# Patient Record
Sex: MALE | Race: WHITE | NOT HISPANIC OR LATINO | Employment: FULL TIME | URBAN - METROPOLITAN AREA
[De-identification: names, ages, dates, MRNs, and addresses within clinical notes are randomized per-mention and may not be internally consistent; named-entity substitution may affect disease eponyms.]

---

## 2017-01-06 ENCOUNTER — OFFICE VISIT (OUTPATIENT)
Dept: DERMATOLOGY | Facility: CLINIC | Age: 57
End: 2017-01-06

## 2017-01-06 DIAGNOSIS — L60.2 ONYCHAUXIS: Primary | ICD-10-CM

## 2017-01-06 DIAGNOSIS — L82.1 SK (SEBORRHEIC KERATOSIS): ICD-10-CM

## 2017-01-06 RX ORDER — LIDOCAINE HYDROCHLORIDE AND EPINEPHRINE 10; 10 MG/ML; UG/ML
3 INJECTION, SOLUTION INFILTRATION; PERINEURAL ONCE
Qty: 3 ML | Refills: 0 | OUTPATIENT
Start: 2017-01-06 | End: 2017-01-06

## 2017-01-06 RX ORDER — UREA 40 %
CREAM (GRAM) TOPICAL 2 TIMES DAILY
Qty: 198 G | Refills: 5 | Status: SHIPPED | OUTPATIENT
Start: 2017-01-06

## 2017-01-06 ASSESSMENT — PAIN SCALES - GENERAL: PAINLEVEL: NO PAIN (0)

## 2017-01-06 NOTE — Clinical Note
"1/6/2017       RE: Luigi Moore  [address]  [place] [address]     Dear Colleague,    Thank you for referring your patient, Luigi Moore, to the [facility] DERMATOLOGY at [facility]. Please see a copy of my visit note below.    [facility] Dermatology Note    Dermatology Problem List:  1. 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 Onychauxis s/p nail clipping  - urea 40% cream    CC:   Chief Complaint   Patient presents with     Derm Problem     uLigi comes to clinic stating he has toe fungus and an area of concern on his left temple.     Date of Service: Jan 6, 2017    History of Present Illness:  Mr. Luigi Moore is a 56 year old male who presents as a referral from Dr. Yuan for an evaluation of his feet and left temple. Today the patient reports that he has always had the \"stuff\" on his feet. He states that the right toe nail has fallen off a few times which he believes is due to his work. He also notes that his toenails are very thick and they look a \"little weird\". He states that he has a high mental distaste for his feet. He denies any pain or discomfort when he walks. He also reports that he did not notice pits on his fingernails before clinic today. He states that when he is hot in temperature he develops a pink rash on his upper chest, but this has never been of particular concern to him. The patient also reports there is a spot of concern on his temple and thigh that come and go as pink little spots. The patient reports no other lesions of concern at this time.    Otherwise, the patient reports no painful, bleeding, nonhealing, or pruritic lesions, and denies new or changing moles.    Past Medical History:   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)     Acquired hypothyroidism     Dyslipidemia     Essential hypertension     Hyperlipidemia     " Proliferative diabetic retinopathy (H)     Past Medical History   Diagnosis Date     Hypertension      Sleep apnea      Numbness and tingling      right hand     Diabetes (H)      TELLY (obstructive sleep apnea)      dx at least 10 years ago, got a new cpap machine 1 year ago. follows in [facility]     Depressive disorder      Hidradenoma dx: Feb 2015     right hand/2 surgeries     Hypothyroidism      Cancer (H)      Uncomplicated asthma      Acquired hypothyroidism 12/7/2016     Past Surgical History   Procedure Laterality Date     Soft tissue surgery       lipoma removal, below rib cage on right side     Orthopedic surgery Left      achilles tendon      Orthopedic surgery Right      hand surgery     Excise lesion hand Right 2/26/2015     Procedure: EXCISE LESION HAND;  Surgeon: Jeovany Jefferson MD;  Location: [facility] OR     C hand/finger surgery unlisted       Social History:  The patient works with [employer]. He bikes a lot during his free time.     Family History:  There is no family history of psoriasis.    Medications:  Current Outpatient Prescriptions   Medication Sig Dispense Refill     lisinopril (PRINIVIL/ZESTRIL) 40 MG tablet        insulin lispro (HUMALOG) 100 UNIT/ML injection Use as directed in pump (approximately 70-80 Units every day) 80 mL 3     buPROPion (WELLBUTRIN XL) 300 MG 24 hr tablet Take 1 tablet (300 mg) by mouth every morning 30 tablet 11     ARIPiprazole (ABILIFY) 15 MG tablet Take 1 tablet (15 mg) by mouth daily 30 tablet 11     rizatriptan (MAXALT) 10 MG tablet Take 1 tablet (10 mg) by mouth at onset of headache for migraine 12 tablet 11     atorvastatin (LIPITOR) 40 MG tablet Take 1 tablet (40 mg) by mouth daily 90 tablet 3     levothyroxine (SYNTHROID/LEVOTHROID) 137 MCG tablet Take one tablet daily. 90 tablet 3     glucagon (GLUCAGON EMERGENCY) 1 MG kit Inject 1 mg into the muscle once for 1 dose 1 mg 3     gabapentin (NEURONTIN) 400 MG capsule Take 1 capsule (400 mg) by mouth 3 times daily  270 capsule 3     blood glucose monitoring (SHEILA CONTOUR NEXT) test strip Use to test blood sugar 4 times daily 400 strip 3     ONE TOUCH TEST STRIPS test strip Use to test blood sugar 4 times daily or as directed. 400 strip 3     losartan-hydrochlorothiazide (HYZAAR) 100-25 MG per tablet Take 1 tablet by mouth daily 90 tablet 3     zolpidem (AMBIEN) 10 MG tablet Take 1 tablet at bedtime as needed for insomnia 90 tablet 3     tadalafil (CIALIS) 20 MG tablet Take 1 hr prior to sexual activity 10 tablet 11     acetaminophen-codeine (TYLENOL #3) 300-30 MG per tablet Take 1-2 tablets by mouth every 6 hours as needed for moderate pain 60 tablet 1     albuterol (PROAIR HFA, PROVENTIL HFA, VENTOLIN HFA) 108 (90 BASE) MCG/ACT inhaler Inhale 2 puffs into the lungs every 4 hours as needed for shortness of breath / dyspnea or wheezing 1 Inhaler 3     aspirin 81 MG tablet Take 1 tablet (81 mg) by mouth daily 100 tablet 3     ONE TOUCH ULTRA test strip Test  four times daily ultra blue (please schedule clinic appt) 400 strip 3     Allergies:  Allergies   Allergen Reactions     Contrast Dye Hives     Review of Systems:  - Skin: As above in HPI. No additional skin concerns.    Physical exam:  Vitals: There were no vitals taken for this visit.  GEN: This is a well developed, well-nourished male in no acute distress, in a pleasant mood.      SKIN: Focused examination of the hands, feet, upper chest was performed.  - All toenails but left 5th toenail demonstrate white to yellowish thickening of the nail plate with subungual hyperkeratosis on many toenails   - On the the left 2nd, 3rd toenail and right 2nd, 3rd hyponychia areas there is trauma hyperkeratosis and scaling  - No evidence of tinea pedis  - Finger nails notable for irregularly spaces and superficial pits on the right 2nd finger nail, solitary pit on the right 3rd fingernail, left 4th finger nail, multiple on left 2nd finger nail  - Pale pink erythematous patch on the left  upper chest  - Pink 3 mm stuck on papule  - No other lesions of concern on areas examined.     Impression/Plan:  1. Onychauxis   - Start urea 40% cream - apply topically to bid to surface of toenails  - Nail clipping done at clinic.  - Discussion that trauma can cause thick skin on the feet due to rubbing against shoes.  - Discussion of treatment options including oral and topical medications and the side effects.    2. Seborrheic keratosis, non inflamed  - Benign nature was discussed. No further intervention required at this time.           Follow-up pending nail clipping results, earlier for new or changing lesions.    Staff Involved:  Staff Only    Scribe Disclosure:   I, Ayla Dawn, am serving as a scribe to document services personally performed by Dr. Pablito Barahona, based on data collection and the provider's statements to me.       Again, thank you for allowing me to participate in the care of your patient.      Sincerely,    Pablito Barahona MD

## 2017-01-06 NOTE — NURSING NOTE
Dermatology Rooming Note    Luigi Moore's goals for this visit include:   Chief Complaint   Patient presents with     Derm Problem     Luigi comes to clinic stating he has toe fungus and an area of concern on his left temple.     Afsaneh Terry LPN

## 2017-01-06 NOTE — PROGRESS NOTES
"[facility] Dermatology Note    Dermatology Problem List:  1. 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 Onychauxis s/p nail clipping  - urea 40% cream    CC:   Chief Complaint   Patient presents with     Derm Problem     Luigi comes to clinic stating he has toe fungus and an area of concern on his left temple.     Date of Service: Jan 6, 2017    History of Present Illness:  Mr. Luigi Moore is a 56 year old male who presents as a referral from Dr. Yuan for an evaluation of his feet and left temple. Today the patient reports that he has always had the \"stuff\" on his feet. He states that the right toe nail has fallen off a few times which he believes is due to his work. He also notes that his toenails are very thick and they look a \"little weird\". He states that he has a high mental distaste for his feet. He denies any pain or discomfort when he walks. He also reports that he did not notice pits on his fingernails before clinic today. He states that when he is hot in temperature he develops a pink rash on his upper chest, but this has never been of particular concern to him. The patient also reports there is a spot of concern on his temple and thigh that come and go as pink little spots. The patient reports no other lesions of concern at this time.    Otherwise, the patient reports no painful, bleeding, nonhealing, or pruritic lesions, and denies new or changing moles.    Past Medical History:   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)     Acquired hypothyroidism     Dyslipidemia     Essential hypertension     Hyperlipidemia     Proliferative diabetic retinopathy (H)     Past Medical History   Diagnosis Date     Hypertension      Sleep apnea      Numbness and tingling      right hand     Diabetes (H)      TELLY (obstructive sleep apnea)      dx at least 10 years ago, got a new cpap machine 1 year ago. follows in " [facility] [place] [place]     Depressive disorder      Hidradenoma dx: Feb 2015     right hand/2 surgeries     Hypothyroidism      Cancer (H)      Uncomplicated asthma      Acquired hypothyroidism 12/7/2016     Past Surgical History   Procedure Laterality Date     Soft tissue surgery       lipoma removal, below rib cage on right side     Orthopedic surgery Left      achilles tendon      Orthopedic surgery Right      hand surgery     Excise lesion hand Right 2/26/2015     Procedure: EXCISE LESION HAND;  Surgeon: Jeovany Jefferson MD;  Location: [facility] OR     C hand/finger surgery unlisted       Social History:  The patient works with [employer]. He bikes a lot during his free time.     Family History:  There is no family history of psoriasis.    Medications:  Current Outpatient Prescriptions   Medication Sig Dispense Refill     lisinopril (PRINIVIL/ZESTRIL) 40 MG tablet        insulin lispro (HUMALOG) 100 UNIT/ML injection Use as directed in pump (approximately 70-80 Units every day) 80 mL 3     buPROPion (WELLBUTRIN XL) 300 MG 24 hr tablet Take 1 tablet (300 mg) by mouth every morning 30 tablet 11     ARIPiprazole (ABILIFY) 15 MG tablet Take 1 tablet (15 mg) by mouth daily 30 tablet 11     rizatriptan (MAXALT) 10 MG tablet Take 1 tablet (10 mg) by mouth at onset of headache for migraine 12 tablet 11     atorvastatin (LIPITOR) 40 MG tablet Take 1 tablet (40 mg) by mouth daily 90 tablet 3     levothyroxine (SYNTHROID/LEVOTHROID) 137 MCG tablet Take one tablet daily. 90 tablet 3     glucagon (GLUCAGON EMERGENCY) 1 MG kit Inject 1 mg into the muscle once for 1 dose 1 mg 3     gabapentin (NEURONTIN) 400 MG capsule Take 1 capsule (400 mg) by mouth 3 times daily 270 capsule 3     blood glucose monitoring (SHEILA CONTOUR NEXT) test strip Use to test blood sugar 4 times daily 400 strip 3     ONE TOUCH TEST STRIPS test strip Use to test blood sugar 4 times daily or as directed. 400 strip 3     losartan-hydrochlorothiazide (HYZAAR) 100-25 MG per  tablet Take 1 tablet by mouth daily 90 tablet 3     zolpidem (AMBIEN) 10 MG tablet Take 1 tablet at bedtime as needed for insomnia 90 tablet 3     tadalafil (CIALIS) 20 MG tablet Take 1 hr prior to sexual activity 10 tablet 11     acetaminophen-codeine (TYLENOL #3) 300-30 MG per tablet Take 1-2 tablets by mouth every 6 hours as needed for moderate pain 60 tablet 1     albuterol (PROAIR HFA, PROVENTIL HFA, VENTOLIN HFA) 108 (90 BASE) MCG/ACT inhaler Inhale 2 puffs into the lungs every 4 hours as needed for shortness of breath / dyspnea or wheezing 1 Inhaler 3     aspirin 81 MG tablet Take 1 tablet (81 mg) by mouth daily 100 tablet 3     ONE TOUCH ULTRA test strip Test  four times daily ultra blue (please schedule clinic appt) 400 strip 3     Allergies:  Allergies   Allergen Reactions     Contrast Dye Hives     Review of Systems:  - Skin: As above in HPI. No additional skin concerns.    Physical exam:  Vitals: There were no vitals taken for this visit.  GEN: This is a well developed, well-nourished male in no acute distress, in a pleasant mood.      SKIN: Focused examination of the hands, feet, upper chest was performed.  - All toenails but left 5th toenail demonstrate white to yellowish thickening of the nail plate with subungual hyperkeratosis on many toenails   - On the the left 2nd, 3rd toenail and right 2nd, 3rd hyponychia areas there is trauma hyperkeratosis and scaling  - No evidence of tinea pedis  - Finger nails notable for irregularly spaces and superficial pits on the right 2nd finger nail, solitary pit on the right 3rd fingernail, left 4th finger nail, multiple on left 2nd finger nail  - Pale pink erythematous patch on the left upper chest  - Pink 3 mm stuck on papule  - No other lesions of concern on areas examined.     Impression/Plan:  1. Onychauxis   - Start urea 40% cream - apply topically to bid to surface of toenails  - Nail clipping done today for PAS.   - ADDENDUM: negative for fungal elements  -  Discussion that trauma can cause thick skin on the feet due to rubbing against shoes.  - Discussion of treatment options including oral and topical medications and the side effects.    2. Seborrheic keratosis, non inflamed  - Benign nature was discussed. No further intervention required at this time.     Follow-up pending nail clipping results, earlier for new or changing lesions.    Staff Involved:  Staff Only    Scribe Disclosure:   I, Ayla Dawn, am serving as a scribe to document services personally performed by Dr. Pablito Barahona, based on data collection and the provider's statements to me.     Staff attestation:  The documentation recorded by the scribe accurately reflects the services I personally performed and the decisions I personally made.    Pablito Barahona MD  Staff Dermatologist    Department of Dermatology

## 2017-01-09 LAB — COPATH REPORT: NORMAL

## 2017-01-23 ENCOUNTER — OFFICE VISIT (OUTPATIENT)
Dept: UROLOGY | Facility: CLINIC | Age: 57
End: 2017-01-23

## 2017-01-23 VITALS
HEIGHT: 76 IN | SYSTOLIC BLOOD PRESSURE: 126 MMHG | WEIGHT: 235 LBS | BODY MASS INDEX: 28.62 KG/M2 | DIASTOLIC BLOOD PRESSURE: 77 MMHG | HEART RATE: 97 BPM

## 2017-01-23 DIAGNOSIS — N52.1 ERECTILE DYSFUNCTION DUE TO DISEASES CLASSIFIED ELSEWHERE: Primary | ICD-10-CM

## 2017-01-23 ASSESSMENT — PAIN SCALES - GENERAL: PAINLEVEL: NO PAIN (0)

## 2017-01-23 NOTE — MR AVS SNAPSHOT
After Visit Summary   1/23/2017    Luigi Moore    MRN: [id number]           Patient Information     Date Of Birth          1960        Visit Information        Provider Department      1/23/2017 9:00 AM Fidel Carlisle MD [facility] and [facility]        Today's Diagnoses     Erectile dysfunction due to diseases classified elsewhere    -  1       Care Instructions    Can try the Edex medicine as we discussed - would start with the 10 mcg dose - this will be half the full dose in the syringe we ordered.  Can increase to 15 mcg (3/4 of the full dose) or 20 mcg (the full dose) if necessary.    Goal is to have an erection that lasts 30 min to an hour - if you have a very rigid, firm erection that is not going away after 3 hrs you should contact us or to to your local ER.  You can apply ice packs to the inner things if you have an erection that is not resolving.            Follow-ups after your visit        Follow-up notes from your care team     Return in about 3 months (around 4/23/2017).      Your next 10 appointments already scheduled     Mar 17, 2017 12:00 PM   (Arrive by 11:45 AM)   RETURN FOOT/ANKLE with Tony Johnson DPM   [facility] ([facility])    [address] [place] [id number] [phone]            May 01, 2017  8:20 AM   (Arrive by 8:05 AM)   Return Visit with Fidel Carlisle MD   [facility] and [facility] ([facility])    [address] [place] [id number] [phone]              Who to contact     Please call your clinic at [phone] to:    Ask questions about your health    Make or cancel appointments    Discuss your medicines    Learn about your test results    Speak to your doctor   If you have compliments or concerns about an experience at your clinic, or if you wish  "to file a complaint, please contact [facility] Patient Relations at [phone] or email us at [email or web address]         Additional Information About Your Visit        [employer] [facility] Information     [employer] Experience gives you secure access to your electronic health record. If you see a primary care provider, you can also send messages to your care team and make appointments. If you have questions, please call your primary care clinic.  If you do not have a primary care provider, please call [phone] and they will assist you.      [employer] is an electronic gateway that provides easy, online access to your medical records. With [employer], you can request a clinic appointment, read your test results, renew a prescription or communicate with your care team.     To access your existing account, please contact your [facility] or call [phone] for assistance.        Care EveryWhere ID     This is your Care EveryWhere ID. This could be used by other organizations to access your [place] medical records  [id number]        Your Vitals Were     Pulse Height BMI (Body Mass Index)             97 1.93 m (6' 4\") 28.62 kg/m2          Blood Pressure from Last 3 Encounters:   01/23/17 126/77   12/19/16 129/76   12/07/16 157/78    Weight from Last 3 Encounters:   01/23/17 106.595 kg (235 lb)   12/19/16 106.278 kg (234 lb 4.8 oz)   12/16/16 106.369 kg (234 lb 8 oz)              Today, you had the following     No orders found for display         Today's Medication Changes          These changes are accurate as of: 1/23/17  9:32 AM.  If you have any questions, ask your nurse or doctor.               Start taking these medicines.        Dose/Directions    alprostadil 20 MCG kit   Commonly known as:  EDEX   Used for:  Erectile dysfunction due to diseases classified elsewhere   Started by:  Fidel Carlisle MD        Inject 10 to 20 ug as needed.   Quantity:  120 mcg "   Refills:  11         Stop taking these medicines if you haven't already. Please contact your care team if you have questions.     lisinopril 40 MG tablet   Commonly known as:  PRINIVIL/ZESTRIL   Stopped by:  Fidel Carlisle MD                Where to get your medicines      Some of these will need a paper prescription and others can be bought over the counter.  Ask your nurse if you have questions.     Bring a paper prescription for each of these medications    - alprostadil 20 MCG kit             Primary Care Provider Office Phone # Fax #    Donn Panfilo Yuan [phone]        [facility] [address]  [facility] [address]        Thank you!     Thank you for choosing [facility]OLOG[facility] AND [facility] PROST[facility] UROLOGIC [facility]CERS  for your care. Our goal is always to provide you with excellent care. Hearing back from our patients is one way we can continue to improve our services. Please take a few minutes to complete the written survey that you may receive in the mail after your visit with us. Thank you!             Your Updated Medication List - Protect others around you: Learn how to safely use, store and throw away your medicines at www.disposemymeds.org.          This list is accurate as of: 1/23/17  9:32 AM.  Always use your most recent med list.                   Brand Name Dispense Instructions for use    acetaminophen-codeine 300-30 MG per tablet    TYLENOL #3    60 tablet    Take 1-2 tablets by mouth every 6 hours as needed for moderate pain       albuterol 108 (90 BASE) MCG/ACT Inhaler    PROAIR HFA/PROVENTIL HFA/VENTOLIN HFA    1 Inhaler    Inhale 2 puffs into the lungs every 4 hours as needed for shortness of breath / dyspnea or wheezing       alprostadil 20 MCG kit    EDEX    120 mcg    Inject 10 to 20 ug as needed.       ARIPiprazole 15 MG tablet    ABILIFY    30 tablet    Take 1 tablet (15 mg) by mouth daily       aspirin 81 MG tablet     100 tablet    Take 1 tablet  (81 mg) by mouth daily       atorvastatin 40 MG tablet    LIPITOR    90 tablet    Take 1 tablet (40 mg) by mouth daily       buPROPion 300 MG 24 hr tablet    WELLBUTRIN XL    30 tablet    Take 1 tablet (300 mg) by mouth every morning       gabapentin 400 MG capsule    NEURONTIN    270 capsule    Take 1 capsule (400 mg) by mouth 3 times daily       glucagon 1 MG kit    GLUCAGON EMERGENCY    1 mg    Inject 1 mg into the muscle once for 1 dose       insulin lispro 100 UNIT/ML injection    humaLOG    80 mL    Use as directed in pump (approximately 70-80 Units every day)       levothyroxine 137 MCG tablet    SYNTHROID/LEVOTHROID    90 tablet    Take one tablet daily.       losartan-hydrochlorothiazide 100-25 MG per tablet    HYZAAR    90 tablet    Take 1 tablet by mouth daily       * ONE TOUCH ULTRA test strip   Generic drug:  blood glucose monitoring     400 strip    Test  four times daily ultra blue (please schedule clinic appt)       * blood glucose monitoring test strip    SHEILA CONTOUR NEXT    400 strip    Use to test blood sugar 4 times daily       * ONE TOUCH TEST STRIPS test strip   Generic drug:  blood glucose monitoring     400 strip    Use to test blood sugar 4 times daily or as directed.       rizatriptan 10 MG tablet    MAXALT    12 tablet    Take 1 tablet (10 mg) by mouth at onset of headache for migraine       tadalafil 20 MG tablet    CIALIS    10 tablet    Take 1 hr prior to sexual activity       Urea 40 % Crea     198 g    Externally apply topically 2 times daily To surface of toenails       zolpidem 10 MG tablet    AMBIEN    90 tablet    Take 1 tablet at bedtime as needed for insomnia       * Notice:  This list has 3 medication(s) that are the same as other medications prescribed for you. Read the directions carefully, and ask your doctor or other care provider to review them with you.

## 2017-01-23 NOTE — Clinical Note
1/23/2017       RE: Luigi Moore  [address]  [place] [address]     Dear Colleague,    Thank you for referring your patient, Luigi Moore, to the [facility] UROLOGY AND INST FOR PROSTATE AND UROLOGIC CANCERS at [facility]. Please see a copy of my visit note below.    DIAGNOSIS:  A 56-year-old man with erectile dysfunction, here for a trial and teaching of penile injection therapy.      HISTORY OF PRESENT ILLNESS:  We had seen Mr. Moore in mid December for erectile dysfunction that did not respond to phosphodiesterase inhibitors.  We discussed treatment options with him and he was interested in a trial of injection therapy.      He returns today for instruction in penile injection therapy and for a trial injection in clinic.      He is feeling well.      We reviewed the risks of injection therapy including the risks of bleeding, infection, prolonged erection and fibrosis.  After discussing the indications and risks, he would like to proceed.      PHYSICAL EXAMINATION:  On exam today he appears well.  His weight is 235 pounds, pulse 97, blood pressure 126/77.        PROCEDURE:  Under my supervision and direction, we instructed him in how to prepare an injection of Edex brand of alprostadil.  We had him inject 10 mcg into the right lateral shaft of the penis.  He had no difficulty with the injection.       After about 10 minutes he was getting partial tumescence.  He would rate it about 5 on a scale of    0-10.  He says this is more than he would get at home just with sexual stimulation.  It is about the same that he was getting with the phosphodiesterase inhibitors.  He was not experiencing any significant pain following the injection.       ASSESSMENT:  The patient is a 56-year-old man with erectile dysfunction unresponsive to phosphodiesterase inhibitors.  The patient has multiple risk factors including age, type 1 diabetes and hypertension as well as  depression.      I suggested to Mr. Moore that we give him the Edex 20 mcg kit.  We will start with the 10 mcg dose at home initially, which is the dose he used here today.  He may get a better response at home under different circumstances.  He can titrate the dose up to 15 and to 20 mcg as necessary.  We discussed the goal was to get an erection firm enough for intercourse that lasts about a half an hour to an hour.  He understands if he gets a very rigid, hard erection which is not resolving after 3 hours he needs to go to the emergency room or contact us.  We did discuss that he could use ice packs to the inner thighs if necessary if he has an erection that is not resolving.       PLAN:   1.  Trial of Edex 20 mcg kit, titrating up from 10 up to 20 mcg as necessary.   2.  The patient will contact us if he has questions or problems.    3.  Otherwise, we will plan to see him back in 3 months to follow up.     Again, thank you for allowing me to participate in the care of your patient.      Sincerely,    Fidel Carlisle MD

## 2017-01-23 NOTE — NURSING NOTE
"Chief Complaint   Patient presents with     RECHECK     Follow up- erectile dysfunction with injection teaching       Initial Ht 1.93 m (6' 4\")  Wt 106.595 kg (235 lb)  BMI 28.62 kg/m2 Estimated body mass index is 28.62 kg/(m^2) as calculated from the following:    Height as of this encounter: 1.93 m (6' 4\").    Weight as of this encounter: 106.595 kg (235 lb).  BP completed using cuff size: FALLON Lepe    "

## 2017-01-23 NOTE — PATIENT INSTRUCTIONS
Can try the Edex medicine as we discussed - would start with the 10 mcg dose - this will be half the full dose in the syringe we ordered.  Can increase to 15 mcg (3/4 of the full dose) or 20 mcg (the full dose) if necessary.    Goal is to have an erection that lasts 30 min to an hour - if you have a very rigid, firm erection that is not going away after 3 hrs you should contact us or to to your local ER.  You can apply ice packs to the inner things if you have an erection that is not resolving.

## 2017-01-23 NOTE — PROGRESS NOTES
DIAGNOSIS:  A 56-year-old man with erectile dysfunction, here for a trial and teaching of penile injection therapy.      HISTORY OF PRESENT ILLNESS:  We had seen Mr. Moore in mid December for erectile dysfunction that did not respond to phosphodiesterase inhibitors.  We discussed treatment options with him and he was interested in a trial of injection therapy.      He returns today for instruction in penile injection therapy and for a trial injection in clinic.      He is feeling well.      We reviewed the risks of injection therapy including the risks of bleeding, infection, prolonged erection and fibrosis.  After discussing the indications and risks, he would like to proceed.      PHYSICAL EXAMINATION:  On exam today he appears well.  His weight is 235 pounds, pulse 97, blood pressure 126/77.        PROCEDURE:  Under my supervision and direction, we instructed him in how to prepare an injection of Edex brand of alprostadil.  We had him inject 10 mcg into the right lateral shaft of the penis.  He had no difficulty with the injection.       After about 10 minutes he was getting partial tumescence.  He would rate it about 5 on a scale of    0-10.  He says this is more than he would get at home just with sexual stimulation.  It is about the same that he was getting with the phosphodiesterase inhibitors.  He was not experiencing any significant pain following the injection.       ASSESSMENT:  The patient is a 56-year-old man with erectile dysfunction unresponsive to phosphodiesterase inhibitors.  The patient has multiple risk factors including age, type 1 diabetes and hypertension as well as depression.      I suggested to Mr. Moore that we give him the Edex 20 mcg kit.  We will start with the 10 mcg dose at home initially, which is the dose he used here today.  He may get a better response at home under different circumstances.  He can titrate the dose up to 15 and to 20 mcg as necessary.  We discussed the goal was to  get an erection firm enough for intercourse that lasts about a half an hour to an hour.  He understands if he gets a very rigid, hard erection which is not resolving after 3 hours he needs to go to the emergency room or contact us.  We did discuss that he could use ice packs to the inner thighs if necessary if he has an erection that is not resolving.       PLAN:   1.  Trial of Edex 20 mcg kit, titrating up from 10 up to 20 mcg as necessary.   2.  The patient will contact us if he has questions or problems.    3.  Otherwise, we will plan to see him back in 3 months to follow up.

## 2017-02-28 ENCOUNTER — TELEPHONE (OUTPATIENT)
Dept: ENDOCRINOLOGY | Facility: CLINIC | Age: 57
End: 2017-02-28

## 2017-02-28 NOTE — TELEPHONE ENCOUNTER
Received refill request for Tylenol #3 that  Luigi  has been getting from his Endocrinologist that has retired.  I left a message for Luigi that he needs to establish care with  A PCP  for  Management of  Pain  Or contact  another provider  for refills. If a PCP is needed we can help him get  established. .

## 2017-03-13 DIAGNOSIS — E10.8 TYPE 1 DIABETES MELLITUS WITH COMPLICATIONS (H): ICD-10-CM

## 2017-03-13 NOTE — TELEPHONE ENCOUNTER
acetaminophen-codeine (TYLENOL #3) 300-30 MG per tablet      Last Written Prescription Date:  6/7/2016  Last Fill Quantity: 60,   # refills: 1  Last Office Visit : 12/7/2016  Future Office visit:  Not scheduled    Routing refill request to on-call provider for review/approval because:  Drug controlled substance.  Previous patient of Dr Yuan's and not established with another provider yet.

## 2017-03-16 ASSESSMENT — ACTIVITIES OF DAILY LIVING (ADL)
ARE_THERE_SMOKE_DETECTORS_IN_YOUR_HOME?: Y
DO_MEMBERS_OF_YOUR_HOUSEHOLD_WEAR_SEAT_BELTS?: Y
DO_MEMBERS_OF_YOUR_HOUSEHOLD_USE_SUNSCREEN?: Y
ARE_THERE_CARBON_MONOXIDE_DETECTORS_IN_YOUR_HOME?: Y
DO_MEMBERS_OF_YOUR_HOUSEHOLD_USE_SAFETY_HELMETS?: Y
ARE_THERE_FIREARMS_IN_YOUR_HOME?: N

## 2017-03-22 ENCOUNTER — OFFICE VISIT (OUTPATIENT)
Dept: ENDOCRINOLOGY | Facility: CLINIC | Age: 57
End: 2017-03-22

## 2017-03-22 ENCOUNTER — OFFICE VISIT (OUTPATIENT)
Dept: INTERNAL MEDICINE | Facility: CLINIC | Age: 57
End: 2017-03-22

## 2017-03-22 VITALS
DIASTOLIC BLOOD PRESSURE: 76 MMHG | HEART RATE: 94 BPM | SYSTOLIC BLOOD PRESSURE: 142 MMHG | WEIGHT: 237 LBS | BODY MASS INDEX: 28.86 KG/M2 | HEIGHT: 76 IN

## 2017-03-22 VITALS
DIASTOLIC BLOOD PRESSURE: 81 MMHG | SYSTOLIC BLOOD PRESSURE: 128 MMHG | HEART RATE: 91 BPM | OXYGEN SATURATION: 96 % | RESPIRATION RATE: 16 BRPM | TEMPERATURE: 98 F | HEIGHT: 76 IN | WEIGHT: 237 LBS | BODY MASS INDEX: 28.86 KG/M2

## 2017-03-22 DIAGNOSIS — E10.8 TYPE 1 DIABETES MELLITUS WITH COMPLICATION (H): Primary | ICD-10-CM

## 2017-03-22 DIAGNOSIS — E10.8 TYPE 1 DIABETES MELLITUS WITH COMPLICATIONS (H): ICD-10-CM

## 2017-03-22 DIAGNOSIS — Z23 NEED FOR DIPHTHERIA-TETANUS-PERTUSSIS (TDAP) VACCINE, ADULT/ADOLESCENT: ICD-10-CM

## 2017-03-22 DIAGNOSIS — G47.33 OSA (OBSTRUCTIVE SLEEP APNEA): ICD-10-CM

## 2017-03-22 DIAGNOSIS — Z23 NEED FOR PNEUMOCOCCAL VACCINATION: ICD-10-CM

## 2017-03-22 DIAGNOSIS — J45.990 EXERCISE INDUCED BRONCHOSPASM: ICD-10-CM

## 2017-03-22 DIAGNOSIS — Z12.11 SCREENING FOR COLON CANCER: ICD-10-CM

## 2017-03-22 DIAGNOSIS — Z76.89 ENCOUNTER TO ESTABLISH CARE: Primary | ICD-10-CM

## 2017-03-22 LAB — HBA1C MFR BLD: 8 % (ref 4.3–6)

## 2017-03-22 RX ORDER — ALBUTEROL SULFATE 90 UG/1
2 AEROSOL, METERED RESPIRATORY (INHALATION) EVERY 6 HOURS
Qty: 1 INHALER | Refills: 3 | Status: SHIPPED | OUTPATIENT
Start: 2017-03-22 | End: 2018-04-30

## 2017-03-22 ASSESSMENT — PAIN SCALES - GENERAL: PAINLEVEL: NO PAIN (0)

## 2017-03-22 NOTE — PATIENT INSTRUCTIONS
Temp basal to 50% during biking.     Start taking bolus insulin BEFORE you eat.     No other changes with the pump.     Labs before your next visit.

## 2017-03-22 NOTE — LETTER
3/22/2017       RE: Luigi Moore  [address]  [place] [address]     Dear Colleague,    Thank you for referring your patient, Luigi Moore, to the [facility] ENDOCRINOLOGY at [facility]. Please see a copy of my visit note below.    Endocrinology Clinic Visit      March 22, 2017      Chief Complaint: RECHECK (HYPOTHYROID AND DIABETES TYPE 1 F/U )       Subjective:         HPI: Luigi Moore is a 56 year old year old Male with history of  has a past medical history of Acquired hypothyroidism (12/7/2016); Cancer (H); Depressive disorder; Diabetes (H); Hidradenoma (dx: Feb 2015); Hypertension; Hypothyroidism; Numbness and tingling; TELLY (obstructive sleep apnea); Sleep apnea; and Uncomplicated asthma. who is seen in consultation at  No primary care provider on file.'s request for Type-1 Diabetes Mellitus.   Maurice works at [employer] from about 10:30 p.m. to 9:00 a.m. He would then sleep from about 11:00 a.m. to 6:00 p.m. He reported checking blood glucose 4 times daily.   Maurice has a [employer] continuous glucose monitoring system which he wore some of the time. He had the system set for 70 mg/dL threshold suspend, 80 mg/dL low alert and 240 mg/dL high alert.   Barriers to achieve glycemic goals:   Review of data shows following:   Hypoglycemia:  1. Hyperglycemia preceding hypoglycemia. Main reason is patient does not bolus until BG is high. This leads to hypoglycemia in a few hours.   2. Overcorrection  Hyperglycemia:   3. Delayed bolusing  4. Delayed site change  5. Infrequent rewind.     History of Diabetes  Type 1      Complications  1. Retinopathy: Background retinopathy    2.  Nephropathy: + on ACEI  Lab Results   Component Value Date    MICROL 6 12/07/2016     3. Neuropathy   Last monofilament testing: mild diminished sensation.     4. Hypoglycemia 2/week.     5. Macrovascular: No    Treatment  Diabetes Medication(s)     Diabetic Other Sig    glucagon (GLUCAGON  EMERGENCY) 1 MG kit Inject 1 mg into the muscle once for 1 dose    Insulin Sig    insulin lispro (HUMALOG) 100 UNIT/ML injection Use as directed in pump (approximately 70-80 Units every day)            Prevention  Lipid  LDL Cholesterol Calculated   Date Value Ref Range Status   08/05/2014 78 0 - 129 mg/dL Final     Comment:     LDL Cholesterol is the primary guide to therapy: LDL-cholesterol goal in high   risk patients is <100 mg/dL and in very high risk patients is <70 mg/dL.     07/17/2012 117 0 - 129 mg/dL Final     Comment:     LDL Cholesterol is the primary guide to therapy: LDL-cholesterol goal in high   risk patients is <100 mg/dL and in very high risk patients is <70 mg/dL.     LDL Cholesterol Direct   Date Value Ref Range Status   12/07/2016 140 (H) <100 mg/dL Final     Comment:     Above desirable:  100-129 mg/dl   Borderline High:  130-159 mg/dL   High:             160-189 mg/dL   Very high:       >189 mg/dl     02/17/2015 103 0 - 129 mg/dL Final     Comment:     Optimal:         <100 mg/dL   Near Optimal:     100-129 mg/dL   Borderline High:  130-159 mg/dL   High:             160-189 mg/dL   Very high:  greater than or equal to 190 mg/dL   Cannot estimate LDL when triglyceride exceeds 400 mg/dL         Medications     HMG CoA Reductase Inhibitors    atorvastatin (LIPITOR) 40 MG tablet    Salicylates    aspirin 81 MG tablet          Renal  Medication (Note: This includes the hypertensive combination subclass to make sure to show all ACEI/ARB's.)     Antihypertensive Combinations Sig    losartan-hydrochlorothiazide (HYZAAR) 100-25 MG per tablet Take 1 tablet by mouth daily            Weight  Wt Readings from Last 4 Encounters:   03/22/17 107.5 kg (237 lb)   01/23/17 106.6 kg (235 lb)   12/19/16 106.3 kg (234 lb 4.8 oz)   12/16/16 106.4 kg (234 lb 8 oz)       Pump Data  Humalog insulin in a [employer] 532 insulin pump at a basal rate of 1.2 units per hour. When at work he often used a temporary basal rate  that was 60% of usual and 50% when he bikes.  In addition, he took premeal bolus insulin using 1 unit for each 7 grams of carbohydrate with 1 additional unit for each 20 mg/dL blood glucose was above 120 mg/dL.     Meter Download Summary: No meter    Diet: 2 meals and a snack    Exercise Bike    Weight trend  Wt Readings from Last 4 Encounters:   03/22/17 107.5 kg (237 lb)   01/23/17 106.6 kg (235 lb)   12/19/16 106.3 kg (234 lb 4.8 oz)   12/16/16 106.4 kg (234 lb 8 oz)       A1c today is 8  Lab Results   Component Value Date    A1C 8.7 12/07/2016    A1C 8.1 02/17/2015    A1C 10.0 01/17/2013        Allergies   Allergen Reactions     Contrast Dye Hives       Current Outpatient Prescriptions   Medication Sig Dispense Refill     acetaminophen-codeine (TYLENOL #3) 300-30 MG per tablet Take 1-2 tablets by mouth every 6 hours as needed for moderate pain 60 tablet 1     alprostadil (EDEX) 20 MCG kit Inject 10 to 20 ug as needed. 120 mcg 11     Urea 40 % CREA Externally apply topically 2 times daily To surface of toenails 198 g 5     insulin lispro (HUMALOG) 100 UNIT/ML injection Use as directed in pump (approximately 70-80 Units every day) 80 mL 3     buPROPion (WELLBUTRIN XL) 300 MG 24 hr tablet Take 1 tablet (300 mg) by mouth every morning 30 tablet 11     ARIPiprazole (ABILIFY) 15 MG tablet Take 1 tablet (15 mg) by mouth daily 30 tablet 11     rizatriptan (MAXALT) 10 MG tablet Take 1 tablet (10 mg) by mouth at onset of headache for migraine 12 tablet 11     atorvastatin (LIPITOR) 40 MG tablet Take 1 tablet (40 mg) by mouth daily 90 tablet 3     levothyroxine (SYNTHROID/LEVOTHROID) 137 MCG tablet Take one tablet daily. 90 tablet 3     gabapentin (NEURONTIN) 400 MG capsule Take 1 capsule (400 mg) by mouth 3 times daily 270 capsule 3     blood glucose monitoring (SHEILA CONTOUR NEXT) test strip Use to test blood sugar 4 times daily 400 strip 3     ONE TOUCH TEST STRIPS test strip Use to test blood sugar 4 times daily or as  directed. 400 strip 3     losartan-hydrochlorothiazide (HYZAAR) 100-25 MG per tablet Take 1 tablet by mouth daily 90 tablet 3     zolpidem (AMBIEN) 10 MG tablet Take 1 tablet at bedtime as needed for insomnia 90 tablet 3     tadalafil (CIALIS) 20 MG tablet Take 1 hr prior to sexual activity 10 tablet 11     albuterol (PROAIR HFA, PROVENTIL HFA, VENTOLIN HFA) 108 (90 BASE) MCG/ACT inhaler Inhale 2 puffs into the lungs every 4 hours as needed for shortness of breath / dyspnea or wheezing 1 Inhaler 3     aspirin 81 MG tablet Take 1 tablet (81 mg) by mouth daily 100 tablet 3     ONE TOUCH ULTRA test strip Test  four times daily ultra blue (please schedule clinic appt) 400 strip 3     glucagon (GLUCAGON EMERGENCY) 1 MG kit Inject 1 mg into the muscle once for 1 dose 1 mg 3       Review of Systems     Constitutional: Negative for fever and unexpected weight change. Appetite normal   Head: no headache   Eye: no vision change/ loss of peripheral vision.   ENT: No throat congestion.   Respiratory: no cough   Cardiovascular: no chest pain or tachycardia  Gastrointestinal: Negative for vomiting, abdominal pain and diarrhea.  Genitourinary: No bladder problems.  Musculoskeletal: Negative for myalgias. No weakness.  Neurological: Negative for seizures and headaches.  Psychiatric/Behavioral: Negative for behavioral problem and dysphoric mood.    Past Medical History:   Diagnosis Date     Acquired hypothyroidism 12/7/2016     Cancer (H)      Depressive disorder      Diabetes (H)      Hidradenoma dx: Feb 2015    right hand/2 surgeries     Hypertension      Hypothyroidism      Numbness and tingling     right hand     TELLY (obstructive sleep apnea)     dx at least 10 years ago, got a new cpap machine 1 year ago. [place] [facility]     Sleep apnea      Uncomplicated asthma        Past Surgical History:   Procedure Laterality Date     C HAND/FINGER SURGERY UNLISTED       EXCISE LESION HAND Right 2/26/2015    Procedure: EXCISE LESION HAND;   "Surgeon: Jeovany Jefferson MD;  Location: [facility] OR     ORTHOPEDIC SURGERY Left     achilles tendon      ORTHOPEDIC SURGERY Right     hand surgery     SOFT TISSUE SURGERY      lipoma removal, below rib cage on right side       Family History   Problem Relation Age of Onset     Other - See Comments Mother      pancreatitis     Substance Abuse Mother      MENTAL ILLNESS Mother      Depression Mother      Hypertension Mother      Obesity Mother      Asthma Father      CANCER Father       of leukemia, also had a h/o asthma     DIABETES Father      Other Cancer Father      Soft Tissue Cancer Father      Melanoma Father        Social History     Social History     Marital status:      Spouse name: N/A     Number of children: N/A     Years of education: N/A     Social History Main Topics     Smoking status: Never Smoker     Smokeless tobacco: Never Used     Alcohol use No      Comment: History of alcohol abuse/sober for 11months     Drug use: No     Sexual activity: Yes     Partners: Female     Birth control/ protection: Condom, Pill     Other Topics Concern     Not on file     Social History Narrative    Works at [employer], inside job    No unusual exposures    Has 1 dog 3 cats    [place] [place] [facility], in a house    Hardwood floors           Objective:   /76  Pulse 94  Ht 1.93 m (6' 4\")  Wt 107.5 kg (237 lb)  BMI 28.85 kg/m2  Constitutional: Appears well-developed and well-nourished. Active.   EYES: anicteric, normal extra-ocular movements, no lid lag or retraction   HEENT: Mouth/Throat: Mucous membrane is moist. Oropharynx is clear. No adenopathy. Normal thyroid   Cardiovascular: RRR, S1, S2 normal. No m/g/r   Pulmonary/Chest: CTAB. No wheezing or rales   Abdominal: +BS. Nontender to palpation. No organomegaly present.  Neurological: Alert. Normal affect. CNII-XII intact. Muscle strength 5/5. Sensory is intact.  Extremities: No clubbing, cyanosis or inflammation   Skin: normal texture, color  Feet: Callus+ " no toe infection (per podiatry) intact monofilament  Lymphatic: no cervical lymphadenopathy.  Psychological: appropriate mood     In House Labs:   Lab Results   Component Value Date    A1C 8.7 12/07/2016    A1C 8.1 02/17/2015    A1C 10.0 01/17/2013       TSH   Date Value Ref Range Status   12/07/2016 2.42 0.40 - 4.00 mU/L Final   07/29/2015 1.11 0.40 - 4.00 mU/L Final   02/17/2015 2.34 0.40 - 4.00 mU/L Final     Comment:     Effective 7/30/2014, the reference range for this assay has changed to reflect   new instrumentation/methodology.     08/05/2014 2.34 0.40 - 4.00 mU/L Final     Comment:     Effective 7/30/2014, the reference range for this assay has changed to reflect   new instrumentation/methodology.     05/01/2013 2.89 0.4 - 5.0 mU/L Final     T4 Free   Date Value Ref Range Status   12/07/2016 1.33 0.76 - 1.46 ng/dL Final   02/17/2015 1.04 0.76 - 1.46 ng/dL Final     Comment:     Effective 7/30/2014, the reference range for this assay has changed to reflect   new instrumentation/methodology.     02/25/2011 1.31 0.70 - 1.85 ng/dL Final       Creatinine   Date Value Ref Range Status   12/07/2016 0.74 0.66 - 1.25 mg/dL Final   ]    Recent Labs   Lab Test  12/07/16   1341  02/17/15   1611   07/17/12   0715   CHOL   --    --    --   194   HDL   --    --    --   56   LDL  140*  103   < >  117   TRIG   --    --    --   104   CHOLHDLRATIO   --    --    --   3.5    < > = values in this interval not displayed.       No results found for: [id number], [id number], [id number]      Assessment/Treatment Plan:      Luigi Moore is a 56 year old year old male with    1. Type 1 Diabetes with triopathy:    A1c is 8.0 today.      Barriers to achieving glycemic goals: Major issue is lack of bolus at the start of meal leading to hyperglycemia, overcorrection after that leading to hypoglycemia.     Hypoglycemia risks  1. Hyperglycemia preceding hypoglycemia. Main reason is patient does not bolus until BG is high. This leads to  hypoglycemia in a few hours.   2. Overcorrection  Hyperglycemia risks  3. Delayed bolusing  4. Delayed site change  5. Infrequent rewind.     Patient Instructions   Temp basal to 50% during biking.     Start taking bolus insulin BEFORE you eat.     No other changes with the pump.     Labs before your next visit.   .     Hypothyroidism  Continue LT4    FU labs prior to next visit.     I will contact the patient with the test results.  Return to clinic in 4 months.    Loraine Szymanski MD  [id number]  Endocrinology Service    Test and/or medications prescribed today:  No orders of the defined types were placed in this encounter.    Again, thank you for allowing me to participate in the care of your patient.      Sincerely,    Loraine Szymanski MD

## 2017-03-22 NOTE — PATIENT INSTRUCTIONS
[facility] Medication Refill Request Information:  * Please contact your pharmacy regarding ANY request for medication refills.  ** [facility] Prescription Fax = [phone]  * Please allow 3 business days for routine medication refills.  * Please allow 5 business days for controlled substance medication refills.     [facility] Test Result notification information:  *You will be notified with in 7-10 days of your appointment day regarding the results of your test.  If you are on MyChart you will be notified as soon as the provider has reviewed the results and signed off on them.    [facility] [phone]     Endoscopy Screening Tool    Your Provider is requesting that you have a Colonoscopy procedure.    If you answer yes to any of the following 9 questions, your procedure will be done at the [facility] - [phone].    No 1) PATIENT IS UNDER 16 YEARS OF AGE?  No 2) PATIENT HAS A BLEEDING DISORDER?  No 3) PATIENT IS TAKING MEDICATIONS THAT THIN THE BLOOD TO IMPAIR CLOTTING?  No 4) PATIENT HAS HAD A HEART OR LUNG TRANSPLANT?  No 5) PATIENT HAS A PACE MAKER WITH A DEFIBRILLATOR?  No 6) PATIENT HAS CHEST PAIN OR FREQUENT USE OF NITROGLYCERIN?  No 7) PATIENT HAS CARDIOVASCULAR PROBLEMS OR CONGESTIVE HEART FAILURE?  No 8) PATIENT HAS ESOPHAGEAL VARICES OR COPD?  No 9) PATIENT USES HOME OXYGEN CONTINUOUSLY?    If none of those 9 questions apply to you, your procedure will be done at the [facility] ([facility])  [address] [place] [address], [address]  [place] [address]  [phone].    Please answer the following questions to provide schedulers information to note for the providers performing the procedure:  No 10) PATIENT HAS KIDNEY DISEASE OR KIDNEY FAILURE?  Yes11) IS THE PATIENT DIABETIC?  NA 12) FOR FEMALE PATIENTS, IS THE PATIENT PREGNANT?    Additional Information that is needed:  Pharmacy    [id number] - [place], [place] - [address] AT [facility] [address] &  [facility] [address] [place], [place] - [address] AT [facility] [address] [facility] (of Pharmacy): Noe  Phone Number (of Pharmacy):     What is best time of day; 9-11am,  and best phone number: [phone] to reach patient.

## 2017-03-22 NOTE — NURSING NOTE
"Performed A1C test - patient tolerated well.    Ludmila Tovar CMA       Chief Complaint   Patient presents with     RECHECK     HYPOTHYROID AND DIABETES TYPE 1 F/U        Initial /76  Pulse 94  Ht 1.93 m (6' 4\")  Wt 107.5 kg (237 lb)  BMI 28.85 kg/m2 Estimated body mass index is 28.85 kg/(m^2) as calculated from the following:    Height as of this encounter: 1.93 m (6' 4\").    Weight as of this encounter: 107.5 kg (237 lb).  Medication Reconciliation: complete       Ludmila Tovar CMA     "

## 2017-03-22 NOTE — NURSING NOTE
Immunization(s) was given, tolerated well. See immunizations for more details. Yin Faust LPN at 1:17 PM on 3/22/2017.

## 2017-03-22 NOTE — NURSING NOTE
Chief Complaint   Patient presents with     Establish Care     Here to establish care     Jasper Martinez CMA at 10:51 AM on 3/22/2017

## 2017-03-22 NOTE — PROGRESS NOTES
Luigi Moore is a 56 year old male who comes in for    CC: establish care  HPI:  Mr. Moore presents to establish care with primary care and to catch up on health maintenance; requesting Hepatitis C screening, pneumonia vaccination, Tdap. Is also due for colonoscopy (has never had this done, does not have any symptoms), and he believes he is also due for a diabetic eye exam.   He had a foot exam this morning during his endocrinology appointment. He has a hx of T1DM (diagnosed at age 22) with proliferative diabetic retinopathy, and uses an insulin pump and [employer] continuous glucose monitor. Today's HgbA1C 8.0. Routine labs ordered today, will return to clinic fasting for these prior to his next endo visit in 4 months.    Uses Cpap, since about 2004 for sleep apnea. Requesting an order for a new machine today.  Exercise--bikes 3-4 x per week. Wears a helmet 100% of the time.    Other issues discussed today:     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)     Acquired hypothyroidism     Dyslipidemia     Essential hypertension     Hyperlipidemia     Proliferative diabetic retinopathy (H)       Current Outpatient Prescriptions   Medication Sig Dispense Refill     acetaminophen-codeine (TYLENOL #3) 300-30 MG per tablet Take 1-2 tablets by mouth every 6 hours as needed for moderate pain 60 tablet 1     alprostadil (EDEX) 20 MCG kit Inject 10 to 20 ug as needed. 120 mcg 11     Urea 40 % CREA Externally apply topically 2 times daily To surface of toenails 198 g 5     insulin lispro (HUMALOG) 100 UNIT/ML injection Use as directed in pump (approximately 70-80 Units every day) 80 mL 3     buPROPion (WELLBUTRIN XL) 300 MG 24 hr tablet Take 1 tablet (300 mg) by mouth every morning 30 tablet 11     ARIPiprazole (ABILIFY) 15 MG tablet Take 1 tablet (15 mg) by mouth daily 30 tablet 11     rizatriptan (MAXALT) 10 MG tablet Take 1 tablet (10 mg) by mouth at onset of headache for  migraine 12 tablet 11     atorvastatin (LIPITOR) 40 MG tablet Take 1 tablet (40 mg) by mouth daily 90 tablet 3     levothyroxine (SYNTHROID/LEVOTHROID) 137 MCG tablet Take one tablet daily. 90 tablet 3     gabapentin (NEURONTIN) 400 MG capsule Take 1 capsule (400 mg) by mouth 3 times daily 270 capsule 3     blood glucose monitoring (SHEILA CONTOUR NEXT) test strip Use to test blood sugar 4 times daily 400 strip 3     ONE TOUCH TEST STRIPS test strip Use to test blood sugar 4 times daily or as directed. 400 strip 3     losartan-hydrochlorothiazide (HYZAAR) 100-25 MG per tablet Take 1 tablet by mouth daily 90 tablet 3     zolpidem (AMBIEN) 10 MG tablet Take 1 tablet at bedtime as needed for insomnia 90 tablet 3     tadalafil (CIALIS) 20 MG tablet Take 1 hr prior to sexual activity 10 tablet 11     albuterol (PROAIR HFA, PROVENTIL HFA, VENTOLIN HFA) 108 (90 BASE) MCG/ACT inhaler Inhale 2 puffs into the lungs every 4 hours as needed for shortness of breath / dyspnea or wheezing 1 Inhaler 3     aspirin 81 MG tablet Take 1 tablet (81 mg) by mouth daily 100 tablet 3     ONE TOUCH ULTRA test strip Test  four times daily ultra blue (please schedule clinic appt) 400 strip 3     glucagon (GLUCAGON EMERGENCY) 1 MG kit Inject 1 mg into the muscle once for 1 dose 1 mg 3         ALLERGIES: Contrast dye    PAST MEDICAL HX:   Past Medical History:   Diagnosis Date     Acquired hypothyroidism 12/7/2016     Cancer (H)      Depressive disorder      Diabetes (H)      Hidradenoma dx: Feb 2015    right hand/2 surgeries     Hypertension      Hypothyroidism      Numbness and tingling     right hand     TELLY (obstructive sleep apnea)     dx at least 10 years ago, got a new cpap machine 1 year ago. follows in [facility]     Sleep apnea      Uncomplicated asthma        PAST SURGICAL HX:   Past Surgical History:   Procedure Laterality Date     C HAND/FINGER SURGERY UNLISTED       EXCISE LESION HAND Right 2/26/2015    Procedure: EXCISE LESION HAND;   Surgeon: Jeovany Jefferson MD;  Location: [facility] OR     ORTHOPEDIC SURGERY Left     achilles tendon      ORTHOPEDIC SURGERY Right     hand surgery     SOFT TISSUE SURGERY      lipoma removal, below rib cage on right side       IMMUNIZATION HX:   Immunization History   Administered Date(s) Administered     Influenza (IIV3) 2013     Family History   Problem Relation Age of Onset     Other - See Comments Mother      pancreatitis     Substance Abuse Mother      alcohol     MENTAL ILLNESS Mother      Depression Mother      Hypertension Mother      Obesity Mother      Asthma Father      CANCER Father       of leukemia, also had a h/o asthma     DIABETES Father      Other Cancer Father      Melanoma Father      Liver Cancer Brother 53     CANCER Brother      Intestinal cancer, spread to liver     CANCER Maternal Grandmother      CANCER Maternal Grandfather      Bone Cancer Paternal Grandmother      Bone Cancer Paternal Grandfather        SOCIAL HX:   Social History     Social History Narrative    Works at [employer], inside job    No unusual exposures    Has 1 dog 3 cats    Lives in [facility] [place] [facility], in a house    Hardwood floors           ROS:   Answers for HPI/ROS submitted by the patient on 3/15/2017   Annual Exam:  General Symptoms: No  Skin Symptoms: No  HENT Symptoms: No  EYE SYMPTOMS: No  HEART SYMPTOMS: No  LUNG SYMPTOMS: No  INTESTINAL SYMPTOMS: No  URINARY SYMPTOMS: No  REPRODUCTIVE SYMPTOMS: Yes  SKELETAL SYMPTOMS: No  BLOOD SYMPTOMS: No  NERVOUS SYSTEM SYMPTOMS: No  MENTAL HEALTH SYMPTOMS: No--depression is stable on Bupropion 300 mg and Abilify 15 mg daily.  Scrotal pain or swelling: No  Erectile dysfunction: Yes  Penile discharge: No  Genital ulcers: No  Reduced libido: No  Frequency of exercise:: 2-3 days/week  Duration of exercise:: Greater than 60 minutes    OBJECTIVE:  /81 (BP Location: Right arm, Patient Position: Chair, Cuff Size: Adult Regular)  Pulse 91  Temp 98  F (36.7  C) (Oral)  Resp 16   Wt 107.5 kg (237 lb)  SpO2 96%  BMI 28.85 kg/m2   Wt Readings from Last 1 Encounters:   03/22/17 107.5 kg (237 lb)     Constitutional: no distress, comfortable, pleasant, well-groomed  Eyes: anicteric, conjunctiva pink, normal extra-ocular movements   Ears, Nose and Throat: tympanic membranes pearly gray with positive light reflex, EACs clear bilaterally, nose clear and free of lesions, throat clear, mucosa pink and moist.   Neck: supple with full range of motion, no thyromegaly, no lymphadenopathy  Cardiovascular: regular rate and rhythm, normal S1 and S2, no murmurs, rubs or gallops, peripheral pulses full and symmetric, cap refill <2 sec  Respiratory: clear to auscultation with good air movement bilaterally, no wheezes or crackles, non-labored  Gastrointestinal: positive bowel sounds, nontender, no hepatosplenomegaly, no masses, glucometer and insulin pump intact  Psychological: appropriate mood, demonstrates intact judgment and logical thought process      ASSESSMENT/PLAN:    1. Encounter to establish care    2. Screening for colon cancer    3. 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)    4. TELLY (obstructive sleep apnea)    5. Need for pneumococcal vaccination    6. Exercise induced bronchospasm    7. Need for diphtheria-tetanus-pertussis (Tdap) vaccine, adult/adolescent      Health maintenance: Referral for colonoscopy and ophthalmology. PCV23 and Tdap given in clinic today. Order for new CPAP machine provided. Refill for albuterol for exercise-induced asthma.   Continue with healthy lifestyle habits of stress management and regular exercise.  FOLLOW UP: in 6 month(s) for physical, or sooner as need for any changes or concerns    GALILEA Escalante CNP

## 2017-03-22 NOTE — MR AVS SNAPSHOT
After Visit Summary   3/22/2017    Luigi Moore    MRN: [id number]           Patient Information     Date Of Birth          1960        Visit Information        Provider Department      3/22/2017 9:45 AM Loraine Szymanski MD [facility]        Care Instructions    Temp basal to 50% during biking.     Start taking bolus insulin BEFORE you eat.     No other changes with the pump.     Labs before your next visit.         Follow-ups after your visit        Follow-up notes from your care team     Return in about 4 months (around 7/22/2017).      Your next 10 appointments already scheduled     May 01, 2017  8:20 AM CDT   (Arrive by 8:05 AM)   Return Visit with Fidel Carlisle MD   [facility] ([facility])    [address] [place] [id number] [phone]            Jul 26, 2017 10:15 AM CDT   (Arrive by 10:00 AM)   RETURN ENDOCRINE with Loraine Szymanski MD   [facility] ([facility])    [address] [place] [id number] [phone]              Who to contact     Please call your clinic at [phone] to:    Ask questions about your health    Make or cancel appointments    Discuss your medicines    Learn about your test results    Speak to your doctor   If you have compliments or concerns about an experience at your clinic, or if you wish to file a complaint, please contact [facility] Patient Relations at [phone] or email us at [email or web address]         Additional Information About Your Visit        MyChart Information     [employer] gives you secure access to your electronic health record. If you see a primary care provider, you can also send messages to your care team and make appointments. If you have questions, please call your primary care clinic.  If  "you do not have a primary care provider, please call [phone] and they will assist you.      Beam. is an electronic gateway that provides easy, online access to your medical records. With Be[employer]., you can request a clinic appointment, read your test results, renew a prescription or communicate with your care team.     To access your existing account, please contact your [facility] or call [phone] for assistance.        Care EveryWhere ID     This is your Care EveryWhere ID. This could be used by other organizations to access your [facility] medical records  [id number]        Your Vitals Were     Pulse Height BMI (Body Mass Index)             94 1.93 m (6' 4\") 28.85 kg/m2          Blood Pressure from Last 3 Encounters:   03/22/17 142/76   01/23/17 126/77   12/19/16 129/76    Weight from Last 3 Encounters:   03/22/17 107.5 kg (237 lb)   01/23/17 106.6 kg (235 lb)   12/19/16 106.3 kg (234 lb 4.8 oz)              Today, you had the following     No orders found for display       Primary Care Provider    None Specified       No primary provider on file.        Thank you!     Thank you for choosing [facility]  for your care. Our goal is always to provide you with excellent care. Hearing back from our patients is one way we can continue to improve our services. Please take a few minutes to complete the written survey that you may receive in the mail after your visit with us. Thank you!             Your Updated Medication List - Protect others around you: Learn how to safely use, store and throw away your medicines at www.disposemymeds.org.          This list is accurate as of: 3/22/17 10:34 AM.  Always use your most recent med list.                   Brand Name Dispense Instructions for use    acetaminophen-codeine 300-30 MG per tablet    TYLENOL #3    60 tablet    Take 1-2 tablets by mouth every 6 hours as needed for moderate pain       albuterol 108 (90 BASE) MCG/ACT " Inhaler    PROAIR HFA/PROVENTIL HFA/VENTOLIN HFA    1 Inhaler    Inhale 2 puffs into the lungs every 4 hours as needed for shortness of breath / dyspnea or wheezing       alprostadil 20 MCG kit    EDEX    120 mcg    Inject 10 to 20 ug as needed.       ARIPiprazole 15 MG tablet    ABILIFY    30 tablet    Take 1 tablet (15 mg) by mouth daily       aspirin 81 MG tablet     100 tablet    Take 1 tablet (81 mg) by mouth daily       atorvastatin 40 MG tablet    LIPITOR    90 tablet    Take 1 tablet (40 mg) by mouth daily       buPROPion 300 MG 24 hr tablet    WELLBUTRIN XL    30 tablet    Take 1 tablet (300 mg) by mouth every morning       gabapentin 400 MG capsule    NEURONTIN    270 capsule    Take 1 capsule (400 mg) by mouth 3 times daily       glucagon 1 MG kit    GLUCAGON EMERGENCY    1 mg    Inject 1 mg into the muscle once for 1 dose       insulin lispro 100 UNIT/ML injection    humaLOG    80 mL    Use as directed in pump (approximately 70-80 Units every day)       levothyroxine 137 MCG tablet    SYNTHROID/LEVOTHROID    90 tablet    Take one tablet daily.       losartan-hydrochlorothiazide 100-25 MG per tablet    HYZAAR    90 tablet    Take 1 tablet by mouth daily       * ONE TOUCH ULTRA test strip   Generic drug:  blood glucose monitoring     400 strip    Test  four times daily ultra blue (please schedule clinic appt)       * blood glucose monitoring test strip    SHEILA CONTOUR NEXT    400 strip    Use to test blood sugar 4 times daily       * ONE TOUCH TEST STRIPS test strip   Generic drug:  blood glucose monitoring     400 strip    Use to test blood sugar 4 times daily or as directed.       rizatriptan 10 MG tablet    MAXALT    12 tablet    Take 1 tablet (10 mg) by mouth at onset of headache for migraine       tadalafil 20 MG tablet    CIALIS    10 tablet    Take 1 hr prior to sexual activity       Urea 40 % Crea     198 g    Externally apply topically 2 times daily To surface of toenails       zolpidem 10 MG  tablet    AMBIEN    90 tablet    Take 1 tablet at bedtime as needed for insomnia       * Notice:  This list has 3 medication(s) that are the same as other medications prescribed for you. Read the directions carefully, and ask your doctor or other care provider to review them with you.

## 2017-03-22 NOTE — PROGRESS NOTES
Endocrinology Clinic Visit      March 22, 2017      Chief Complaint: RECHECK (HYPOTHYROID AND DIABETES TYPE 1 F/U )       Subjective:         HPI: Luigi Moore is a 56 year old year old Male with history of  has a past medical history of Acquired hypothyroidism (12/7/2016); Cancer (H); Depressive disorder; Diabetes (H); Hidradenoma (dx: Feb 2015); Hypertension; Hypothyroidism; Numbness and tingling; TELLY (obstructive sleep apnea); Sleep apnea; and Uncomplicated asthma. who is seen in consultation at  No primary care provider on file.'s request for Type-1 Diabetes Mellitus.   Maurice works at [employer] from about 10:30 p.m. to 9:00 a.m. He would then sleep from about 11:00 a.m. to 6:00 p.m. He reported checking blood glucose 4 times daily.   Maurice has a [employer] continuous glucose monitoring system which he wore some of the time. He had the system set for 70 mg/dL threshold suspend, 80 mg/dL low alert and 240 mg/dL high alert.   Barriers to achieve glycemic goals:   Review of data shows following:   Hypoglycemia:  1. Hyperglycemia preceding hypoglycemia. Main reason is patient does not bolus until BG is high. This leads to hypoglycemia in a few hours.   2. Overcorrection  Hyperglycemia:   3. Delayed bolusing  4. Delayed site change  5. Infrequent rewind.     History of Diabetes  Type 1      Complications  1. Retinopathy: Background retinopathy    2.  Nephropathy: + on ACEI  Lab Results   Component Value Date    MICROL 6 12/07/2016     3. Neuropathy   Last monofilament testing: mild diminished sensation.     4. Hypoglycemia 2/week.     5. Macrovascular: No    Treatment  Diabetes Medication(s)     Diabetic Other Sig    glucagon (GLUCAGON EMERGENCY) 1 MG kit Inject 1 mg into the muscle once for 1 dose    Insulin Sig    insulin lispro (HUMALOG) 100 UNIT/ML injection Use as directed in pump (approximately 70-80 Units every day)            Prevention  Lipid  LDL Cholesterol Calculated   Date Value Ref Range Status   08/05/2014 78  0 - 129 mg/dL Final     Comment:     LDL Cholesterol is the primary guide to therapy: LDL-cholesterol goal in high   risk patients is <100 mg/dL and in very high risk patients is <70 mg/dL.     07/17/2012 117 0 - 129 mg/dL Final     Comment:     LDL Cholesterol is the primary guide to therapy: LDL-cholesterol goal in high   risk patients is <100 mg/dL and in very high risk patients is <70 mg/dL.     LDL Cholesterol Direct   Date Value Ref Range Status   12/07/2016 140 (H) <100 mg/dL Final     Comment:     Above desirable:  100-129 mg/dl   Borderline High:  130-159 mg/dL   High:             160-189 mg/dL   Very high:       >189 mg/dl     02/17/2015 103 0 - 129 mg/dL Final     Comment:     Optimal:         <100 mg/dL   Near Optimal:     100-129 mg/dL   Borderline High:  130-159 mg/dL   High:             160-189 mg/dL   Very high:  greater than or equal to 190 mg/dL   Cannot estimate LDL when triglyceride exceeds 400 mg/dL         Medications     HMG CoA Reductase Inhibitors    atorvastatin (LIPITOR) 40 MG tablet    Salicylates    aspirin 81 MG tablet          Renal  Medication (Note: This includes the hypertensive combination subclass to make sure to show all ACEI/ARB's.)     Antihypertensive Combinations Sig    losartan-hydrochlorothiazide (HYZAAR) 100-25 MG per tablet Take 1 tablet by mouth daily            Weight  Wt Readings from Last 4 Encounters:   03/22/17 107.5 kg (237 lb)   01/23/17 106.6 kg (235 lb)   12/19/16 106.3 kg (234 lb 4.8 oz)   12/16/16 106.4 kg (234 lb 8 oz)       Pump Data  Humalog insulin in a Medtronic 532 insulin pump at a basal rate of 1.2 units per hour. When at work he often used a temporary basal rate that was 60% of usual and 50% when he bikes.  In addition, he took premeal bolus insulin using 1 unit for each 7 grams of carbohydrate with 1 additional unit for each 20 mg/dL blood glucose was above 120 mg/dL.     Meter Download Summary: No meter    Diet: 2 meals and a snack    Exercise  Bike    Weight trend  Wt Readings from Last 4 Encounters:   03/22/17 107.5 kg (237 lb)   01/23/17 106.6 kg (235 lb)   12/19/16 106.3 kg (234 lb 4.8 oz)   12/16/16 106.4 kg (234 lb 8 oz)       A1c today is 8  Lab Results   Component Value Date    A1C 8.7 12/07/2016    A1C 8.1 02/17/2015    A1C 10.0 01/17/2013        Allergies   Allergen Reactions     Contrast Dye Hives       Current Outpatient Prescriptions   Medication Sig Dispense Refill     acetaminophen-codeine (TYLENOL #3) 300-30 MG per tablet Take 1-2 tablets by mouth every 6 hours as needed for moderate pain 60 tablet 1     alprostadil (EDEX) 20 MCG kit Inject 10 to 20 ug as needed. 120 mcg 11     Urea 40 % CREA Externally apply topically 2 times daily To surface of toenails 198 g 5     insulin lispro (HUMALOG) 100 UNIT/ML injection Use as directed in pump (approximately 70-80 Units every day) 80 mL 3     buPROPion (WELLBUTRIN XL) 300 MG 24 hr tablet Take 1 tablet (300 mg) by mouth every morning 30 tablet 11     ARIPiprazole (ABILIFY) 15 MG tablet Take 1 tablet (15 mg) by mouth daily 30 tablet 11     rizatriptan (MAXALT) 10 MG tablet Take 1 tablet (10 mg) by mouth at onset of headache for migraine 12 tablet 11     atorvastatin (LIPITOR) 40 MG tablet Take 1 tablet (40 mg) by mouth daily 90 tablet 3     levothyroxine (SYNTHROID/LEVOTHROID) 137 MCG tablet Take one tablet daily. 90 tablet 3     gabapentin (NEURONTIN) 400 MG capsule Take 1 capsule (400 mg) by mouth 3 times daily 270 capsule 3     blood glucose monitoring (SHEILA CONTOUR NEXT) test strip Use to test blood sugar 4 times daily 400 strip 3     ONE TOUCH TEST STRIPS test strip Use to test blood sugar 4 times daily or as directed. 400 strip 3     losartan-hydrochlorothiazide (HYZAAR) 100-25 MG per tablet Take 1 tablet by mouth daily 90 tablet 3     zolpidem (AMBIEN) 10 MG tablet Take 1 tablet at bedtime as needed for insomnia 90 tablet 3     tadalafil (CIALIS) 20 MG tablet Take 1 hr prior to sexual  activity 10 tablet 11     albuterol (PROAIR HFA, PROVENTIL HFA, VENTOLIN HFA) 108 (90 BASE) MCG/ACT inhaler Inhale 2 puffs into the lungs every 4 hours as needed for shortness of breath / dyspnea or wheezing 1 Inhaler 3     aspirin 81 MG tablet Take 1 tablet (81 mg) by mouth daily 100 tablet 3     ONE TOUCH ULTRA test strip Test  four times daily ultra blue (please schedule clinic appt) 400 strip 3     glucagon (GLUCAGON EMERGENCY) 1 MG kit Inject 1 mg into the muscle once for 1 dose 1 mg 3       Review of Systems     Constitutional: Negative for fever and unexpected weight change. Appetite normal   Head: no headache   Eye: no vision change/ loss of peripheral vision.   ENT: No throat congestion.   Respiratory: no cough   Cardiovascular: no chest pain or tachycardia  Gastrointestinal: Negative for vomiting, abdominal pain and diarrhea.  Genitourinary: No bladder problems.  Musculoskeletal: Negative for myalgias. No weakness.  Neurological: Negative for seizures and headaches.  Psychiatric/Behavioral: Negative for behavioral problem and dysphoric mood.    Past Medical History:   Diagnosis Date     Acquired hypothyroidism 12/7/2016     Cancer (H)      Depressive disorder      Diabetes (H)      Hidradenoma dx: Feb 2015    right hand/2 surgeries     Hypertension      Hypothyroidism      Numbness and tingling     right hand     TELLY (obstructive sleep apnea)     dx at least 10 years ago, got a new cpap machine 1 year ago. follows in [facility] [place]     Sleep apnea      Uncomplicated asthma        Past Surgical History:   Procedure Laterality Date     C HAND/FINGER SURGERY UNLISTED       EXCISE LESION HAND Right 2/26/2015    Procedure: EXCISE LESION HAND;  Surgeon: Jeovany Jefferson MD;  Location: [facility] OR     ORTHOPEDIC SURGERY Left     achilles tendon      ORTHOPEDIC SURGERY Right     hand surgery     SOFT TISSUE SURGERY      lipoma removal, below rib cage on right side       Family History   Problem Relation Age of Onset      "Other - See Comments Mother      pancreatitis     Substance Abuse Mother      MENTAL ILLNESS Mother      Depression Mother      Hypertension Mother      Obesity Mother      Asthma Father      CANCER Father       of leukemia, also had a h/o asthma     DIABETES Father      Other Cancer Father      Soft Tissue Cancer Father      Melanoma Father        Social History     Social History     Marital status:      Spouse name: N/A     Number of children: N/A     Years of education: N/A     Social History Main Topics     Smoking status: Never Smoker     Smokeless tobacco: Never Used     Alcohol use No      Comment: History of alcohol abuse/sober for 11months     Drug use: No     Sexual activity: Yes     Partners: Female     Birth control/ protection: Condom, Pill     Other Topics Concern     Not on file     Social History Narrative    Works at [employer], inside job    No unusual exposures    Has 1 dog 3 cats    Lives in [facility], in a house    Hardwood floors           Objective:   /76  Pulse 94  Ht 1.93 m (6' 4\")  Wt 107.5 kg (237 lb)  BMI 28.85 kg/m2  Constitutional: Appears well-developed and well-nourished. Active.   EYES: anicteric, normal extra-ocular movements, no lid lag or retraction   HEENT: Mouth/Throat: Mucous membrane is moist. Oropharynx is clear. No adenopathy. Normal thyroid   Cardiovascular: RRR, S1, S2 normal. No m/g/r   Pulmonary/Chest: CTAB. No wheezing or rales   Abdominal: +BS. Nontender to palpation. No organomegaly present.  Neurological: Alert. Normal affect. CNII-XII intact. Muscle strength 5/5. Sensory is intact.  Extremities: No clubbing, cyanosis or inflammation   Skin: normal texture, color  Feet: Callus+ no toe infection (per podiatry) intact monofilament  Lymphatic: no cervical lymphadenopathy.  Psychological: appropriate mood     In House Labs:   Lab Results   Component Value Date    A1C 8.7 2016    A1C 8.1 2015    A1C 10.0 2013       TSH   Date Value Ref Range " Status   12/07/2016 2.42 0.40 - 4.00 mU/L Final   07/29/2015 1.11 0.40 - 4.00 mU/L Final   02/17/2015 2.34 0.40 - 4.00 mU/L Final     Comment:     Effective 7/30/2014, the reference range for this assay has changed to reflect   new instrumentation/methodology.     08/05/2014 2.34 0.40 - 4.00 mU/L Final     Comment:     Effective 7/30/2014, the reference range for this assay has changed to reflect   new instrumentation/methodology.     05/01/2013 2.89 0.4 - 5.0 mU/L Final     T4 Free   Date Value Ref Range Status   12/07/2016 1.33 0.76 - 1.46 ng/dL Final   02/17/2015 1.04 0.76 - 1.46 ng/dL Final     Comment:     Effective 7/30/2014, the reference range for this assay has changed to reflect   new instrumentation/methodology.     02/25/2011 1.31 0.70 - 1.85 ng/dL Final       Creatinine   Date Value Ref Range Status   12/07/2016 0.74 0.66 - 1.25 mg/dL Final   ]    Recent Labs   Lab Test  12/07/16   1341  02/17/15   1611   07/17/12   0715   CHOL   --    --    --   194   HDL   --    --    --   56   LDL  140*  103   < >  117   TRIG   --    --    --   104   CHOLHDLRATIO   --    --    --   3.5    < > = values in this interval not displayed.       No results found for: [id number], [id number], [id number]      Assessment/Treatment Plan:      Luigi Moore is a 56 year old year old male with    1. Type 1 Diabetes with triopathy:    A1c is 8.0 today.      Barriers to achieving glycemic goals: Major issue is lack of bolus at the start of meal leading to hyperglycemia, overcorrection after that leading to hypoglycemia.     Hypoglycemia risks  1. Hyperglycemia preceding hypoglycemia. Main reason is patient does not bolus until BG is high. This leads to hypoglycemia in a few hours.   2. Overcorrection  Hyperglycemia risks  3. Delayed bolusing  4. Delayed site change  5. Infrequent rewind.     Patient Instructions   Temp basal to 50% during biking.     Start taking bolus insulin BEFORE you eat.     No other changes with the pump.      Labs before your next visit.   .     Hypothyroidism  Continue LT4    FU labs prior to next visit.     I will contact the patient with the test results.  Return to clinic in 4 months.    Loraine Szymanski MD  [id number]  Endocrinology [facility]    Test and/or medications prescribed today:  No orders of the defined types were placed in this encounter.              Answers for HPI/ROS submitted by the patient on 3/15/2017   General Symptoms: No  Skin Symptoms: No  HENT Symptoms: No  EYE SYMPTOMS: No  HEART SYMPTOMS: No  LUNG SYMPTOMS: No  INTESTINAL SYMPTOMS: No  URINARY SYMPTOMS: No  REPRODUCTIVE SYMPTOMS: Yes  SKELETAL SYMPTOMS: No  BLOOD SYMPTOMS: No  NERVOUS SYSTEM SYMPTOMS: No  MENTAL HEALTH SYMPTOMS: No  Scrotal pain or swelling: No  Erectile dysfunction: Yes  Penile discharge: No  Genital ulcers: No  Reduced libido: No

## 2017-03-22 NOTE — MR AVS SNAPSHOT
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  ** [facility] Prescription Fax = [phone]  * Please allow 3 business days for routine medication refills.  * Please allow 5 business days for controlled substance medication refills.     Primary Care Center Test Result notification information:  *You will be notified with in 7-10 days of your appointment day regarding the results of your test.  If you are on MyChart you will be notified as soon as the provider has reviewed the results and signed off on them.    [facility] [phone]     Endoscopy Screening Tool    Your Provider is requesting that you have a Colonoscopy procedure.    If you answer yes to any of the following 9 questions, your procedure will be done at the [facility] - [phone].    No 1) PATIENT IS UNDER 16 YEARS OF AGE?  No 2) PATIENT HAS A BLEEDING DISORDER?  No 3) PATIENT IS TAKING MEDICATIONS THAT THIN THE BLOOD TO IMPAIR CLOTTING?  No 4) PATIENT HAS HAD A HEART OR LUNG TRANSPLANT?  No 5) PATIENT HAS A PACE MAKER WITH A DEFIBRILLATOR?  No 6) PATIENT HAS CHEST PAIN OR FREQUENT USE OF NITROGLYCERIN?  No 7) PATIENT HAS CARDIOVASCULAR PROBLEMS OR CONGESTIVE HEART FAILURE?  No 8) PATIENT HAS  ESOPHAGEAL VARICES OR COPD?  No 9) PATIENT USES HOME OXYGEN CONTINUOUSLY?    If none of those 9 questions apply to you, your procedure will be done at the [facility] ([facility])  [address], [address]  [place] [address]  [phone].    Please answer the following questions to provide schedulers information to note for the providers performing the procedure:  No 10) PATIENT HAS KIDNEY DISEASE OR KIDNEY FAILURE?  Yes11) IS THE PATIENT DIABETIC?  NA 12) FOR FEMALE PATIENTS, IS THE PATIENT PREGNANT?    Additional Information that is needed:  Pharmacy    [id number] - [place], [place] - [address] AT [facility] OF [place] [id number] - [place], [place] - [address] AT [facility] OF [facility] (of Pharmacy): [place]  Phone Number (of Pharmacy):     What is best time of day; 9-11am,  and best phone number: [phone] to reach patient.            Follow-ups after your visit        Additional Services     GI Procedure Referral       Last Lab Result: Creatinine (mg/dL)       Date                     Value                 12/07/2016               0.74             ----------  Body mass index is 28.85 kg/(m^2).     Needed:  No  Language:  English    Patient will be contacted to schedule procedure.     Please be aware that coverage of these services is subject to the terms and limitations of your health insurance plan.  Call member services at your health plan with any benefit or coverage questions.  Any procedures must be performed at a [facility] facility OR coordinated by your clinic's referral office.    Please bring the following with you to your appointment:    (1) Any X-Rays, CTs or MRIs which have been performed.  Contact the facility where they were done to arrange for  prior to your scheduled appointment.    (2) List of current medications   (3) This referral request   (4) Any documents/labs given to you for this  referral            OPHTHALMOLOGY ADULT REFERRAL       Your provider has referred you to: [facility]: Eye Clinic - [place] [phone]   [email or web address]    Please be aware that coverage of these services is subject to the terms and limitations of your health insurance plan.  Call member services at your health plan with any benefit or coverage questions.      Please bring the following with you to your appointment:    (1) Any X-Rays, CTs or MRIs which have been performed.  Contact the facility where they were done to arrange for  prior to your scheduled appointment.    (2) List of current medications  (3) This referral request   (4) Any documents/labs given to you for this referral                  Your next 10 appointments already scheduled     Apr 06, 2017  3:00 PM CDT   RETURN RETINA with Karine Hawkins MD   Eye Clinic ([facility])    [address]  [facility] [address]   [phone]            May 01, 2017  8:20 AM CDT   (Arrive by 8:05 AM)   Return Visit with Fidel Carlisle MD   [facility]rology and Inst for Prostate and Urologic Cancers ([facility])    [address]  [address] [facility] [address] [phone]            Jul 26, 2017 10:15 AM CDT   (Arrive by 10:00 AM)   RETURN ENDOCRINE with Loraine Szymanski MD   [facility] Endocrinology ([facility])    [address]  [address] [facility] [address] [phone]              Future tests that were ordered for you today     Open Future Orders        Priority Expected Expires Ordered    Albumin Random Urine Quantitative Routine  3/22/2018 3/22/2017    Hemoglobin A1c Routine  3/22/2018 3/22/2017    Creatinine Routine  3/22/2018 3/22/2017    Urea nitrogen Routine  3/22/2018 3/22/2017    Electrolyte panel Routine  3/22/2018 3/22/2017    Alkaline phosphatase Routine   "3/22/2018 3/22/2017    T4 free Routine  3/22/2018 3/22/2017    TSH with free T4 reflex Routine  3/22/2018 3/22/2017    ALT Routine  3/22/2018 3/22/2017    Hemoglobin Routine  3/22/2018 3/22/2017    LDL cholesterol direct Routine  3/22/2018 3/22/2017            Who to contact     Please call your clinic at [phone] to:    Ask questions about your health    Make or cancel appointments    Discuss your medicines    Learn about your test results    Speak to your doctor   If you have compliments or concerns about an experience at your clinic, or if you wish to file a complaint, please contact [facility] Patient Relations at [phone] or email us at [email or web address]         Additional Information About Your Visit        Argushar[facility]UL Information     Branch gives you secure access to your electronic health record. If you see a primary care provider, you can also send messages to your care team and make appointments. If you have questions, please call your primary care clinic.  If you do not have a primary care provider, please call [phone] and they will assist you.      Branch is an electronic gateway that provides easy, online access to your medical records. With Branch, you can request a clinic appointment, read your test results, renew a prescription or communicate with your care team.     To access your existing account, please contact your [facility] or call [phone] for assistance.        Care EveryWhere ID     This is your Care EveryWhere ID. This could be used by other organizations to access your [facility] medical records  [id number]        Your Vitals Were     Pulse Temperature Respirations Height Pulse Oximetry BMI (Body Mass Index)    91 98  F (36.7  C) (Oral) 16 1.93 m (6' 4\") 96% 28.85 kg/m2       Blood Pressure from Last 3 Encounters:   03/22/17 128/81   03/22/17 142/76   01/23/17 126/77    Weight from Last 3 Encounters: "   03/22/17 107.5 kg (237 lb)   03/22/17 107.5 kg (237 lb)   01/23/17 106.6 kg (235 lb)              We Performed the Following     GI Procedure Referral     OPHTHALMOLOGY ADULT REFERRAL     Pneumococcal vaccine 23 valent PPSV23  (Pneumovax) [51484]     TDAP (BOOSTRIX), AGE 10-64 YR          Today's Medication Changes          These changes are accurate as of: 3/22/17  8:05 PM.  If you have any questions, ask your nurse or doctor.               Start taking these medicines.        Dose/Directions    albuterol 108 (90 BASE) MCG/ACT Inhaler   Commonly known as:  PROAIR HFA/PROVENTIL HFA/VENTOLIN HFA   Used for:  Exercise induced bronchospasm   Started by:  Kathya Lang APRN CNP        Dose:  2 puff   Inhale 2 puffs into the lungs every 6 hours   Quantity:  1 Inhaler   Refills:  3       order for DME   Used for:  TELLY (obstructive sleep apnea)   Started by:  Kathya Lang APRN CNP        Equipment being ordered: CPAP machine   Quantity:  1 each   Refills:  0            Where to get your medicines      These medications were sent to [facility] [address] [address] AT [facility] [address]  [address] [facility] [address]    Hours:  24-hours Phone:  [phone]     albuterol 108 (90 BASE) MCG/ACT Inhaler         Some of these will need a paper prescription and others can be bought over the counter.  Ask your nurse if you have questions.     Bring a paper prescription for each of these medications     order for DME                Primary Care Provider Office Phone # Fax #    GALILEA Morataya [phone]       [address] [address]        Thank you!     Thank you for choosing [facility]  for your care. Our goal is always to provide you with excellent care. Hearing back from our patients is one way we can continue to improve our services. Please take a few minutes to complete  the written survey that you may receive in the mail after your visit with us. Thank you!             Your Updated Medication List - Protect others around you: Learn how to safely use, store and throw away your medicines at www.disposemymeds.org.          This list is accurate as of: 3/22/17  8:05 PM.  Always use your most recent med list.                   Brand Name Dispense Instructions for use    acetaminophen-codeine 300-30 MG per tablet    TYLENOL #3    60 tablet    Take 1-2 tablets by mouth every 6 hours as needed for moderate pain       albuterol 108 (90 BASE) MCG/ACT Inhaler    PROAIR HFA/PROVENTIL HFA/VENTOLIN HFA    1 Inhaler    Inhale 2 puffs into the lungs every 6 hours       alprostadil 20 MCG kit    EDEX    120 mcg    Inject 10 to 20 ug as needed.       ARIPiprazole 15 MG tablet    ABILIFY    30 tablet    Take 1 tablet (15 mg) by mouth daily       aspirin 81 MG tablet     100 tablet    Take 1 tablet (81 mg) by mouth daily       atorvastatin 40 MG tablet    LIPITOR    90 tablet    Take 1 tablet (40 mg) by mouth daily       buPROPion 300 MG 24 hr tablet    WELLBUTRIN XL    30 tablet    Take 1 tablet (300 mg) by mouth every morning       gabapentin 400 MG capsule    NEURONTIN    270 capsule    Take 1 capsule (400 mg) by mouth 3 times daily       glucagon 1 MG kit    GLUCAGON EMERGENCY    1 mg    Inject 1 mg into the muscle once for 1 dose       insulin lispro 100 UNIT/ML injection    humaLOG    80 mL    Use as directed in pump (approximately 70-80 Units every day)       levothyroxine 137 MCG tablet    SYNTHROID/LEVOTHROID    90 tablet    Take one tablet daily.       losartan-hydrochlorothiazide 100-25 MG per tablet    HYZAAR    90 tablet    Take 1 tablet by mouth daily       * ONE TOUCH ULTRA test strip   Generic drug:  blood glucose monitoring     400 strip    Test  four times daily ultra blue (please schedule clinic appt)       * blood glucose monitoring test strip    SHEILA CONTOUR NEXT    400 strip     Use to test blood sugar 4 times daily       * ONE TOUCH TEST STRIPS test strip   Generic drug:  blood glucose monitoring     400 strip    Use to test blood sugar 4 times daily or as directed.       order for DME     1 each    Equipment being ordered: CPAP machine       rizatriptan 10 MG tablet    MAXALT    12 tablet    Take 1 tablet (10 mg) by mouth at onset of headache for migraine       tadalafil 20 MG tablet    CIALIS    10 tablet    Take 1 hr prior to sexual activity       Urea 40 % Crea     198 g    Externally apply topically 2 times daily To surface of toenails       zolpidem 10 MG tablet    AMBIEN    90 tablet    Take 1 tablet at bedtime as needed for insomnia       * Notice:  This list has 3 medication(s) that are the same as other medications prescribed for you. Read the directions carefully, and ask your doctor or other care provider to review them with you.

## 2017-03-23 ENCOUNTER — TELEPHONE (OUTPATIENT)
Dept: GASTROENTEROLOGY | Facility: CLINIC | Age: 57
End: 2017-03-23

## 2017-03-24 ENCOUNTER — TELEPHONE (OUTPATIENT)
Dept: GASTROENTEROLOGY | Facility: CLINIC | Age: 57
End: 2017-03-24

## 2017-04-14 ENCOUNTER — RESULTS ONLY (OUTPATIENT)
Dept: LAB | Age: 57
End: 2017-04-14

## 2017-04-14 LAB
CK SERPL-CCNC: 135 U/L (ref 30–300)
POTASSIUM SERPL-SCNC: 3.7 MMOL/L (ref 3.4–5.3)
TSH SERPL DL<=0.005 MIU/L-ACNC: 0.83 MU/L (ref 0.4–4)

## 2017-04-24 ENCOUNTER — PRE VISIT (OUTPATIENT)
Dept: UROLOGY | Facility: CLINIC | Age: 57
End: 2017-04-24

## 2017-05-01 ENCOUNTER — OFFICE VISIT (OUTPATIENT)
Dept: UROLOGY | Facility: CLINIC | Age: 57
End: 2017-05-01

## 2017-05-01 VITALS
HEIGHT: 76 IN | DIASTOLIC BLOOD PRESSURE: 66 MMHG | WEIGHT: 235 LBS | SYSTOLIC BLOOD PRESSURE: 120 MMHG | HEART RATE: 110 BPM | BODY MASS INDEX: 28.62 KG/M2

## 2017-05-01 DIAGNOSIS — N52.1 ERECTILE DYSFUNCTION DUE TO DISEASES CLASSIFIED ELSEWHERE: Primary | ICD-10-CM

## 2017-05-01 ASSESSMENT — PAIN SCALES - GENERAL: PAINLEVEL: NO PAIN (0)

## 2017-05-01 NOTE — PROGRESS NOTES
DIAGNOSIS:  A 56-year-old man with erectile dysfunction, on penile injection therapy.       HISTORY OF PRESENT ILLNESS:  Mr. Moore is here for a followup visit.  We had seen him in December for initial evaluation and then in January for instruction in penile injection therapy.  We prescribed alprostadil as the Edex brand with the 20-mcg kit.  He returns today to follow up.       He is getting a good response with the Edex.  He has been using, however 30-35 mcg at a time.  This requires that he uses 2 of the 20-mcg kits.  With this, he gets erections that last about a half an hour to an hour that are adequate for intercourse.  He has had no problems with pain with the erections.  He has noted no curvature of the penis.  He is very satisfied with the current results.      He does the injections appropriately on the lateral shaft of the penis.  He interchanges sides.        He reports no significant change in his health or new medical issues since we last saw him.       He continues on other medications including Humalog insulin for type 1 diabetes, Hyzaar, Abilify, Wellbutrin, Lipitor and levothyroxine.        PHYSICAL EXAMINATION:   GENERAL:  He appears well.   VITAL SIGNS:  Weight is 235 pounds.  Blood pressure 120/66.   GENITALIA:  Penis is normally developed.  There is no plaque palpable.      LABORATORY TESTS:  He had some recent laboratory tests.  In April TSH was normal at 0.83.  He had a hemoglobin A1c in March of 8.       ASSESSMENT:  The patient is a 56-year-old man with erectile dysfunction.  Major risk factors included type 1 diabetes which was longstanding and hypertension.  The patient also had depression and was on antidepressants.  He did not get adequate response to phosphodiesterase inhibitors.       Mr. Moore is getting an adequate response with the alprostadil.  We could go up to a maximum dose of 40 mcg of that, but his current dose of 30-35 mcg appears adequate.       Because he is having to use  2 kits at a time now, we will try changing the prescription to the 40-mcg kits.      PLAN:   1.  Continue the alprostadil as the Edex injection kits.  We will change the prescription to the 40-mcg kits.  He will use three-quarters or so of a mL with each injection.  This would be approximately    30 mcg.   2.  We reviewed the injection technique which he is doing appropriately.   3.  He will call us if he has any problems with penile curvature or is having problems with prolonged erections.  He does understand that if he gets a very firm, rigid erection that is not resolving after 3 hours or so, he needs to contact us or go to the emergency room.       Otherwise, we can plan to see him back in a year to follow up.

## 2017-05-01 NOTE — PATIENT INSTRUCTIONS
Call us if there are questions or problems with the new Rx.  Otherwise, follow up with Dr. Carlisle in one year.    It was a pleasure meeting with you today.  Thank you for allowing me and my team the privilege of caring for you today.  YOU are the reason we are here, and I truly hope we provided you with the excellent service you deserve.  Please let us know if there is anything else we can do for you so that we can be sure you are leaving completely satisfied with your care experience.        FALLON Poole

## 2017-05-01 NOTE — LETTER
5/1/2017       RE: Luigi Moore  [address]  [place] [address]     Dear Colleague,    Thank you for referring your patient, Luigi Moore, to the [facility] UROLOGY AND INST FOR PROSTATE AND UROLOGIC CANCERS at [facility]. Please see a copy of my visit note below.    DIAGNOSIS:  A 56-year-old man with erectile dysfunction, on penile injection therapy.       HISTORY OF PRESENT ILLNESS:  Mr. Moore is here for a followup visit.  We had seen him in December for initial evaluation and then in January for instruction in penile injection therapy.  We prescribed alprostadil as the Edex brand with the 20-mcg kit.  He returns today to follow up.       He is getting a good response with the Edex.  He has been using, however 30-35 mcg at a time.  This requires that he uses 2 of the 20-mcg kits.  With this, he gets erections that last about a half an hour to an hour that are adequate for intercourse.  He has had no problems with pain with the erections.  He has noted no curvature of the penis.  He is very satisfied with the current results.      He does the injections appropriately on the lateral shaft of the penis.  He interchanges sides.        He reports no significant change in his health or new medical issues since we last saw him.       He continues on other medications including Humalog insulin for type 1 diabetes, Hyzaar, Abilify, Wellbutrin, Lipitor and levothyroxine.        PHYSICAL EXAMINATION:   GENERAL:  He appears well.   VITAL SIGNS:  Weight is 235 pounds.  Blood pressure 120/66.   GENITALIA:  Penis is normally developed.  There is no plaque palpable.      LABORATORY TESTS:  He had some recent laboratory tests.  In April TSH was normal at 0.83.  He had a hemoglobin A1c in March of 8.       ASSESSMENT:  The patient is a 56-year-old man with erectile dysfunction.  Major risk factors included type 1 diabetes which was longstanding and hypertension.  The  patient also had depression and was on antidepressants.  He did not get adequate response to phosphodiesterase inhibitors.       Mr. Moore is getting an adequate response with the alprostadil.  We could go up to a maximum dose of 40 mcg of that, but his current dose of 30-35 mcg appears adequate.       Because he is having to use 2 kits at a time now, we will try changing the prescription to the 40-mcg kits.      PLAN:   1.  Continue the alprostadil as the Edex injection kits.  We will change the prescription to the 40-mcg kits.  He will use three-quarters or so of a mL with each injection.  This would be approximately    30 mcg.   2.  We reviewed the injection technique which he is doing appropriately.   3.  He will call us if he has any problems with penile curvature or is having problems with prolonged erections.  He does understand that if he gets a very firm, rigid erection that is not resolving after 3 hours or so, he needs to contact us or go to the emergency room.       Otherwise, we can plan to see him back in a year to follow up.     Again, thank you for allowing me to participate in the care of your patient.      Sincerely,    Fidel Carlisle MD

## 2017-05-01 NOTE — MR AVS SNAPSHOT
After Visit Summary   5/1/2017    Luigi Moore    MRN: [id number]           Patient Information     Date Of Birth          1960        Visit Information        Provider Department      5/1/2017 8:20 AM Fidel Carlisle MD [facility] Urology and [facility] Urologic [facility]        Today's Diagnoses     Erectile dysfunction due to diseases classified elsewhere    -  1      Care Instructions    Call us if there are questions or problems with the new Rx.  Otherwise, follow up with Dr. Carlisle in one year.    It was a pleasure meeting with you today.  Thank you for allowing me and my team the privilege of caring for you today.  YOU are the reason we are here, and I truly hope we provided you with the excellent service you deserve.  Please let us know if there is anything else we can do for you so that we can be sure you are leaving completely satisfied with your care experience.        Tami Maya [facility]        Follow-ups after your visit        Follow-up notes from your care team     Return in about 1 year (around 5/1/2018).      Your next 10 appointments already scheduled     May 08, 2017   Procedure with Nish Meraz MD   [facility], [place], Endoscopy ([facility], [facility])    [address] [id number] [phone]           [facility] is located on the corner of [address] and [address] on the [address] [facility]. It is easily accessible from virtually any point in the [facility] area, via [address] and [address].            Jul 26, 2017 10:15 AM CDT   (Arrive by 10:00 AM)   RETURN ENDOCRINE with Loraine Szymanski MD   [facility] Endocrinology ([facility])    [address] [id number] [phone]              Who to contact     Please call your clinic at [phone] to:    Ask questions about your health    Make or cancel  "appointments    Discuss your medicines    Learn about your test results    Speak to your doctor   If you have compliments or concerns about an experience at your clinic, or if you wish to file a complaint, please contact [facility] Patient Relations at [phone] or email us at [email or web address]         Additional Information About Your Visit        DriveK[employer] Information     [employer] gives you secure access to your electronic health record. If you see a primary care provider, you can also send messages to your care team and make appointments. If you have questions, please call your primary care clinic.  If you do not have a primary care provider, please call [phone] and they will assist you.      [employer] is an electronic gateway that provides easy, online access to your medical records. With [employer], you can request a clinic appointment, read your test results, renew a prescription or communicate with your care team.     To access your existing account, please contact your [facility] or call [phone] for assistance.        Care EveryWhere ID     This is your Care EveryWhere ID. This could be used by other organizations to access your [place] medical records  [id number]        Your Vitals Were     Pulse Height BMI (Body Mass Index)             110 1.93 m (6' 4\") 28.61 kg/m2          Blood Pressure from Last 3 Encounters:   05/01/17 120/66   03/22/17 128/81   03/22/17 142/76    Weight from Last 3 Encounters:   05/01/17 106.6 kg (235 lb)   03/22/17 107.5 kg (237 lb)   03/22/17 107.5 kg (237 lb)              Today, you had the following     No orders found for display         Today's Medication Changes          These changes are accurate as of: 5/1/17  8:36 AM.  If you have any questions, ask your nurse or doctor.               These medicines have changed or have updated prescriptions.        Dose/Directions    alprostadil 40 MCG kit "   Commonly known as:  EDEX   This may have changed:    - medication strength  - additional instructions   Used for:  Erectile dysfunction due to diseases classified elsewhere   Changed by:  Fidel Carlisle MD        Inject 30 - 35 mcg (approx 3/4 ml) as directed.   Quantity:  240 mcg   Refills:  11         Stop taking these medicines if you haven't already. Please contact your care team if you have questions.     tadalafil 20 MG tablet   Commonly known as:  CIALIS   Stopped by:  Fidel Carlisle MD                Where to get your medicines      Some of these will need a paper prescription and others can be bought over the counter.  Ask your nurse if you have questions.     Bring a paper prescription for each of these medications     alprostadil 40 MCG kit                Primary Care Provider Office Phone # Fax #    Kathya GALILEA Mirza [facility] [phone]       [address] [address]        Thank you!     Thank you for choosing [facility] UROLOGY AND [facility] FOR PROSTATE AND UROLOGIC CANCERS  for your care. Our goal is always to provide you with excellent care. Hearing back from our patients is one way we can continue to improve our services. Please take a few minutes to complete the written survey that you may receive in the mail after your visit with us. Thank you!             Your Updated Medication List - Protect others around you: Learn how to safely use, store and throw away your medicines at www.disposemymeds.org.          This list is accurate as of: 5/1/17  8:36 AM.  Always use your most recent med list.                   Brand Name Dispense Instructions for use    acetaminophen-codeine 300-30 MG per tablet    TYLENOL #3    60 tablet    Take 1-2 tablets by mouth every 6 hours as needed for moderate pain       albuterol 108 (90 BASE) MCG/ACT Inhaler    PROAIR HFA/PROVENTIL HFA/VENTOLIN HFA    1 Inhaler    Inhale 2 puffs into the lungs every 6 hours        alprostadil 40 MCG kit    EDEX    240 mcg    Inject 30 - 35 mcg (approx 3/4 ml) as directed.       ARIPiprazole 15 MG tablet    ABILIFY    30 tablet    Take 1 tablet (15 mg) by mouth daily       aspirin 81 MG tablet     100 tablet    Take 1 tablet (81 mg) by mouth daily       atorvastatin 40 MG tablet    LIPITOR    90 tablet    Take 1 tablet (40 mg) by mouth daily       buPROPion 300 MG 24 hr tablet    WELLBUTRIN XL    30 tablet    Take 1 tablet (300 mg) by mouth every morning       gabapentin 400 MG capsule    NEURONTIN    270 capsule    Take 1 capsule (400 mg) by mouth 3 times daily       glucagon 1 MG kit    GLUCAGON EMERGENCY    1 mg    Inject 1 mg into the muscle once for 1 dose       insulin lispro 100 UNIT/ML injection    humaLOG    80 mL    Use as directed in pump (approximately 70-80 Units every day)       levothyroxine 137 MCG tablet    SYNTHROID/LEVOTHROID    90 tablet    Take one tablet daily.       losartan-hydrochlorothiazide 100-25 MG per tablet    HYZAAR    90 tablet    Take 1 tablet by mouth daily       * ONE TOUCH ULTRA test strip   Generic drug:  blood glucose monitoring     400 strip    Test  four times daily ultra blue (please schedule clinic appt)       * blood glucose monitoring test strip    SHEILA CONTOUR NEXT    400 strip    Use to test blood sugar 4 times daily       * ONE TOUCH TEST STRIPS test strip   Generic drug:  blood glucose monitoring     400 strip    Use to test blood sugar 4 times daily or as directed.       order for DME     1 each    Equipment being ordered: CPAP machine       rizatriptan 10 MG tablet    MAXALT    12 tablet    Take 1 tablet (10 mg) by mouth at onset of headache for migraine       Urea 40 % Crea     198 g    Externally apply topically 2 times daily To surface of toenails       VIOXX OR      1 TABLET DAILY       zolpidem 10 MG tablet    AMBIEN    90 tablet    Take 1 tablet at bedtime as needed for insomnia       * Notice:  This list has 3 medication(s) that are  the same as other medications prescribed for you. Read the directions carefully, and ask your doctor or other care provider to review them with you.

## 2017-05-01 NOTE — NURSING NOTE
"Chief Complaint   Patient presents with     RECHECK     Follow up- erectile dysfunction       Initial Ht 1.93 m (6' 4\")  Wt 106.6 kg (235 lb)  BMI 28.61 kg/m2 Estimated body mass index is 28.61 kg/(m^2) as calculated from the following:    Height as of this encounter: 1.93 m (6' 4\").    Weight as of this encounter: 106.6 kg (235 lb).  Medication Reconciliation: complete     FALLON Poole    "

## 2017-05-02 ENCOUNTER — TELEPHONE (OUTPATIENT)
Dept: GASTROENTEROLOGY | Facility: CLINIC | Age: 57
End: 2017-05-02

## 2017-05-02 DIAGNOSIS — Z12.11 ENCOUNTER FOR SCREENING COLONOSCOPY: Primary | ICD-10-CM

## 2017-05-02 NOTE — TELEPHONE ENCOUNTER
Patient scheduled for Colonoscopy    Indication for procedure. Screening for colon cancer    Referring Provider. Kathya Lang APRN CNP    ? Not Needed    Arrival time verified? Yes, 0800    Facility location verified? Yes, [address] [facility] [place]    Instructions given regarding prep and procedure    Prep Type Golytely    Are you taking any anticoagulants or blood thinners? Aspirin    Instructions given? Stopped    Electronic implanted devices? No    Pre procedure teaching completed? Yes

## 2017-05-08 ENCOUNTER — HOSPITAL ENCOUNTER (OUTPATIENT)
Facility: CLINIC | Age: 57
Discharge: HOME | End: 2017-05-08
Attending: INTERNAL MEDICINE | Admitting: INTERNAL MEDICINE
Payer: COMMERCIAL

## 2017-05-08 ENCOUNTER — SURGERY (OUTPATIENT)
Age: 57
End: 2017-05-08

## 2017-05-08 VITALS
SYSTOLIC BLOOD PRESSURE: 131 MMHG | DIASTOLIC BLOOD PRESSURE: 66 MMHG | HEART RATE: 70 BPM | OXYGEN SATURATION: 96 % | RESPIRATION RATE: 10 BRPM

## 2017-05-08 LAB
COLONOSCOPY: NORMAL
GLUCOSE BLDC GLUCOMTR-MCNC: 213 MG/DL (ref 70–99)

## 2017-05-08 PROCEDURE — 99153 MOD SED SAME PHYS/QHP EA: CPT | Performed by: INTERNAL MEDICINE

## 2017-05-08 PROCEDURE — 88305 TISSUE EXAM BY PATHOLOGIST: CPT | Performed by: INTERNAL MEDICINE

## 2017-05-08 PROCEDURE — 25000128 H RX IP 250 OP 636: Performed by: INTERNAL MEDICINE

## 2017-05-08 PROCEDURE — G0500 MOD SEDAT ENDO SERVICE >5YRS: HCPCS | Performed by: INTERNAL MEDICINE

## 2017-05-08 PROCEDURE — 25000125 ZZHC RX 250: Performed by: INTERNAL MEDICINE

## 2017-05-08 PROCEDURE — 99152 MOD SED SAME PHYS/QHP 5/>YRS: CPT | Performed by: INTERNAL MEDICINE

## 2017-05-08 PROCEDURE — 82962 GLUCOSE BLOOD TEST: CPT

## 2017-05-08 PROCEDURE — 45385 COLONOSCOPY W/LESION REMOVAL: CPT | Performed by: INTERNAL MEDICINE

## 2017-05-08 RX ORDER — ONDANSETRON 2 MG/ML
4 INJECTION INTRAMUSCULAR; INTRAVENOUS
Status: DISCONTINUED | OUTPATIENT
Start: 2017-05-08 | End: 2017-05-08 | Stop reason: HOSPADM

## 2017-05-08 RX ORDER — FENTANYL CITRATE 50 UG/ML
INJECTION, SOLUTION INTRAMUSCULAR; INTRAVENOUS PRN
Status: DISCONTINUED | OUTPATIENT
Start: 2017-05-08 | End: 2017-05-08 | Stop reason: HOSPADM

## 2017-05-08 RX ADMIN — FENTANYL CITRATE 50 MCG: 50 INJECTION, SOLUTION INTRAMUSCULAR; INTRAVENOUS at 09:29

## 2017-05-08 RX ADMIN — MIDAZOLAM HYDROCHLORIDE 2 MG: 1 INJECTION, SOLUTION INTRAMUSCULAR; INTRAVENOUS at 09:29

## 2017-05-08 RX ADMIN — MIDAZOLAM HYDROCHLORIDE 1 MG: 1 INJECTION, SOLUTION INTRAMUSCULAR; INTRAVENOUS at 09:31

## 2017-05-08 NOTE — IP AVS SNAPSHOT
[facility], [place], Endoscopy    [address] [facility] [address]    Phone:  [phone]                                       After Visit Summary   5/8/2017    Luigi Moore    MRN: [id number]           After Visit Summary Signature Page     I have received my discharge instructions, and my questions have been answered. I have discussed any challenges I see with this plan with the nurse or doctor.    ..........................................................................................................................................  Patient/Patient Representative Signature      ..........................................................................................................................................  Patient Representative Print Name and Relationship to Patient    ..................................................               ................................................  Date                                            Time    ..........................................................................................................................................  Reviewed by Signature/Title    ...................................................              ..............................................  Date                                                            Time

## 2017-05-08 NOTE — OR NURSING
Colonoscopy with polypectomy via cold snare and pt tolerated well with conscious sedation and on 2L nc oxygen.

## 2017-05-08 NOTE — IP AVS SNAPSHOT
MRN:[id number]                      After Visit Summary   5/8/2017    Luigi Moore    MRN: [id number]           Thank you!     Thank you for choosing [facility] for your care. Our goal is always to provide you with excellent care. Hearing back from our patients is one way we can continue to improve our services. Please take a few minutes to complete the written survey that you may receive in the mail after you visit with us. Thank you!        Patient Information     Date Of Birth          1960        About your hospital stay     You were admitted on:  May 8, 2017 You last received care in the:  [facility], Endoscopy    You were discharged on:  May 8, 2017       Who to Call     For medical emergencies, please call 911.  For non-urgent questions about your medical care, please call your primary care provider or clinic, [phone]  For questions related to your surgery, please call your surgery clinic        Attending Provider     Provider Specialty    Nish Lozano MD Gastroenterology       Primary Care Provider Office Phone # Fax #    Kathya GALILEA Mirza [facility] [phone]       [address] [address]        Your next 10 appointments already scheduled     Jul 26, 2017 10:15 AM CDT   (Arrive by 10:00 AM)   RETURN ENDOCRINE with Loraine Szymanski MD   [facility] Endocrinology ([facility])    [address] [place] [address] [phone]              Further instructions from your care team       Dc  Discharge Instructions after Colonoscopy  or Sigmoidoscopy    Today you had a __x__ Colonoscopy _    Activity and Diet  You were given medicine for pain. You may be dizzy or sleepy.  For 24 hours:    Do not drive or use heavy equipment.    Do not make important decisions.    Do not drink any alcohol.  You may return to your normal diet and medicines.    Discomfort    Air  was placed in your colon during the exam in order to see it. Walking helps to pass the air.    You may take Tylenol (acetaminophen) for pain unless your doctor has told you not to.  Do not take aspirin or ibuprofen (Advil, Motrin, or other anti-inflammatory  drugs) for __3___ days.    Follow-up  __x__ We took small tissue samples or polyps to study. Your doctor will call you with the results  within two weeks.    When to call:    Call right away if you have:    Unusual pain in belly or chest pain not relieved with passing air.    More than 1 to 2 Tablespoons of bleeding from your rectum.    Fever above 100.6  F (37.5  C).    If you have severe pain, bleeding, or shortness of breath, go to an emergency room.    If you have questions, call:  Monday to Friday, 7 a.m. to 4:30 p.m.  Endoscopy: [phone] (We may have to call you back)    After hours  Hospital: [phone] (Ask for the GI fellow on call)    Pending Results     No orders found from 5/6/2017 to 5/9/2017.            Admission Information     Date & Time Provider Department Dept. Phone    5/8/2017 Nish Lozano MD [facility], [place], Endoscopy [phone]      Your Vitals Were     Blood Pressure Pulse Respirations Pulse Oximetry          115/76 72 32 98%        MyChart Information     [employer] gives you secure access to your electronic health record. If you see a primary care provider, you can also send messages to your care team and make appointments. If you have questions, please call your primary care clinic.  If you do not have a primary care provider, please call [phone] and they will assist you.        Care EveryWhere ID     This is your Care EveryWhere ID. This could be used by other organizations to access your [place] medical records  [id number]           Review of your medicines      UNREVIEWED medicines. 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), Acquired hypothyroidism, Dyslipidemia, Onychomycosis        Dose:  40 mg   Take 1 tablet (40 mg) by mouth daily   Quantity:  90 tablet   Refills:  3       buPROPion 300 MG 24 hr tablet   Commonly known as:  WELLBUTRIN XL   Used for:  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), Acquired hypothyroidism, Dyslipidemia, Onychomycosis        Dose:  300 mg   Take 1 tablet (300 mg) by mouth every morning   Quantity:  30 tablet   Refills:  11       gabapentin 400 MG capsule   Commonly known as:  NEURONTIN   Used for:  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), Acquired hypothyroidism, Dyslipidemia, Onychomycosis        Dose:  400 mg   Take 1 capsule (400 mg) by mouth 3 times daily   Quantity:  270 capsule    Refills:  3       glucagon 1 MG kit   Commonly known as:  GLUCAGON EMERGENCY   Used for:  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), Acquired hypothyroidism, Dyslipidemia, Onychomycosis        Dose:  1 mg   Inject 1 mg into the muscle once for 1 dose   Quantity:  1 mg   Refills:  3       insulin lispro 100 UNIT/ML injection   Commonly known as:  humaLOG   Used for:  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), Acquired hypothyroidism, Dyslipidemia, Onychomycosis        Dose:  10 mg   Take 1 tablet (10 mg) by mouth at onset of headache for migraine   Quantity:  12 tablet   Refills:  11       Urea 40 % Crea   Used for:  Onychauxis        Externally apply topically 2 times daily To surface of toenails   Quantity:  198 g   Refills:  5       VIOXX OR        1 TABLET DAILY   Refills:  0       zolpidem 10 MG tablet   Commonly known as:  AMBIEN   Used for:  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)        Take 1 tablet at bedtime as needed for insomnia   Quantity:  90 tablet   Refills:  3         CONTINUE these medicines which have NOT CHANGED        Dose / Directions    * ONE TOUCH ULTRA test strip   Used for:  DM (diabetes mellitus) (H)   Generic drug:  blood glucose monitoring        Test  four  times daily ultra blue (please schedule clinic appt)   Quantity:  400 strip   Refills:  3       * blood glucose monitoring test strip   Commonly known as:  SHEILA CONTOUR NEXT   Used for:  Type 1 diabetes mellitus without complication (H)        Use to test blood sugar 4 times daily   Quantity:  400 strip   Refills:  3       * ONE TOUCH TEST STRIPS test strip   Used for:  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   Quantity:  400 strip   Refills:  3       order for DME   Used for:  TELLY (obstructive sleep apnea)        Equipment being ordered: CPAP machine   Quantity:  1 each   Refills:  0       * Notice:  This list has 3 medication(s) that are the same as other medications prescribed for you. Read the directions carefully, and ask your doctor or other care provider to review them with you.             Protect others around you: Learn how to safely use, store and throw away your medicines at www.disposemymeds.org.             Medication List: This is a list of all your medications and when to take them. Check marks below indicate your daily home schedule. Keep this list as a reference.      Medications           Morning Afternoon Evening Bedtime As Needed    acetaminophen-codeine 300-30 MG per tablet   Commonly known as:  TYLENOL #3   Take 1-2 tablets by mouth every 6 hours as needed for moderate pain                                albuterol 108 (90 BASE) MCG/ACT Inhaler   Commonly known as:  PROAIR HFA/PROVENTIL HFA/VENTOLIN HFA   Inhale 2 puffs into the lungs every 6 hours                                alprostadil 40 MCG kit   Commonly known as:  EDEX   Inject 30 - 35 mcg (approx 3/4 ml) as directed.                                ARIPiprazole 15 MG tablet   Commonly known as:  ABILIFY   Take 1 tablet (15 mg) by mouth daily                                aspirin 81 MG tablet   Take 1 tablet (81 mg) by mouth  daily                                atorvastatin 40 MG tablet   Commonly known as:  LIPITOR   Take 1 tablet (40 mg) by mouth daily                                buPROPion 300 MG 24 hr tablet   Commonly known as:  WELLBUTRIN XL   Take 1 tablet (300 mg) by mouth every morning                                gabapentin 400 MG capsule   Commonly known as:  NEURONTIN   Take 1 capsule (400 mg) by mouth 3 times daily                                glucagon 1 MG kit   Commonly known as:  GLUCAGON EMERGENCY   Inject 1 mg into the muscle once for 1 dose                                insulin lispro 100 UNIT/ML injection   Commonly known as:  humaLOG   Use as directed in pump (approximately 70-80 Units every day)                                levothyroxine 137 MCG tablet   Commonly known as:  SYNTHROID/LEVOTHROID   Take one tablet daily.                                losartan-hydrochlorothiazide 100-25 MG per tablet   Commonly known as:  HYZAAR   Take 1 tablet by mouth daily                                * ONE TOUCH ULTRA test strip   Test  four times daily ultra blue (please schedule clinic appt)   Generic drug:  blood glucose monitoring                                * blood glucose monitoring test strip   Commonly known as:  SHEILA CONTOUR NEXT   Use to test blood sugar 4 times daily                                * ONE TOUCH TEST STRIPS test strip   Use to test blood sugar 4 times daily or as directed.   Generic drug:  blood glucose monitoring                                order for DME   Equipment being ordered: CPAP machine                                rizatriptan 10 MG tablet   Commonly known as:  MAXALT   Take 1 tablet (10 mg) by mouth at onset of headache for migraine                                Urea 40 % Crea   Externally apply topically 2 times daily To surface of toenails                                VIOXX OR   1 TABLET DAILY                                zolpidem 10 MG tablet   Commonly known as:   AMBIEN   Take 1 tablet at bedtime as needed for insomnia                                * Notice:  This list has 3 medication(s) that are the same as other medications prescribed for you. Read the directions carefully, and ask your doctor or other care provider to review them with you.

## 2017-05-08 NOTE — DISCHARGE INSTRUCTIONS
Dc  Discharge Instructions after Colonoscopy  or Sigmoidoscopy    Today you had a __x__ Colonoscopy _    Activity and Diet  You were given medicine for pain. You may be dizzy or sleepy.  For 24 hours:    Do not drive or use heavy equipment.    Do not make important decisions.    Do not drink any alcohol.  You may return to your normal diet and medicines.    Discomfort    Air was placed in your colon during the exam in order to see it. Walking helps to pass the air.    You may take Tylenol (acetaminophen) for pain unless your doctor has told you not to.  Do not take aspirin or ibuprofen (Advil, Motrin, or other anti-inflammatory  drugs) for __3___ days.    Follow-up  __x__ We took small tissue samples or polyps to study. Your doctor will call you with the results  within two weeks.    When to call:    Call right away if you have:    Unusual pain in belly or chest pain not relieved with passing air.    More than 1 to 2 Tablespoons of bleeding from your rectum.    Fever above 100.6  F (37.5  C).    If you have severe pain, bleeding, or shortness of breath, go to an emergency room.    If you have questions, call:  Monday to Friday, 7 a.m. to 4:30 p.m.  Endoscopy: [phone] (We may have to call you back)    After hours  Hospital: [phone] (Ask for the GI fellow on call)

## 2017-05-09 DIAGNOSIS — E10.8 TYPE 1 DIABETES MELLITUS WITH COMPLICATIONS (H): ICD-10-CM

## 2017-05-09 LAB — COPATH REPORT: NORMAL

## 2017-05-09 NOTE — TELEPHONE ENCOUNTER
ambien  Last Written Prescription Date:  11/16/16  Last Fill Quantity: 90,   # refills: 3  Last Office Visit : 3/22/17  Future Office visit:  7/26/17    Routing refill request to provider for review/approval because:  Drug not on the [facility], P or [facility] refill protocol or controlled substance  Dr. Yuan ordered 1 year of refills in November but can only be filled for 6 months at a time. New order needed

## 2017-05-11 RX ORDER — ZOLPIDEM TARTRATE 10 MG/1
TABLET ORAL
Qty: 90 TABLET | Refills: 1 | Status: SHIPPED | OUTPATIENT
Start: 2017-05-11 | End: 2017-11-16

## 2017-05-12 ENCOUNTER — TELEPHONE (OUTPATIENT)
Dept: ENDOCRINOLOGY | Facility: CLINIC | Age: 57
End: 2017-05-12

## 2017-05-12 NOTE — TELEPHONE ENCOUNTER
Please let the patient know that he needs to get this done through his PCP. If he does not have one, please provide him with information regarding the PCP near him

## 2017-05-12 NOTE — TELEPHONE ENCOUNTER
Left voicemail for pt re: MD message below and PCP number [phone] to establish care.  ----- Message from Saundra Ordoñez RN sent at 5/12/2017 12:32 PM CDT -----  Regarding: ambien  Dr. Yuan okayed a more refill of his ambien but he is now seeing Dr. Szymanski and he will not be prescribing this for him. Please see note below and let pt know he needs to establish with PCP and have future ambien refills done through that provider, thanks    Please let the patient know that he needs to get this done through his PCP. If he does not have one, please provide him with information regarding the PCP near him     Loraine Szymanski MD

## 2017-11-06 DIAGNOSIS — E10.8 TYPE 1 DIABETES MELLITUS WITH COMPLICATIONS (H): ICD-10-CM

## 2017-11-06 NOTE — TELEPHONE ENCOUNTER
Zolpidem 10 mg       Last Written Prescription Date:  5/11/17  Last Fill Quantity: 90,   # refills: 1  Last Office Visit : 3/22/17  Future Office visit:  NONE  Routing refill request to provider for review/approval because:  Drug not on refill protocol or controlled substance

## 2017-11-07 RX ORDER — ZOLPIDEM TARTRATE 10 MG/1
TABLET ORAL
Qty: 90 TABLET | Refills: 1 | OUTPATIENT
Start: 2017-11-07

## 2017-11-16 DIAGNOSIS — G47.00 INSOMNIA, UNSPECIFIED TYPE: Primary | ICD-10-CM

## 2017-11-16 DIAGNOSIS — E10.8 TYPE 1 DIABETES MELLITUS WITH COMPLICATIONS (H): ICD-10-CM

## 2017-11-16 RX ORDER — ZOLPIDEM TARTRATE 10 MG/1
TABLET ORAL
Qty: 90 TABLET | Refills: 0 | Status: SHIPPED | OUTPATIENT
Start: 2017-11-16 | End: 2018-02-20

## 2017-11-16 NOTE — TELEPHONE ENCOUNTER
Rx faxed to pt's [facility] pharmacy in [place]. Left message for pt informing him that no further refills until he follows up in clinic. Number for scheduling provided.    Juanita Hoyt RN

## 2017-11-16 NOTE — TELEPHONE ENCOUNTER
Last office visit 3/22/17. No future appt scheduled. Last zolpidem refill received 8/07/17.  reviewed 11/16/17. Last prescribed by endocrinology. Endo requests PCP to review refill request.    Juanita Hoyt RN

## 2017-12-13 DIAGNOSIS — E78.5 DYSLIPIDEMIA: ICD-10-CM

## 2017-12-13 DIAGNOSIS — E10.8 TYPE 1 DIABETES MELLITUS WITH COMPLICATIONS (H): ICD-10-CM

## 2017-12-13 DIAGNOSIS — E03.9 ACQUIRED HYPOTHYROIDISM: ICD-10-CM

## 2017-12-13 DIAGNOSIS — B35.1 ONYCHOMYCOSIS: ICD-10-CM

## 2017-12-15 RX ORDER — ARIPIPRAZOLE 15 MG/1
15 TABLET ORAL DAILY
Qty: 90 TABLET | Refills: 0 | OUTPATIENT
Start: 2017-12-15

## 2017-12-15 NOTE — TELEPHONE ENCOUNTER
Abilify      Last Written Prescription Date:  12-7-16  Last Fill Quantity: 30,   # refills: 11  Last Office Visit : 3/22/2017  [facility]  Future Office visit:  none    Routing refill request to provider for review/approval because:  Drug not on protocol.    Scheduling has been notified to contact the pt for appointment.      Kathleen M Doege RN

## 2017-12-15 NOTE — TELEPHONE ENCOUNTER
Pharmacy notified to contact pt to see if followed by psy or if not then to send to PCP.    Kathleen M Doege RN

## 2018-01-02 DIAGNOSIS — C44.90 HIDRADENOCARCINOMA: Primary | ICD-10-CM

## 2018-01-20 ENCOUNTER — RADIANT APPOINTMENT (OUTPATIENT)
Dept: CT IMAGING | Facility: CLINIC | Age: 58
End: 2018-01-20
Attending: PHYSICIAN ASSISTANT
Payer: COMMERCIAL

## 2018-01-20 ENCOUNTER — RADIANT APPOINTMENT (OUTPATIENT)
Dept: MRI IMAGING | Facility: CLINIC | Age: 58
End: 2018-01-20
Attending: PHYSICIAN ASSISTANT
Payer: COMMERCIAL

## 2018-01-20 DIAGNOSIS — C44.90 HIDRADENOCARCINOMA: ICD-10-CM

## 2018-01-20 LAB — RADIOLOGIST FLAGS: NORMAL

## 2018-01-20 RX ORDER — GADOBUTROL 604.72 MG/ML
10 INJECTION INTRAVENOUS ONCE
Status: COMPLETED | OUTPATIENT
Start: 2018-01-20 | End: 2018-01-20

## 2018-01-20 RX ORDER — GADOBUTROL 604.72 MG/ML
10 INJECTION INTRAVENOUS ONCE
Status: DISCONTINUED | OUTPATIENT
Start: 2018-01-20 | End: 2018-01-20

## 2018-01-20 RX ADMIN — GADOBUTROL 10 ML: 604.72 INJECTION INTRAVENOUS at 10:39

## 2018-01-20 NOTE — DISCHARGE INSTRUCTIONS
MRI Contrast Discharge Instructions    The IV contrast you received today will pass out of your body in your  urine. This will happen in the next 24 hours. You will not feel this process.  Your urine will not change color.    Drink at least 4 extra glasses of water or juice today (unless your doctor  has restricted your fluids). This reduces the stress on your kidneys.  You may take your regular medicines.    If you are on dialysis: It is best to have dialysis today.    If you have a reaction: Most reactions happen right away. If you have  any new symptoms after leaving the hospital (such as hives or swelling),  call your hospital at the correct number below. Or call your family doctor.  If you have breathing distress or wheezing, call 911.    Special instructions: ***    I have read and understand the above information.    Signature:______________________________________ Date:___________    Staff:__________________________________________ Date:___________     Time:__________    [facility] Departments:    ___[place]: [phone]  ___[facility]: [phone]  ___[place]: [phone] ___[facility]: [phone]  ___[facility]: [phone]  ___[facility]: [phone]  ___[phone]  ___[facility]: [phone]  ___[phone]

## 2018-01-26 ENCOUNTER — OFFICE VISIT (OUTPATIENT)
Dept: ORTHOPEDICS | Facility: CLINIC | Age: 58
End: 2018-01-26
Payer: COMMERCIAL

## 2018-01-26 VITALS — WEIGHT: 232.2 LBS | HEIGHT: 76 IN | BODY MASS INDEX: 28.27 KG/M2

## 2018-01-26 DIAGNOSIS — C44.90 HIDRADENOCARCINOMA: Primary | ICD-10-CM

## 2018-01-26 NOTE — LETTER
1/26/2018       RE: Luigi Moore  [address]  [place] [address]     Dear Colleague,    Thank you for referring your patient, Luigi Moore, to the [facility] at [facility]. Please see a copy of my visit note below.    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  He has not noticed any new masses at that site.  He continues to be active and engaging in work.  He really has no symptoms related to his tumor.  I reviewed his patient surveys in the EMR.    He has some persistent numbness in the area of his previous surgery but full motion of the fingers wrist and elbow.  There is no edema erythema or adenopathy or masses in the right upper extremity including the hand.  The hand is well-perfused.  He has a normal gait.  He is alert oriented has a normal mood and affect and is in no acute distress.  Eyes are nonicteric with equal pupils.    I reviewed his MRI and CT scan.  MRI shows no sign of local recurrence.  The CT scan shows no new nodules but slight increase in size over the last 18 months in nodules that he had present.  Recommendations were given for another CT scan in 1 year.    We will plan to repeat this patient's CT scan of his chest in 1 year.  He will come back sooner if he notices any mass in his hand or other chest symptoms.  I have answered all of his questions.        Again, thank you for allowing me to participate in the care of your patient.      Sincerely,    Jeovany Jefferson MD

## 2018-01-26 NOTE — PROGRESS NOTES
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  He has not noticed any new masses at that site.  He continues to be active and engaging in work.  He really has no symptoms related to his tumor.  I reviewed his patient surveys in the EMR.    He has some persistent numbness in the area of his previous surgery but full motion of the fingers wrist and elbow.  There is no edema erythema or adenopathy or masses in the right upper extremity including the hand.  The hand is well-perfused.  He has a normal gait.  He is alert oriented has a normal mood and affect and is in no acute distress.  Eyes are nonicteric with equal pupils.    I reviewed his MRI and CT scan.  MRI shows no sign of local recurrence.  The CT scan shows no new nodules but slight increase in size over the last 18 months in nodules that he had present.  Recommendations were given for another CT scan in 1 year.    We will plan to repeat this patient's CT scan of his chest in 1 year.  He will come back sooner if he notices any mass in his hand or other chest symptoms.  I have answered all of his questions.

## 2018-01-26 NOTE — MR AVS SNAPSHOT
"              After Visit Summary   1/26/2018    Luigi Moore    MRN: [id number]           Patient Information     Date Of Birth          1960        Visit Information        Provider Department      1/26/2018 11:15 AM Jeovany Jefferson MD [facility]        Today's Diagnoses     Hidradenocarcinoma    -  1       Follow-ups after your visit        Who to contact     Please call your clinic at [phone] to:    Ask questions about your health    Make or cancel appointments    Discuss your medicines    Learn about your test results    Speak to your doctor   If you have compliments or concerns about an experience at your clinic, or if you wish to file a complaint, please contact [facility] Patient Relations at [phone] or email us at [email or web address]         Additional Information About Your Visit        MyChart Information     [employer] gives you secure access to your electronic health record. If you see a primary care provider, you can also send messages to your care team and make appointments. If you have questions, please call your primary care clinic.  If you do not have a primary care provider, please call [phone] and they will assist you.      [employer] is an electronic gateway that provides easy, online access to your medical records. With [employer], you can request a clinic appointment, read your test results, renew a prescription or communicate with your care team.     To access your existing account, please contact your [facility] or call [phone] for assistance.        Care EveryWhere ID     This is your Care EveryWhere ID. This could be used by other organizations to access your [facility] medical records  [id number]        Your Vitals Were     Height BMI (Body Mass Index)                1.93 m (6' 4\") 28.26 kg/m2           Blood Pressure from Last 3 Encounters:   05/08/17 131/66   05/01/17 " 120/66   03/22/17 128/81    Weight from Last 3 Encounters:   01/26/18 105.3 kg (232 lb 3.2 oz)   05/01/17 106.6 kg (235 lb)   03/22/17 107.5 kg (237 lb)              Today, you had the following     No orders found for display       Primary Care Provider Office Phone # Fax #    Kathya Lang, APRN [facility] [phone] [address] [address]        Equal Access to Services     OLGA [facility] : Hadii aad ku hadasho Soomaali, waaxda luqadaha, qaybta kaalmada adeegyada, waxjosh mensah haydeborah jaimes . So [facility] [phone].    ATENCIÓN: Si habla español, tiene a garcia disposición servicios gratuitos de asistencia lingüística. [facility] [phone].    We comply with applicable federal civil rights laws and [place] laws. We do not discriminate on the basis of race, color, national origin, age, disability, sex, sexual orientation, or gender identity.            Thank you!     Thank you for choosing [facility]  for your care. Our goal is always to provide you with excellent care. Hearing back from our patients is one way we can continue to improve our services. Please take a few minutes to complete the written survey that you may receive in the mail after your visit with us. Thank you!             Your Updated Medication List - Protect others around you: Learn how to safely use, store and throw away your medicines at www.disposemymeds.org.          This list is accurate as of 1/26/18 11:55 AM.  Always use your most recent med list.                   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), Acquired hypothyroidism, Dyslipidemia, Onychomycosis       aspirin 81 MG tablet     100 tablet    Take 1 tablet (81 mg) by mouth daily    Soft tissue tumor       atorvastatin 40 MG tablet    LIPITOR    90 tablet    Take 1 tablet (40 mg) by mouth daily    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), Acquired hypothyroidism, Dyslipidemia, Onychomycosis       buPROPion 300 MG 24 hr tablet    WELLBUTRIN XL    30 tablet    Take 1 tablet (300 mg) by mouth every morning    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), Acquired hypothyroidism, Dyslipidemia, Onychomycosis       gabapentin 400 MG capsule    NEURONTIN    270 capsule    Take 1 capsule (400 mg) by mouth 3 times daily    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), Acquired hypothyroidism, Dyslipidemia, Onychomycosis       glucagon 1 MG kit    GLUCAGON EMERGENCY    1 mg    Inject 1 mg into the muscle once for 1 dose    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), Acquired hypothyroidism, Dyslipidemia, Onychomycosis       insulin lispro 100 UNIT/ML injection    humaLOG    80 mL    Use as directed in pump (approximately 70-80 Units every day)    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), Acquired hypothyroidism, Dyslipidemia, Onychomycosis       levothyroxine 137 MCG tablet    SYNTHROID/LEVOTHROID    90 tablet    Take one tablet daily.    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)       losartan-hydrochlorothiazide 100-25 MG per tablet    HYZAAR    90 tablet    Take 1 tablet by mouth daily    Type 1 diabetes mellitus without complication (H)       * ONETOUCH ULTRA test strip   Generic drug:  blood glucose monitoring     400  strip    Test  four times daily ultra blue (please schedule clinic appt)    DM (diabetes mellitus) (H)       * blood glucose monitoring test strip    SHEILA CONTOUR NEXT    400 strip    Use to test blood sugar 4 times daily    Type 1 diabetes mellitus without complication (H)       * ONETOUCH TEST STRIPS test strip   Generic drug:  blood glucose monitoring     400 strip    Use to test blood sugar 4 times daily or as directed.    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 Read the directions carefully, and ask your doctor or other care provider to review them with you.

## 2018-01-26 NOTE — NURSING NOTE
"Reason For Visit:   Chief Complaint   Patient presents with     RECHECK     Follow Up, Right Hand. Same Day MRI and CT Scan. HX:  DOS: 02/26/2015, Wide Resection of Right Hand Wound Bed.                        HEIGHT: 6' 4\", WEIGHT: 232 lbs 3.2 oz, BMI: Body mass index is 28.26 kg/(m^2).      Current Outpatient Prescriptions   Medication Sig Dispense Refill     zolpidem (AMBIEN) 10 MG tablet Take 1 tablet at bedtime as needed for insomnia 90 tablet 0     Rofecoxib (VIOXX OR) 1 TABLET DAILY       alprostadil (EDEX) 40 MCG kit Inject 30 - 35 mcg (approx 3/4 ml) as directed. 240 mcg 11     order for DME Equipment being ordered: CPAP machine 1 each 0     albuterol (PROAIR HFA/PROVENTIL HFA/VENTOLIN HFA) 108 (90 BASE) MCG/ACT Inhaler Inhale 2 puffs into the lungs every 6 hours 1 Inhaler 3     acetaminophen-codeine (TYLENOL #3) 300-30 MG per tablet Take 1-2 tablets by mouth every 6 hours as needed for moderate pain 60 tablet 1     Urea 40 % CREA Externally apply topically 2 times daily To surface of toenails 198 g 5     insulin lispro (HUMALOG) 100 UNIT/ML injection Use as directed in pump (approximately 70-80 Units every day) 80 mL 3     buPROPion (WELLBUTRIN XL) 300 MG 24 hr tablet Take 1 tablet (300 mg) by mouth every morning 30 tablet 11     ARIPiprazole (ABILIFY) 15 MG tablet Take 1 tablet (15 mg) by mouth daily 30 tablet 11     rizatriptan (MAXALT) 10 MG tablet Take 1 tablet (10 mg) by mouth at onset of headache for migraine 12 tablet 11     atorvastatin (LIPITOR) 40 MG tablet Take 1 tablet (40 mg) by mouth daily 90 tablet 3     levothyroxine (SYNTHROID/LEVOTHROID) 137 MCG tablet Take one tablet daily. 90 tablet 3     gabapentin (NEURONTIN) 400 MG capsule Take 1 capsule (400 mg) by mouth 3 times daily 270 capsule 3     blood glucose monitoring (SHEILA CONTOUR NEXT) test strip Use to test blood sugar 4 times daily 400 strip 3     ONE TOUCH TEST STRIPS test strip Use to test blood sugar 4 times daily or as directed. " 400 strip 3     losartan-hydrochlorothiazide (HYZAAR) 100-25 MG per tablet Take 1 tablet by mouth daily 90 tablet 3     aspirin 81 MG tablet Take 1 tablet (81 mg) by mouth daily 100 tablet 3     ONE TOUCH ULTRA test strip Test  four times daily ultra blue (please schedule clinic appt) 400 strip 3     glucagon (GLUCAGON EMERGENCY) 1 MG kit Inject 1 mg into the muscle once for 1 dose 1 mg 3          Allergies   Allergen Reactions     Diagnostic X-Ray Materials Anaphylaxis     Contrast Dye Hives     Diatrizoate Hives     iodine

## 2018-02-09 DIAGNOSIS — B35.1 ONYCHOMYCOSIS: ICD-10-CM

## 2018-02-09 DIAGNOSIS — E10.8 TYPE 1 DIABETES MELLITUS WITH COMPLICATIONS (H): ICD-10-CM

## 2018-02-09 DIAGNOSIS — E03.9 ACQUIRED HYPOTHYROIDISM: ICD-10-CM

## 2018-02-09 DIAGNOSIS — E78.5 DYSLIPIDEMIA: ICD-10-CM

## 2018-02-09 RX ORDER — ARIPIPRAZOLE 15 MG/1
15 TABLET ORAL DAILY
Qty: 30 TABLET | Refills: 1 | OUTPATIENT
Start: 2018-02-09

## 2018-02-12 DIAGNOSIS — E78.5 DYSLIPIDEMIA: ICD-10-CM

## 2018-02-12 DIAGNOSIS — E10.8 TYPE 1 DIABETES MELLITUS WITH COMPLICATIONS (H): ICD-10-CM

## 2018-02-12 DIAGNOSIS — E03.9 ACQUIRED HYPOTHYROIDISM: ICD-10-CM

## 2018-02-12 DIAGNOSIS — B35.1 ONYCHOMYCOSIS: ICD-10-CM

## 2018-02-12 NOTE — TELEPHONE ENCOUNTER
abilify  Last Written Prescription Date:  12/6/16  Last Fill Quantity: 30,   # refills: 11  Last Office Visit : 3/22/17  Future Office visit:  No future    Routing refill request to clinic nurse for review/approval because:  Last refilled by Dr. Donn Yuan, endocrine refused further refills redirected to PCP, failed protocol  Scheduling has been notified to contact the pt for appointment.

## 2018-02-13 RX ORDER — ARIPIPRAZOLE 15 MG/1
15 TABLET ORAL DAILY
Qty: 90 TABLET | Refills: 0 | Status: SHIPPED | OUTPATIENT
Start: 2018-02-13 | End: 2018-10-01

## 2018-02-20 DIAGNOSIS — E10.8 TYPE 1 DIABETES MELLITUS WITH COMPLICATIONS (H): ICD-10-CM

## 2018-02-20 DIAGNOSIS — G47.00 INSOMNIA, UNSPECIFIED TYPE: ICD-10-CM

## 2018-02-20 RX ORDER — ZOLPIDEM TARTRATE 10 MG/1
TABLET ORAL
Qty: 90 TABLET | Refills: 0 | Status: SHIPPED | OUTPATIENT
Start: 2018-02-20 | End: 2018-05-11

## 2018-02-27 DIAGNOSIS — E10.9 TYPE 1 DIABETES MELLITUS WITHOUT COMPLICATION (H): ICD-10-CM

## 2018-02-27 RX ORDER — LOSARTAN POTASSIUM AND HYDROCHLOROTHIAZIDE 25; 100 MG/1; MG/1
1 TABLET ORAL DAILY
Qty: 90 TABLET | Refills: 3 | Status: SHIPPED | OUTPATIENT
Start: 2018-02-27 | End: 2019-04-19

## 2018-02-27 NOTE — TELEPHONE ENCOUNTER
Last Clinic Visit:  3/22/17   NEXT:NONE    RF  12/7/16  90:3  90 DAY RF     APPT. DUE    Scheduling has been notified to contact the pt for appointment.

## 2018-02-28 ENCOUNTER — TELEPHONE (OUTPATIENT)
Dept: ENDOCRINOLOGY | Facility: CLINIC | Age: 58
End: 2018-02-28

## 2018-02-28 DIAGNOSIS — B35.1 ONYCHOMYCOSIS: ICD-10-CM

## 2018-02-28 DIAGNOSIS — E11.9 DM (DIABETES MELLITUS) (H): ICD-10-CM

## 2018-02-28 DIAGNOSIS — E10.8 TYPE 1 DIABETES MELLITUS WITH COMPLICATIONS (H): ICD-10-CM

## 2018-02-28 DIAGNOSIS — E78.5 DYSLIPIDEMIA: ICD-10-CM

## 2018-02-28 DIAGNOSIS — E03.9 ACQUIRED HYPOTHYROIDISM: ICD-10-CM

## 2018-02-28 RX ORDER — ATORVASTATIN CALCIUM 40 MG/1
40 TABLET, FILM COATED ORAL DAILY
Qty: 90 TABLET | Refills: 0 | Status: SHIPPED | OUTPATIENT
Start: 2018-02-28 | End: 2019-04-09

## 2018-02-28 RX ORDER — LEVOTHYROXINE SODIUM 137 UG/1
TABLET ORAL
Qty: 90 TABLET | Refills: 0 | Status: SHIPPED | OUTPATIENT
Start: 2018-02-28 | End: 2018-07-23

## 2018-02-28 NOTE — TELEPHONE ENCOUNTER
Last Clinic Visit:  3/22/17  F/u 4/16/18      gabapentin (NEURONTIN) 400 MG capsule    Last Written Prescription Date:  12/7/16  Last Fill Quantity: 270,   # refills: 3  Last Office Visit : 3/22/17  Future Office visit:  4/16/18    Routing refill request to provider for review/approval because:  Not on protocol

## 2018-03-01 RX ORDER — GABAPENTIN 400 MG/1
400 CAPSULE ORAL 3 TIMES DAILY
Qty: 270 CAPSULE | Refills: 0 | Status: SHIPPED | OUTPATIENT
Start: 2018-03-01 | End: 2018-07-23

## 2018-04-05 DIAGNOSIS — E03.9 ACQUIRED HYPOTHYROIDISM: ICD-10-CM

## 2018-04-05 DIAGNOSIS — E10.8 TYPE 1 DIABETES MELLITUS WITH COMPLICATIONS (H): ICD-10-CM

## 2018-04-05 DIAGNOSIS — E78.5 DYSLIPIDEMIA: ICD-10-CM

## 2018-04-05 DIAGNOSIS — B35.1 ONYCHOMYCOSIS: ICD-10-CM

## 2018-04-06 RX ORDER — BUPROPION HYDROCHLORIDE 300 MG/1
300 TABLET ORAL EVERY MORNING
Qty: 30 TABLET | Refills: 11 | OUTPATIENT
Start: 2018-04-06

## 2018-04-06 RX ORDER — BUPROPION HYDROCHLORIDE 300 MG/1
300 TABLET ORAL EVERY MORNING
Qty: 30 TABLET | Refills: 11 | Status: SHIPPED | OUTPATIENT
Start: 2018-04-06 | End: 2019-04-15

## 2018-04-06 NOTE — TELEPHONE ENCOUNTER
buPROPion (WELLBUTRIN XL) 300 MG 24 hr tablet  Last Written Prescription Date:  12/7/16  Last Fill Quantity: 30,   # refills: 11  Last Office Visit :3/22/17  Future Office visit: 4/16/18      Routing refill request to provider for review/approval because:  Not on protocol.  rf or to pcp?

## 2018-04-07 ASSESSMENT — ENCOUNTER SYMPTOMS
PANIC: 1
DECREASED CONCENTRATION: 1
NERVOUS/ANXIOUS: 0
INSOMNIA: 1
DEPRESSION: 0

## 2018-04-10 ENCOUNTER — OFFICE VISIT (OUTPATIENT)
Dept: INTERNAL MEDICINE | Facility: CLINIC | Age: 58
End: 2018-04-10
Payer: COMMERCIAL

## 2018-04-10 VITALS
BODY MASS INDEX: 28.7 KG/M2 | HEIGHT: 76 IN | DIASTOLIC BLOOD PRESSURE: 74 MMHG | HEART RATE: 85 BPM | SYSTOLIC BLOOD PRESSURE: 135 MMHG | WEIGHT: 235.7 LBS

## 2018-04-10 DIAGNOSIS — G47.33 OSA (OBSTRUCTIVE SLEEP APNEA): Primary | ICD-10-CM

## 2018-04-10 DIAGNOSIS — E10.9 TYPE 1 DIABETES MELLITUS WITHOUT COMPLICATION (H): ICD-10-CM

## 2018-04-10 DIAGNOSIS — Z11.59 ENCOUNTER FOR HCV SCREENING TEST FOR LOW RISK PATIENT: ICD-10-CM

## 2018-04-10 DIAGNOSIS — E03.9 ACQUIRED HYPOTHYROIDISM: ICD-10-CM

## 2018-04-10 DIAGNOSIS — D23.9 CLEAR CELL HIDRADENOMA: ICD-10-CM

## 2018-04-10 LAB
ANION GAP SERPL CALCULATED.3IONS-SCNC: 6 MMOL/L (ref 3–14)
BUN SERPL-MCNC: 11 MG/DL (ref 7–30)
CALCIUM SERPL-MCNC: 8.8 MG/DL (ref 8.5–10.1)
CHLORIDE SERPL-SCNC: 105 MMOL/L (ref 94–109)
CHOLEST SERPL-MCNC: 173 MG/DL
CO2 SERPL-SCNC: 28 MMOL/L (ref 20–32)
CREAT SERPL-MCNC: 0.86 MG/DL (ref 0.66–1.25)
CREAT UR-MCNC: 121 MG/DL
ERYTHROCYTE [DISTWIDTH] IN BLOOD BY AUTOMATED COUNT: 12.9 % (ref 10–15)
GFR SERPL CREATININE-BSD FRML MDRD: >90 ML/MIN/1.7M2
GLUCOSE SERPL-MCNC: 78 MG/DL (ref 70–99)
HBA1C MFR BLD: 7.6 % (ref 0–6.4)
HCT VFR BLD AUTO: 43.1 % (ref 40–53)
HDLC SERPL-MCNC: 70 MG/DL
HGB BLD-MCNC: 14.7 G/DL (ref 13.3–17.7)
LDLC SERPL CALC-MCNC: 89 MG/DL
MCH RBC QN AUTO: 32.8 PG (ref 26.5–33)
MCHC RBC AUTO-ENTMCNC: 34.1 G/DL (ref 31.5–36.5)
MCV RBC AUTO: 96 FL (ref 78–100)
MICROALBUMIN UR-MCNC: <5 MG/L
MICROALBUMIN/CREAT UR: NORMAL MG/G CR (ref 0–17)
NONHDLC SERPL-MCNC: 103 MG/DL
PLATELET # BLD AUTO: 267 10E9/L (ref 150–450)
POTASSIUM SERPL-SCNC: 3.9 MMOL/L (ref 3.4–5.3)
RBC # BLD AUTO: 4.48 10E12/L (ref 4.4–5.9)
SODIUM SERPL-SCNC: 138 MMOL/L (ref 133–144)
TRIGL SERPL-MCNC: 68 MG/DL
TSH SERPL DL<=0.005 MIU/L-ACNC: 2.68 MU/L (ref 0.4–4)
WBC # BLD AUTO: 7.9 10E9/L (ref 4–11)

## 2018-04-10 ASSESSMENT — PAIN SCALES - GENERAL: PAINLEVEL: NO PAIN (0)

## 2018-04-10 NOTE — MR AVS SNAPSHOT
After Visit Summary   4/10/2018    Luigi Moore    MRN: [id number]           Patient Information     Date Of Birth          1960        Visit Information        Provider Department      4/10/2018 1:40 PM Doris Altman MD [facility]        Today's Diagnoses     TELLY (obstructive sleep apnea)    -  1    Type 1 diabetes mellitus without complication (H)        Encounter for HCV screening test for low risk patient        Acquired hypothyroidism        Clear cell hidradenoma          Care Instructions    [place]at[place] [phone] [place] [place] [address]          Follow-ups after your visit        Additional Services     DERMATOLOGY REFERRAL       Patient has new hx of clear cell hidradenoma s/p resection 2015, now with new small raised flesh like lesion on scalp.       Your provider has referred you to: [facility]: Dermatology Clinic - [place] [phone]   [email or web address]    Please be aware that coverage of these services is subject to the terms and limitations of your health insurance plan.  Call member services at your health plan with any benefit or coverage questions.      Please bring the following with you to your appointment:    (1) Any X-Rays, CTs or MRIs which have been performed.  Contact the facility where they were done to arrange for  prior to your scheduled appointment.    (2) List of current medications  (3) This referral request   (4) Any documents/labs given to you for this referral                  Your next 10 appointments already scheduled     Apr 10, 2018  3:00 PM CDT   LAB with  LAB    Health Lab ([facility])    [address] [place] [address]  1st Floor  [facility] [address] [phone]           Please do not eat 10-12 hours before your appointment if you are coming in fasting for labs on lipids, cholesterol, or glucose (sugar). This does not apply to pregnant women. Water, hot tea  and black coffee (with nothing added) are okay. Do not drink other fluids, diet soda or chew gum.            Apr 16, 2018 12:00 PM CDT   (Arrive by 11:45 AM)   RETURN ENDOCRINE with Loraine Szymanski MD   [facility] ([facility])    [address] [id number] [phone]              Future tests that were ordered for you today     Open Future Orders        Priority Expected Expires Ordered    CBC with platelets Routine 4/10/2018 4/24/2018 4/10/2018    Basic metabolic panel Routine 4/10/2018 4/24/2018 4/10/2018    Hemoglobin A1c Routine 4/10/2018 4/24/2018 4/10/2018    Lipid panel reflex to direct LDL Non-fasting Routine 4/10/2018 4/24/2018 4/10/2018    TSH with free T4 reflex Routine 4/10/2018 4/24/2018 4/10/2018    HCV Screen with Reflex Routine 4/10/2018 4/10/2019 4/10/2018            Who to contact     Please call your clinic at [phone] to:    Ask questions about your health    Make or cancel appointments    Discuss your medicines    Learn about your test results    Speak to your doctor            Additional Information About Your Visit        Adea Information     Adea gives you secure access to your electronic health record. If you see a primary care provider, you can also send messages to your care team and make appointments. If you have questions, please call your primary care clinic.  If you do not have a primary care provider, please call [phone] and they will assist you.      Adea is an electronic gateway that provides easy, online access to your medical records. With Adea, you can request a clinic appointment, read your test results, renew a prescription or communicate with your care team.     To access your existing account, please contact your [facility] or call [phone] for assistance.        Care EveryWhere ID     This is your Care EveryWhere ID. This could be used  "by other organizations to access your [facility] medical records  [id number]        Your Vitals Were     Pulse Height BMI (Body Mass Index)             85 1.93 m (6' 4\") 28.69 kg/m2          Blood Pressure from Last 3 Encounters:   04/10/18 135/74   05/08/17 131/66   05/01/17 120/66    Weight from Last 3 Encounters:   04/10/18 106.9 kg (235 lb 11.2 oz)   01/26/18 105.3 kg (232 lb 3.2 oz)   05/01/17 106.6 kg (235 lb)              We Performed the Following     Albumin Random Urine Quantitative with Creat Ratio     CPAP DEVICE     DERMATOLOGY REFERRAL          Today's Medication Changes          These changes are accurate as of 4/10/18  2:50 PM.  If you have any questions, ask your nurse or doctor.               Stop taking these medicines if you haven't already. Please contact your care team if you have questions.     [facility] OR   Stopped by:  Doris Altman MD                    Primary Care Provider Office Phone # Fax #    Kathya GALILEA Mirza [facility] [phone] [address] [facility] [address]        Equal Access to Services     [facility] AH: Hadii aad ku hadasho Soquangali, waaxda luqadaha, qaybta kaalmada adeegyada, dennis jaimes . So [facility] [phone].    ATENCIÓN: Si habla español, tiene a garcia disposición servicios gratuitos de asistencia lingüística. Llame al [phone].    We comply with applicable federal civil rights laws and [place] laws. We do not discriminate on the basis of race, color, national origin, age, disability, sex, sexual orientation, or gender identity.            Thank you!     Thank you for choosing [facility]  for your care. Our goal is always to provide you with excellent care. Hearing back from our patients is one way we can continue to improve our services. Please take a few minutes to complete the written survey that you may receive in the mail after your visit with us. Thank you!             Your Updated " Medication List - Protect others around you: Learn how to safely use, store and throw away your medicines at www.disposemymeds.org.          This list is accurate as of 4/10/18  2:50 PM.  Always use your most recent med list.                   Brand Name Dispense Instructions for use Diagnosis    acetaminophen-codeine 300-30 MG per tablet    TYLENOL #3    60 tablet    Take 1-2 tablets by mouth every 6 hours as needed for moderate pain    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)       albuterol 108 (90 Base) MCG/ACT Inhaler    PROAIR HFA/PROVENTIL HFA/VENTOLIN HFA    1 Inhaler    Inhale 2 puffs into the lungs every 6 hours    Exercise induced bronchospasm       alprostadil 40 MCG kit    EDEX    240 mcg    Inject 30 - 35 mcg (approx 3/4 ml) as directed.    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)       * ONETOUCH ULTRA test strip   Generic drug:  blood glucose monitoring     400 strip    Test  four times daily ultra blue (please schedule clinic appt)    DM (diabetes mellitus) (H)       buPROPion 300 MG 24 hr tablet    WELLBUTRIN  XL    30 tablet    Take 1 tablet (300 mg) by mouth every morning    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), Acquired hypothyroidism, Dyslipidemia, Onychomycosis       gabapentin 400 MG capsule    NEURONTIN    270 capsule    Take 1 capsule (400 mg) by mouth 3 times daily    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), Acquired hypothyroidism, Dyslipidemia, Onychomycosis       glucagon 1 MG kit    GLUCAGON EMERGENCY    1 mg    Inject 1 mg into the muscle once for 1 dose    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), Acquired hypothyroidism, Dyslipidemia, Onychomycosis       insulin lispro 100 UNIT/ML injection    humaLOG    80 mL    Use as directed in pump (approximately 70-80 Units every day)    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), Acquired hypothyroidism, Dyslipidemia, Onychomycosis       levothyroxine 137 MCG tablet    SYNTHROID/LEVOTHROID    90 tablet    Take by mouth one tablet daily.    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)       losartan-hydrochlorothiazide 100-25 MG per tablet    HYZAAR    90 tablet    Take 1 tablet by mouth daily *MUST BE SEEN FOR FURTHER  REFILLS    Type 1 diabetes mellitus without complication (H)       order for DME     1 each    Equipment being ordered: CPAP machine    TELLY (obstructive sleep apnea)       rizatriptan 10 MG tablet    MAXALT    12 tablet    Take 1 tablet (10 mg) by mouth at onset of headache for migraine    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), Acquired hypothyroidism, Dyslipidemia, Onychomycosis       Urea 40 % Crea     198 g    Externally apply topically 2 times daily To surface of toenails    Onychauxis       zolpidem 10 MG tablet    AMBIEN    90 tablet    Take 1 tablet at bedtime as needed for insomnia    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 Read the directions carefully, and  ask your doctor or other care provider to review them with you.

## 2018-04-11 LAB — HCV AB SERPL QL IA: NONREACTIVE

## 2018-04-13 NOTE — PROGRESS NOTES
Attending Addendum:  Patient seen and examined with resident in clinic today.  Pertinent portions of history and exam were independently verified by myself.  I agree with the exam and plan as outlined above with the following modifications: none.  Freda De La Garza MD  Internal Medicine      Answers for HPI/ROS submitted by the patient on 4/7/2018   General Symptoms: No  Skin Symptoms: No  HENT Symptoms: No  EYE SYMPTOMS: No  HEART SYMPTOMS: No  LUNG SYMPTOMS: No  INTESTINAL SYMPTOMS: No  URINARY SYMPTOMS: No  REPRODUCTIVE SYMPTOMS: Yes  SKELETAL SYMPTOMS: No  BLOOD SYMPTOMS: No  NERVOUS SYSTEM SYMPTOMS: No  MENTAL HEALTH SYMPTOMS: Yes  Scrotal pain or swelling: No  Erectile dysfunction: Yes  Penile discharge: No  Genital ulcers: No  Reduced libido: No  Nervous or Anxious: No  Depression: No  Trouble sleeping: Yes  Trouble thinking or concentrating: Yes  Mood changes: No  Panic attacks: Yes

## 2018-04-16 NOTE — PROGRESS NOTES
"Luigi Moore MRN# [id number]   YOB: 1960 Age: 57 year old     Date: April 10, 2018     Reason for visit: annual physical     HPI:   Mr. Moore is a 57 year old male with medical history notable for DMI (uses insulin pump), acquired hypothyroidism, HTN, HLD, TELLY, MDD who presents for annual physical, 3 month hx scalp \"bump\" in context of prior  hx of malignant clear cell hidradenoma.     The patient states that overall he has been doing quite well since his last annual visit and only has a couple of things he would like to address today.    First, he notes a small \"bump\" located on the left anterior portion ~2 inches above his right eye. He states that it has been there for about 3-4 months, is not sure if it is growing in size, but does think the size changes (waxes and wanes). Is not hard, but is more soft and fleshy feeling, feels mobile. Is not painful or pruritic, no associated skin changes, no hx trauma. States that would typically not be concerned but ~5 years ago had a similar bump on right hand that was ultimately diagnosed as a clear cell hidradenoma, which is a malignant tumor of the apocrine glands. Removed by ortho, all f/u since that time has been unremarkable. He notes that he has been told the scalp can commonly be involved so is concerned.     Secondly, the patient reports he has been feeling slightly more fatigued than usual over the past couple of months. Is worried that maybe his thyroid function is getting low again, though has been on stable dose of Synthroid for an extended period of time. Reports sleep is unchanged, is not falling asleep easily or napping during the day. Not sure if he is sleepy or more weary. Has been using the same CPAP machine and setting for many years, though does not report he is waking up more at night, spouse/girlfriend not reporting that he is snoring or having apneic episodes. Denies any other hyper or hypo thyroid symptoms. No fevers, chills, " night sweats, weight loss. Does note he has been exercising less than normal, typically bikes regularly but has been working mre hours, has not been able to exercise as much, thinks maybe this is why he is more tired.Has a dx of MDD, on Buproprion and Abilify, sees a therapist and psychiatrist, feels that his MDD is stable, no recent worsening. Denies current depressed mood or anhedonia.     Regarding chronic medical problems, reports that he checks BP at home regularly and is well controlled with -130s, DBP 70s-90s. DMI followed by Endocrinology, is due to see them next week, uses insulin pump. No other concerns regarding this.     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)     Acquired hypothyroidism     Dyslipidemia     Essential hypertension     Hyperlipidemia     Proliferative diabetic retinopathy (H)     Obstructive sleep apnea syndrome     ROS: Complete 10 point review of systems negative unless mentioned in HPI    Current Outpatient Prescriptions   Medication     buPROPion (WELLBUTRIN XL) 300 MG 24 hr tablet     gabapentin (NEURONTIN) 400 MG capsule     levothyroxine (SYNTHROID/LEVOTHROID) 137 MCG tablet     atorvastatin (LIPITOR) 40 MG tablet     ONETOUCH ULTRA test strip     losartan-hydrochlorothiazide (HYZAAR) 100-25 MG per tablet     zolpidem (AMBIEN) 10 MG tablet     ARIPiprazole (ABILIFY) 15 MG tablet     insulin lispro (HUMALOG) 100 UNIT/ML injection     alprostadil (EDEX) 40 MCG kit     order for DME     albuterol (PROAIR HFA/PROVENTIL HFA/VENTOLIN HFA) 108 (90 BASE) MCG/ACT Inhaler     acetaminophen-codeine (TYLENOL #3) 300-30 MG per tablet     Urea 40 % CREA     rizatriptan (MAXALT) 10 MG tablet     blood glucose monitoring (SHEILA CONTOUR NEXT) test strip     ONE TOUCH TEST STRIPS test strip     aspirin 81 MG tablet     glucagon (GLUCAGON EMERGENCY) 1 MG kit     No current facility-administered medications for this visit.       Exam:  BP  "135/74  Pulse 85  Ht 1.93 m (6' 4\")  Wt 106.9 kg (235 lb 11.2 oz)  BMI 28.69 kg/m2  General: NAD, pleasant and cooperative with interview and exam  HEENT: NCAT, moist oral mucosa, oropharynx clear, anicteric sclera, small 3x3cm bump on right anterior scalp, palpable to touch, non-tender, no skin changes or associated rashes or ulcerations, appears somewhat fleshy and mobile, not hard or fixed  Neck: no cervical lymphadenopathy or thyromegaly appreciated  CV: RRR, Normal S1, S2, no murmurs, rubs, thrills  Resp: non-labored respirations, CTAB, no rhonchi, crackles, wheezes appreciated  Abdomen: soft, nontender, nondistended, normoactive bs, no HSM  Extremities: WWP, no LE edema bilaterally, monofilament testing done, sensation intact throughout bilaterally   Skin: Warm and dry, no jaundice, rash, or concerning lesions  Neurological: alert and oriented, language fluent, no dysarthria, no gait abnormalities appreciated.  Psychological: appropriate mood     Labs:   Prior labs reviewed by me, none recent    Lab Results   Component Value Date    A1C 7.6 04/10/2018    A1C 8.7 12/07/2016    A1C 8.1 02/17/2015    A1C 10.0 01/17/2013     Imaging:   All prior imaging reviewed by me, none recent.     A&P:   Mr. Moore is a 57 year old male with medical history notable for DMI (uses insulin pump), acquired hypothyroidism, HTN, HLD, TELLY, MDD who presents for annual physical, 3 month hx scalp \"bump\" in context of prior  hx of malignant clear cell hidradenoma.     # HTN:   Patient with hx HTN, treated with Losartan-HCTZ 100-25mg qday; BP today in acceptable range at 135/74; reports good BP control at home. Will recheck BMP today given ARB, HCTZ use.  Otherwise no changes.   -     Basic metabolic panel; Future    # Hx Clear cell hidradenoma  # Scalp Lesion:   Patient with remote hx right hand clear cell hidradenoma, monitoring by ortho annually with CT chest and MRI right hand has been unremarkable. Now with new scalp bump, not " particularly like prior lesion, no concerning features on exam. Suspect most likely cyst vs adenoma, but given hx and no clear imaging study will refer to Derm for further eval, low suspicion this is recurrent clear cell hidradenoma.   -     DERMATOLOGY REFERRAL    # Type 1 diabetes mellitus without complication (H)  Longstanding hx DM1, managed by Endocrinology, last A1c in 8 range. Uses insulin pump, reports BGs have been better controlled, FBG ~`120. Has annual optho visit, did foot check today with negative monofilament testing, sensation intact. Will repeat basic labs today in anticipation of endocrine follow-up.   -     Basic metabolic panel; Future  -     Hemoglobin A1c; Future  -     Albumin Random Urine Quantitative with Creat Ratio   -    Reviewed need for annual optho visit as dayna  -    Foot check with monofilament testing today within normal limits    # HLD:   On atorvastatin 40, due for FLP recheck, ordered as below.   -     Lipid panel reflex to direct LDL Non-fasting; Future    # Fatigue:  Patient reporting 2 months generalized fatigue, not able to identify changes to sleep, not napping or falling asleep easily during the day, not affecting daily life. Possible contributors include deconditioning from decreased exercise in the winter months, increased work hours/stress, poorly controlled MDD though patient feels depression well controlled, thyroid abnormalities.  Could also be d/t poorly functioning CPAP/i.e. Need for updated rx, last done ~10 years ago.   -- CBC, TSH, T4 screening  -- CPAP device ordered as below  -- Encouraged patient to f/u with psychologist and psychiatrist regarding MDD cares  -- If no improvement, labs unrevealing consider referral back to sleep medicine for re-evaluation of TELLY    # Acquired hypothyroidism  Last check ~1 year ago, patient reporting fatigue symptoms, will check as above, though low suspicion thyroid abnormality given stable Synthroid dose for extended period of  time, no other s/s hypo or hyperthyroid.   -     TSH with free T4 reflex; Future    # Hx TELLY (obstructive sleep apnea)  -     CPAP DEVICE    # Encounter for HCV screening test for low risk patient  -     HCV Screen with Reflex; Future    # Healthcare Maintenance:  - BP: see above  - Lipids: see above  - Diabetes Screening: see above  - Colonoscopy: done 5/8/17, see Epic for details  - Immunizations: up to date  -HCV Screening: ordered today     Return to clinic: 1 year for annual physical or sooner if needed     Patient care plan discussed with attending physician, Dr. De La Garza, who agreed with above.    Doris Altman MD  Internal Medicine, PGY-3  [phone]

## 2018-04-21 ENCOUNTER — MYC MEDICAL ADVICE (OUTPATIENT)
Dept: INTERNAL MEDICINE | Facility: CLINIC | Age: 58
End: 2018-04-21

## 2018-04-24 DIAGNOSIS — B35.1 ONYCHOMYCOSIS: ICD-10-CM

## 2018-04-24 DIAGNOSIS — E03.9 ACQUIRED HYPOTHYROIDISM: ICD-10-CM

## 2018-04-24 DIAGNOSIS — E78.5 DYSLIPIDEMIA: ICD-10-CM

## 2018-04-24 DIAGNOSIS — E10.8 TYPE 1 DIABETES MELLITUS WITH COMPLICATIONS (H): ICD-10-CM

## 2018-04-26 RX ORDER — RIZATRIPTAN BENZOATE 10 MG/1
10 TABLET ORAL
Qty: 12 TABLET | Refills: 5 | OUTPATIENT
Start: 2018-04-26

## 2018-04-26 NOTE — TELEPHONE ENCOUNTER
rizatriptan (MAXALT) 10 MG tablet   Last Written Prescription Date:  12/7/16  Last Fill Quantity: 12,   # refills: 11  Last Office Visit : 3/22/17  Future Office visit: 7/25/18    Routing refill request to provider for review/approval because: not on protocol. Lv> 1 yr.   2 cancelled appts.   F/u  7/25/18

## 2018-04-26 NOTE — TELEPHONE ENCOUNTER
Patient should have headaches assessed by PCP and get treated as deemed necessary after the evaluation.

## 2018-04-30 DIAGNOSIS — R06.2 WHEEZING: Primary | ICD-10-CM

## 2018-04-30 NOTE — TELEPHONE ENCOUNTER
albuterol (PROAIR HFA/PROVENTIL HFA/VENTOLIN HFA) 108 (90 BASE) MCG/ACT Inhaler      Last Written Prescription Date:  3/22/17  Last Fill Quantity: 1 inhaler,   # refills: 3  Last Office Visit : 4/10/18  Future Office visit:  none    Routing refill request to provider for review/approval because:  Failed protocol- no asthma control test assessment on file.

## 2018-05-01 RX ORDER — ALBUTEROL SULFATE 90 UG/1
2 AEROSOL, METERED RESPIRATORY (INHALATION) EVERY 6 HOURS PRN
Qty: 8.5 G | Refills: 1 | Status: SHIPPED | OUTPATIENT
Start: 2018-05-01 | End: 2018-11-05

## 2018-05-11 DIAGNOSIS — E10.8 TYPE 1 DIABETES MELLITUS WITH COMPLICATIONS (H): ICD-10-CM

## 2018-05-11 DIAGNOSIS — G47.00 INSOMNIA, UNSPECIFIED TYPE: ICD-10-CM

## 2018-05-11 RX ORDER — ZOLPIDEM TARTRATE 10 MG/1
TABLET ORAL
Qty: 90 TABLET | Refills: 0
Start: 2018-05-20 | End: 2018-08-08

## 2018-06-21 ENCOUNTER — RADIANT APPOINTMENT (OUTPATIENT)
Dept: MAMMOGRAPHY | Facility: CLINIC | Age: 58
End: 2018-06-21
Payer: COMMERCIAL

## 2018-06-21 ENCOUNTER — RADIANT APPOINTMENT (OUTPATIENT)
Dept: MAMMOGRAPHY | Facility: CLINIC | Age: 58
End: 2018-06-21
Attending: NURSE PRACTITIONER
Payer: COMMERCIAL

## 2018-06-21 ENCOUNTER — OFFICE VISIT (OUTPATIENT)
Dept: INTERNAL MEDICINE | Facility: CLINIC | Age: 58
End: 2018-06-21
Payer: COMMERCIAL

## 2018-06-21 VITALS
HEART RATE: 93 BPM | SYSTOLIC BLOOD PRESSURE: 119 MMHG | DIASTOLIC BLOOD PRESSURE: 73 MMHG | BODY MASS INDEX: 26.79 KG/M2 | TEMPERATURE: 98.6 F | WEIGHT: 220.1 LBS | RESPIRATION RATE: 12 BRPM

## 2018-06-21 DIAGNOSIS — R22.31 MASS OF RIGHT AXILLA: ICD-10-CM

## 2018-06-21 DIAGNOSIS — R22.31 MASS OF RIGHT AXILLA: Primary | ICD-10-CM

## 2018-06-21 LAB
ANION GAP SERPL CALCULATED.3IONS-SCNC: 8 MMOL/L (ref 3–14)
BASOPHILS # BLD AUTO: 0.1 10E9/L (ref 0–0.2)
BASOPHILS NFR BLD AUTO: 1.2 %
BUN SERPL-MCNC: 8 MG/DL (ref 7–30)
CALCIUM SERPL-MCNC: 8.6 MG/DL (ref 8.5–10.1)
CHLORIDE SERPL-SCNC: 102 MMOL/L (ref 94–109)
CO2 SERPL-SCNC: 26 MMOL/L (ref 20–32)
CREAT SERPL-MCNC: 0.78 MG/DL (ref 0.66–1.25)
DIFFERENTIAL METHOD BLD: NORMAL
EOSINOPHIL # BLD AUTO: 0.4 10E9/L (ref 0–0.7)
EOSINOPHIL NFR BLD AUTO: 5.4 %
ERYTHROCYTE [DISTWIDTH] IN BLOOD BY AUTOMATED COUNT: 12.3 % (ref 10–15)
GFR SERPL CREATININE-BSD FRML MDRD: >90 ML/MIN/1.7M2
GLUCOSE SERPL-MCNC: 117 MG/DL (ref 70–99)
HCT VFR BLD AUTO: 44.1 % (ref 40–53)
HGB BLD-MCNC: 15.8 G/DL (ref 13.3–17.7)
IMM GRANULOCYTES # BLD: 0 10E9/L (ref 0–0.4)
IMM GRANULOCYTES NFR BLD: 0.3 %
LYMPHOCYTES # BLD AUTO: 2.5 10E9/L (ref 0.8–5.3)
LYMPHOCYTES NFR BLD AUTO: 33.3 %
MCH RBC QN AUTO: 33 PG (ref 26.5–33)
MCHC RBC AUTO-ENTMCNC: 35.8 G/DL (ref 31.5–36.5)
MCV RBC AUTO: 92 FL (ref 78–100)
MONOCYTES # BLD AUTO: 0.7 10E9/L (ref 0–1.3)
MONOCYTES NFR BLD AUTO: 8.9 %
NEUTROPHILS # BLD AUTO: 3.8 10E9/L (ref 1.6–8.3)
NEUTROPHILS NFR BLD AUTO: 50.9 %
NRBC # BLD AUTO: 0 10*3/UL
NRBC BLD AUTO-RTO: 0 /100
PLATELET # BLD AUTO: 275 10E9/L (ref 150–450)
POTASSIUM SERPL-SCNC: 3.7 MMOL/L (ref 3.4–5.3)
RBC # BLD AUTO: 4.79 10E12/L (ref 4.4–5.9)
SODIUM SERPL-SCNC: 137 MMOL/L (ref 133–144)
WBC # BLD AUTO: 7.4 10E9/L (ref 4–11)

## 2018-06-21 ASSESSMENT — PAIN SCALES - GENERAL: PAINLEVEL: NO PAIN (0)

## 2018-06-21 NOTE — PROGRESS NOTES
"[facility]   Kathya Martinodellearnest, GALILEA CNP  06/21/2018      Chief Complaint:   Consult       History of Present Illness:   Luigi Moore is a 57 year old male with a history of hidradenoma and type 1 diabetes mellitus who presents for evaluation of a lump in his armpit. He is accompanied by his cousins, Lesa and Juan. 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 He believes the lump has been present for 1 week. He reports that the lump is hard, immobile, nontender, and \"marble-sized.\" The lump is not painful, and Luigi has not attempted to treat it himself. Luigi reports feeling tired lately, which he attributes to his work schedule. He denies shortness of breath or night sweats. He has had no recent medication changes. He has not found any additional lumps.     Other concerns discussed:  Luigi reports that his blood sugars have been \"good\" lately, and that they average in the 130-range.     Review of Systems:   Pertinent items are noted in HPI, remainder of complete ROS is negative.      Active Medications:   Current Outpatient Prescriptions:      acetaminophen-codeine (TYLENOL #3) 300-30 MG per tablet, Take 1-2 tablets by mouth every 6 hours as needed for moderate pain, Disp: 60 tablet, Rfl: 1     albuterol (PROAIR HFA/PROVENTIL HFA/VENTOLIN HFA) 108 (90 Base) MCG/ACT Inhaler, Inhale 2 puffs into the lungs every 6 hours as needed for shortness of breath / dyspnea or wheezing - exercise induced wheezing, Disp: 8.5 g, Rfl: 1     alprostadil (EDEX) 40 MCG kit, Inject 30 - 35 mcg (approx 3/4 ml) as directed., Disp: 240 mcg, Rfl: 11     ARIPiprazole (ABILIFY) 15 MG tablet, Take 1 tablet (15 mg) by mouth daily, Disp: 90 tablet, Rfl: 0     aspirin 81 MG tablet, Take 1 tablet (81 mg) by mouth daily, Disp: 100 " tablet, Rfl: 3     atorvastatin (LIPITOR) 40 MG tablet, Take 1 tablet (40 mg) by mouth daily, Disp: 90 tablet, Rfl: 0     blood glucose monitoring (SHEILA CONTOUR NEXT) test strip, Use to test blood sugar 4 times daily, Disp: 400 strip, Rfl: 3     buPROPion (WELLBUTRIN XL) 300 MG 24 hr tablet, Take 1 tablet (300 mg) by mouth every morning, Disp: 30 tablet, Rfl: 11     gabapentin (NEURONTIN) 400 MG capsule, Take 1 capsule (400 mg) by mouth 3 times daily, Disp: 270 capsule, Rfl: 0     insulin lispro (HUMALOG) 100 UNIT/ML injection, Use as directed in pump (approximately 70-80 Units every day), Disp: 80 mL, Rfl: 3     levothyroxine (SYNTHROID/LEVOTHROID) 137 MCG tablet, Take by mouth one tablet daily., Disp: 90 tablet, Rfl: 0     losartan-hydrochlorothiazide (HYZAAR) 100-25 MG per tablet, Take 1 tablet by mouth daily *MUST BE SEEN FOR FURTHER  REFILLS, Disp: 90 tablet, Rfl: 3     ONE TOUCH TEST STRIPS test strip, Use to test blood sugar 4 times daily or as directed., Disp: 400 strip, Rfl: 3     ONETOUCH ULTRA test strip, Test  four times daily ultra blue (please schedule clinic appt), Disp: 400 strip, Rfl: 0     order for DME, Equipment being ordered: CPAP machine, Disp: 1 each, Rfl: 0     rizatriptan (MAXALT) 10 MG tablet, Take 1 tablet (10 mg) by mouth at onset of headache for migraine, Disp: 12 tablet, Rfl: 11     Urea 40 % CREA, Externally apply topically 2 times daily To surface of toenails, Disp: 198 g, Rfl: 5     zolpidem (AMBIEN) 10 MG tablet, Take 1 tablet at bedtime as needed for insomnia, Disp: 90 tablet, Rfl: 0     glucagon (GLUCAGON EMERGENCY) 1 MG kit, Inject 1 mg into the muscle once for 1 dose, Disp: 1 mg, Rfl: 3      Allergies:   Diagnostic x-ray materials; Contrast dye; and Diatrizoate      Past Medical History:  Acquired hypothyroidism  Depressive disorder  Hidradenoma, right hand  Numbness and tingling, right hand  Obstructed sleep apnea  Type 1 diabetes mellitus without  complications  Dyslipidemia  Hyperlipidemia  Essential hypertension  Proliferative diabetic retinopathy  Uncomplicated asthma     Past Surgical History:  Excise lesion hand, right, 2015  Other hand surgery, right  Orthopedic surgery, achilles tendon, left  Lipoma removal below rib cage, right    Family History:   Mother: Pancreatitis, alcohol abuse, depression, hypertension, obesity  Father: Asthma, leukemia (), diabetes, melanoma  Brother: Liver cancer, intestinal cancer  Maternal grandmother: Cancer  Maternal grandfather: Cancer  Paternal grandmother: Bone cancer  Paternal grandfather: Bone cancer      Social History:   Marital Status:   Presents to the clinic accompanied by his cousins, [place]  Tobacco Use: Never smoker, never used  Alcohol Use: No (history of alcohol abuse; sober since )  PCP: Kathya Lang     Physical Exam:   /73  Pulse 93  Temp 98.6  F (37  C)  Resp 12  Wt 99.8 kg (220 lb 1.6 oz)  BMI 26.79 kg/m2   Constitutional: no distress, comfortable, pleasant, well-groomed  Eyes: anicteric, conjunctiva pink, normal extra-ocular movements   Ears, Nose and Throat: tympanic membranes pearly gray with positive light reflex, EOMs clear bilaterally, nose clear and free of lesions, throat clear, mucosa pink and moist.   Neck: supple with full range of motion, no thyromegaly.   Cardiovascular: regular rate and rhythm, normal S1 and S2, no murmurs, rubs or gallops  Respiratory: clear to auscultation with good air movement bilaterally, no wheezes or crackles, non-labored   Breast: no skin retraction, no mass or concerning lesion  Lymph: no pre/post-auricular, submandibular, anterior/posterior cervical, or supra/infraclavicular, or inguinal lymphadenopathy. There is a ~1 cm firm, non-tender superficial nodule in R axilla  Gastrointestinal: positive bowel sounds, nontender, no hepatosplenomegaly, no masses    Skin: no concerning lesions or rash, no jaundice, temp normal    Psychological: appropriate mood, demonstrates intact judgment and logical thought process     Assessment and Plan:  Mass of right axilla  Ordered labs and ultrasound today to further assess the R axillary lump. Discussed need for FNA to biopsy if indicated by US.  - CBC with platelets differential  - Basic metabolic panel  - US Axillary Right  - BREAST CENTER REFERRAL     Follow-up: as needed following imaging        Scribe Disclosure:   I, Umu Harry, am serving as a scribe to document services personally performed by GALILEA Escalante CNP at this visit, based upon the provider's statements to me. All documentation has been reviewed by the aforementioned provider prior to being entered into the official medical record.     Portions of this medical record were completed by a scribe. UPON MY REVIEW AND AUTHENTICATION BY ELECTRONIC SIGNATURE, this confirms (a) I performed the applicable clinical services, and (b) the record is accurate .     GALILEA Escalante CNP

## 2018-06-21 NOTE — PATIENT INSTRUCTIONS
[facility]: [phone]     [facility] Medication Refill Request Information:  * Please contact your pharmacy regarding ANY request for medication refills.  ** [facility] Prescription Fax = [phone]  * Please allow 3 business days for routine medication refills.  * Please allow 5 business days for controlled substance medication refills.     [facility] Test Result notification information:  *You will be notified with in 7-10 days of your appointment day regarding the results of your test.  If you are on MyChart you will be notified as soon as the provider has reviewed the results and signed off on them.

## 2018-06-21 NOTE — MR AVS SNAPSHOT
After Visit Summary   6/21/2018    Luigi Moore    MRN: [id number]           Patient Information     Date Of Birth          1960        Visit Information        Provider Department      6/21/2018 7:00 AM Kathya Lang APRN [facility]        Today's Diagnoses     Mass of right axilla    -  1      Care Instructions    Primary Care Center: [phone]     Primary Care Center Medication Refill Request Information:  * Please contact your pharmacy regarding ANY request for medication refills.  ** [facility] Prescription Fax = [phone]  * Please allow 3 business days for routine medication refills.  * Please allow 5 business days for controlled substance medication refills.     Primary Care Center Test Result notification information:  *You will be notified with in 7-10 days of your appointment day regarding the results of your test.  If you are on MyChart you will be notified as soon as the provider has reviewed the results and signed off on them.            Follow-ups after your visit        Additional Services     BREAST CENTER REFERRAL       Your provider has referred you to: [facility]: Breast Center at [facility] [phone]   [email or web address]    Please be aware that coverage of these services is subject to the terms and limitations of your health insurance plan.  Call member services at your health plan with any benefit or coverage questions.      Please bring the following with you to your appointment:    (1) Any X-Rays, CTs or MRIs which have been performed.  Contact the facility where they were done to arrange for  prior to your scheduled appointment.    (2) List of current medications   (3) This referral request   (4) Any documents/labs given to you for this referral                  Your next 10 appointments already scheduled     Jun 21, 2018  7:30 AM CDT   LAB with [facility]   [facility] ([facility])    [address] [id number] [phone]           Please do not eat 10-12 hours before your appointment if you are coming in fasting for labs on lipids, cholesterol, or glucose (sugar). This does not apply to pregnant women. Water, hot tea and black coffee (with nothing added) are okay. Do not drink other fluids, diet soda or chew gum.            Jul 25, 2018  3:30 PM CDT   (Arrive by 3:15 PM)   RETURN ENDOCRINE with Loraine Szymanski MD   [facility] ([facility])    [address] [address] [phone]              Future tests that were ordered for you today     Open Future Orders        Priority Expected Expires Ordered    CBC with platelets differential Routine 6/21/2018 7/5/2018 6/21/2018    Basic metabolic panel Routine 6/21/2018 7/5/2018 6/21/2018    US Axillary Right Routine  6/21/2019 6/21/2018            Who to contact     Please call your clinic at [phone] to:    Ask questions about your health    Make or cancel appointments    Discuss your medicines    Learn about your test results    Speak to your doctor            Additional Information About Your Visit        [employer] Information     [employer] gives you secure access to your electronic health record. If you see a primary care provider, you can also send messages to your care team and make appointments. If you have questions, please call your primary care clinic.  If you do not have a primary care provider, please call [phone] and they will assist you.      [employer] is an electronic gateway that provides easy, online access to your medical records. With [employer], you can request a clinic appointment, read your test results, renew a prescription or communicate with your care team.     To access your existing account, please contact your [facility] or  call [phone] for assistance.        Care EveryWhere ID     This is your Care EveryWhere ID. This could be used by other organizations to access your [place] medical records  [id number]        Your Vitals Were     Pulse Temperature Respirations BMI (Body Mass Index)          93 98.6  F (37  C) 12 26.79 kg/m2         Blood Pressure from Last 3 Encounters:   06/21/18 119/73   04/10/18 135/74   05/08/17 131/66    Weight from Last 3 Encounters:   06/21/18 99.8 kg (220 lb 1.6 oz)   04/10/18 106.9 kg (235 lb 11.2 oz)   01/26/18 105.3 kg (232 lb 3.2 oz)              We Performed the Following     BREAST CENTER REFERRAL        Primary Care Provider Office Phone # Fax #    Kathya Hill Maulacie, APRN [facility] [phone]       [address] [facility] [address]        Equal Access to Services     GALLO SIDDIQI : Hadii aad ku hadasho Soomaali, waaxda luqadaha, qaybta kaalmada adeegyada, waxay idiin haymaryn saman jaimes . So [facility] [phone].    ATENCIÓN: Si elina espvarsha, tiene a garcia disposición servicios gratuitos de asistencia lingüística. Llame al [phone].    We comply with applicable federal civil rights laws and [place] laws. We do not discriminate on the basis of race, color, national origin, age, disability, sex, sexual orientation, or gender identity.            Thank you!     Thank you for choosing [facility]  for your care. Our goal is always to provide you with excellent care. Hearing back from our patients is one way we can continue to improve our services. Please take a few minutes to complete the written survey that you may receive in the mail after your visit with us. Thank you!             Your Updated Medication List - Protect others around you: Learn how to safely use, store and throw away your medicines at www.disposemymeds.org.          This list is accurate as of 6/21/18  7:26 AM.  Always use your most recent med list.                   Brand Name  Dispense Instructions for use Diagnosis    acetaminophen-codeine 300-30 MG per tablet    TYLENOL #3    60 tablet    Take 1-2 tablets by mouth every 6 hours as needed for moderate pain    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)       albuterol 108 (90 Base) MCG/ACT Inhaler    PROAIR HFA/PROVENTIL HFA/VENTOLIN HFA    8.5 g    Inhale 2 puffs into the lungs every 6 hours as needed for shortness of breath / dyspnea or wheezing - exercise induced wheezing    Wheezing       alprostadil 40 MCG kit    EDEX    240 mcg    Inject 30 - 35 mcg (approx 3/4 ml) as directed.    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)       * ONETOUCH ULTRA test strip   Generic drug:  blood glucose monitoring     400 strip    Test  four times daily ultra blue (please schedule clinic appt)    DM (diabetes mellitus) (H)       buPROPion 300 MG 24 hr tablet    WELLBUTRIN XL    30 tablet    Take 1 tablet (300 mg) by mouth every morning    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), Acquired hypothyroidism, Dyslipidemia, Onychomycosis       gabapentin 400 MG  capsule    NEURONTIN    270 capsule    Take 1 capsule (400 mg) by mouth 3 times daily    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), Acquired hypothyroidism, Dyslipidemia, Onychomycosis       glucagon 1 MG kit    GLUCAGON EMERGENCY    1 mg    Inject 1 mg into the muscle once for 1 dose    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), Acquired hypothyroidism, Dyslipidemia, Onychomycosis       insulin lispro 100 UNIT/ML injection    humaLOG    80 mL    Use as directed in pump (approximately 70-80 Units every day)    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), Acquired hypothyroidism, Dyslipidemia, Onychomycosis       levothyroxine 137 MCG tablet    SYNTHROID/LEVOTHROID    90 tablet    Take by mouth one tablet daily.    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)       losartan-hydrochlorothiazide 100-25 MG per tablet    HYZAAR    90 tablet    Take 1 tablet by mouth daily *MUST BE SEEN FOR FURTHER  REFILLS    Type 1 diabetes mellitus without complication (H)       order for DME     1 each    Equipment being ordered: CPAP machine    TELLY (obstructive sleep apnea)       rizatriptan 10 MG tablet    MAXALT    12 tablet    Take 1 tablet (10 mg) by mouth at onset of headache for migraine    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), Acquired hypothyroidism, Dyslipidemia, Onychomycosis       Urea 40 % Crea     198 g    Externally apply topically 2 times daily To surface of toenails    Onychauxis       zolpidem 10 MG tablet    AMBIEN    90 tablet    Take 1 tablet at bedtime as needed for insomnia    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 Read the directions carefully, and ask your doctor or other care provider to review them with you.

## 2018-06-21 NOTE — NURSING NOTE
Chief Complaint   Patient presents with     Consult     right armpit lump x this week     PEGGY FUENTES at 6:59 AM on 6/21/2018.

## 2018-06-27 ENCOUNTER — RADIANT APPOINTMENT (OUTPATIENT)
Dept: MAMMOGRAPHY | Facility: CLINIC | Age: 58
End: 2018-06-27
Attending: NURSE PRACTITIONER
Payer: COMMERCIAL

## 2018-06-27 DIAGNOSIS — R22.31 MASS OF RIGHT AXILLA: ICD-10-CM

## 2018-06-27 DIAGNOSIS — N63.10 LUMP OF RIGHT BREAST: ICD-10-CM

## 2018-06-27 RX ADMIN — LIDOCAINE HYDROCHLORIDE 10 ML: 10 INJECTION, SOLUTION EPIDURAL; INFILTRATION; INTRACAUDAL; PERINEURAL at 11:22

## 2018-06-29 LAB — COPATH REPORT: NORMAL

## 2018-07-02 ENCOUNTER — TELEPHONE (OUTPATIENT)
Dept: MAMMOGRAPHY | Facility: CLINIC | Age: 58
End: 2018-07-02

## 2018-07-02 NOTE — TELEPHONE ENCOUNTER
Left Luigi a voicemail message regarding the availability of the results from his recent right breast biopsy. Awaiting a return call.

## 2018-07-02 NOTE — TELEPHONE ENCOUNTER
Spoke to Luigi regarding the results from his recent right breast biopsy which indicated benign lymph node and fibroadipose tissue with no evidence of lymphoma or metastatic carcinoma. He stated he was healing well after his biopsy with no concerns at this time. Discussed the radiologist's recommendation for clinical follow-up of axillary lump. Luigi verbalized understanding and all questions were addressed at this time.

## 2018-07-18 ENCOUNTER — MYC MEDICAL ADVICE (OUTPATIENT)
Dept: INTERNAL MEDICINE | Facility: CLINIC | Age: 58
End: 2018-07-18

## 2018-07-23 DIAGNOSIS — E78.5 DYSLIPIDEMIA: ICD-10-CM

## 2018-07-23 DIAGNOSIS — E03.9 ACQUIRED HYPOTHYROIDISM: ICD-10-CM

## 2018-07-23 DIAGNOSIS — E10.8 TYPE 1 DIABETES MELLITUS WITH COMPLICATIONS (H): ICD-10-CM

## 2018-07-23 DIAGNOSIS — B35.1 ONYCHOMYCOSIS: ICD-10-CM

## 2018-07-24 RX ORDER — LEVOTHYROXINE SODIUM 137 UG/1
TABLET ORAL
Qty: 30 TABLET | Refills: 0 | Status: SHIPPED | OUTPATIENT
Start: 2018-07-24 | End: 2018-09-06

## 2018-07-24 RX ORDER — GABAPENTIN 400 MG/1
400 CAPSULE ORAL 3 TIMES DAILY
Qty: 270 CAPSULE | Refills: 0 | Status: SHIPPED | OUTPATIENT
Start: 2018-07-24 | End: 2018-12-18

## 2018-07-24 NOTE — TELEPHONE ENCOUNTER
gabapentin (NEURONTIN) 400 MG capsule  Last Written Prescription Date:  3/1/18  Last Fill Quantity: 270,   # refills: 0  Last Office Visit : 3/22/17  Future Office visit:  7/25/18    Routing refill request to provider for review/approval because:  Not on protocol

## 2018-07-27 ENCOUNTER — TELEPHONE (OUTPATIENT)
Dept: UROLOGY | Facility: CLINIC | Age: 58
End: 2018-07-27

## 2018-07-27 NOTE — TELEPHONE ENCOUNTER
----- Message from Fidel Carlisle MD sent at 7/26/2018 11:51 AM CDT -----  OK - can have him sched fu visit with Dr Cooper and then can give him enough refills until he can see Lui.    Thanks Mari MERRITT      ----- Message -----     From: Mari Caputo LPN     Sent: 7/25/2018   5:26 PM       To: Fidel Carlisle MD    Patient asking for refill on edex  Seen last may 2017  Are you ok with this??? mari

## 2018-07-27 NOTE — TELEPHONE ENCOUNTER
Called [facility] and refilled edex for one month made patient an appointment next month for follow up with [facility] --- one month refill. Elvira Caputo LPN Staff Nurse

## 2018-08-06 ENCOUNTER — PRE VISIT (OUTPATIENT)
Dept: UROLOGY | Facility: CLINIC | Age: 58
End: 2018-08-06

## 2018-08-08 DIAGNOSIS — G47.00 INSOMNIA, UNSPECIFIED TYPE: ICD-10-CM

## 2018-08-08 DIAGNOSIS — E10.8 TYPE 1 DIABETES MELLITUS WITH COMPLICATIONS (H): ICD-10-CM

## 2018-08-08 RX ORDER — ZOLPIDEM TARTRATE 10 MG/1
TABLET ORAL
Qty: 60 TABLET | Refills: 0 | Status: SHIPPED | OUTPATIENT
Start: 2018-08-15 | End: 2018-10-02

## 2018-08-08 NOTE — TELEPHONE ENCOUNTER
Last fill date was 5/11/18 with 90 tabs. It was 10 day early from the previous Rx.    8/09/18: rx faxed to [facility] #[id number] pharmacy today at [phone].    Juanita Hoyt RN

## 2018-08-10 ENCOUNTER — OFFICE VISIT (OUTPATIENT)
Dept: UROLOGY | Facility: CLINIC | Age: 58
End: 2018-08-10
Payer: COMMERCIAL

## 2018-08-10 VITALS
WEIGHT: 220 LBS | HEART RATE: 92 BPM | DIASTOLIC BLOOD PRESSURE: 70 MMHG | SYSTOLIC BLOOD PRESSURE: 147 MMHG | HEIGHT: 76 IN | BODY MASS INDEX: 26.79 KG/M2

## 2018-08-10 DIAGNOSIS — N52.9 VASCULOGENIC ERECTILE DYSFUNCTION, UNSPECIFIED VASCULOGENIC ERECTILE DYSFUNCTION TYPE: Primary | ICD-10-CM

## 2018-08-10 DIAGNOSIS — E10.8 TYPE 1 DIABETES MELLITUS WITH COMPLICATIONS (H): ICD-10-CM

## 2018-08-10 DIAGNOSIS — N52.1 ERECTILE DYSFUNCTION DUE TO DISEASES CLASSIFIED ELSEWHERE: ICD-10-CM

## 2018-08-10 RX ORDER — IBUPROFEN 200 MG
TABLET ORAL
Qty: 20 EACH | Status: SHIPPED | OUTPATIENT
Start: 2018-08-10 | End: 2020-11-02

## 2018-08-10 ASSESSMENT — PAIN SCALES - GENERAL: PAINLEVEL: NO PAIN (0)

## 2018-08-10 NOTE — NURSING NOTE
"Chief Complaint   Patient presents with     Follow Up For     ED       Blood pressure 147/70, pulse 92, height 1.93 m (6' 4\"), weight 99.8 kg (220 lb). Body mass index is 26.78 kg/(m^2).    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)     Acquired hypothyroidism     Dyslipidemia     Essential hypertension     Hyperlipidemia     Proliferative diabetic retinopathy (H)     Obstructive sleep apnea syndrome       Allergies   Allergen Reactions     Diagnostic X-Ray Materials Anaphylaxis     Contrast Dye Hives     Diatrizoate Hives     iodine       Current Outpatient Prescriptions   Medication Sig Dispense Refill     acetaminophen-codeine (TYLENOL #3) 300-30 MG per tablet Take 1-2 tablets by mouth every 6 hours as needed for moderate pain 60 tablet 1     albuterol (PROAIR HFA/PROVENTIL HFA/VENTOLIN HFA) 108 (90 Base) MCG/ACT Inhaler Inhale 2 puffs into the lungs every 6 hours as needed for shortness of breath / dyspnea or wheezing - exercise induced wheezing 8.5 g 1     alprostadil (EDEX) 40 MCG kit Inject 30 - 35 mcg (approx 3/4 ml) as directed. 240 mcg 11     ARIPiprazole (ABILIFY) 15 MG tablet Take 1 tablet (15 mg) by mouth daily 90 tablet 0     aspirin 81 MG tablet Take 1 tablet (81 mg) by mouth daily 100 tablet 3     atorvastatin (LIPITOR) 40 MG tablet Take 1 tablet (40 mg) by mouth daily 90 tablet 0     blood glucose monitoring (SHEILA CONTOUR NEXT) test strip Use to test blood sugar 4 times daily 400 strip 3     buPROPion (WELLBUTRIN XL) 300 MG 24 hr tablet Take 1 tablet (300 mg) by mouth every morning 30 tablet 11     gabapentin (NEURONTIN) 400 MG capsule Take 1 capsule (400 mg) by mouth 3 times daily 270 capsule 0     glucagon (GLUCAGON EMERGENCY) 1 MG kit Inject 1 mg into the muscle once for 1 dose 1 mg 3     insulin lispro (HUMALOG) 100 UNIT/ML injection Use as directed in pump (approximately 70-80 Units every day) 80 mL 3     levothyroxine (SYNTHROID/LEVOTHROID) 137 " MCG tablet Take by mouth one tablet daily. 30 tablet 0     losartan-hydrochlorothiazide (HYZAAR) 100-25 MG per tablet Take 1 tablet by mouth daily *MUST BE SEEN FOR FURTHER  REFILLS 90 tablet 3     ONE TOUCH TEST STRIPS test strip Use to test blood sugar 4 times daily or as directed. 400 strip 3     ONETOUCH ULTRA test strip Test  four times daily ultra blue (please schedule clinic appt) 400 strip 0     order for DME Equipment being ordered: CPAP machine 1 each 0     rizatriptan (MAXALT) 10 MG tablet Take 1 tablet (10 mg) by mouth at onset of headache for migraine 12 tablet 11     Urea 40 % CREA Externally apply topically 2 times daily To surface of toenails 198 g 5     [START ON 8/15/2018] zolpidem (AMBIEN) 10 MG tablet Take 1 tablet at bedtime as needed for insomnia 60 tablet 0       Social History   Substance Use Topics     Smoking status: Never Smoker     Smokeless tobacco: Never Used     Alcohol use No      Comment: History of alcohol abuse/sober since 2015; went to treatment        Love Canseco LPN  8/10/2018  3:03 PM

## 2018-08-10 NOTE — LETTER
"8/10/2018       RE: Luigi Moore  [address]  [place] [place] [facility] [address]     Dear Colleague,    Thank you for referring your patient, Luigi Moore, to the [facility] UROLOGY AND INST FOR PROSTATE AND UROLOGIC CANCERS at [facility]. Please see a copy of my visit note below.    Luigi Moore is a 57 year old male here for erectile dysfunction follow-up.  Past pt of Dr. Carlisle.  Has been using 40mcg Edex kits.  Sometimes this dose is adequate, often not.  Denies new lumps, no curvature to erections.    /70  Pulse 92  Ht 1.93 m (6' 4\")  Wt 99.8 kg (220 lb)  BMI 26.78 kg/m2   General appearance normal.  resps normal, non-labored.  Abdomen nondistended.  Phallus without plaques today.  CLINT deferred.    Component      Latest Ref Rng & Units 6/21/2018   Sodium      133 - 144 mmol/L 137   Potassium      3.4 - 5.3 mmol/L 3.7   Chloride      94 - 109 mmol/L 102   Carbon Dioxide      20 - 32 mmol/L 26   Anion Gap      3 - 14 mmol/L 8   Glucose      70 - 99 mg/dL 117 (H)   Urea Nitrogen      7 - 30 mg/dL 8   Creatinine      0.66 - 1.25 mg/dL 0.78   GFR Estimate      >60 mL/min/1.7m2 >90   GFR Estimate If Black      >60 mL/min/1.7m2 >90   Calcium      8.5 - 10.1 mg/dL 8.6      Current Outpatient Prescriptions   Medication     Insulin Syringe (BD INSULIN SYRINGE) 29G X 1/2\" 1 ML MISC     NEW MED     acetaminophen-codeine (TYLENOL #3) 300-30 MG per tablet     albuterol (PROAIR HFA/PROVENTIL HFA/VENTOLIN HFA) 108 (90 Base) MCG/ACT Inhaler     alprostadil (EDEX) 40 MCG kit     ARIPiprazole (ABILIFY) 15 MG tablet     aspirin 81 MG tablet     atorvastatin (LIPITOR) 40 MG tablet     blood glucose monitoring (SHEILA CONTOUR NEXT) test strip     buPROPion (WELLBUTRIN XL) 300 MG 24 hr tablet     gabapentin (NEURONTIN) 400 MG capsule     glucagon (GLUCAGON EMERGENCY) 1 MG kit     insulin lispro (HUMALOG) 100 UNIT/ML injection     levothyroxine (SYNTHROID/LEVOTHROID) 137 MCG " tablet     losartan-hydrochlorothiazide (HYZAAR) 100-25 MG per tablet     ONE TOUCH TEST STRIPS test strip     ONETOUCH ULTRA test strip     order for DME     rizatriptan (MAXALT) 10 MG tablet     Urea 40 % CREA     [START ON 8/15/2018] zolpidem (AMBIEN) 10 MG tablet     No current facility-administered medications for this visit.      Facility-Administered Medications Ordered in Other Visits   Medication     lidocaine (PF) (XYLOCAINE) 1 % injection 10 mL         A-  Erectile dysfunction, follow-up on intracavernosal injection prescription.    Plan    Advised moving up to Trimix.  Discussed intracavernosal injections or IPP as alternatives.  He would like to try stronger ICI.    Trimix #8 prescription, 0.3mL starting dose.  Return to clinic 1 year, sooner if needed.  15min visit, over 50% face to face in counseling/discussion of erectile dysfunction treatment options.    Again, thank you for allowing me to participate in the care of your patient.      Sincerely,    Johnathan Cooper MD

## 2018-08-10 NOTE — PROGRESS NOTES
"Luigi Moore is a 57 year old male here for erectile dysfunction follow-up.  Past pt of Dr. Carlisle.  Has been using 40mcg Edex kits.  Sometimes this dose is adequate, often not.  Denies new lumps, no curvature to erections.    /70  Pulse 92  Ht 1.93 m (6' 4\")  Wt 99.8 kg (220 lb)  BMI 26.78 kg/m2   General appearance normal.  resps normal, non-labored.  Abdomen nondistended.  Phallus without plaques today.  CLINT deferred.    Component      Latest Ref Rng & Units 6/21/2018   Sodium      133 - 144 mmol/L 137   Potassium      3.4 - 5.3 mmol/L 3.7   Chloride      94 - 109 mmol/L 102   Carbon Dioxide      20 - 32 mmol/L 26   Anion Gap      3 - 14 mmol/L 8   Glucose      70 - 99 mg/dL 117 (H)   Urea Nitrogen      7 - 30 mg/dL 8   Creatinine      0.66 - 1.25 mg/dL 0.78   GFR Estimate      >60 mL/min/1.7m2 >90   GFR Estimate If Black      >60 mL/min/1.7m2 >90   Calcium      8.5 - 10.1 mg/dL 8.6      Current Outpatient Prescriptions   Medication     Insulin Syringe (BD INSULIN SYRINGE) 29G X 1/2\" 1 ML MISC     NEW MED     acetaminophen-codeine (TYLENOL #3) 300-30 MG per tablet     albuterol (PROAIR HFA/PROVENTIL HFA/VENTOLIN HFA) 108 (90 Base) MCG/ACT Inhaler     alprostadil (EDEX) 40 MCG kit     ARIPiprazole (ABILIFY) 15 MG tablet     aspirin 81 MG tablet     atorvastatin (LIPITOR) 40 MG tablet     blood glucose monitoring (SHEILA CONTOUR NEXT) test strip     buPROPion (WELLBUTRIN XL) 300 MG 24 hr tablet     gabapentin (NEURONTIN) 400 MG capsule     glucagon (GLUCAGON EMERGENCY) 1 MG kit     insulin lispro (HUMALOG) 100 UNIT/ML injection     levothyroxine (SYNTHROID/LEVOTHROID) 137 MCG tablet     losartan-hydrochlorothiazide (HYZAAR) 100-25 MG per tablet     ONE TOUCH TEST STRIPS test strip     ONETOUCH ULTRA test strip     order for DME     rizatriptan (MAXALT) 10 MG tablet     Urea 40 % CREA     [START ON 8/15/2018] zolpidem (AMBIEN) 10 MG tablet     No current facility-administered medications for this visit. "      Facility-Administered Medications Ordered in Other Visits   Medication     lidocaine (PF) (XYLOCAINE) 1 % injection 10 mL         A-  Erectile dysfunction, follow-up on intracavernosal injection prescription.    Plan    Advised moving up to Trimix.  Discussed intracavernosal injections or IPP as alternatives.  He would like to try stronger ICI.    Trimix #8 prescription, 0.3mL starting dose.  Return to clinic 1 year, sooner if needed.  15min visit, over 50% face to face in counseling/discussion of erectile dysfunction treatment options.    Ben TABOR

## 2018-08-10 NOTE — MR AVS SNAPSHOT
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 Cooper as needed.    It was a pleasure meeting with you today.  Thank you for allowing me and my team the privilege of caring for you today.  YOU are the reason we are here, and I truly hope we provided you with the excellent service you deserve.  Please let us know if there is anything else we can do for you so that we can be sure you are leaving completely satisfied with your care experience.        Tami Maya ROBERTO          Follow-ups after your visit        Follow-up notes from your care team     Return in about 1 year (around 8/10/2019).      Your next 10 appointments already scheduled     Dec 19, 2018  3:30 PM CST   (Arrive by 3:15 PM)   RETURN ENDOCRINE with Loraine Szymanski MD   [facility] End[facility] ([facility])    [address] [place] [id number] [phone]              Who to contact     Please call your clinic at [phone] to:    Ask questions about your health    Make or cancel appointments    Discuss your medicines    Learn about your test results    Speak to your doctor            Additional Information About Your Visit        MyChart Information     [employer] gives you secure access to your electronic health record. If you see a primary care provider, you can also send messages to your care team and make appointments. If you have questions,  "please call your primary care clinic.  If you do not have a primary care provider, please call [phone] and they will assist you.      [employer] is an electronic gateway that provides easy, online access to your medical records. With [employer], you can request a clinic appointment, read your test results, renew a prescription or communicate with your care team.     To access your existing account, please contact your [facility] or call [phone] for assistance.        Care EveryWhere ID     This is your Care EveryWhere ID. This could be used by other organizations to access your [facility] medical records  [id number]        Your Vitals Were     Pulse Height BMI (Body Mass Index)             92 1.93 m (6' 4\") 26.78 kg/m2          Blood Pressure from Last 3 Encounters:   08/10/18 147/70   06/21/18 119/73   04/10/18 135/74    Weight from Last 3 Encounters:   08/10/18 99.8 kg (220 lb)   06/21/18 99.8 kg (220 lb 1.6 oz)   04/10/18 106.9 kg (235 lb 11.2 oz)              Today, you had the following     No orders found for display         Today's Medication Changes          These changes are accurate as of 8/10/18 11:59 PM.  If you have any questions, ask your nurse or doctor.               Start taking these medicines.        Dose/Directions    Insulin Syringe 29G X 1/2\" 1 ML Misc   Commonly known as:  BD insulin syringe   Used for:  Vasculogenic erectile dysfunction, unspecified vasculogenic erectile dysfunction type   Started by:  Johnathan Cooper MD        Use as directed   Quantity:  20 each   Refills:  prn       NEW MED   Used for:  Vasculogenic erectile dysfunction, unspecified vasculogenic erectile dysfunction type   Started by:  Johnathan Cooper MD        \"[place] Trimix #8\" Trimix intercavernosal injection, per mL: PGE1 20 mcg Papaverine 27mg Phentolamine 1 mg   Start at 0.3mL injection May titrate in 0.1mL increments to lowest effective dose. Max use one daily, and " "3x/week.   Quantity:  5 mL   Refills:  99            Where to get your medicines      These medications were sent to [facility] - [place], [place] - [address]   [address] , [facility] [address]    Hours:  Mon-Fri 8:30am-5:00pm Toll Free [phone] Phone:  [phone]     Insulin Syringe 29G X 1/2\" 1 ML Misc         Some of these will need a paper prescription and others can be bought over the counter.  Ask your nurse if you have questions.     Bring a paper prescription for each of these medications     [facility]                Primary Care Provider Office Phone # Fax #    Kathya TrejoGALILEA [facility] [phone]       [address] [facility] [address]        Equal Access to Services     GALLO SIDDIQI : Hadii emeterio lucero hadasho Soomaali, waaxda luqadaha, qaybta kaalmada adeegyada, dennis jaimes . So [facility] [phone].    ATENCIÓN: Si habla español, tiene a garcia disposición servicios gratuitos de asistencia lingüística. Frank al [phone].    We comply with applicable federal civil rights laws and [place] laws. We do not discriminate on the basis of race, color, national origin, age, disability, sex, sexual orientation, or gender identity.            Thank you!     Thank you for choosing [facility] UROLOGY AND [facility] FOR PROSTATE AND UROLOGIC CANCERS  for your care. Our goal is always to provide you with excellent care. Hearing back from our patients is one way we can continue to improve our services. Please take a few minutes to complete the written survey that you may receive in the mail after your visit with us. Thank you!             Your Updated Medication List - Protect others around you: Learn how to safely use, store and throw away your medicines at www.disposemymeds.org.          This list is accurate as of 8/10/18 11:59 PM.  Always use your most recent med list.                   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), Acquired hypothyroidism, Dyslipidemia, Onychomycosis       glucagon 1 MG kit    GLUCAGON EMERGENCY    1 mg    Inject 1 mg into the muscle once for 1 dose    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)       losartan-hydrochlorothiazide 100-25 MG per tablet    HYZAAR    90 tablet    Take 1 tablet by mouth daily *MUST BE SEEN FOR FURTHER  REFILLS    Type 1 diabetes mellitus without complication (H)       NEW MED     5 mL    \"[place] Trimix #8\" Trimix intercavernosal injection, per mL: PGE1 20 mcg Papaverine 27mg Phentolamine 1 mg   Start at 0.3mL injection May titrate in 0.1mL increments to lowest effective dose. Max use one daily, and 3x/week.    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 Read the directions carefully, and ask your doctor or other care provider to review them with you.

## 2018-08-10 NOTE — PATIENT INSTRUCTIONS
Follow up with Dr. Cooper as needed.    It was a pleasure meeting with you today.  Thank you for allowing me and my team the privilege of caring for you today.  YOU are the reason we are here, and I truly hope we provided you with the excellent service you deserve.  Please let us know if there is anything else we can do for you so that we can be sure you are leaving completely satisfied with your care experience.        FALLON Poole

## 2018-08-15 ENCOUNTER — OFFICE VISIT (OUTPATIENT)
Dept: ENDOCRINOLOGY | Facility: CLINIC | Age: 58
End: 2018-08-15
Payer: COMMERCIAL

## 2018-08-15 VITALS
BODY MASS INDEX: 26.9 KG/M2 | DIASTOLIC BLOOD PRESSURE: 74 MMHG | HEART RATE: 63 BPM | SYSTOLIC BLOOD PRESSURE: 153 MMHG | WEIGHT: 220.9 LBS | HEIGHT: 76 IN

## 2018-08-15 DIAGNOSIS — E10.8 TYPE 1 DIABETES MELLITUS WITH COMPLICATION (H): Primary | ICD-10-CM

## 2018-08-15 LAB — HBA1C MFR BLD: 7.8 % (ref 4.3–6)

## 2018-08-15 NOTE — LETTER
8/15/2018     RE: Luigi Moore  [address]  [place] [address]     Dear Colleague,    Thank you for referring your patient, Luigi Moore, to the [facility] END[facility]CRINOLOG[facility]. Please see a copy of my visit note below.    Endocrinology Clinic Visit        Interval history  Presents today for follow-up on type 1 diabetes mellitus.  A1c was 7.8.  Last A1c 7.6.  He has been trying to bolus immediately after food.  Infrequent hypoglycemia 1-2 occasions in 2 weeks.  He can feel low blood sugars without any problems.  No severe hypoglycemia  No change in mental status  Good appetite, stable weight  Went for biopsy of the breast and axillary lymph nodes and were benign.  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  For that to happen, his bedtime number has to be over 200.        Assessment/Treatment Plan:      Luigi Moore is a 56 year old year old male with    1. Type 1 Diabetes with triopathy:    A1c is 7.8 today.    Barriers to achieving glycemic goals: Major issue is lack of bolus at the start of meal leading to hyperglycemia, overcorrection after that leading to hypoglycemia.     Hypoglycemia risks  1. Hyperglycemia preceding hypoglycemia. Main reason is patient does not bolus until BG is high. This leads to hypoglycemia in a few hours.  He has been trying to bolus immediately after meals.  2. Overcorrection    Hyperglycemia risks  3. Delayed bolusing  4. Delayed site change  5. Infrequent rewind.      Hypothyroidism  Continue LT4    FU labs prior to next visit.     I will contact the patient with the test results.  Return to clinic in 4 months.    Loraine Szymanski MD  [id number]  Endocrinology Service            Chief Complaint: RECHECK (Hypothyroidism/Type I Diabetes)       Subjective:         HPI: Luigi Moore is a 57 year old year old Male with history of  has a past medical  history of Acquired hypothyroidism (12/7/2016); Depressive disorder; Hidradenoma (dx: Feb 2015); Hypertension; Numbness and tingling (2015); TELLY (obstructive sleep apnea) (2007); Type 1 diabetes (H) (1982); and Uncomplicated asthma. who is seen in Follow-up for Type-1 Diabetes Mellitus.   Maurice works at [employer] from about 10:30 p.m. to 9:00 a.m. He would then sleep from about 11:00 a.m. to 6:00 p.m. He reported checking blood glucose 4 times daily.   Maurice has a IPXI continuous glucose monitoring system which he wore some of the time. He had the system set for 70 mg/dL threshold suspend, 80 mg/dL low alert and 240 mg/dL high alert.   Barriers to achieve glycemic goals:   Review of data shows following:   Hypoglycemia:  1. Hyperglycemia preceding hypoglycemia. Main reason is patient does not bolus until BG is high. This leads to hypoglycemia in a few hours.   2. Overcorrection  Hyperglycemia:   3. Delayed bolusing  4. Delayed site change  5. Infrequent rewind.     History of Diabetes  Type 1      Complications  1. Retinopathy: Background retinopathy    2.  Nephropathy: + on ACEI  Lab Results   Component Value Date    MICROL 6 12/07/2016     3. Neuropathy   Last monofilament testing: mild diminished sensation.     4. Hypoglycemia 2/week.     5. Macrovascular: No    Treatment  Diabetes Medication(s)     Diabetic Other Sig    glucagon (GLUCAGON EMERGENCY) 1 MG kit Inject 1 mg into the muscle once for 1 dose    Insulin Sig    insulin lispro (HUMALOG) 100 UNIT/ML injection Use as directed in pump (approximately 70-80 Units every day)            Prevention  Lipid  LDL Cholesterol Calculated   Date Value Ref Range Status   04/10/2018 89 <100 mg/dL Final     Comment:     Desirable:       <100 mg/dl   08/05/2014 78 0 - 129 mg/dL Final     Comment:     LDL Cholesterol is the primary guide to therapy: LDL-cholesterol goal in high   risk patients is <100 mg/dL and in very high risk patients is <70 mg/dL.       LDL Cholesterol  Direct   Date Value Ref Range Status   12/07/2016 140 (H) <100 mg/dL Final     Comment:     Above desirable:  100-129 mg/dl   Borderline High:  130-159 mg/dL   High:             160-189 mg/dL   Very high:       >189 mg/dl     02/17/2015 103 0 - 129 mg/dL Final     Comment:     Optimal:         <100 mg/dL   Near Optimal:     100-129 mg/dL   Borderline High:  130-159 mg/dL   High:             160-189 mg/dL   Very high:  greater than or equal to 190 mg/dL   Cannot estimate LDL when triglyceride exceeds 400 mg/dL         Medications     HMG CoA Reductase Inhibitors    atorvastatin (LIPITOR) 40 MG tablet    Salicylates    aspirin 81 MG tablet          Renal  Medication (Note: This includes the hypertensive combination subclass to make sure to show all ACEI/ARB's.)     Antihypertensive Combinations Sig    losartan-hydrochlorothiazide (HYZAAR) 100-25 MG per tablet Take 1 tablet by mouth daily *MUST BE SEEN FOR FURTHER  REFILLS            Weight  Wt Readings from Last 4 Encounters:   08/15/18 100.2 kg (220 lb 14.4 oz)   08/10/18 99.8 kg (220 lb)   06/21/18 99.8 kg (220 lb 1.6 oz)   04/10/18 106.9 kg (235 lb 11.2 oz)       Pump Data  Humalog insulin in a [employer] 532 insulin pump at a basal rate of 1.2 units per hour. When at work he often used a temporary basal rate that was 60% of usual and 50% when he bikes.  In addition, he took premeal bolus insulin using 1 unit for each 7 grams of carbohydrate with 1 additional unit for each 20 mg/dL blood glucose was above 120 mg/dL.     Meter Download Summary: No meter    Diet: 2 meals and a snack    Exercise Bike    Weight trend  Wt Readings from Last 4 Encounters:   08/15/18 100.2 kg (220 lb 14.4 oz)   08/10/18 99.8 kg (220 lb)   06/21/18 99.8 kg (220 lb 1.6 oz)   04/10/18 106.9 kg (235 lb 11.2 oz)       A1c today is 8  Lab Results   Component Value Date    A1C 8.7 12/07/2016    A1C 8.1 02/17/2015    A1C 10.0 01/17/2013        Allergies   Allergen Reactions     Diagnostic X-Ray  "Materials Anaphylaxis     Contrast Dye Hives     Diatrizoate Hives     iodine       Current Outpatient Prescriptions   Medication Sig Dispense Refill     acetaminophen-codeine (TYLENOL #3) 300-30 MG per tablet Take 1-2 tablets by mouth every 6 hours as needed for moderate pain 60 tablet 1     albuterol (PROAIR HFA/PROVENTIL HFA/VENTOLIN HFA) 108 (90 Base) MCG/ACT Inhaler Inhale 2 puffs into the lungs every 6 hours as needed for shortness of breath / dyspnea or wheezing - exercise induced wheezing 8.5 g 1     alprostadil (EDEX) 40 MCG kit Inject 30 - 35 mcg (approx 3/4 ml) as directed. 240 mcg 11     ARIPiprazole (ABILIFY) 15 MG tablet Take 1 tablet (15 mg) by mouth daily 90 tablet 0     aspirin 81 MG tablet Take 1 tablet (81 mg) by mouth daily 100 tablet 3     atorvastatin (LIPITOR) 40 MG tablet Take 1 tablet (40 mg) by mouth daily 90 tablet 0     blood glucose monitoring (SHEILA CONTOUR NEXT) test strip Use to test blood sugar 4 times daily 400 strip 3     buPROPion (WELLBUTRIN XL) 300 MG 24 hr tablet Take 1 tablet (300 mg) by mouth every morning 30 tablet 11     gabapentin (NEURONTIN) 400 MG capsule Take 1 capsule (400 mg) by mouth 3 times daily 270 capsule 0     glucagon (GLUCAGON EMERGENCY) 1 MG kit Inject 1 mg into the muscle once for 1 dose 1 mg 3     insulin lispro (HUMALOG) 100 UNIT/ML injection Use as directed in pump (approximately 70-80 Units every day) 80 mL 3     Insulin Syringe (BD INSULIN SYRINGE) 29G X 1/2\" 1 ML MISC Use as directed 20 each prn     levothyroxine (SYNTHROID/LEVOTHROID) 137 MCG tablet Take by mouth one tablet daily. 30 tablet 0     losartan-hydrochlorothiazide (HYZAAR) 100-25 MG per tablet Take 1 tablet by mouth daily *MUST BE SEEN FOR FURTHER  REFILLS 90 tablet 3     NEW MED \"Alfred Trimix #8\"  Trimix intercavernosal injection, per mL:  PGE1 20 mcg  Papaverine 27mg  Phentolamine 1 mg     Start at 0.3mL injection  May titrate in 0.1mL increments to lowest effective dose.  Max use one " daily, and 3x/week. 5 mL 99     ONE TOUCH TEST STRIPS test strip Use to test blood sugar 4 times daily or as directed. 400 strip 3     ONETOUCH ULTRA test strip Test  four times daily ultra blue (please schedule clinic appt) 400 strip 0     order for DME Equipment being ordered: CPAP machine 1 each 0     rizatriptan (MAXALT) 10 MG tablet Take 1 tablet (10 mg) by mouth at onset of headache for migraine 12 tablet 11     Urea 40 % CREA Externally apply topically 2 times daily To surface of toenails 198 g 5     zolpidem (AMBIEN) 10 MG tablet Take 1 tablet at bedtime as needed for insomnia 60 tablet 0       Review of Systems     Constitutional: Negative for fever and unexpected weight change. Appetite normal   Head: no headache   Eye: no vision change/ loss of peripheral vision.   ENT: No throat congestion.   Respiratory: no cough   Cardiovascular: no chest pain or tachycardia  Gastrointestinal: Negative for vomiting, abdominal pain and diarrhea.  Genitourinary: No bladder problems.  Musculoskeletal: Negative for myalgias. No weakness.  Neurological: Negative for seizures and headaches.  Psychiatric/Behavioral: Negative for behavioral problem and dysphoric mood.    Past Medical History:   Diagnosis Date     Acquired hypothyroidism 12/7/2016     Depressive disorder      Hidradenoma dx: Feb 2015    right hand/2 surgeries     Hypertension      Numbness and tingling 2015    right hand, related to surgery for hidradenoma     TELLY (obstructive sleep apnea) 2007    New cpap machine 1 year ago. follows at [facility]     Type 1 diabetes (H) 1982     Uncomplicated asthma        Past Surgical History:   Procedure Laterality Date     EXCISE LESION HAND Right 2/26/2015    Procedure: EXCISE LESION HAND;  Surgeon: Jeovany Jefferson MD;  Location: [facility] OR     ORTHOPEDIC SURGERY Left     achilles tendon      ORTHOPEDIC SURGERY Right     hand surgery     SOFT TISSUE SURGERY      lipoma removal, below rib cage on right side       Family  "History   Problem Relation Age of Onset     Other - See Comments Mother      pancreatitis     Substance Abuse Mother      alcohol     Mental Illness Mother      Depression Mother      Hypertension Mother      Obesity Mother      Asthma Father      Cancer Father       of leukemia, also had a h/o asthma     Diabetes Father      Other Cancer Father      Melanoma Father      Liver Cancer Brother 53     Cancer Brother      Intestinal cancer, spread to liver     Cancer Maternal Grandmother      Cancer Maternal Grandfather      Bone Cancer Paternal Grandmother      Bone Cancer Paternal Grandfather        Social History     Social History     Marital status:      Spouse name: N/A     Number of children: N/A     Years of education: N/A     Social History Main Topics     Smoking status: Never Smoker     Smokeless tobacco: Never Used     Alcohol use No      Comment: History of alcohol abuse/sober for 11months     Drug use: No     Sexual activity: Yes     Partners: Female     Birth control/ protection: Condom, Pill     Other Topics Concern     Not on file     Social History Narrative    Works at [employer], inside job    No unusual exposures    Has 1 dog 3 cats    L[facility] in [facility], in a house    Hardwood floors           Objective:   /74 (BP Location: Right arm, Patient Position: Sitting, Cuff Size: Adult Regular)  Pulse 63  Ht 1.93 m (6' 4\")  Wt 100.2 kg (220 lb 14.4 oz)  BMI 26.89 kg/m2  Constitutional: Appears well-developed and well-nourished. Active.   EYES: anicteric, normal extra-ocular movements, no lid lag or retraction   HEENT: Mouth/Throat: Mucous membrane is moist. Oropharynx is clear. No adenopathy. Normal thyroid   Cardiovascular: RRR, S1, S2 normal. No m/g/r   Pulmonary/Chest: CTAB. No wheezing or rales   Abdominal: +BS. Nontender to palpation. No organomegaly present.  Neurological: Alert. Normal affect. CNII-XII intact. Muscle strength 5/5. Sensory is intact.  Extremities: No clubbing, cyanosis or " inflammation   Skin: normal texture, color  Feet: Callus+ no toe infection (per podiatry) intact monofilament  Lymphatic: no cervical lymphadenopathy.  Psychological: appropriate mood     In House Labs:   Lab Results   Component Value Date    A1C 8.7 12/07/2016    A1C 8.1 02/17/2015    A1C 10.0 01/17/2013       TSH   Date Value Ref Range Status   04/10/2018 2.68 0.40 - 4.00 mU/L Final   04/14/2017 0.83 0.40 - 4.00 mU/L Final   12/07/2016 2.42 0.40 - 4.00 mU/L Final   07/29/2015 1.11 0.40 - 4.00 mU/L Final   02/17/2015 2.34 0.40 - 4.00 mU/L Final     Comment:     Effective 7/30/2014, the reference range for this assay has changed to reflect   new instrumentation/methodology.       T4 Free   Date Value Ref Range Status   12/07/2016 1.33 0.76 - 1.46 ng/dL Final   02/17/2015 1.04 0.76 - 1.46 ng/dL Final     Comment:     Effective 7/30/2014, the reference range for this assay has changed to reflect   new instrumentation/methodology.     02/25/2011 1.31 0.70 - 1.85 ng/dL Final       Creatinine   Date Value Ref Range Status   06/21/2018 0.78 0.66 - 1.25 mg/dL Final   ]    Recent Labs   Lab Test  12/07/16   1341  02/17/15   1611   07/17/12   0715   CHOL   --    --    --   194   HDL   --    --    --   56   LDL  140*  103   < >  117   TRIG   --    --    --   104   CHOLHDLRATIO   --    --    --   3.5    < > = values in this interval not displayed.       No results found for: [id number], [id number], [id number]    Test and/or medications prescribed today:  Orders Placed This Encounter   Procedures     Hemoglobin A1c POCT     Again, thank you for allowing me to participate in the care of your patient.      Sincerely,    Loraine Szymanski MD

## 2018-08-15 NOTE — PROGRESS NOTES
Endocrinology Clinic Visit        Interval history  Presents today for follow-up on type 1 diabetes mellitus.  A1c was 7.8.  Last A1c 7.6.  He has been trying to bolus immediately after food.  Infrequent hypoglycemia 1-2 occasions in 2 weeks.  He can feel low blood sugars without any problems.  No severe hypoglycemia  No change in mental status  Good appetite, stable weight  Went for biopsy of the breast and axillary lymph nodes and were benign.  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  For that to happen, his bedtime number has to be over 200.        Assessment/Treatment Plan:      Luigi Moore is a 56 year old year old male with    1. Type 1 Diabetes with triopathy:    A1c is 7.8 today.    Barriers to achieving glycemic goals: Major issue is lack of bolus at the start of meal leading to hyperglycemia, overcorrection after that leading to hypoglycemia.     Hypoglycemia risks  1. Hyperglycemia preceding hypoglycemia. Main reason is patient does not bolus until BG is high. This leads to hypoglycemia in a few hours.  He has been trying to bolus immediately after meals.  2. Overcorrection    Hyperglycemia risks  3. Delayed bolusing  4. Delayed site change  5. Infrequent rewind.      Hypothyroidism  Continue LT4    FU labs prior to next visit.     I will contact the patient with the test results.  Return to clinic in 4 months.    Loraine Szymanski MD  [id number]  Endocrinology Service            Chief Complaint: RECHECK (Hypothyroidism/Type I Diabetes)       Subjective:         HPI: Luigi Moore is a 57 year old year old Male with history of  has a past medical history of Acquired hypothyroidism (12/7/2016); Depressive disorder; Hidradenoma (dx: Feb 2015); Hypertension; Numbness and tingling (2015); TELLY (obstructive sleep apnea) (2007); Type 1 diabetes (H) (1982); and Uncomplicated asthma. who is seen in Follow-up for Type-1 Diabetes Mellitus.    Maurice works at [employer] from about 10:30 p.m. to 9:00 a.m. He would then sleep from about 11:00 a.m. to 6:00 p.m. He reported checking blood glucose 4 times daily.   Maurice has a Youbet[employer] continuous glucose monitoring system which he wore some of the time. He had the system set for 70 mg/dL threshold suspend, 80 mg/dL low alert and 240 mg/dL high alert.   Barriers to achieve glycemic goals:   Review of data shows following:   Hypoglycemia:  1. Hyperglycemia preceding hypoglycemia. Main reason is patient does not bolus until BG is high. This leads to hypoglycemia in a few hours.   2. Overcorrection  Hyperglycemia:   3. Delayed bolusing  4. Delayed site change  5. Infrequent rewind.     History of Diabetes  Type 1      Complications  1. Retinopathy: Background retinopathy    2.  Nephropathy: + on ACEI  Lab Results   Component Value Date    MICROL 6 12/07/2016     3. Neuropathy   Last monofilament testing: mild diminished sensation.     4. Hypoglycemia 2/week.     5. Macrovascular: No    Treatment  Diabetes Medication(s)     Diabetic Other Sig    glucagon (GLUCAGON EMERGENCY) 1 MG kit Inject 1 mg into the muscle once for 1 dose    Insulin Sig    insulin lispro (HUMALOG) 100 UNIT/ML injection Use as directed in pump (approximately 70-80 Units every day)            Prevention  Lipid  LDL Cholesterol Calculated   Date Value Ref Range Status   04/10/2018 89 <100 mg/dL Final     Comment:     Desirable:       <100 mg/dl   08/05/2014 78 0 - 129 mg/dL Final     Comment:     LDL Cholesterol is the primary guide to therapy: LDL-cholesterol goal in high   risk patients is <100 mg/dL and in very high risk patients is <70 mg/dL.       LDL Cholesterol Direct   Date Value Ref Range Status   12/07/2016 140 (H) <100 mg/dL Final     Comment:     Above desirable:  100-129 mg/dl   Borderline High:  130-159 mg/dL   High:             160-189 mg/dL   Very high:       >189 mg/dl     02/17/2015 103 0 - 129 mg/dL Final     Comment:     Optimal:          <100 mg/dL   Near Optimal:     100-129 mg/dL   Borderline High:  130-159 mg/dL   High:             160-189 mg/dL   Very high:  greater than or equal to 190 mg/dL   Cannot estimate LDL when triglyceride exceeds 400 mg/dL         Medications     HMG CoA Reductase Inhibitors    atorvastatin (LIPITOR) 40 MG tablet    Salicylates    aspirin 81 MG tablet          Renal  Medication (Note: This includes the hypertensive combination subclass to make sure to show all ACEI/ARB's.)     Antihypertensive Combinations Sig    losartan-hydrochlorothiazide (HYZAAR) 100-25 MG per tablet Take 1 tablet by mouth daily *MUST BE SEEN FOR FURTHER  REFILLS            Weight  Wt Readings from Last 4 Encounters:   08/15/18 100.2 kg (220 lb 14.4 oz)   08/10/18 99.8 kg (220 lb)   06/21/18 99.8 kg (220 lb 1.6 oz)   04/10/18 106.9 kg (235 lb 11.2 oz)       Pump Data  Humalog insulin in a [email or web address] 532 insulin pump at a basal rate of 1.2 units per hour. When at work he often used a temporary basal rate that was 60% of usual and 50% when he bikes.  In addition, he took premeal bolus insulin using 1 unit for each 7 grams of carbohydrate with 1 additional unit for each 20 mg/dL blood glucose was above 120 mg/dL.     Meter Download Summary: No meter    Diet: 2 meals and a snack    Exercise Bike    Weight trend  Wt Readings from Last 4 Encounters:   08/15/18 100.2 kg (220 lb 14.4 oz)   08/10/18 99.8 kg (220 lb)   06/21/18 99.8 kg (220 lb 1.6 oz)   04/10/18 106.9 kg (235 lb 11.2 oz)       A1c today is 8  Lab Results   Component Value Date    A1C 8.7 12/07/2016    A1C 8.1 02/17/2015    A1C 10.0 01/17/2013        Allergies   Allergen Reactions     Diagnostic X-Ray Materials Anaphylaxis     Contrast Dye Hives     Diatrizoate Hives     iodine       Current Outpatient Prescriptions   Medication Sig Dispense Refill     acetaminophen-codeine (TYLENOL #3) 300-30 MG per tablet Take 1-2 tablets by mouth every 6 hours as needed for moderate pain 60 tablet 1      "albuterol (PROAIR HFA/PROVENTIL HFA/VENTOLIN HFA) 108 (90 Base) MCG/ACT Inhaler Inhale 2 puffs into the lungs every 6 hours as needed for shortness of breath / dyspnea or wheezing - exercise induced wheezing 8.5 g 1     alprostadil (EDEX) 40 MCG kit Inject 30 - 35 mcg (approx 3/4 ml) as directed. 240 mcg 11     ARIPiprazole (ABILIFY) 15 MG tablet Take 1 tablet (15 mg) by mouth daily 90 tablet 0     aspirin 81 MG tablet Take 1 tablet (81 mg) by mouth daily 100 tablet 3     atorvastatin (LIPITOR) 40 MG tablet Take 1 tablet (40 mg) by mouth daily 90 tablet 0     blood glucose monitoring (SHEILA CONTOUR NEXT) test strip Use to test blood sugar 4 times daily 400 strip 3     buPROPion (WELLBUTRIN XL) 300 MG 24 hr tablet Take 1 tablet (300 mg) by mouth every morning 30 tablet 11     gabapentin (NEURONTIN) 400 MG capsule Take 1 capsule (400 mg) by mouth 3 times daily 270 capsule 0     glucagon (GLUCAGON EMERGENCY) 1 MG kit Inject 1 mg into the muscle once for 1 dose 1 mg 3     insulin lispro (HUMALOG) 100 UNIT/ML injection Use as directed in pump (approximately 70-80 Units every day) 80 mL 3     Insulin Syringe (BD INSULIN SYRINGE) 29G X 1/2\" 1 ML MISC Use as directed 20 each prn     levothyroxine (SYNTHROID/LEVOTHROID) 137 MCG tablet Take by mouth one tablet daily. 30 tablet 0     losartan-hydrochlorothiazide (HYZAAR) 100-25 MG per tablet Take 1 tablet by mouth daily *MUST BE SEEN FOR FURTHER  REFILLS 90 tablet 3     NEW MED \"[place] Trimix #8\"  Trimix intercavernosal injection, per mL:  PGE1 20 mcg  Papaverine 27mg  Phentolamine 1 mg     Start at 0.3mL injection  May titrate in 0.1mL increments to lowest effective dose.  Max use one daily, and 3x/week. 5 mL 99     ONE TOUCH TEST STRIPS test strip Use to test blood sugar 4 times daily or as directed. 400 strip 3     ONETOUCH ULTRA test strip Test  four times daily ultra blue (please schedule clinic appt) 400 strip 0     order for DME Equipment being ordered: CPAP machine 1 " each 0     rizatriptan (MAXALT) 10 MG tablet Take 1 tablet (10 mg) by mouth at onset of headache for migraine 12 tablet 11     Urea 40 % CREA Externally apply topically 2 times daily To surface of toenails 198 g 5     zolpidem (AMBIEN) 10 MG tablet Take 1 tablet at bedtime as needed for insomnia 60 tablet 0       Review of Systems     Constitutional: Negative for fever and unexpected weight change. Appetite normal   Head: no headache   Eye: no vision change/ loss of peripheral vision.   ENT: No throat congestion.   Respiratory: no cough   Cardiovascular: no chest pain or tachycardia  Gastrointestinal: Negative for vomiting, abdominal pain and diarrhea.  Genitourinary: No bladder problems.  Musculoskeletal: Negative for myalgias. No weakness.  Neurological: Negative for seizures and headaches.  Psychiatric/Behavioral: Negative for behavioral problem and dysphoric mood.    Past Medical History:   Diagnosis Date     Acquired hypothyroidism 2016     Depressive disorder      Hidradenoma dx: 2015    right hand/2 surgeries     Hypertension      Numbness and tingling     right hand, related to surgery for hidradenoma     TELLY (obstructive sleep apnea)     New cpap machine 1 year ago. follows at [facility]     Type 1 diabetes (H) 1982     Uncomplicated asthma        Past Surgical History:   Procedure Laterality Date     EXCISE LESION HAND Right 2015    Procedure: EXCISE LESION HAND;  Surgeon: Jeovany Jefferson MD;  Location: [facility] OR     ORTHOPEDIC SURGERY Left     achilles tendon      ORTHOPEDIC SURGERY Right     hand surgery     SOFT TISSUE SURGERY      lipoma removal, below rib cage on right side       Family History   Problem Relation Age of Onset     Other - See Comments Mother      pancreatitis     Substance Abuse Mother      alcohol     Mental Illness Mother      Depression Mother      Hypertension Mother      Obesity Mother      Asthma Father      Cancer Father       of leukemia, also had a  "h/o asthma     Diabetes Father      Other Cancer Father      Melanoma Father      Liver Cancer Brother 53     Cancer Brother      Intestinal cancer, spread to liver     Cancer Maternal Grandmother      Cancer Maternal Grandfather      Bone Cancer Paternal Grandmother      Bone Cancer Paternal Grandfather        Social History     Social History     Marital status:      Spouse name: N/A     Number of children: N/A     Years of education: N/A     Social History Main Topics     Smoking status: Never Smoker     Smokeless tobacco: Never Used     Alcohol use No      Comment: History of alcohol abuse/sober for 11months     Drug use: No     Sexual activity: Yes     Partners: Female     Birth control/ protection: Condom, Pill     Other Topics Concern     Not on file     Social History Narrative    Works at [employer], inside job    No unusual exposures    Has 1 dog 3 cats    Lives in [facility], in a house    Hardwood floors           Objective:   /74 (BP Location: Right arm, Patient Position: Sitting, Cuff Size: Adult Regular)  Pulse 63  Ht 1.93 m (6' 4\")  Wt 100.2 kg (220 lb 14.4 oz)  BMI 26.89 kg/m2  Constitutional: Appears well-developed and well-nourished. Active.   EYES: anicteric, normal extra-ocular movements, no lid lag or retraction   HEENT: Mouth/Throat: Mucous membrane is moist. Oropharynx is clear. No adenopathy. Normal thyroid   Cardiovascular: RRR, S1, S2 normal. No m/g/r   Pulmonary/Chest: CTAB. No wheezing or rales   Abdominal: +BS. Nontender to palpation. No organomegaly present.  Neurological: Alert. Normal affect. CNII-XII intact. Muscle strength 5/5. Sensory is intact.  Extremities: No clubbing, cyanosis or inflammation   Skin: normal texture, color  Feet: Callus+ no toe infection (per podiatry) intact monofilament  Lymphatic: no cervical lymphadenopathy.  Psychological: appropriate mood     In House Labs:   Lab Results   Component Value Date    A1C 8.7 12/07/2016    A1C 8.1 02/17/2015    A1C 10.0 " 01/17/2013       TSH   Date Value Ref Range Status   04/10/2018 2.68 0.40 - 4.00 mU/L Final   04/14/2017 0.83 0.40 - 4.00 mU/L Final   12/07/2016 2.42 0.40 - 4.00 mU/L Final   07/29/2015 1.11 0.40 - 4.00 mU/L Final   02/17/2015 2.34 0.40 - 4.00 mU/L Final     Comment:     Effective 7/30/2014, the reference range for this assay has changed to reflect   new instrumentation/methodology.       T4 Free   Date Value Ref Range Status   12/07/2016 1.33 0.76 - 1.46 ng/dL Final   02/17/2015 1.04 0.76 - 1.46 ng/dL Final     Comment:     Effective 7/30/2014, the reference range for this assay has changed to reflect   new instrumentation/methodology.     02/25/2011 1.31 0.70 - 1.85 ng/dL Final       Creatinine   Date Value Ref Range Status   06/21/2018 0.78 0.66 - 1.25 mg/dL Final   ]    Recent Labs   Lab Test  12/07/16   1341  02/17/15   1611   07/17/12   0715   CHOL   --    --    --   194   HDL   --    --    --   56   LDL  140*  103   < >  117   TRIG   --    --    --   104   CHOLHDLRATIO   --    --    --   3.5    < > = values in this interval not displayed.       No results found for: [id number], [id number], [id number]    Test and/or medications prescribed today:  Orders Placed This Encounter   Procedures     Hemoglobin A1c POCT

## 2018-08-15 NOTE — MR AVS SNAPSHOT
After Visit Summary   8/15/2018    Luigi Moore    MRN: [id number]           Patient Information     Date Of Birth          1960        Visit Information        Provider Department      8/15/2018 6:30 PM Loraine Szymanski MD [facility]        Today's Diagnoses     Diabetes mellitus type 1 (H)    -  1       Follow-ups after your visit        Your next 10 appointments already scheduled     Sep 04, 2018  2:45 PM CDT   (Arrive by 2:30 PM)   Return Visit with GALILEA Morataya [facility] ([facility])    [address] [place] [address]  4th [facility] [id number] [phone]            Feb 20, 2019  2:45 PM CST   LAB with [facility] ([facility])    [address] [place] [address]  1st [facility] [id number] [phone]           Please do not eat 10-12 hours before your appointment if you are coming in fasting for labs on lipids, cholesterol, or glucose (sugar). This does not apply to pregnant women. Water, hot tea and black coffee (with nothing added) are okay. Do not drink other fluids, diet soda or chew gum.            Feb 20, 2019  3:30 PM CST   (Arrive by 3:15 PM)   RETURN DIABETES with Loraine Szymanski MD   [facility] ([facility])    [address] [place] [address]  3rd [facility] [id number] [phone]              Future tests that were ordered for you today     Open Future Orders        Priority Expected Expires Ordered    Hemoglobin A1c Routine  8/15/2019 8/15/2018    Albumin Random Urine Quantitative with Creat Ratio Routine  8/15/2019 8/15/2018    Creatinine Routine  8/15/2019 8/15/2018    Glucose Routine  8/15/2019 8/15/2018    TSH Routine  8/15/2019 8/15/2018    Lipid Profile Routine  8/15/2019 8/15/2018    Urea nitrogen Routine  8/15/2019 8/15/2018    Potassium Routine  8/15/2019 8/15/2018            Who to  "contact     Please call your clinic at [phone] to:    Ask questions about your health    Make or cancel appointments    Discuss your medicines    Learn about your test results    Speak to your doctor            Additional Information About Your Visit        [employer] Information     [employer] gives you secure access to your electronic health record. If you see a primary care provider, you can also send messages to your care team and make appointments. If you have questions, please call your primary care clinic.  If you do not have a primary care provider, please call [phone] and they will assist you.      [employer] is an electronic gateway that provides easy, online access to your medical records. With [employer], you can request a clinic appointment, read your test results, renew a prescription or communicate with your care team.     To access your existing account, please contact your [facility] or call [phone] for assistance.        Care EveryWhere ID     This is your Care EveryWhere ID. This could be used by other organizations to access your [place] medical records  [id number]        Your Vitals Were     Pulse Height BMI (Body Mass Index)             63 1.93 m (6' 4\") 26.89 kg/m2          Blood Pressure from Last 3 Encounters:   08/15/18 153/74   08/10/18 147/70   06/21/18 119/73    Weight from Last 3 Encounters:   08/15/18 100.2 kg (220 lb 14.4 oz)   08/10/18 99.8 kg (220 lb)   06/21/18 99.8 kg (220 lb 1.6 oz)              We Performed the Following     Hemoglobin A1c POCT        Primary Care Provider Office Phone # Fax #    GALILEA Morataya [facility] [phone]       [address] [facility] [address]        Equal Access to Services     [facility] : Hadii emeterio Mayen, shaila manuel, dennis redd. [facility] [phone].    ATENCIÓN: Si habla español, tiene a garcia disposición " servicios gratuitos de asistencia lingüística. Frank peña [phone].    We comply with applicable federal civil rights laws and [place] laws. We do not discriminate on the basis of race, color, national origin, age, disability, sex, sexual orientation, or gender identity.            Thank you!     Thank you for choosing [facility]  for your care. Our goal is always to provide you with excellent care. Hearing back from our patients is one way we can continue to improve our services. Please take a few minutes to complete the written survey that you may receive in the mail after your visit with us. Thank you!             Your Updated Medication List - Protect others around you: Learn how to safely use, store and throw away your medicines at www.disposemymeds.org.          This list is accurate as of 8/15/18  7:00 PM.  Always use your most recent med list.                   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), Acquired hypothyroidism, Dyslipidemia, Onychomycosis       aspirin 81 MG tablet     100 tablet    Take 1 tablet (81 mg) by mouth daily    Soft tissue tumor       atorvastatin 40 MG tablet    LIPITOR    90 tablet    Take 1 tablet (40 mg) by mouth daily     "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), Acquired hypothyroidism, Dyslipidemia, Onychomycosis       * blood glucose monitoring test strip    SHEILA CONTOUR NEXT    400 strip    Use to test blood sugar 4 times daily    Type 1 diabetes mellitus without complication (H)       * ONETOUCH TEST STRIPS test strip   Generic drug:  blood glucose monitoring     400 strip    Use to test blood sugar 4 times daily or as directed.    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)       * ONETOUCH ULTRA test strip   Generic drug:  blood glucose monitoring     400 strip    Test  four times daily ultra blue (please schedule clinic appt)    DM (diabetes mellitus) (H)       buPROPion 300 MG 24 hr tablet    WELLBUTRIN XL    30 tablet    Take 1 tablet (300 mg) by mouth every morning    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), Acquired hypothyroidism, Dyslipidemia, Onychomycosis       gabapentin 400 MG capsule    NEURONTIN    270 capsule    Take 1 capsule (400 mg) by mouth 3 times daily    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), Acquired hypothyroidism, Dyslipidemia, Onychomycosis       glucagon 1 MG kit    GLUCAGON EMERGENCY    1 mg    Inject 1 mg into the muscle once for 1 dose    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)       losartan-hydrochlorothiazide 100-25 MG per tablet    HYZAAR " "   90 tablet    Take 1 tablet by mouth daily *MUST BE SEEN FOR FURTHER  REFILLS    Type 1 diabetes mellitus without complication (H)       NEW MED     5 mL    \"[place] Trimix #8\" Trimix intercavernosal injection, per mL: PGE1 20 mcg Papaverine 27mg Phentolamine 1 mg   Start at 0.3mL injection May titrate in 0.1mL increments to lowest effective dose. Max use one daily, and 3x/week.    Vasculogenic erectile dysfunction, unspecified vasculogenic erectile dysfunction type       order for DME     1 each    Equipment being ordered: CPAP machine    TELLY (obstructive sleep apnea)       rizatriptan 10 MG tablet    MAXALT    12 tablet    Take 1 tablet (10 mg) by mouth at onset of headache for migraine    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), Acquired hypothyroidism, Dyslipidemia, Onychomycosis       Urea 40 % Crea     198 g    Externally apply topically 2 times daily To surface of toenails    Onychauxis       zolpidem 10 MG tablet    AMBIEN    60 tablet    Take 1 tablet at bedtime as needed for insomnia    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 Read the directions carefully, and ask your doctor or other care provider to review them with you.      "

## 2018-08-15 NOTE — NURSING NOTE
Chief Complaint   Patient presents with     RECHECK     Hypothyroidism/Type I Diabetes     Performed capillary puncture for A1C testing. Patient tolerated well.    Audi Swift [facility]  Endocrinology & Diabetes

## 2018-08-17 ENCOUNTER — DOCUMENTATION ONLY (OUTPATIENT)
Dept: ENDOCRINOLOGY | Facility: CLINIC | Age: 58
End: 2018-08-17

## 2018-08-17 NOTE — PROGRESS NOTES
[facility] CLINICAL DOCUMENTATION    Form Documentation Form or Letter Request    Type or form/letter needing completion: [employer] CMN  Provider: Nessa  Has provider seen patient for office visit related to reason for form request? Yes  Date form needed: ASAP  Once completed: Fax form to: [email or web address]

## 2018-09-04 ENCOUNTER — OFFICE VISIT (OUTPATIENT)
Dept: INTERNAL MEDICINE | Facility: CLINIC | Age: 58
End: 2018-09-04
Payer: COMMERCIAL

## 2018-09-04 ENCOUNTER — MYC MEDICAL ADVICE (OUTPATIENT)
Dept: ENDOCRINOLOGY | Facility: CLINIC | Age: 58
End: 2018-09-04

## 2018-09-04 VITALS
WEIGHT: 223 LBS | HEART RATE: 89 BPM | RESPIRATION RATE: 24 BRPM | SYSTOLIC BLOOD PRESSURE: 138 MMHG | DIASTOLIC BLOOD PRESSURE: 80 MMHG | OXYGEN SATURATION: 99 % | TEMPERATURE: 97.9 F | BODY MASS INDEX: 27.14 KG/M2

## 2018-09-04 DIAGNOSIS — G43.111 INTRACTABLE MIGRAINE WITH AURA WITH STATUS MIGRAINOSUS: Primary | ICD-10-CM

## 2018-09-04 RX ORDER — TOPIRAMATE 25 MG/1
25 TABLET, FILM COATED ORAL DAILY
Qty: 90 TABLET | Refills: 1 | Status: SHIPPED | OUTPATIENT
Start: 2018-09-04 | End: 2018-11-29

## 2018-09-04 ASSESSMENT — PAIN SCALES - GENERAL: PAINLEVEL: EXTREME PAIN (9)

## 2018-09-04 NOTE — MR AVS SNAPSHOT
After Visit Summary   9/4/2018    Luigi Moore    MRN: [id number]           Patient Information     Date Of Birth          1960        Visit Information        Provider Department      9/4/2018 2:45 PM Kathya Lang APRN CNP [facility]        Today's Diagnoses     Intractable migraine with aura with status migrainosus    -  1      Care Instructions      Topiramate Oral tablet  What is this medicine?  TOPIRAMATE (toe PYRE a mate) is used to treat seizures in adults or children with epilepsy. It is also used for the prevention of migraine headaches.  This medicine may be used for other purposes; ask your health care provider or pharmacist if you have questions.  What should I tell my health care provider before I take this medicine?  They need to know if you have any of these conditions:    bleeding disorders    cirrhosis of the liver or liver disease    diarrhea    glaucoma    kidney stones or kidney disease    low blood counts, like low white cell, platelet, or red cell counts    lung disease like asthma, obstructive pulmonary disease, emphysema    metabolic acidosis    on a ketogenic diet    schedule for surgery or a procedure    suicidal thoughts, plans, or attempt; a previous suicide attempt by you or a family member    an unusual or allergic reaction to topiramate, other medicines, foods, dyes, or preservatives    pregnant or trying to get pregnant    breast-feeding  How should I use this medicine?  Take this medicine by mouth with a glass of water. Follow the directions on the prescription label. Do not crush or chew. You may take this medicine with meals. Take your medicine at regular intervals. Do not take it more often than directed.  Talk to your pediatrician regarding the use of this medicine in children. Special care may be needed. While this drug may be prescribed for children as young as 2 years of age for selected conditions, precautions do  apply.  Overdosage: If you think you have taken too much of this medicine contact a poison control center or emergency room at once.  NOTE: This medicine is only for you. Do not share this medicine with others.  What if I miss a dose?  If you miss a dose, take it as soon as you can. If your next dose is to be taken in less than 6 hours, then do not take the missed dose. Take the next dose at your regular time. Do not take double or extra doses.  What may interact with this medicine?  Do not take this medicine with any of the following medications:    probenecid  This medicine may also interact with the following medications:    acetazolamide    alcohol    amitriptyline    aspirin and aspirin-like medicines    birth control pills    certain medicines for depression    certain medicines for seizures    certain medicines that treat or prevent blood clots like warfarin, enoxaparin, dalteparin, apixaban, dabigatran, and rivaroxaban    digoxin    hydrochlorothiazide    lithium    medicines for pain, sleep, or muscle relaxation    metformin    methazolamide    NSAIDS, medicines for pain and inflammation, like ibuprofen or naproxen    pioglitazone    risperidone  This list may not describe all possible interactions. Give your health care provider a list of all the medicines, herbs, non-prescription drugs, or dietary supplements you use. Also tell them if you smoke, drink alcohol, or use illegal drugs. Some items may interact with your medicine.  What should I watch for while using this medicine?  Visit your doctor or health care professional for regular checks on your progress. Do not stop taking this medicine suddenly. This increases the risk of seizures if you are using this medicine to control epilepsy. Wear a medical identification bracelet or chain to say you have epilepsy or seizures, and carry a card that lists all your medicines.  This medicine can decrease sweating and increase your body temperature. Watch for  signs of  sweating or fever, especially in children. Avoid extreme heat, hot baths, and saunas. Be careful about exercising, especially in hot weather. Contact your health care provider right away if you notice a fever or decrease in sweating.  You should drink plenty of fluids while taking this medicine. If you have had kidney stones in the past, this will help to reduce your chances of forming kidney stones.  If you have stomach pain, with nausea or vomiting and yellowing of your eyes or skin, call your doctor immediately.  You may get drowsy, dizzy, or have blurred vision. Do not drive, use machinery, or do anything that needs mental alertness until you know how this medicine affects you. To reduce dizziness, do not sit or stand up quickly, especially if you are an older patient. Alcohol can increase drowsiness and dizziness. Avoid alcoholic drinks.  If you notice blurred vision, eye pain, or other eye problems, seek medical attention at once for an eye exam.  The use of this medicine may increase the chance of suicidal thoughts or actions. Pay special attention to how you are responding while on this medicine. Any worsening of mood, or thoughts of suicide or dying should be reported to your health care professional right away.  This medicine may increase the chance of developing metabolic acidosis. If left untreated, this can cause kidney stones, bone disease, or slowed growth in children. Symptoms include breathing fast, fatigue, loss of appetite, irregular heartbeat, or loss of consciousness. Call your doctor immediately if you experience any of these side effects. Also, tell your doctor about any surgery you plan on having while taking this medicine since this may increase your risk for metabolic acidosis.  Birth control pills may not work properly while you are taking this medicine. Talk to your doctor about using an extra method of birth control.  Women who become pregnant while using this medicine  may enroll in the [place] Antiepileptic Drug Pregnancy Registry by calling 1-800.784.8981. This registry collects information about the safety of antiepileptic drug use during pregnancy.  What side effects may I notice from receiving this medicine?  Side effects that you should report to your doctor or health care professional as soon as possible:    allergic reactions like skin rash, itching or hives, swelling of the face, lips, or tongue    decreased sweating and/or rise in body temperature    depression    difficulty breathing, fast or irregular breathing patterns    difficulty speaking    difficulty walking or controlling muscle movements    hearing impairment    redness, blistering, peeling or loosening of the skin, including inside the mouth    tingling, pain or numbness in the hands or feet    unusual bleeding or bruising    unusually weak or tired    worsening of mood, thoughts or actions of suicide or dying  Side effects that usually do not require medical attention (report to your doctor or health care professional if they continue or are bothersome):    altered taste    back pain, joint or muscle aches and pains    diarrhea, or constipation    headache    loss of appetite    nausea    stomach upset, indigestion    tremors  This list may not describe all possible side effects. Call your doctor for medical advice about side effects. You may report side effects to FDA at 7-934-FDA-5510.  Where should I keep my medicine?  Keep out of the reach of children.  Store at room temperature between 15 and 30 degrees C (59 and 86 degrees F) in a tightly closed container. Protect from moisture. Throw away any unused medicine after the expiration date.  NOTE:This sheet is a summary. It may not cover all possible information. If you have questions about this medicine, talk to your doctor, pharmacist, or health care provider. Copyright  2016 [employer]                Follow-ups after your visit        Follow-up  notes from your care team     Return in about 4 weeks (around 10/2/2018).      Your next 10 appointments already scheduled     Sep 06, 2018  3:30 PM CDT   (Arrive by 3:15 PM)   Diabetes Education with Anastasia Cowart RN   [facility] ([facility])    [address] [place] [id number] [phone]            Feb 20, 2019  2:45 PM CST   LAB with  LAB   [facility] ([facility])    [address] [place] [id number] [phone]           Please do not eat 10-12 hours before your appointment if you are coming in fasting for labs on lipids, cholesterol, or glucose (sugar). This does not apply to pregnant women. Water, hot tea and black coffee (with nothing added) are okay. Do not drink other fluids, diet soda or chew gum.            Feb 20, 2019  3:30 PM CST   (Arrive by 3:15 PM)   RETURN DIABETES with Loraine Szymanski MD   [facility] ([facility])    [address] [place] [id number] [phone]              Who to contact     Please call your clinic at [phone] to:    Ask questions about your health    Make or cancel appointments    Discuss your medicines    Learn about your test results    Speak to your doctor            Additional Information About Your Visit        [employer] Information     [employer] gives you secure access to your electronic health record. If you see a primary care provider, you can also send messages to your care team and make appointments. If you have questions, please call your primary care clinic.  If you do not have a primary care provider, please call [phone] and they will assist you.      [employer] is an electronic gateway that provides easy, online access to your medical records. With [employer], you can request a clinic appointment, read your test results, renew a prescription or communicate with your care  team.     To access your existing account, please contact your [facility] or call [phone] for assistance.        Care EveryWhere ID     This is your Care EveryWhere ID. This could be used by other organizations to access your [facility] medical records  [id number]        Your Vitals Were     Pulse Temperature Respirations Pulse Oximetry BMI (Body Mass Index)       89 97.9  F (36.6  C) (Oral) 24 99% 27.14 kg/m2        Blood Pressure from Last 3 Encounters:   09/04/18 168/87   08/15/18 153/74   08/10/18 147/70    Weight from Last 3 Encounters:   09/04/18 101.2 kg (223 lb)   08/15/18 100.2 kg (220 lb 14.4 oz)   08/10/18 99.8 kg (220 lb)              Today, you had the following     No orders found for display         Today's Medication Changes          These changes are accurate as of 9/4/18  2:50 PM.  If you have any questions, ask your nurse or doctor.               Start taking these medicines.        Dose/Directions    topiramate 25 MG tablet   Commonly known as:  TOPAMAX   Used for:  Intractable migraine with aura with status migrainosus   Started by:  Kathya Lang APRN CNP        Dose:  25 mg   Take 1 tablet (25 mg) by mouth daily Increase to 50 mg per day after 1 week.   Quantity:  90 tablet   Refills:  1            Where to get your medicines      These medications were sent to [facility] [address] [place] [address] [address] [facility] [address]  [address] [facility] [address]     Phone:  [phone]     topiramate 25 MG tablet                Primary Care Provider Office Phone # Fax #    GALILEA Morataya [phone]       [address] [facility] [address]        Equal Access to Services     GALLO SIDDIQI AH: Agus wallaceo Sole[place], waaxda luqadaha, qaybta kaalmada adeegyada, waxay makenna mtz. So [facility] [phone].    ATENCIÓN: Si arianna gutierrez  disposición servicios gratuitos de asistencia lingüística. Frank peña [phone].    We comply with applicable federal civil rights laws and [place] laws. We do not discriminate on the basis of race, color, national origin, age, disability, sex, sexual orientation, or gender identity.            Thank you!     Thank you for choosing [facility]  for your care. Our goal is always to provide you with excellent care. Hearing back from our patients is one way we can continue to improve our services. Please take a few minutes to complete the written survey that you may receive in the mail after your visit with us. Thank you!             Your Updated Medication List - Protect others around you: Learn how to safely use, store and throw away your medicines at www.disposemymeds.org.          This list is accurate as of 9/4/18  2:50 PM.  Always use your most recent med list.                   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), Acquired hypothyroidism, Dyslipidemia, Onychomycosis       aspirin 81 MG tablet     100 tablet    Take 1 tablet (81 mg) by mouth daily    Soft tissue tumor       atorvastatin 40 MG tablet    LIPITOR    90 tablet    Take 1 tablet (40 mg) by  "mouth daily    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), Acquired hypothyroidism, Dyslipidemia, Onychomycosis       * blood glucose monitoring test strip    SHEILA CONTOUR NEXT    400 strip    Use to test blood sugar 4 times daily    Type 1 diabetes mellitus without complication (H)       * ONETOUCH TEST STRIPS test strip   Generic drug:  blood glucose monitoring     400 strip    Use to test blood sugar 4 times daily or as directed.    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)       * ONETOUCH ULTRA test strip   Generic drug:  blood glucose monitoring     400 strip    Test  four times daily ultra blue (please schedule clinic appt)    DM (diabetes mellitus) (H)       buPROPion 300 MG 24 hr tablet    WELLBUTRIN XL    30 tablet    Take 1 tablet (300 mg) by mouth every morning    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), Acquired hypothyroidism, Dyslipidemia, Onychomycosis       gabapentin 400 MG capsule    NEURONTIN    270 capsule    Take 1 capsule (400 mg) by mouth 3 times daily    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), Acquired hypothyroidism, Dyslipidemia, Onychomycosis       glucagon 1 MG kit    GLUCAGON EMERGENCY    1 mg    Inject 1 mg into the muscle once for 1 dose    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)       losartan-hydrochlorothiazide 100-25 MG per " "tablet    HYZAAR    90 tablet    Take 1 tablet by mouth daily *MUST BE SEEN FOR FURTHER  REFILLS    Type 1 diabetes mellitus without complication (H)       NEW MED     5 mL    \"[place] Trimix #8\" Trimix intercavernosal injection, per mL: PGE1 20 mcg Papaverine 27mg Phentolamine 1 mg   Start at 0.3mL injection May titrate in 0.1mL increments to lowest effective dose. Max use one daily, and 3x/week.    Vasculogenic erectile dysfunction, unspecified vasculogenic erectile dysfunction type       order for DME     1 each    Equipment being ordered: CPAP machine    TELLY (obstructive sleep apnea)       rizatriptan 10 MG tablet    MAXALT    12 tablet    Take 1 tablet (10 mg) by mouth at onset of headache for migraine    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), Acquired hypothyroidism, Dyslipidemia, Onychomycosis       topiramate 25 MG tablet    TOPAMAX    90 tablet    Take 1 tablet (25 mg) by mouth daily Increase to 50 mg per day after 1 week.    Intractable migraine with aura with status migrainosus       Urea 40 % Crea     198 g    Externally apply topically 2 times daily To surface of toenails    Onychauxis       zolpidem 10 MG tablet    AMBIEN    60 tablet    Take 1 tablet at bedtime as needed for insomnia    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 Read the directions carefully, and ask your doctor or other care provider to review them with you.      "

## 2018-09-04 NOTE — PATIENT INSTRUCTIONS
Topiramate Oral tablet  What is this medicine?  TOPIRAMATE (toe PYRE a mate) is used to treat seizures in adults or children with epilepsy. It is also used for the prevention of migraine headaches.  This medicine may be used for other purposes; ask your health care provider or pharmacist if you have questions.  What should I tell my health care provider before I take this medicine?  They need to know if you have any of these conditions:    bleeding disorders    cirrhosis of the liver or liver disease    diarrhea    glaucoma    kidney stones or kidney disease    low blood counts, like low white cell, platelet, or red cell counts    lung disease like asthma, obstructive pulmonary disease, emphysema    metabolic acidosis    on a ketogenic diet    schedule for surgery or a procedure    suicidal thoughts, plans, or attempt; a previous suicide attempt by you or a family member    an unusual or allergic reaction to topiramate, other medicines, foods, dyes, or preservatives    pregnant or trying to get pregnant    breast-feeding  How should I use this medicine?  Take this medicine by mouth with a glass of water. Follow the directions on the prescription label. Do not crush or chew. You may take this medicine with meals. Take your medicine at regular intervals. Do not take it more often than directed.  Talk to your pediatrician regarding the use of this medicine in children. Special care may be needed. While this drug may be prescribed for children as young as 2 years of age for selected conditions, precautions do apply.  Overdosage: If you think you have taken too much of this medicine contact a poison control center or emergency room at once.  NOTE: This medicine is only for you. Do not share this medicine with others.  What if I miss a dose?  If you miss a dose, take it as soon as you can. If your next dose is to be taken in less than 6 hours, then do not take the missed dose. Take the next dose at your regular time. Do  not take double or extra doses.  What may interact with this medicine?  Do not take this medicine with any of the following medications:    probenecid  This medicine may also interact with the following medications:    acetazolamide    alcohol    amitriptyline    aspirin and aspirin-like medicines    birth control pills    certain medicines for depression    certain medicines for seizures    certain medicines that treat or prevent blood clots like warfarin, enoxaparin, dalteparin, apixaban, dabigatran, and rivaroxaban    digoxin    hydrochlorothiazide    lithium    medicines for pain, sleep, or muscle relaxation    metformin    methazolamide    NSAIDS, medicines for pain and inflammation, like ibuprofen or naproxen    pioglitazone    risperidone  This list may not describe all possible interactions. Give your health care provider a list of all the medicines, herbs, non-prescription drugs, or dietary supplements you use. Also tell them if you smoke, drink alcohol, or use illegal drugs. Some items may interact with your medicine.  What should I watch for while using this medicine?  Visit your doctor or health care professional for regular checks on your progress. Do not stop taking this medicine suddenly. This increases the risk of seizures if you are using this medicine to control epilepsy. Wear a medical identification bracelet or chain to say you have epilepsy or seizures, and carry a card that lists all your medicines.  This medicine can decrease sweating and increase your body temperature. Watch for signs of  sweating or fever, especially in children. Avoid extreme heat, hot baths, and saunas. Be careful about exercising, especially in hot weather. Contact your health care provider right away if you notice a fever or decrease in sweating.  You should drink plenty of fluids while taking this medicine. If you have had kidney stones in the past, this will help to reduce your chances of forming kidney  stones.  If you have stomach pain, with nausea or vomiting and yellowing of your eyes or skin, call your doctor immediately.  You may get drowsy, dizzy, or have blurred vision. Do not drive, use machinery, or do anything that needs mental alertness until you know how this medicine affects you. To reduce dizziness, do not sit or stand up quickly, especially if you are an older patient. Alcohol can increase drowsiness and dizziness. Avoid alcoholic drinks.  If you notice blurred vision, eye pain, or other eye problems, seek medical attention at once for an eye exam.  The use of this medicine may increase the chance of suicidal thoughts or actions. Pay special attention to how you are responding while on this medicine. Any worsening of mood, or thoughts of suicide or dying should be reported to your health care professional right away.  This medicine may increase the chance of developing metabolic acidosis. If left untreated, this can cause kidney stones, bone disease, or slowed growth in children. Symptoms include breathing fast, fatigue, loss of appetite, irregular heartbeat, or loss of consciousness. Call your doctor immediately if you experience any of these side effects. Also, tell your doctor about any surgery you plan on having while taking this medicine since this may increase your risk for metabolic acidosis.  Birth control pills may not work properly while you are taking this medicine. Talk to your doctor about using an extra method of birth control.  Women who become pregnant while using this medicine may enroll in the [place] Antiepileptic Drug Pregnancy Registry by calling 1-975.243.3283. This registry collects information about the safety of antiepileptic drug use during pregnancy.  What side effects may I notice from receiving this medicine?  Side effects that you should report to your doctor or health care professional as soon as possible:    allergic reactions like skin rash, itching or hives,  swelling of the face, lips, or tongue    decreased sweating and/or rise in body temperature    depression    difficulty breathing, fast or irregular breathing patterns    difficulty speaking    difficulty walking or controlling muscle movements    hearing impairment    redness, blistering, peeling or loosening of the skin, including inside the mouth    tingling, pain or numbness in the hands or feet    unusual bleeding or bruising    unusually weak or tired    worsening of mood, thoughts or actions of suicide or dying  Side effects that usually do not require medical attention (report to your doctor or health care professional if they continue or are bothersome):    altered taste    back pain, joint or muscle aches and pains    diarrhea, or constipation    headache    loss of appetite    nausea    stomach upset, indigestion    tremors  This list may not describe all possible side effects. Call your doctor for medical advice about side effects. You may report side effects to FDA at 6-886-FDA-1052.  Where should I keep my medicine?  Keep out of the reach of children.  Store at room temperature between 15 and 30 degrees C (59 and 86 degrees F) in a tightly closed container. Protect from moisture. Throw away any unused medicine after the expiration date.  NOTE:This sheet is a summary. It may not cover all possible information. If you have questions about this medicine, talk to your doctor, pharmacist, or health care provider. Copyright  2016 [employer] Standard

## 2018-09-04 NOTE — NURSING NOTE
Chief Complaint   Patient presents with     Headache     Patient is here for migraines and other health concerns       Jasper Martinez CMA ([facility]) at 2:23 PM on 9/4/2018

## 2018-09-04 NOTE — PROGRESS NOTES
[facility]  Primary Care Center   Kathya PRADOAnahi Lang, GALILEA CNP  09/05/2018      Chief Complaint:   Headache       History of Present Illness:   Luigi Moore is a 57 year old male with a history of type I diabetes, hypertension, hyperlipidemia, and hypothyroidism, who presents for evaluation of migraines. The patient follows with Dr. Szymanski, endocrinologist, for his type I diabetes. On 07/18, the patient communicated via [employer] that he has been having a lot of migraines lately, stating that his Maxalt is only sometimes effective. He has filed for FMLA as a result of his debilitating migraines. He has a long history of migraines and notes that they are worsening lately, getting longer. He takes maxalt at headache onset and again an hour later if there is no improvement. Migraines occur 3-4 times over a couple weeks followed by a period of relief in between. Two of these migraines lasted 4 days. They start at the front of his skull and radiate to the posterior; he does have aura prior to headache onset with flashes of light. He has not noticed weakness or tingling. He has been taking his prescribed medications regularly. He is interested in preventative measures for future migraines. Pain is rated 9/10 when the migraine is present. Per patient his sleep is stable, he recently saw a sleep specialist and is using CPAP.   For wheezing while cycling he uses an albuterol inhaler, lately he has been playing more tennis and not biking as much.     Today he feels like he is getting over the flu, he experienced myalgia, fatigue, and fever last week. This is resolving and he feels much better today with some remaining mild respiratory symptoms.    Blood pressure was high during his visit. He works night and thinks his medication has worn off. He denies bowel changes and urinary symptoms.     Review of Systems:   Pertinent items are noted in HPI, remainder of complete ROS is negative.      Active Medications:     Current  "Outpatient Prescriptions:      acetaminophen-codeine (TYLENOL #3) 300-30 MG per tablet, Take 1-2 tablets by mouth every 6 hours as needed for moderate pain, Disp: 60 tablet, Rfl: 1     albuterol (PROAIR HFA/PROVENTIL HFA/VENTOLIN HFA) 108 (90 Base) MCG/ACT Inhaler, Inhale 2 puffs into the lungs every 6 hours as needed for shortness of breath / dyspnea or wheezing - exercise induced wheezing, Disp: 8.5 g, Rfl: 1     alprostadil (EDEX) 40 MCG kit, Inject 30 - 35 mcg (approx 3/4 ml) as directed., Disp: 240 mcg, Rfl: 11     ARIPiprazole (ABILIFY) 15 MG tablet, Take 1 tablet (15 mg) by mouth daily, Disp: 90 tablet, Rfl: 0     aspirin 81 MG tablet, Take 1 tablet (81 mg) by mouth daily, Disp: 100 tablet, Rfl: 3     atorvastatin (LIPITOR) 40 MG tablet, Take 1 tablet (40 mg) by mouth daily, Disp: 90 tablet, Rfl: 0     buPROPion (WELLBUTRIN XL) 300 MG 24 hr tablet, Take 1 tablet (300 mg) by mouth every morning, Disp: 30 tablet, Rfl: 11     gabapentin (NEURONTIN) 400 MG capsule, Take 1 capsule (400 mg) by mouth 3 times daily, Disp: 270 capsule, Rfl: 0     insulin lispro (HUMALOG) 100 UNIT/ML injection, Use as directed in pump (approximately 70-80 Units every day), Disp: 80 mL, Rfl: 3     Insulin Syringe (BD INSULIN SYRINGE) 29G X 1/2\" 1 ML MISC, Use as directed, Disp: 20 each, Rfl: prn     levothyroxine (SYNTHROID/LEVOTHROID) 137 MCG tablet, Take by mouth one tablet daily., Disp: 30 tablet, Rfl: 0     losartan-hydrochlorothiazide (HYZAAR) 100-25 MG per tablet, Take 1 tablet by mouth daily *MUST BE SEEN FOR FURTHER  REFILLS, Disp: 90 tablet, Rfl: 3     NEW MED, \"[place] Trimix #8\" Trimix intercavernosal injection, per mL: PGE1 20 mcg Papaverine 27mg Phentolamine 1 mg   Start at 0.3mL injection May titrate in 0.1mL increments to lowest effective dose. Max use one daily, and 3x/week., Disp: 5 mL, Rfl: 99     ONE TOUCH TEST STRIPS test strip, Use to test blood sugar 4 times daily or as directed., Disp: 400 strip, Rfl: 3     " ONETOUCH ULTRA test strip, Test  four times daily ultra blue (please schedule clinic appt), Disp: 400 strip, Rfl: 0     order for DME, Equipment being ordered: CPAP machine, Disp: 1 each, Rfl: 0     rizatriptan (MAXALT) 10 MG tablet, Take 1 tablet (10 mg) by mouth at onset of headache for migraine, Disp: 12 tablet, Rfl: 11     topiramate (TOPAMAX) 25 MG tablet, Take 1 tablet (25 mg) by mouth daily Increase to 50 mg per day after 1 week., Disp: 90 tablet, Rfl: 1     Urea 40 % CREA, Externally apply topically 2 times daily To surface of toenails, Disp: 198 g, Rfl: 5     zolpidem (AMBIEN) 10 MG tablet, Take 1 tablet at bedtime as needed for insomnia, Disp: 60 tablet, Rfl: 0     blood glucose monitoring (SHEILA CONTOUR NEXT) test strip, Use to test blood sugar 4 times daily with Medtronic  670 G pump, Disp: 400 strip, Rfl: 3     glucagon (GLUCAGON EMERGENCY) 1 MG kit, Inject 1 mg into the muscle once for 1 dose, Disp: 1 mg, Rfl: 3  No current facility-administered medications for this visit.     Facility-Administered Medications Ordered in Other Visits:      lidocaine (PF) (XYLOCAINE) 1 % injection 10 mL, 10 mL, Intradermal, Once, Kathya Lang APRN CNP      Allergies:   Diagnostic x-ray materials; Contrast dye; and Diatrizoate      Past Medical History:  Acquired hypothyroidism  Depressive disorder  Hidradenoma  Hypertension  Numbness and tingling  Obstructive sleep apnea  Type I diabetes mellitus with complications  Uncomplicated asthma  Dyslipidemia  Essential hypertension  Hyperlipidemia  Proliferative diabetes retinopathy    Past Surgical History:  Excise lesion hand  Orthopedic surgery: achilles tendon  Orthopedic surgery: hand surgery  Soft tissue surgery: lipoma removal     Family History:   Pancreatitis - mother  Substance abuse - mother  Mental illness - mother  Depression - mother  Hypertension - mother  obesity - mother  Asthma - father  Leukemia - father  Diabetes - father  Other cancer -  father  Melanoma - father  Liver cancer - brother  Intestinal cancer - brother  Bone cancer - paternal grandmother, paternal grandfather     Social History:   Smoking status: never smoker  Smokeless tobacco: never used  Alcohol use; no   Cycles and plays tennis for exercise    Physical Exam:   /80  Pulse 89  Temp 97.9  F (36.6  C) (Oral)  Resp 24  Wt 101.2 kg (223 lb)  SpO2 99%  BMI 27.14 kg/m2   Constitutional: Alert, oriented, pleasant, no acute distress  Head: Normocephalic, atraumatic  Eyes: Extra-ocular movements intact, pupils equally round and reactive bilaterally, no scleral icterus  Ears: tympanic membranes pearly gray with positive light reflex  ENT: Oropharynx clear, mildy erythematous, no tonsillar edema or exudates, moist mucus membranes, good dentition  Neck: Supple, no lymphadenopathy, no thyromegaly  Cardiovascular: Regular rate and rhythm, no murmurs, rubs or gallops  Respiratory: Good air movement bilaterally, lungs clear, no wheezes/rales/rhonchi  GI: Glucose monitor in place, Abdomen soft, bowel sounds present, nondistended, nontender, no organomegaly or masses, no rebound/guarding  Neurologic: Alert and oriented, cranial nerves 2-12 intact, strength 5/5 throughout, sensation to light touch intact, reflexes 2+ bilaterally  Psychiatric: normal mentation, affect and mood      Assessment and Plan:  Luigi was seen today for migraines.    Diagnoses and all orders for this visit:    Intractable migraine with aura with status migrainosus  Maxalt has not been consistently relieving his migraine symptoms and he would like to try preventative medication as migraines are debilitating and make work difficult. Given increase in frequency and duration of headache, will start Topiramate for prophylaxis, starting with 25 mg and increasing to 50 mg after 1 week if tolerating well. Advised f/u in 2-4 weeks, or sooner if symptoms worsen. Work on lifestyle management for headache prevention--sleep,  nutrition, stress reduction.  -     topiramate (TOPAMAX) 25 MG tablet; Take 1 tablet (25 mg) by mouth daily Increase to 50 mg per day after 1 week.    Benign essential hypertension  BP control borderline--improved upon recheck, but above goal of <130/80. Discussed will need to increase anti-hypertensive medication regimen if remains elevated.    Follow-up: Return in about 4 weeks (around 10/2/2018).         Scribe Disclosure:   I, Buster Walls, am serving as a scribe to document services personally performed by GALILEA Escalante CNP at this visit, based upon the provider's statements to me. All documentation has been reviewed by the aforementioned provider prior to being entered into the official medical record.     Portions of this medical record were completed by a scribe. UPON MY REVIEW AND AUTHENTICATION BY ELECTRONIC SIGNATURE, this confirms (a) I performed the applicable clinical services, and (b) the record is accurate.    GALILEA Escalante CNP

## 2018-09-05 ENCOUNTER — DOCUMENTATION ONLY (OUTPATIENT)
Dept: ENDOCRINOLOGY | Facility: CLINIC | Age: 58
End: 2018-09-05

## 2018-09-05 ENCOUNTER — MEDICAL CORRESPONDENCE (OUTPATIENT)
Dept: HEALTH INFORMATION MANAGEMENT | Facility: CLINIC | Age: 58
End: 2018-09-05

## 2018-09-05 DIAGNOSIS — E10.9 TYPE 1 DIABETES MELLITUS WITHOUT COMPLICATION (H): ICD-10-CM

## 2018-09-05 NOTE — PROGRESS NOTES
[facility] CLINICAL DOCUMENTATION    Form Documentation Form or Letter Request    Type or form/letter needing completion: Medtronic CMN  Provider: Nessa  Has provider seen patient for office visit related to reason for form request? Yes  Date form needed: ASAP  Once completed: Fax form to: Serena

## 2018-09-06 ENCOUNTER — ALLIED HEALTH/NURSE VISIT (OUTPATIENT)
Dept: EDUCATION SERVICES | Facility: CLINIC | Age: 58
End: 2018-09-06
Payer: COMMERCIAL

## 2018-09-06 DIAGNOSIS — E10.8 TYPE 1 DIABETES MELLITUS WITH COMPLICATIONS (H): Primary | ICD-10-CM

## 2018-09-06 DIAGNOSIS — E10.8 TYPE 1 DIABETES MELLITUS WITH COMPLICATIONS (H): ICD-10-CM

## 2018-09-06 RX ORDER — LEVOTHYROXINE SODIUM 137 UG/1
137 TABLET ORAL DAILY
Qty: 90 TABLET | Refills: 2 | Status: SHIPPED | OUTPATIENT
Start: 2018-09-06 | End: 2019-09-12

## 2018-09-06 NOTE — PROGRESS NOTES
"Diabetes Self-Management Education & Support    Initial Diabetes Self-Management Education & Support - Insulin Pump/CGM    SUBJECTIVE/OBJECTIVE  Diabetes education in the past 24mo: (P) No  Diabetes type: (P) Type 1  Date of diagnosis: (P) 06[birth year]  Disease course: (P) Stable  Diabetes management related comments/concerns: (P) No  Cultural Influences/Ethnic Background:  American      Diabetes Symptoms & Complications  Blurred vision: (P) No  Fatigue: (P) No  Foot paresthesias: (P) No  Foot ulcerations: (P) No  Polydipsia: (P) No  Polyphagia: (P) No  Polyuria: (P) No  Visual change: (P) No  Weakness: (P) No  Weight loss: (P) No  Slow healing wounds: (P) No  Weight trend: (P) Stable  Autonomic neuropathy: (P) No  CVA: (P) No  Heart disease: (P) No  Nephropathy: (P) No  Peripheral neuropathy: (P) No  Peripheral Vascular Disease: (P) No  Retinopathy: (P) Yes  Sexual dysfunction: (P) Yes    Patient Problem List and Family Medical History reviewed for relevant medical history, current medical status, and diabetes risk factors.    Vitals:    Estimated body mass index is 27.14 kg/(m^2) as calculated from the following:    Height as of 8/15/18: 1.93 m (6' 4\").    Weight as of 9/4/18: 101.2 kg (223 lb).   Last 3 BP:   BP Readings from Last 3 Encounters:   09/04/18 138/80   08/15/18 153/74   08/10/18 147/70       History   Smoking Status     Never Smoker   Smokeless Tobacco     Never Used       Labs:  Lab Results   Component Value Date    A1C 7.6 04/10/2018     Lab Results   Component Value Date     06/21/2018     Lab Results   Component Value Date    LDL 89 04/10/2018     HDL Cholesterol   Date Value Ref Range Status   04/10/2018 70 >39 mg/dL Final   ]  GFR Estimate   Date Value Ref Range Status   06/21/2018 >90 >60 mL/min/1.7m2 Final     Comment:     Non  GFR Calc     GFR Estimate If Black   Date Value Ref Range Status   06/21/2018 >90 >60 mL/min/1.7m2 Final     Comment:      GFR Calc "     Lab Results   Component Value Date    CR 0.78 06/21/2018     No results found for: MICROALBUMIN    Diabetes Self Management Training: Insulin Pump Start    SUBJECTIVE:  Patient presents for a Medtronic 670 G insulin pump start accompanied by self  Pump serial number: [id number]  OBJECTIVE:    Patient is checking blood sugar: four times daily, six times daily     Patient is currently wearing glucose sensor? YES Enlite      ASSESSMENT / INTERVENTION:  Basic Features were reviewed and entered including:  Button Functions, Battery type/Insertion,Belt Clip/Activity Guard,Skins, Start up Wizard, Home Screen, Pump Unlock/Sleep Mode, Status Bar,  Icons, Menu Review, Status Screens, Audio Options, Display Options, Connect Pump and Meter    Additional Features were explained:  Summary/Daily History/ Alarm History, Bolus BG Check, Missed Meal Bolus, Low Milo,Set Change Reminder, Present Temp Setup, Dual Wave/Square Wave, Bolus Increment, Easy Bolus, Auto Suspend, Bolus Speed, Airplane Mode, Block, Self Test, Event Markers      Pump Settings:    BASAL RATES and times:  12   AM (midnight): 1.2 units/hour    10AM  1.0  6    PM: 1.2 units/hour     Carb ratio:   CARB RATIO and times:  12   AM (midnight): 7 grams    Corection Factor (Sensitivity) and times:  12   AM (midnight): 30 mg/dL    Target BG Ranges:   BLOOD GLUCOSE TARGET and times:  12   AM (midnight): 90 - 140 mg/dl    Active insulin time: 2.5 hours      Dual wave/Square wave Bolus: off     BG Reminder: off      Patient is in: ( manual)    Education materials provided to patient: Tips to remember after starting an insulin pump    Sensor Settings:    High Setup:      Hi Limit: on (240)    Time before high: :Off    Alert on high: on    Rise alert: on ( 2 mg/dl per minute, )    Low Setup:    Low Limit: on (70 mg/dl)    Suspend before low: On    Alert before low:On    Suspend on low: off     Alert on low:on     Snooze:    High Snooze:Off    Low snooze: On 20  minutes  Additional Training:    High and low blood sugar protocols for pump  Back up plan in case of pump failure    FOLLOW-UP:  Telephone follow-up scheduled in 1 day.    Upload to [facility] in 3 days    Time Spent: 120  Visit Type: Individual    Scanned documents: Insulin pump initiation settings with MD signature, Insulin pump start checklist.        PLAN  See Patient Instructions for co-developed, patient-stated behavior change goals.  AVS printed and provided to patient today. See Follow-Up section for recommended follow-up.    Time Spent: 90 minutes  Encounter Type: Individual    Any diabetes medication dose changes were made via the CDE Protocol and Collaborative Practice Agreement with the patient's referring provider. A copy of this encounter was shared with the provider.

## 2018-09-06 NOTE — PATIENT INSTRUCTIONS
1.) Suspend your pump every time you disconnect.  Example: shower    2.) Check your blood sugar 4-6 times a day.  Calibrate your sensor with 3 to 4 of the readings.    3.) Calibrate when blood sugar is stable.  This means 0-1 arrows.  This is most likely to be before meals.    4.) Always calibrate at bedtime.    5.) Do not treat hypoglycemia if pump is suspending before low.    6.) Practice accurate carb counting.  Read labels, check Calorie Morgan or Google, assess portion sizes.    7.) Bolus before meals and snacks.    8.) Change sensor every 7 days.  Shoot for a certain day of the week.  Example: Saturday morning    9.) Change sensor early in the day to get 4 stable, spread-out calibrations on day one.  This will help ensure accuracy.    10.) Continue to change pump and reservoir every 2 to 3 days.    11.) Return October 5 at 10:30 am    Anastasia Cowart RN,CDE  [address] [address]  Phone: [phone]  [email or web address]

## 2018-09-06 NOTE — MR AVS SNAPSHOT
After Visit Summary   9/6/2018    Luigi Moore    MRN: [id number]           Patient Information     Date Of Birth          1960        Visit Information        Provider Department      9/6/2018 3:30 PM Anastasia Cowart RN [facility]        Care Instructions    1.) Suspend your pump every time you disconnect.  Example: shower    2.) Check your blood sugar 4-6 times a day.  Calibrate your sensor with 3 to 4 of the readings.    3.) Calibrate when blood sugar is stable.  This means 0-1 arrows.  This is most likely to be before meals.    4.) Always calibrate at bedtime.    5.) Do not treat hypoglycemia if pump is suspending before low.    6.) Practice accurate carb counting.  Read labels, check Calorie Morgan or Google, assess portion sizes.    7.) Bolus before meals and snacks.    8.) Change sensor every 7 days.  Shoot for a certain day of the week.  Example: Saturday morning    9.) Change sensor early in the day to get 4 stable, spread-out calibrations on day one.  This will help ensure accuracy.    10.) Continue to change pump and reservoir every 2 to 3 days.    11.) Return October 5 at 10:30 am    Anastasia Cowart RN,RADHA  [address] [address]  Phone: [phone]  [email or web address]                  Follow-ups after your visit        Your next 10 appointments already scheduled     Oct 03, 2018 10:45 AM CDT   (Arrive by 10:30 AM)   Return Visit with GALILEA Morataya [facility] ([facility])    [address] [place] [address]  4th [facility] [address] [phone]            Feb 20, 2019  2:45 PM CST   LAB with  LAB   [facility] ([facility])    [address] [place] [address] [phone]           Please do not eat 10-12 hours before your appointment if you are coming in fasting for labs on lipids, cholesterol, or glucose (sugar).  This does not apply to pregnant women. Water, hot tea and black coffee (with nothing added) are okay. Do not drink other fluids, diet soda or chew gum.            Feb 20, 2019  3:30 PM CST   (Arrive by 3:15 PM)   RETURN DIABETES with Loraine Szymanski MD   [facility] ([facility])    [address] [address] [phone]              Who to contact     Please call your clinic at [phone] to:    Ask questions about your health    Make or cancel appointments    Discuss your medicines    Learn about your test results    Speak to your doctor            Additional Information About Your Visit        N4MD Information     N4MD gives you secure access to your electronic health record. If you see a primary care provider, you can also send messages to your care team and make appointments. If you have questions, please call your primary care clinic.  If you do not have a primary care provider, please call [phone] and they will assist you.      N4MD is an electronic gateway that provides easy, online access to your medical records. With N4MD, you can request a clinic appointment, read your test results, renew a prescription or communicate with your care team.     To access your existing account, please contact your [facility] or call [phone] for assistance.        Care EveryWhere ID     This is your Care EveryWhere ID. This could be used by other organizations to access your [place] medical records  [id number]         Blood Pressure from Last 3 Encounters:   09/04/18 138/80   08/15/18 153/74   08/10/18 147/70    Weight from Last 3 Encounters:   09/04/18 101.2 kg (223 lb)   08/15/18 100.2 kg (220 lb 14.4 oz)   08/10/18 99.8 kg (220 lb)              Today, you had the following     No orders found for display         Today's Medication Changes          These changes are accurate as of  9/6/18  4:23 PM.  If you have any questions, ask your nurse or doctor.               These medicines have changed or have updated prescriptions.        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   Quantity:  90 tablet   Refills:  2            Where to get your medicines      These medications were sent to [facility] [id number] AT [facility] [address]  [address], [facility] [address]     Phone:  [phone]     levothyroxine 137 MCG tablet                Primary Care Provider Office Phone # Fax #    Kathya Liz Lang, APRN [facility] [phone]       [address] [facility] [address]        Equal Access to Services     [facility]: Hadii emeterio ku hadasho Soomaali, waaxda luqadaha, qaybta kaalmada adeegyacarlton, dennis jaimes . So [facility] [phone].    ATENCIÓN: Si habla español, tiene a garcia disposición servicios gratuitos de asistencia lingüística. [facility] [phone].    We comply with applicable federal civil rights laws and [place] laws. We do not discriminate on the basis of race, color, national origin, age, disability, sex, sexual orientation, or gender identity.            Thank you!     Thank you for choosing [facility]  for your care. Our goal is always to provide you with excellent care. Hearing back from our patients is one way we can continue to improve our services. Please take a few minutes to complete the written survey that you may receive in the mail after your visit with us. Thank you!             Your Updated Medication List - Protect others around you:  Learn how to safely use, store and throw away your medicines at www.disposemymeds.org.          This list is accurate as of 9/6/18  4:23 PM.  Always use your most recent med list.                   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), Acquired hypothyroidism, Dyslipidemia, Onychomycosis       aspirin 81 MG tablet     100 tablet    Take 1 tablet (81 mg) by mouth daily    Soft tissue tumor       atorvastatin 40 MG tablet    LIPITOR    90 tablet    Take 1 tablet (40 mg) by mouth daily    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), Acquired hypothyroidism, Dyslipidemia, Onychomycosis       buPROPion 300 MG 24 hr tablet    WELLBUTRIN XL    30 tablet    Take 1 tablet (300 mg) by mouth every morning    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), Acquired hypothyroidism, Dyslipidemia, Onychomycosis       gabapentin 400 MG capsule    NEURONTIN    270 capsule    Take 1 capsule (400 mg) by mouth 3 times daily    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), Acquired hypothyroidism, Dyslipidemia, Onychomycosis       glucagon 1 MG kit    GLUCAGON EMERGENCY    1 mg    Inject 1 mg into the muscle once for 1 dose    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), Acquired  "hypothyroidism, Dyslipidemia, Onychomycosis       insulin lispro 100 UNIT/ML injection    humaLOG    80 mL    Use as directed in pump (approximately 70-80 Units every day)    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), Acquired hypothyroidism, Dyslipidemia, Onychomycosis       Insulin Syringe 29G X 1/2\" 1 ML Misc    BD insulin syringe    20 each    Use as directed    Vasculogenic erectile dysfunction, unspecified vasculogenic erectile dysfunction type       levothyroxine 137 MCG tablet    SYNTHROID/LEVOTHROID    90 tablet    Take 1 tablet (137 mcg) by mouth daily Take by mouth one tablet daily.    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)       losartan-hydrochlorothiazide 100-25 MG per tablet    HYZAAR    90 tablet    Take 1 tablet by mouth daily *MUST BE SEEN FOR FURTHER  REFILLS    Type 1 diabetes mellitus without complication (H)       NEW MED     5 mL    \"[place] Trimix #8\" Trimix intercavernosal injection, per mL: PGE1 20 mcg Papaverine 27mg Phentolamine 1 mg   Start at 0.3mL injection May titrate in 0.1mL increments to lowest effective dose. Max use one daily, and 3x/week.    Vasculogenic erectile dysfunction, unspecified vasculogenic erectile dysfunction type       * ONETOUCH TEST STRIPS test strip   Generic drug:  blood glucose monitoring     400 strip    Use to test blood sugar 4 times daily or as directed.    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)       * ONETOUCH ULTRA test strip   Generic drug:  blood glucose monitoring     400 strip    Test  four times daily ultra blue (please schedule clinic appt)    DM (diabetes mellitus) (H)       * blood glucose monitoring test strip    SHEILA CONTOUR NEXT    400 strip    Use to test blood sugar 4 times daily with Medtronic  670 G pump    Type 1 diabetes mellitus without complication (H)       order for DME     1 each    Equipment being ordered: CPAP machine    TELLY (obstructive sleep apnea)       rizatriptan 10 MG tablet    MAXALT    12 tablet "    Take 1 tablet (10 mg) by mouth at onset of headache for migraine    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), Acquired hypothyroidism, Dyslipidemia, Onychomycosis       topiramate 25 MG tablet    TOPAMAX    90 tablet    Take 1 tablet (25 mg) by mouth daily Increase to 50 mg per day after 1 week.    Intractable migraine with aura with status migrainosus       Urea 40 % Crea     198 g    Externally apply topically 2 times daily To surface of toenails    Onychauxis       zolpidem 10 MG tablet    AMBIEN    60 tablet    Take 1 tablet at bedtime as needed for insomnia    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 Read the directions carefully, and ask your doctor or other care provider to review them with you.

## 2018-09-14 NOTE — TELEPHONE ENCOUNTER
Continuous glucose monitoring physician interpretation    Major patterns detected was:  Hyperglycemia preceding hypoglycemia: He boluses only after he eats and when the blood sugars are high.  Manual bolusing leading to hypoglycemia    Hyperglycemia patterns  Delay in changing site.  Rising sensor without bolus  Delay in bolusing.      Plan as discussed during visit.     Loraine Szymanski MD  [id number]  Endocrinology Service

## 2018-09-28 ENCOUNTER — MYC MEDICAL ADVICE (OUTPATIENT)
Dept: INTERNAL MEDICINE | Facility: CLINIC | Age: 58
End: 2018-09-28

## 2018-09-28 DIAGNOSIS — E78.5 DYSLIPIDEMIA: ICD-10-CM

## 2018-09-28 DIAGNOSIS — B35.1 ONYCHOMYCOSIS: ICD-10-CM

## 2018-09-28 DIAGNOSIS — E03.9 ACQUIRED HYPOTHYROIDISM: ICD-10-CM

## 2018-09-28 DIAGNOSIS — E10.8 TYPE 1 DIABETES MELLITUS WITH COMPLICATIONS (H): ICD-10-CM

## 2018-10-01 RX ORDER — ARIPIPRAZOLE 15 MG/1
15 TABLET ORAL DAILY
Qty: 90 TABLET | Refills: 0 | Status: SHIPPED | OUTPATIENT
Start: 2018-10-01 | End: 2019-04-04

## 2018-10-02 DIAGNOSIS — E10.8 TYPE 1 DIABETES MELLITUS WITH COMPLICATIONS (H): ICD-10-CM

## 2018-10-02 DIAGNOSIS — G47.00 INSOMNIA, UNSPECIFIED TYPE: ICD-10-CM

## 2018-10-02 NOTE — TELEPHONE ENCOUNTER
Last office visit 9/04/18. Upcoming visit 10/12/18. [facility] site reviewed 10/01/18. Last zolpidem refill received 9/11/18, though fill date on rx was listed for 9/14/18 (based on previous fill of 8/15/18).     Rx pended for PCP review with a start date of 10/14/18. Patient does have an upcoming visit, so PCP may prefer to see patient before fill.    Juanita Hoyt RN

## 2018-10-05 ENCOUNTER — RESULTS ONLY (OUTPATIENT)
Dept: LAB | Age: 58
End: 2018-10-05

## 2018-10-05 ENCOUNTER — ALLIED HEALTH/NURSE VISIT (OUTPATIENT)
Dept: EDUCATION SERVICES | Facility: CLINIC | Age: 58
End: 2018-10-05
Payer: COMMERCIAL

## 2018-10-05 DIAGNOSIS — E10.8 TYPE 1 DIABETES MELLITUS WITH COMPLICATIONS (H): Primary | ICD-10-CM

## 2018-10-05 LAB
CK SERPL-CCNC: 117 U/L (ref 30–300)
LDLC SERPL DIRECT ASSAY-MCNC: 66 MG/DL
POTASSIUM SERPL-SCNC: 4.2 MMOL/L (ref 3.4–5.3)
T4 FREE SERPL-MCNC: 2.35 NG/DL (ref 0.76–1.46)
TSH SERPL DL<=0.005 MIU/L-ACNC: 0.01 MU/L (ref 0.4–4)

## 2018-10-12 ENCOUNTER — OFFICE VISIT (OUTPATIENT)
Dept: INTERNAL MEDICINE | Facility: CLINIC | Age: 58
End: 2018-10-12
Payer: COMMERCIAL

## 2018-10-12 VITALS
HEART RATE: 91 BPM | BODY MASS INDEX: 26.71 KG/M2 | DIASTOLIC BLOOD PRESSURE: 71 MMHG | SYSTOLIC BLOOD PRESSURE: 123 MMHG | WEIGHT: 219.4 LBS

## 2018-10-12 DIAGNOSIS — G43.111 INTRACTABLE MIGRAINE WITH AURA WITH STATUS MIGRAINOSUS: Primary | ICD-10-CM

## 2018-10-12 DIAGNOSIS — G47.00 INSOMNIA, UNSPECIFIED TYPE: ICD-10-CM

## 2018-10-12 RX ORDER — ZOLPIDEM TARTRATE 10 MG/1
TABLET ORAL
Qty: 30 TABLET | Refills: 1 | Status: SHIPPED | OUTPATIENT
Start: 2018-10-14 | End: 2018-12-06

## 2018-10-12 RX ORDER — CETIRIZINE HYDROCHLORIDE 10 MG/1
10 TABLET ORAL
COMMUNITY

## 2018-10-12 ASSESSMENT — PAIN SCALES - GENERAL: PAINLEVEL: NO PAIN (0)

## 2018-10-12 NOTE — MR AVS SNAPSHOT
After Visit Summary   10/12/2018    Luigi Moore    MRN: [id number]           Patient Information     Date Of Birth          1960        Visit Information        Provider Department      10/12/2018 2:45 PM Kathya Lang APRN [facility]        Care Instructions    Primary Care Center Medication Refill Request Information:  * Please contact your pharmacy regarding ANY request for medication refills.  ** [facility] Prescription Fax = [phone]  * Please allow 3 business days for routine medication refills.  * Please allow 5 business days for controlled substance medication refills.     Primary Care Center Test Result notification information:  *You will be notified with in 7-10 days of your appointment day regarding the results of your test.  If you are on MyChart you will be notified as soon as the provider has reviewed the results and signed off on them.    [facility]: [phone]             Follow-ups after your visit        Your next 10 appointments already scheduled     Feb 20, 2019  2:45 PM CST   LAB with [facility] ([facility])    [address] [place] [address] [phone]           Please do not eat 10-12 hours before your appointment if you are coming in fasting for labs on lipids, cholesterol, or glucose (sugar). This does not apply to pregnant women. Water, hot tea and black coffee (with nothing added) are okay. Do not drink other fluids, diet soda or chew gum.            Feb 20, 2019  3:30 PM CST   (Arrive by 3:15 PM)   RETURN DIABETES with Loraine Szymanski MD   [facility] ([facility])    [address] [place] [address] [phone]              Who to contact     Please call your clinic at [phone] to:    Ask questions about your health    Make or cancel appointments    Discuss your  medicines    Learn about your test results    Speak to your doctor            Additional Information About Your Visit        Kilopasshart Information     LinkedIn gives you secure access to your electronic health record. If you see a primary care provider, you can also send messages to your care team and make appointments. If you have questions, please call your primary care clinic.  If you do not have a primary care provider, please call [phone] and they will assist you.      LinkedIn is an electronic gateway that provides easy, online access to your medical records. With LinkedIn, you can request a clinic appointment, read your test results, renew a prescription or communicate with your care team.     To access your existing account, please contact your [facility] or call [phone] for assistance.        Care EveryWhere ID     This is your Care EveryWhere ID. This could be used by other organizations to access your [place] medical records  [id number]        Your Vitals Were     Pulse BMI (Body Mass Index)                91 26.71 kg/m2           Blood Pressure from Last 3 Encounters:   10/12/18 123/71   09/04/18 138/80   08/15/18 153/74    Weight from Last 3 Encounters:   10/12/18 99.5 kg (219 lb 6.4 oz)   09/04/18 101.2 kg (223 lb)   08/15/18 100.2 kg (220 lb 14.4 oz)              Today, you had the following     No orders found for display         Today's Medication Changes          These changes are accurate as of 10/12/18  3:17 PM.  If you have any questions, ask your nurse or doctor.               Stop taking these medicines if you haven't already. Please contact your care team if you have questions.     LEVOTHYROXINE-LIOTHYRONINE PO   Stopped by:  Kathya Lang APRN CNP           LISINOPRIL PO   Stopped by:  Kathya Lang APRN CNP                    Primary Care Provider Office Phone # Fax #    GALILEA Morataya [phone]        [address] [facility] [address]        Equal Access to Services     BRIGETTEOLGA NEERU : Hadii emeterio lucero malka Wattsali, wagumaroda luyuliananataliha, qavaldota fantasmakalincarlton davisonbernabecarlton, waxjosh makenna wanomari hansonfidelinarosetta mtz. So [facility] [phone].    ATENCIÓN: Si habla español, tiene a garcia disposición servicios gratuitos de asistencia lingüística. Llame al [phone].    We comply with applicable federal civil rights laws and [place] laws. We do not discriminate on the basis of race, color, national origin, age, disability, sex, sexual orientation, or gender identity.            Thank you!     Thank you for choosing [facility]  for your care. Our goal is always to provide you with excellent care. Hearing back from our patients is one way we can continue to improve our services. Please take a few minutes to complete the written survey that you may receive in the mail after your visit with us. Thank you!             Your Updated Medication List - Protect others around you: Learn how to safely use, store and throw away your medicines at www.disposemymeds.org.          This list is accurate as of 10/12/18  3:17 PM.  Always use your most recent med list.                   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    Erectile dysfunction due to diseases classified elsewhere       ARIPiprazole 15 MG tablet    ABILIFY    90 tablet    Take 1 tablet (15 mg) by mouth daily    Type 1 diabetes  "mellitus with complications (H), Acquired hypothyroidism, Dyslipidemia, Onychomycosis       aspirin 81 MG tablet     100 tablet    Take 1 tablet (81 mg) by mouth daily    Soft tissue tumor       atorvastatin 40 MG tablet    LIPITOR    90 tablet    Take 1 tablet (40 mg) by mouth daily    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), Acquired hypothyroidism, Dyslipidemia, Onychomycosis       BENADRYL PO           buPROPion 300 MG 24 hr tablet    WELLBUTRIN XL    30 tablet    Take 1 tablet (300 mg) by mouth every morning    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), Acquired hypothyroidism, Dyslipidemia, Onychomycosis       cetirizine 10 MG tablet    zyrTEC     Take 10 mg by mouth        gabapentin 400 MG capsule    NEURONTIN    270 capsule    Take 1 capsule (400 mg) by mouth 3 times daily    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), Acquired hypothyroidism, Dyslipidemia, Onychomycosis       glucagon 1 MG kit    GLUCAGON EMERGENCY    1 mg    Inject 1 mg into the muscle once for 1 dose    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), Acquired hypothyroidism, Dyslipidemia, Onychomycosis       insulin lispro 100 UNIT/ML injection    humaLOG    80 mL    Use as directed in pump (approximately 70-80 Units every day)    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), Acquired hypothyroidism, Dyslipidemia, Onychomycosis       Insulin Pump [id number] Kit           Insulin Syringe 29G X 1/2\" 1 ML Misc    BD insulin syringe    20 each    Use as directed    Vasculogenic erectile dysfunction, unspecified vasculogenic erectile dysfunction type       levothyroxine 137 MCG tablet    SYNTHROID/LEVOTHROID    90 tablet    Take 1 tablet (137 mcg) by mouth daily Take by mouth one tablet daily.    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)       losartan-hydrochlorothiazide 100-25 MG per tablet    HYZAAR    90 tablet    Take 1 tablet by mouth daily *MUST BE SEEN FOR FURTHER  REFILLS    Type 1 diabetes mellitus without " "complication (H)       NEW MED     5 mL    \"[place] Trimix #8\" Trimix intercavernosal injection, per mL: PGE1 20 mcg Papaverine 27mg Phentolamine 1 mg   Start at 0.3mL injection May titrate in 0.1mL increments to lowest effective dose. Max use one daily, and 3x/week.    Vasculogenic erectile dysfunction, unspecified vasculogenic erectile dysfunction type       * ONETOUCH TEST STRIPS test strip   Generic drug:  blood glucose monitoring     400 strip    Use to test blood sugar 4 times daily or as directed.    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)       * ONETOUCH ULTRA test strip   Generic drug:  blood glucose monitoring     400 strip    Test  four times daily ultra blue (please schedule clinic appt)    DM (diabetes mellitus) (H)       * blood glucose monitoring test strip    SHEILA CONTOUR NEXT    400 strip    Use to test blood sugar 4 times daily with Medtronic  670 G pump    Type 1 diabetes mellitus without complication (H)       order for DME     1 each    Equipment being ordered: CPAP machine    TELLY (obstructive sleep apnea)       rizatriptan 10 MG tablet    MAXALT    12 tablet    Take 1 tablet (10 mg) by mouth at onset of headache for migraine    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), Acquired hypothyroidism, Dyslipidemia, Onychomycosis       topiramate 25 MG tablet    TOPAMAX    90 tablet    Take 1 tablet (25 mg) by mouth daily Increase to 50 mg per day after 1 week.    Intractable migraine with aura with status migrainosus       Urea 40 % Crea     198 g    Externally apply topically 2 times daily To surface of toenails    Onychauxis       zolpidem 10 MG tablet   Start taking on:  10/14/2018    AMBIEN    30 tablet    Take 1 tablet at bedtime as needed for insomnia    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 Read the directions carefully, and ask your " doctor or other care provider to review them with you.

## 2018-10-12 NOTE — PATIENT INSTRUCTIONS
[facility] Medication Refill Request Information:  * Please contact your pharmacy regarding ANY request for medication refills.  ** [facility] Prescription Fax = [phone]  * Please allow 3 business days for routine medication refills.  * Please allow 5 business days for controlled substance medication refills.     [facility] Test Result notification information:  *You will be notified with in 7-10 days of your appointment day regarding the results of your test.  If you are on MyChart you will be notified as soon as the provider has reviewed the results and signed off on them.    [facility]: [phone]

## 2018-10-12 NOTE — PROGRESS NOTES
[facility]  Primary Care Center   Kathya PRADOAnahi Leónlacie, GALILEA CNP  10/12/2018      Chief Complaint:   Recheck Medication       History of Present Illness:   Luigi Moore is a 57 year old male with a history of type I diabetes, hypertension, hyperlipidemia, migraines and hypothyroidism who presents for evaluation of migraines.    Migraines greatly improved since starting Topamax.  He has not had any in the past month.  He also knows one of his migraine triggers is low blood sugars and he had one low about a month ago and he did not experience any migraine.  He is currently taking 25mg BID. No complaints of dizziness, lightheadedness, nausea or vomiting.  Did have a case of the Flu a while back before FLU shot was available has a lingering cough but no other illnesses or hospitalizations.  Cough is not bothersome.    Healthcare/Maintenance topics discussed:  1. New pump for DM, had about a month now is doing well with this.  No lows or highs since starting it.    2. Still having problems with insomnia falling and staying asleep.  He works overnights for [employer] Sun[employer]Thurs so he usually goes to sleep around 10-11am and sleeps till 6-7pm.  He does have his room blacked out to help with sleep, practices good sleep hygiene. He usually tries to maintain this schedule to a certain extent, but finds it difficult with his family/friends working normal hours.  He typically uses Ambien before bed and this sometimes helps him sleep through the night.  When he does wake up he tends to wake up after 4 hours and will sometimes use benadryl at this time so that he doesn't double up on the Ambien. He was last seen by Dr. Joy at [facility], advised to use CPAP for TELLY.    3. Exercise induced asthma: does well with albuterol inhaler, only uses when biking, did not need inhaler with recent illness.       Review of Systems:   Pertinent items are noted in HPI remainder of complete ROS is negative.     Answers for HPI/ROS submitted  "by the patient on 10/12/2018   PHQ-2 Score: 0    Active Medications:     Current Outpatient Prescriptions:      acetaminophen-codeine (TYLENOL #3) 300-30 MG per tablet, Take 1-2 tablets by mouth every 6 hours as needed for moderate pain, Disp: 60 tablet, Rfl: 1     albuterol (PROAIR HFA/PROVENTIL HFA/VENTOLIN HFA) 108 (90 Base) MCG/ACT Inhaler, Inhale 2 puffs into the lungs every 6 hours as needed for shortness of breath / dyspnea or wheezing - exercise induced wheezing, Disp: 8.5 g, Rfl: 1     alprostadil (EDEX) 40 MCG kit, Inject 30 - 35 mcg (approx 3/4 ml) as directed., Disp: 240 mcg, Rfl: 11     ARIPiprazole (ABILIFY) 15 MG tablet, Take 1 tablet (15 mg) by mouth daily, Disp: 90 tablet, Rfl: 0     aspirin 81 MG tablet, Take 1 tablet (81 mg) by mouth daily, Disp: 100 tablet, Rfl: 3     atorvastatin (LIPITOR) 40 MG tablet, Take 1 tablet (40 mg) by mouth daily, Disp: 90 tablet, Rfl: 0     blood glucose monitoring (SHEILA CONTOUR NEXT) test strip, Use to test blood sugar 4 times daily with Swopboardtronic  670 G pump, Disp: 400 strip, Rfl: 3     buPROPion (WELLBUTRIN XL) 300 MG 24 hr tablet, Take 1 tablet (300 mg) by mouth every morning, Disp: 30 tablet, Rfl: 11     cetirizine (ZYRTEC) 10 MG tablet, Take 10 mg by mouth, Disp: , Rfl:      DiphenhydrAMINE HCl (BENADRYL PO), , Disp: , Rfl:      gabapentin (NEURONTIN) 400 MG capsule, Take 1 capsule (400 mg) by mouth 3 times daily, Disp: 270 capsule, Rfl: 0     Insulin Infusion Pump (INSULIN PUMP [id number]) KIT, , Disp: , Rfl:      insulin lispro (HUMALOG) 100 UNIT/ML injection, Use as directed in pump (approximately 70-80 Units every day), Disp: 80 mL, Rfl: 3     Insulin Syringe (BD INSULIN SYRINGE) 29G X 1/2\" 1 ML MISC, Use as directed, Disp: 20 each, Rfl: prn     levothyroxine (SYNTHROID/LEVOTHROID) 137 MCG tablet, Take 1 tablet (137 mcg) by mouth daily Take by mouth one tablet daily., Disp: 90 tablet, Rfl: 2     losartan-hydrochlorothiazide (HYZAAR) 100-25 MG per tablet, Take 1 " "tablet by mouth daily *MUST BE SEEN FOR FURTHER  REFILLS, Disp: 90 tablet, Rfl: 3     NEW MED, \"[place] Trimix #8\" Trimix intercavernosal injection, per mL: PGE1 20 mcg Papaverine 27mg Phentolamine 1 mg   Start at 0.3mL injection May titrate in 0.1mL increments to lowest effective dose. Max use one daily, and 3x/week., Disp: 5 mL, Rfl: 99     ONE TOUCH TEST STRIPS test strip, Use to test blood sugar 4 times daily or as directed., Disp: 400 strip, Rfl: 3     ONETOUCH ULTRA test strip, Test  four times daily ultra blue (please schedule clinic appt), Disp: 400 strip, Rfl: 0     order for DME, Equipment being ordered: CPAP machine, Disp: 1 each, Rfl: 0     rizatriptan (MAXALT) 10 MG tablet, Take 1 tablet (10 mg) by mouth at onset of headache for migraine, Disp: 12 tablet, Rfl: 11     topiramate (TOPAMAX) 25 MG tablet, Take 1 tablet (25 mg) by mouth daily Increase to 50 mg per day after 1 week., Disp: 90 tablet, Rfl: 1     Urea 40 % CREA, Externally apply topically 2 times daily To surface of toenails, Disp: 198 g, Rfl: 5     glucagon (GLUCAGON EMERGENCY) 1 MG kit, Inject 1 mg into the muscle once for 1 dose, Disp: 1 mg, Rfl: 3     [START ON 10/14/2018] zolpidem (AMBIEN) 10 MG tablet, Take 1 tablet at bedtime as needed for insomnia, Disp: 30 tablet, Rfl: 1  No current facility-administered medications for this visit.     Facility-Administered Medications Ordered in Other Visits:      lidocaine (PF) (XYLOCAINE) 1 % injection 10 mL, 10 mL, Intradermal, Once, Kathya Lang APRN CNP      Allergies:   Diagnostic x-ray materials; Contrast dye; and Diatrizoate      Past Medical History:  Acquired hypothyroidism  Depression  Hidradenoma  Hypertension  Obstructive sleep apnea  Type 1 diabetes mellitus  Hyperlipidemia  Proliferative diabetic retinopathy     Past Surgical History:  Excise lesion hand  Orthopedic surgery: achilles tendon  Orthopedic surgery: hand surgery  Soft tissue surgery: lipoma removal     Family " History:   Mother: pancreatitis, substance abuse, depression, hypertension, obesity  Father: asthma, leukemia, diabetes mellitus, melanoma  Brother: intestinal cancer with liver metastases  Maternal grandmother: cancer  Maternal grandfather: cancer  Paternal grandmother: bone cancer  Paternal grandfather: bone cancer     Social History:     Non smoker    Physical Exam:   /71  Pulse 91  Wt 99.5 kg (219 lb 6.4 oz)  BMI 26.71 kg/m2   GENERAL APPEARANCE: healthy, alert and no distress  EYES: Eyes grossly normal to inspection, PERRL, conjunctivae and sclerae normal and red reflex present, EOMs intact.  HENT: ear canals and TM's normal and nose and mouth without ulcers or lesions  NECK: no cervical or supraclavicular adenopathy, no asymmetry, masses, or scars and thyroid normal to palpation  RESP: lungs clear to auscultation - no rales, rhonchi or wheezes  CV: regular rates and rhythm, normal S1 S2, no S3 or S4 and no murmur, click or rub  ABDOMEN: soft, nontender, without hepatosplenomegaly or masses and bowel sounds normal  MS: extremities normal- no gross deformities noted  SKIN: no suspicious lesions or rashes  NEURO: Normal strength and tone, mentation intact, speech normal and cranial nerves 2-12 intact  PSYCH: mentation appears normal and affect normal/bright    Assessment and Plan:  Intractable migraine with aura with status migrainosus  Doing well with Topamax.  Instructed patient he can continue Topamax twice a day or he can just take 50mg once a day.   He is trying to lose weight for winter biking but encouraged him to monitor for excessive weight loss.     Insomnia, unspecified type  Continue good sleep habits, darkened room and no tvs or electronics, and CPAP as prescribed at Sleep Center. Renewed Ambien prescription and cautioned about excess sleepiness when awakening and not to double up on doses especially since he is already on high dose.    DM 1  Stable using new pump (Medtronic 670G)   "Last Hgb A1c in August was 7.8. Following with Endocrine    Follow-up: in 6 months, sooner if needed.     I, Moni Pérez, am serving as a Nurse Practitioner Student working in conjunction with GALILEA Matson CNP. All documentation has been reviewed by the aforementioned provider prior to being entered into the official medical record.    Moni Pérez, NP Student    I, Kathya Lang, saw and examined this patient with the NP student and agree with the student's findings and plan of care as documented in the student's note with the following modifications: none\"    GALILEA Escalante CNP      "

## 2018-11-05 DIAGNOSIS — R06.2 WHEEZING: ICD-10-CM

## 2018-11-05 RX ORDER — ALBUTEROL SULFATE 90 UG/1
2 AEROSOL, METERED RESPIRATORY (INHALATION) EVERY 6 HOURS PRN
Qty: 18 G | Refills: 11 | Status: SHIPPED | OUTPATIENT
Start: 2018-11-05 | End: 2020-11-16

## 2018-11-05 NOTE — TELEPHONE ENCOUNTER
Last Clinic Visit: 10/12/2018  [facility]  Dx: Exercise induced wheezing

## 2018-11-29 DIAGNOSIS — G43.111 INTRACTABLE MIGRAINE WITH AURA WITH STATUS MIGRAINOSUS: ICD-10-CM

## 2018-11-30 RX ORDER — TOPIRAMATE 25 MG/1
TABLET, FILM COATED ORAL
Qty: 180 TABLET | Refills: 1 | Status: SHIPPED | OUTPATIENT
Start: 2018-11-30 | End: 2019-06-11

## 2018-11-30 NOTE — TELEPHONE ENCOUNTER
topiramate (TOPAMAX) 25 MG tablet      Last Written Prescription Date:  9-4-18  Last Fill Quantity: 90,   # refills: 1  Last Office Visit : 10-12-18  Future Office visit:  none     Clinic Note: 10-12-18 Doing well with Topamax.  Instructed patient he can continue Topamax twice a day or he can just take 50mg once a day    Routing refill request to provider for review/approval because:   Not on protocol:  Diagnosis Migraines  Rx pending as indicate in clinic note

## 2018-12-06 DIAGNOSIS — E10.8 TYPE 1 DIABETES MELLITUS WITH COMPLICATIONS (H): ICD-10-CM

## 2018-12-06 DIAGNOSIS — G47.00 INSOMNIA, UNSPECIFIED TYPE: ICD-10-CM

## 2018-12-06 RX ORDER — ZOLPIDEM TARTRATE 10 MG/1
TABLET ORAL
Qty: 30 TABLET | Refills: 1 | Status: SHIPPED | OUTPATIENT
Start: 2018-12-12 | End: 2019-02-07

## 2018-12-06 NOTE — TELEPHONE ENCOUNTER
Last office visit 10/12/18. [facility] site reviewed 12/06/18. Last zolpidem refills received 11/10/18, 10/12/18, 9/11/18, 8/15/18.    Last script dated for 10/14, filled 10/12. Will send to PCP for review.    Juanita Hoyt RN    --------------------    Rx faxed to [facility] #[id number] today at 0910.    Juanita Hoyt RN  12/10/2018 9:21 AM

## 2018-12-14 DIAGNOSIS — B35.1 ONYCHOMYCOSIS: ICD-10-CM

## 2018-12-14 DIAGNOSIS — E10.8 TYPE 1 DIABETES MELLITUS WITH COMPLICATIONS (H): ICD-10-CM

## 2018-12-14 DIAGNOSIS — E78.5 DYSLIPIDEMIA: ICD-10-CM

## 2018-12-14 DIAGNOSIS — E03.9 ACQUIRED HYPOTHYROIDISM: ICD-10-CM

## 2018-12-15 NOTE — TELEPHONE ENCOUNTER
gabapentin (NEURONTIN) 400 MG capsule  Last Written Prescription Date:  7/24/18  Last Fill Quantity: 270,   # refills: 0  Last Office Visit : 10/12/18  Future Office visit:none    Routing  Because:   Not on protocol

## 2018-12-18 RX ORDER — GABAPENTIN 400 MG/1
400 CAPSULE ORAL 3 TIMES DAILY
Qty: 270 CAPSULE | Refills: 0 | Status: SHIPPED | OUTPATIENT
Start: 2018-12-18 | End: 2019-07-13

## 2019-01-28 DIAGNOSIS — E03.9 ACQUIRED HYPOTHYROIDISM: Primary | ICD-10-CM

## 2019-02-07 DIAGNOSIS — E10.8 TYPE 1 DIABETES MELLITUS WITH COMPLICATIONS (H): ICD-10-CM

## 2019-02-07 DIAGNOSIS — G47.00 INSOMNIA, UNSPECIFIED TYPE: ICD-10-CM

## 2019-02-07 RX ORDER — ZOLPIDEM TARTRATE 10 MG/1
TABLET ORAL
Qty: 30 TABLET | Refills: 1 | Status: SHIPPED | OUTPATIENT
Start: 2019-02-10 | End: 2019-04-05

## 2019-02-07 NOTE — TELEPHONE ENCOUNTER
Last office visit 10/12/18. No upcoming visit scheduled at this time. 6-month follow up due in April.  site reviewed 2/07/19. Last zolpidem refills received 1/08/19, 12/12/18.    Will send to PCP for review.    Juanita Hoyt RN

## 2019-03-08 DIAGNOSIS — E03.9 ACQUIRED HYPOTHYROIDISM: ICD-10-CM

## 2019-03-08 DIAGNOSIS — E78.5 DYSLIPIDEMIA: ICD-10-CM

## 2019-03-08 DIAGNOSIS — E10.8 TYPE 1 DIABETES MELLITUS WITH COMPLICATIONS (H): ICD-10-CM

## 2019-03-08 DIAGNOSIS — B35.1 ONYCHOMYCOSIS: ICD-10-CM

## 2019-03-08 NOTE — TELEPHONE ENCOUNTER
insulin lispro (HUMALOG) 100 UNIT/ML injection        Last Written Prescription Date:  02/09/18  Last Fill Quantity: 80mL,   # refills: 3  Last Office Visit : 08/15/18  Future Office visit:  Non Scheduled    Routing refill request to provider for review/approval because:  Insulin - refilled per clinic

## 2019-03-19 ENCOUNTER — MYC MEDICAL ADVICE (OUTPATIENT)
Dept: INTERNAL MEDICINE | Facility: CLINIC | Age: 59
End: 2019-03-19

## 2019-03-23 ENCOUNTER — COMMUNICATION - HEALTHEAST (OUTPATIENT)
Dept: TELEHEALTH | Facility: CLINIC | Age: 59
End: 2019-03-23

## 2019-03-23 ENCOUNTER — OFFICE VISIT - HEALTHEAST (OUTPATIENT)
Dept: FAMILY MEDICINE | Facility: CLINIC | Age: 59
End: 2019-03-23

## 2019-03-23 DIAGNOSIS — J11.1 INFLUENZA-LIKE ILLNESS: ICD-10-CM

## 2019-03-23 LAB — DEPRECATED S PYO AG THROAT QL EIA: NORMAL

## 2019-03-24 LAB — GROUP A STREP BY PCR: NORMAL

## 2019-04-04 ENCOUNTER — MYC MEDICAL ADVICE (OUTPATIENT)
Dept: INTERNAL MEDICINE | Facility: CLINIC | Age: 59
End: 2019-04-04

## 2019-04-04 DIAGNOSIS — E78.5 DYSLIPIDEMIA: ICD-10-CM

## 2019-04-04 DIAGNOSIS — E03.9 ACQUIRED HYPOTHYROIDISM: ICD-10-CM

## 2019-04-04 DIAGNOSIS — E10.8 TYPE 1 DIABETES MELLITUS WITH COMPLICATIONS (H): ICD-10-CM

## 2019-04-04 DIAGNOSIS — B35.1 ONYCHOMYCOSIS: ICD-10-CM

## 2019-04-04 RX ORDER — ARIPIPRAZOLE 15 MG/1
15 TABLET ORAL DAILY
Qty: 90 TABLET | Refills: 1 | Status: SHIPPED | OUTPATIENT
Start: 2019-04-04 | End: 2020-05-08

## 2019-04-04 NOTE — TELEPHONE ENCOUNTER
ARIPiprazole (ABILIFY) 15 MG tablet  Last Written Prescription Date:  10/1/18  Last Fill Quantity: 90,   # refills: 0  Last Office Visit : 10/12/18  Future Office visit: none

## 2019-04-05 DIAGNOSIS — G47.00 INSOMNIA, UNSPECIFIED TYPE: ICD-10-CM

## 2019-04-05 DIAGNOSIS — E10.8 TYPE 1 DIABETES MELLITUS WITH COMPLICATIONS (H): ICD-10-CM

## 2019-04-05 NOTE — TELEPHONE ENCOUNTER
Last office visit 10/12/18. Patient due for 6-month follow up. [facility] site reviewed 4/05/19. Last zolpidem refill received 3/08/19.    Will send to PCP for review.    Juanita Hoyt RN

## 2019-04-08 DIAGNOSIS — B35.1 ONYCHOMYCOSIS: ICD-10-CM

## 2019-04-08 DIAGNOSIS — E78.5 DYSLIPIDEMIA: ICD-10-CM

## 2019-04-08 DIAGNOSIS — E10.8 TYPE 1 DIABETES MELLITUS WITH COMPLICATIONS (H): ICD-10-CM

## 2019-04-08 DIAGNOSIS — E03.9 ACQUIRED HYPOTHYROIDISM: ICD-10-CM

## 2019-04-09 RX ORDER — ZOLPIDEM TARTRATE 10 MG/1
TABLET ORAL
Qty: 30 TABLET | Refills: 0 | Status: SHIPPED | OUTPATIENT
Start: 2019-04-09 | End: 2019-05-10

## 2019-04-09 RX ORDER — ATORVASTATIN CALCIUM 40 MG/1
40 TABLET, FILM COATED ORAL DAILY
Qty: 90 TABLET | Refills: 1 | Status: SHIPPED | OUTPATIENT
Start: 2019-04-09 | End: 2020-05-08

## 2019-04-09 NOTE — TELEPHONE ENCOUNTER
Rx faxed to [facility] #[id number]. Message left for patient regarding need to schedule 6-month follow up.    Juanita Hoyt RN

## 2019-04-15 DIAGNOSIS — E10.8 TYPE 1 DIABETES MELLITUS WITH COMPLICATIONS (H): ICD-10-CM

## 2019-04-15 DIAGNOSIS — E03.9 ACQUIRED HYPOTHYROIDISM: ICD-10-CM

## 2019-04-15 DIAGNOSIS — F32.A DEPRESSION: Primary | ICD-10-CM

## 2019-04-15 DIAGNOSIS — B35.1 ONYCHOMYCOSIS: ICD-10-CM

## 2019-04-15 DIAGNOSIS — E78.5 DYSLIPIDEMIA: ICD-10-CM

## 2019-04-15 NOTE — TELEPHONE ENCOUNTER
buPROPion (WELLBUTRIN XL) 300 MG 24 hr tablet      Last Written Prescription Date:  4/6/18  Last Fill Quantity: 30 tablet,   # refills: 11  Last Office Visit : 10/12/18  Future Office visit:  none    Routing refill request to provider for review/approval because:  Confirm assoc dx's appropriate for refill- DM, Hypothyroid, Dyslipidemia, Onychomycosis .  *Depression on problem list.  If Rx'd for depression, no PHQ-9.

## 2019-04-16 RX ORDER — BUPROPION HYDROCHLORIDE 300 MG/1
300 TABLET ORAL EVERY MORNING
Qty: 90 TABLET | Refills: 3 | Status: SHIPPED | OUTPATIENT
Start: 2019-04-16 | End: 2020-05-08

## 2019-04-18 DIAGNOSIS — E10.9 TYPE 1 DIABETES MELLITUS WITHOUT COMPLICATION (H): ICD-10-CM

## 2019-04-19 RX ORDER — LOSARTAN POTASSIUM AND HYDROCHLOROTHIAZIDE 25; 100 MG/1; MG/1
1 TABLET ORAL DAILY
Qty: 90 TABLET | Refills: 1 | Status: SHIPPED | OUTPATIENT
Start: 2019-04-19 | End: 2019-12-19

## 2019-05-10 DIAGNOSIS — G47.00 INSOMNIA, UNSPECIFIED TYPE: ICD-10-CM

## 2019-05-10 DIAGNOSIS — E10.8 TYPE 1 DIABETES MELLITUS WITH COMPLICATIONS (H): ICD-10-CM

## 2019-05-10 RX ORDER — ZOLPIDEM TARTRATE 10 MG/1
TABLET ORAL
Qty: 30 TABLET | Refills: 0 | Status: SHIPPED | OUTPATIENT
Start: 2019-05-10 | End: 2019-06-11

## 2019-05-10 NOTE — TELEPHONE ENCOUNTER
Last office visit 10/12/18. No upcoming visit at this time. Patient is due for 6-month follow up. [facility] site reviewed 5/10/19. Last zolpidem refill received 4/09/19.    Will send to PCP for review.    Juanita Hoyt RN

## 2019-05-21 DIAGNOSIS — C44.90 HIDRADENOCARCINOMA: Primary | ICD-10-CM

## 2019-05-22 ENCOUNTER — OFFICE VISIT (OUTPATIENT)
Dept: ORTHOPEDICS | Facility: CLINIC | Age: 59
End: 2019-05-22
Payer: COMMERCIAL

## 2019-05-22 ENCOUNTER — ANCILLARY PROCEDURE (OUTPATIENT)
Dept: CT IMAGING | Facility: CLINIC | Age: 59
End: 2019-05-22
Attending: ORTHOPAEDIC SURGERY
Payer: COMMERCIAL

## 2019-05-22 VITALS — BODY MASS INDEX: 26.79 KG/M2 | WEIGHT: 220 LBS | HEIGHT: 76 IN

## 2019-05-22 DIAGNOSIS — C44.90 HIDRADENOCARCINOMA: Primary | ICD-10-CM

## 2019-05-22 DIAGNOSIS — C44.90 HIDRADENOCARCINOMA: ICD-10-CM

## 2019-05-22 LAB — RADIOLOGIST FLAGS: NORMAL

## 2019-05-22 ASSESSMENT — MIFFLIN-ST. JEOR: SCORE: 1924.41

## 2019-05-22 ASSESSMENT — PAIN SCALES - GENERAL: PAINLEVEL: NO PAIN (0)

## 2019-05-22 NOTE — PROGRESS NOTES
This 58-year-old man is 4 years and 4 months out from the wound bed excision on his right hand for a small carcinoma of the skin.  He has not noticed any masses and has no today.  I reviewed his patient survey information in the EMR.    On examination he is alert oriented has a normal mood and.  Respirations are regular and unlabored eyes are nonicteric with equal pupils.  In his hand he has numbness over the ulnar side of the hand where this well-healed scar is.  He has no masses in this area.  Range of motion of his fingers is within normal limits and the hand is well-perfused.    We have no new imaging today but we would like to get a CT scan of the chest.    This patient will send us the CT scan for review.  It is likely that he will be a PRN follow-up after that.  I have answered all of his questions today.

## 2019-05-22 NOTE — LETTER
"5/22/2019       RE: Luigi Moore  [address]  [place] [address]     Dear Colleague,    Thank you for referring your patient, Luigi Moore, to [facility]. Please see a copy of my visit note below.    Reason For Visit:   Chief Complaint   Patient presents with     Right Hand - Follow Up     Follow Up     right hand post tumor removal 2015       Ht 1.93 m (6' 4\")   Wt 99.8 kg (220 lb)   BMI 26.78 kg/m       Michele Hubbard CMA    This 58-year-old man is 4 years and 4 months out from the wound bed excision on his right hand for a small carcinoma of the skin.  He has not noticed any masses and has no today.  I reviewed his patient survey information in the EMR.    On examination he is alert oriented has a normal mood and.  Respirations are regular and unlabored eyes are nonicteric with equal pupils.  In his hand he has numbness over the ulnar side of the hand where this well-healed scar is.  He has no masses in this area.  Range of motion of his fingers is within normal limits and the hand is well-perfused.    We have no new imaging today but we would like to get a CT scan of the chest.    This patient will send us the CT scan for review.  It is likely that he will be a PRN follow-up after that.  I have answered all of his questions today.    Jeovany Jefferson MD      "

## 2019-05-22 NOTE — PROGRESS NOTES
"Reason For Visit:   Chief Complaint   Patient presents with     Right Hand - Follow Up     Follow Up     right hand post tumor removal 2015       Ht 1.93 m (6' 4\")   Wt 99.8 kg (220 lb)   BMI 26.78 kg/m           Michele Hubbard CMA    Answers for HPI/ROS submitted by the patient on 5/22/2019   General Symptoms: No  Skin Symptoms: No  HENT Symptoms: No  EYE SYMPTOMS: No  HEART SYMPTOMS: No  LUNG SYMPTOMS: No  INTESTINAL SYMPTOMS: No  URINARY SYMPTOMS: No  REPRODUCTIVE SYMPTOMS: Yes  SKELETAL SYMPTOMS: No  BLOOD SYMPTOMS: No  NERVOUS SYSTEM SYMPTOMS: No  MENTAL HEALTH SYMPTOMS: No  Scrotal pain or swelling: No  Erectile dysfunction: Yes  Penile discharge: No  Genital ulcers: No  Reduced libido: No    "

## 2019-05-22 NOTE — NURSING NOTE
Invasive Procedure Safety Checklist:    Procedure: Bilateral subacromial shoulder injection.    Action: Complete sections and checkboxes as appropriate.    Pre-procedure:  1. Patient ID Verified with 2 identifiers (Lilly and  or MRN) : YES    2. Procedure and site verified with patient/designee (when able) : YES    3. Accurate consent documentation in medical record : YES    4. H&P (or appropriate assessment) documented in medical record : YES  H&P must be up to 30 days prior to procedure an updated within 24 hours of                 Procedure as applicable.     5. Relevant diagnostic and radiology test results appropriately labeled and displayed as applicable : YES    6. Blood products, implants, devices, and/or special equipment available for the procedure as applicable : YES    7. Procedure site(s) marked with provider initials: No.    8. Marking not required. Reason : Yes  Procedure does not require site marking    Time Out:     Time-Out performed immediately prior to starting procedure, including verbal and active participation of all team members addressing: Not applicable.    1. Correct patient identity.  2. Confirmed that the correct side and site are marked.  3. An accurate procedure to be done.  4. Agreement on the procedure to be done.  5. Correct patient position.  6. Relevant images and results are properly labeled and appropriately displayed.  7. The need to administer antibiotics or fluids for irrigation purposes during the procedure as applicable.  8. Safety precautions based on patient history or medication use.    During Procedure: Verification of correct person, site, and procedure occurs any time the responsibility for care of the patient is transferred to another member of the care team.    Injection given:  Kenalog 40ml Lot # [id number]   Lidocaine 1% Lot #[id number] 2021

## 2019-05-23 ENCOUNTER — TELEPHONE (OUTPATIENT)
Dept: ORTHOPEDICS | Facility: CLINIC | Age: 59
End: 2019-05-23

## 2019-05-23 NOTE — TELEPHONE ENCOUNTER
[facility] Call Center    Phone Message    May a detailed message be left on voicemail: yes    Reason for Call: Requesting Results   Name/type of test: CT Chest  Date of test: 05/22  Was test done at a location other than [facility] (Please fill in the location if not [facility])?: No      Action Taken: Message routed to:  [facility] ([facility] Orth[facility]

## 2019-05-24 ENCOUNTER — TELEPHONE (OUTPATIENT)
Dept: ORTHOPEDICS | Facility: CLINIC | Age: 59
End: 2019-05-24

## 2019-05-24 NOTE — TELEPHONE ENCOUNTER
----- Message from Jeovany Jefferson MD sent at 5/23/2019  1:48 PM CDT -----  This patient's chest CT looks the same as it did before.  He has a bunch of nodules but they are all the same size or smaller.  ----- Message -----  From: Bianca Polk RN  Sent: 5/22/2019   4:57 PM  To: Jeovany Jefferson MD    Please review CT Chest done today.  Thanks  Bianca

## 2019-05-24 NOTE — TELEPHONE ENCOUNTER
RN called and left voice message.  CT Chest done at office visit is same as before.  Some nodules smaller

## 2019-06-11 DIAGNOSIS — G43.111 INTRACTABLE MIGRAINE WITH AURA WITH STATUS MIGRAINOSUS: ICD-10-CM

## 2019-06-11 DIAGNOSIS — G47.00 INSOMNIA, UNSPECIFIED TYPE: ICD-10-CM

## 2019-06-11 DIAGNOSIS — E10.8 TYPE 1 DIABETES MELLITUS WITH COMPLICATIONS (H): ICD-10-CM

## 2019-06-11 RX ORDER — TOPIRAMATE 25 MG/1
TABLET, FILM COATED ORAL
Qty: 180 TABLET | Refills: 0 | Status: SHIPPED | OUTPATIENT
Start: 2019-06-11 | End: 2019-09-13

## 2019-06-11 RX ORDER — ZOLPIDEM TARTRATE 10 MG/1
TABLET ORAL
Qty: 30 TABLET | Refills: 0 | Status: SHIPPED | OUTPATIENT
Start: 2019-06-11 | End: 2019-07-05

## 2019-06-11 NOTE — TELEPHONE ENCOUNTER
Last Clinic Visit: 10/12/2018  [facility]  *Migraine Dx at last visit.    Lab(s) past due-ALT, AST.  Adri refill 90 days and FYI to [facility] clinic.

## 2019-06-11 NOTE — TELEPHONE ENCOUNTER
Patient Requested  Zolpidem 10 mg   Last Filled  05/10/19  Last Office Visit  10/12/18  Next Office Visit  Nothing scheduled   Checked  06/11/19    PEGGY FUENTES at 11:16 AM on 6/11/2019.

## 2019-06-11 NOTE — TELEPHONE ENCOUNTER
RX for Ambien faxed to [facility] in [place].    PEGGY FUENTES at 12:22 PM on 6/11/2019.

## 2019-07-05 DIAGNOSIS — E10.8 TYPE 1 DIABETES MELLITUS WITH COMPLICATIONS (H): ICD-10-CM

## 2019-07-05 DIAGNOSIS — G47.00 INSOMNIA, UNSPECIFIED TYPE: ICD-10-CM

## 2019-07-05 RX ORDER — ZOLPIDEM TARTRATE 10 MG/1
TABLET ORAL
Qty: 30 TABLET | Refills: 0 | Status: SHIPPED | OUTPATIENT
Start: 2019-07-11 | End: 2019-08-10

## 2019-07-05 NOTE — TELEPHONE ENCOUNTER
Patient Requested  Zolpidem 10 mg   Last Filled  06/11/2019  Last Office Visit  10/12/18  Next Office Visit  Nothing scheduled   Checked  07/05/2019    PEGGY FUENTES at 12:46 PM on 7/5/2019.

## 2019-07-12 DIAGNOSIS — B35.1 ONYCHOMYCOSIS: ICD-10-CM

## 2019-07-12 DIAGNOSIS — E78.5 DYSLIPIDEMIA: ICD-10-CM

## 2019-07-12 DIAGNOSIS — E10.8 TYPE 1 DIABETES MELLITUS WITH COMPLICATIONS (H): ICD-10-CM

## 2019-07-12 DIAGNOSIS — E03.9 ACQUIRED HYPOTHYROIDISM: ICD-10-CM

## 2019-07-13 NOTE — TELEPHONE ENCOUNTER
gabapentin (NEURONTIN) 400 MG capsule    Last Written Prescription Date:  12/18/18  Last Fill Quantity: 270,   # refills: 0  Last Office Visit : 8/15/18  Future Office visit:  none    Routing refill request to provider for review/approval because: not on protocol

## 2019-07-14 RX ORDER — GABAPENTIN 400 MG/1
400 CAPSULE ORAL 3 TIMES DAILY
Qty: 270 CAPSULE | Refills: 0 | Status: SHIPPED | OUTPATIENT
Start: 2019-07-14 | End: 2021-12-27

## 2019-07-25 ENCOUNTER — MEDICAL CORRESPONDENCE (OUTPATIENT)
Dept: HEALTH INFORMATION MANAGEMENT | Facility: CLINIC | Age: 59
End: 2019-07-25

## 2019-07-25 ENCOUNTER — TELEPHONE (OUTPATIENT)
Dept: ENDOCRINOLOGY | Facility: CLINIC | Age: 59
End: 2019-07-25

## 2019-07-25 NOTE — TELEPHONE ENCOUNTER
Forms received from: [employer]     Forms were for pump supplies and diabetic testing supplies     Faxed Date: 7/25/19

## 2019-08-10 DIAGNOSIS — G47.00 INSOMNIA, UNSPECIFIED TYPE: ICD-10-CM

## 2019-08-10 DIAGNOSIS — E10.8 TYPE 1 DIABETES MELLITUS WITH COMPLICATIONS (H): ICD-10-CM

## 2019-08-12 NOTE — TELEPHONE ENCOUNTER
RX for Ambien was faxed to [facility] pharmacy.    PEGGY FUENTES at 1:37 PM on 8/13/2019.        Patient Requested  zolpidem (AMBIEN) 10 MG tablet  Last Filled  07/05/2019  Last Office Visit  10/12/2018  Next Office Visit  Nothing scheduled   Checked  08/12/2019    DX: Insomnia, unspecified type     Pharmacy: Jose Carlos FUENTES at 9:00 AM on 8/12/2019.

## 2019-08-13 RX ORDER — ZOLPIDEM TARTRATE 10 MG/1
TABLET ORAL
Qty: 30 TABLET | Refills: 0 | Status: SHIPPED | OUTPATIENT
Start: 2019-08-13 | End: 2019-09-05

## 2019-09-05 DIAGNOSIS — G47.00 INSOMNIA, UNSPECIFIED TYPE: ICD-10-CM

## 2019-09-05 DIAGNOSIS — E10.8 TYPE 1 DIABETES MELLITUS WITH COMPLICATIONS (H): ICD-10-CM

## 2019-09-05 NOTE — TELEPHONE ENCOUNTER
Patient Requested  zolpidem (AMBIEN) 10 MG tablet  Last Filled  08/13/2019  Last Office Visit  10/12/2018  Next Office Visit  Nothing scheduled   Checked  09/05/2019    DX: Insomnia, unspecified type     Pharmacy: Jose Carlos FUENTES CMA at 9:17 AM on 9/5/2019.

## 2019-09-06 RX ORDER — ZOLPIDEM TARTRATE 10 MG/1
TABLET ORAL
Qty: 30 TABLET | Refills: 0 | Status: SHIPPED | OUTPATIENT
Start: 2019-09-12 | End: 2019-10-16

## 2019-09-12 DIAGNOSIS — E10.8 TYPE 1 DIABETES MELLITUS WITH COMPLICATIONS (H): ICD-10-CM

## 2019-09-13 DIAGNOSIS — G43.111 INTRACTABLE MIGRAINE WITH AURA WITH STATUS MIGRAINOSUS: Primary | ICD-10-CM

## 2019-09-17 RX ORDER — LEVOTHYROXINE SODIUM 137 UG/1
TABLET ORAL
Qty: 30 TABLET | Refills: 0 | Status: SHIPPED | OUTPATIENT
Start: 2019-09-17 | End: 2020-12-15

## 2019-09-17 RX ORDER — TOPIRAMATE 25 MG/1
TABLET, FILM COATED ORAL
Qty: 90 TABLET | Refills: 0 | Status: SHIPPED | OUTPATIENT
Start: 2019-09-17 | End: 2020-05-08

## 2019-09-17 NOTE — TELEPHONE ENCOUNTER
Last Clinic Visit: Primary care clinic 10/12/18  Msg sent to pharm to remind pt he needs blood drawn and annual visit.

## 2019-09-17 NOTE — TELEPHONE ENCOUNTER
Levothyroxine 137 MCG   Last Written Prescription Date:  9/6/18  Last Fill Quantity: 90,   # refills: 2  Last Office Visit : 8/15/18  Future Office visit:  NONE    Routing refill request to provider for review/approval because:  ABNORMAL  TSH  30 DAY RF PENDING * NEEDS APPT   Scheduling has been notified to contact the pt for appointment.

## 2019-09-29 ENCOUNTER — HEALTH MAINTENANCE LETTER (OUTPATIENT)
Age: 59
End: 2019-09-29

## 2019-10-16 DIAGNOSIS — G47.00 INSOMNIA, UNSPECIFIED TYPE: ICD-10-CM

## 2019-10-16 RX ORDER — ZOLPIDEM TARTRATE 10 MG/1
TABLET ORAL
Qty: 30 TABLET | Refills: 0 | Status: SHIPPED | OUTPATIENT
Start: 2019-10-16 | End: 2019-12-09

## 2019-10-16 NOTE — TELEPHONE ENCOUNTER
Patient Requested  zolpidem (AMBIEN) 10 MG tablet  Last Filled  09/06/2019  Last Office Visit  10/12/2018  Next Office Visit  Nothing scheduled   Checked  10/16/2019    DX: Insomnia, unspecified type     Pharmacy: Jose Carlos FUENTES CMA at 12:02 PM on 10/16/2019.

## 2019-10-30 ENCOUNTER — MEDICAL CORRESPONDENCE (OUTPATIENT)
Dept: HEALTH INFORMATION MANAGEMENT | Facility: CLINIC | Age: 59
End: 2019-10-30

## 2019-11-06 ENCOUNTER — DOCUMENTATION ONLY (OUTPATIENT)
Dept: CARE COORDINATION | Facility: CLINIC | Age: 59
End: 2019-11-06

## 2019-11-07 ENCOUNTER — PRE VISIT (OUTPATIENT)
Dept: UROLOGY | Facility: CLINIC | Age: 59
End: 2019-11-07

## 2019-11-07 NOTE — TELEPHONE ENCOUNTER
Reason for Visit: Annual follow up medication refill    Diagnosis: vasculogenic ED    Orders/Procedures/Records: in system    Contact Patient: n/a    Rooming Requirements: normal      Love Canseco LPN  11/07/19  9:50 AM

## 2019-11-14 ENCOUNTER — OFFICE VISIT (OUTPATIENT)
Dept: ENDOCRINOLOGY | Facility: CLINIC | Age: 59
End: 2019-11-14
Payer: COMMERCIAL

## 2019-11-14 ENCOUNTER — OFFICE VISIT (OUTPATIENT)
Dept: UROLOGY | Facility: CLINIC | Age: 59
End: 2019-11-14
Payer: COMMERCIAL

## 2019-11-14 VITALS
DIASTOLIC BLOOD PRESSURE: 85 MMHG | SYSTOLIC BLOOD PRESSURE: 145 MMHG | BODY MASS INDEX: 27.42 KG/M2 | HEART RATE: 77 BPM | HEIGHT: 76 IN | WEIGHT: 225.2 LBS

## 2019-11-14 VITALS
BODY MASS INDEX: 27.44 KG/M2 | HEIGHT: 76 IN | HEART RATE: 77 BPM | SYSTOLIC BLOOD PRESSURE: 145 MMHG | WEIGHT: 225.31 LBS | DIASTOLIC BLOOD PRESSURE: 85 MMHG

## 2019-11-14 DIAGNOSIS — N52.9 ERECTILE DYSFUNCTION, UNSPECIFIED ERECTILE DYSFUNCTION TYPE: Primary | ICD-10-CM

## 2019-11-14 DIAGNOSIS — E10.9 TYPE 1 DIABETES MELLITUS WITHOUT COMPLICATION (H): Primary | ICD-10-CM

## 2019-11-14 DIAGNOSIS — I10 ESSENTIAL HYPERTENSION: ICD-10-CM

## 2019-11-14 DIAGNOSIS — E03.9 ACQUIRED HYPOTHYROIDISM: ICD-10-CM

## 2019-11-14 LAB — HBA1C MFR BLD: 7.6 % (ref 4.3–6)

## 2019-11-14 RX ORDER — BLOOD PRESSURE TEST KIT
1 KIT MISCELLANEOUS WEEKLY
Qty: 1 KIT | Refills: 0 | Status: SHIPPED | OUTPATIENT
Start: 2019-11-14 | End: 2022-02-15

## 2019-11-14 ASSESSMENT — PATIENT HEALTH QUESTIONNAIRE - PHQ9
SUM OF ALL RESPONSES TO PHQ QUESTIONS 1-9: 7
10. IF YOU CHECKED OFF ANY PROBLEMS, HOW DIFFICULT HAVE THESE PROBLEMS MADE IT FOR YOU TO DO YOUR WORK, TAKE CARE OF THINGS AT HOME, OR GET ALONG WITH OTHER PEOPLE: SOMEWHAT DIFFICULT
SUM OF ALL RESPONSES TO PHQ QUESTIONS 1-9: 7

## 2019-11-14 ASSESSMENT — MIFFLIN-ST. JEOR
SCORE: 1948
SCORE: 1948.5

## 2019-11-14 ASSESSMENT — PAIN SCALES - GENERAL
PAINLEVEL: NO PAIN (0)
PAINLEVEL: NO PAIN (0)

## 2019-11-14 NOTE — LETTER
"11/14/2019       RE: Luigi Moore  [address]  [place] [address]     Dear Colleague,    Thank you for referring your patient, Luigi Moore, to the [facility] AND [facility]ST FOR PROSTATE AND UROLOGIC CANCERS at [facility]. Please see a copy of my visit note below.    Luigi Moore is a 57 year old male here for erectile dysfunction follow-up.  Past pt of Dr. Carlisle.  Has been using Trimix #8.  Sometimes this dose is adequate, often not.  Denies new lumps, no curvature to erections.    BP (!) 145/85   Pulse 77   Ht 1.93 m (6' 4\")   Wt 102.2 kg (225 lb 5 oz)   BMI 27.43 kg/m      General appearance normal.  resps normal, non-labored.  Abdomen nondistended.  CLINT deferred.    Component      Latest Ref Rng & Units 6/21/2018   Sodium      133 - 144 mmol/L 137   Potassium      3.4 - 5.3 mmol/L 3.7   Chloride      94 - 109 mmol/L 102   Carbon Dioxide      20 - 32 mmol/L 26   Anion Gap      3 - 14 mmol/L 8   Glucose      70 - 99 mg/dL 117 (H)   Urea Nitrogen      7 - 30 mg/dL 8   Creatinine      0.66 - 1.25 mg/dL 0.78   GFR Estimate      >60 mL/min/1.7m2 >90   GFR Estimate If Black      >60 mL/min/1.7m2 >90   Calcium      8.5 - 10.1 mg/dL 8.6      Current Outpatient Medications   Medication     acetaminophen-codeine (TYLENOL #3) 300-30 MG per tablet     albuterol (PROAIR HFA/PROVENTIL HFA/VENTOLIN HFA) 108 (90 Base) MCG/ACT inhaler     alprostadil (EDEX) 40 MCG kit     ARIPiprazole (ABILIFY) 15 MG tablet     aspirin 81 MG tablet     atorvastatin (LIPITOR) 40 MG tablet     blood glucose monitoring (SHEILA CONTOUR NEXT) test strip     Blood Pressure KIT     buPROPion (WELLBUTRIN XL) 300 MG 24 hr tablet     cetirizine (ZYRTEC) 10 MG tablet     DiphenhydrAMINE HCl (BENADRYL PO)     gabapentin (NEURONTIN) 400 MG capsule     glucagon (GLUCAGON EMERGENCY) 1 MG kit     influenza quadrivalent, PF, vacc age 3 yrs and older (FLUZONE OR FLULAVAL) 0.5 ML injection     " "Insulin Infusion Pump (INSULIN PUMP CU3005) KIT     insulin lispro (HUMALOG) 100 UNIT/ML vial     Insulin Syringe (BD INSULIN SYRINGE) 29G X 1/2\" 1 ML MISC     levothyroxine (SYNTHROID/LEVOTHROID) 137 MCG tablet     losartan-hydrochlorothiazide (HYZAAR) 100-25 MG tablet     NEW MED     ONE TOUCH TEST STRIPS test strip     ONETOUCH ULTRA test strip     order for DME     rizatriptan (MAXALT) 10 MG tablet     topiramate (TOPAMAX) 25 MG tablet     Urea 40 % CREA     zolpidem (AMBIEN) 10 MG tablet     No current facility-administered medications for this visit.      Facility-Administered Medications Ordered in Other Visits   Medication     lidocaine (PF) (XYLOCAINE) 1 % injection 10 mL         A-  Erectile dysfunction, follow-up on intracavernosal injection prescription.    Plan    Advised moving up to Trimix.  Discussed intracavernosal injections or IPP as alternatives.  He would like to try stronger ICI.    Move up to [facility] trimix #4 from #8, 0.3mL starting dose.  Return to clinic 1 year, sooner if needed.    15min visit, over 50% face to face in counseling/discussion of erectile dysfunction treatment options.    Ben TABOR          "

## 2019-11-14 NOTE — LETTER
11/14/2019       RE: Luigi Moore  [address]  [place] [facility] [place] [address]     Dear Colleague,    Thank you for referring your patient, Luigi Moore, to the [facility] ENDOCRINOLOGY at [facility]. Please see a copy of my visit note below.    [facility]  Endocrinology  Arti Kaur PA-C  11/14/2019      Chief Complaint:   Diabetes    History of Present Illness:   Luigi Moore is a 58 year old male with a history of Acquired hypothyroidism (12/7/2016); Depressive disorder; Hidradenoma (dx: Feb 2015); Hypertension; Numbness and tingling (2015); TELLY (obstructive sleep apnea) (2007); Type 1 diabetes (H) (1982); and Uncomplicated asthma who presents for diabetes care. He had been working on his diabetes with Dr. Donn Yuan and recently Dr. Szymanski here in our clinic. In 2014, when he was seeing Dr. Yuan, he had hemoglobin A1'c was 12.4. However, in the years since then his hemoglobin A1cs have been in the 7's and 8's. At his most recent visit with Dr. Szymanski, his hemoglobin A1c was 7.8%. Maurice has been using a [employer] CGM to manage his blood glucose in recent years. For insulin, he has been using Humalog at a basal rate of 1.2 units per hour. When at work he often used a temporary basal rate that was 60% of usual and 50% when he bikes.   Previously, he took premeal bolus insulin using 1 unit for each 7 grams of carbohydrate with 1 additional unit for each 20 mg/dL blood glucose was above 120 mg/dL.     Interval history:   His hemoglobin A1c today is 7.6%. When asked he thinks he could and would like to reduce this by snacking less, buying less snacks, and eating smaller portion sizes. He states that he works night shifts from 4 pm to 3 am. This is typically when he has his low blood sugars and he also has few lows between 6 - 9 am. He will eat in the morning between 6-10 am and lunch between 11-3 pm and dinner. His lunch is typically his largest meal, however  he does not have this every day. He uses his Bolus wizard typically very strictly with meals, and only overrides if he is afraid of going too low when actiove. However, he does seem to struggle with forgetting to bolus for a meal and overcorrecting leading to hyperglycemic episodes followed by hypoglycemic episodes. For exercise, he rides bike and plays tennis, however due to achilles tendonitits he has not biked since June. This is frustrating as he has since gained 20 lbs since then. He believes this injury in from work and has found a new position within [employer] that is not as hard on his tendon. He has been wearing a boot and self treating. Fortunately, he has not been in pain the past few weeks.     He forgot to take his BP medication this morning.     Blood Glucose Monitoring:  We reviewed CGM data together.  Sensor Wear (per week): 93% (6d12h)  Average  mg/dL +/- 76 mg/dL   Average  mg/dL +/- 91 mg/dL   BG/Calibration (per day): 6.7/3.5  Meal (per day) 3.7  Carbs entered: 154 +/- 104 g     Current Insulin Pump Settings:  Type of Pump: Medtronic 670G  BASAL RATES and times:  12   AM (midnight): 1.2 units/hour    10   AM: 1.0 units/hour   6    PM: 1.2 units/hour   Carb ratio:   CARB RATIO and times:  12   AM (midnight): 15.0  Basal 57% 24U :  Bolus 43% 18U  Correction Factor (Sensitivity) and times:  12   AM (midnight): 35 mg/dL  Target BG Ranges:   BLOOD GLUCOSE TARGET and times:  12   AM (midnight): 90 - 120  Amount of Time Insulin is Active:  2:15 hrs  Auto mode (per week): 84% (5d21h)    Diabetes monitoring and complications:  CAD: No  Last eye exam results: retinopathy, next appointment for 11/25/2019  Microalbuminuria: no on 4/10/2018  Neuropathy: Yes mild diminished sensation on last monofilament exam  HTN: Yes on Hyzaar  On Statin: Yes on atorvastatin  On Aspirin: Yes  Depression: Yes  Erectile dysfunction: No    Patient Supplied Answers To Diabetic Questionnaire  including 11/14/2019   1. Since  your last visit to our clinic (or if this your first visit, since you last saw your primary care provider), have you experienced any of the following symptoms that may be related to low blood sugars? No, I have not experienced any of these symptoms   2. Since your last visit to our clinic (or if this your first visit, since you last saw your primary care provider), have you experienced any of the following symptoms that may be related to prolonged high blood sugars? No, I have not experienced any of these symptoms   4. Do you have any female family members who have had heart attacks or strokes before age 60 or male relatives who have had heart attacks or strokes before age 50? No   5. Do you have any family members who have had complications from diabetes such as kidney disease, heart disease or strokes, retinopathy (a vision problem), or amputations? No   6. Who do you live with?  Self   Little interest or pleasure in doing things? Not at all   Feeling down, depressed, or hopeless? Not at all      Hypothyroidism:  Currently taking levothyroxine 137 mcg daily, and has not had levels checked sine last year.     Hypertension:  His blood pressure is 145/85 today, however states that his is mostlikey due to not taking medication today. After rechecking, it remained very similar. He does not check this regularly at home.     Review of Systems:   Pertinent items are noted in HPI.  All other systems are negative.    Active Medications:      acetaminophen-codeine (TYLENOL #3) 300-30 MG per tablet, Take 1-2 tablets by mouth every 6 hours as needed for moderate pain, Disp: 60 tablet, Rfl: 1     albuterol (PROAIR HFA/PROVENTIL HFA/VENTOLIN HFA) 108 (90 Base) MCG/ACT inhaler, Inhale 2 puffs into the lungs every 6 hours as needed for shortness of breath / dyspnea or wheezing - exercise induced wheezing, Disp: 18 g, Rfl: 11     alprostadil (EDEX) 40 MCG kit, Inject 30 - 35 mcg (approx 3/4 ml) as directed., Disp: 240 mcg, Rfl:  "11     ARIPiprazole (ABILIFY) 15 MG tablet, Take 1 tablet (15 mg) by mouth daily, Disp: 90 tablet, Rfl: 1     aspirin 81 MG tablet, Take 1 tablet (81 mg) by mouth daily, Disp: 100 tablet, Rfl: 3     atorvastatin (LIPITOR) 40 MG tablet, Take 1 tablet (40 mg) by mouth daily, Disp: 90 tablet, Rfl: 1     buPROPion (WELLBUTRIN XL) 300 MG 24 hr tablet, Take 1 tablet (300 mg) by mouth every morning, Disp: 90 tablet, Rfl: 3     cetirizine (ZYRTEC) 10 MG tablet, Take 10 mg by mouth, Disp: , Rfl:      DiphenhydrAMINE HCl (BENADRYL PO), , Disp: , Rfl:      gabapentin (NEURONTIN) 400 MG capsule, Take 1 capsule (400 mg) by mouth 3 times daily, Disp: 270 capsule, Rfl: 0     glucagon (GLUCAGON EMERGENCY) 1 MG kit, Inject 1 mg into the muscle once for 1 dose, Disp: 1 mg, Rfl: 3     Insulin Infusion Pump (INSULIN PUMP OM5892) KIT, , Disp: , Rfl:      insulin lispro (HUMALOG) 100 UNIT/ML vial, Use as directed in pump Approx 70-80 Units per day Need to be seen, Disp: 80 mL, Rfl: 1     levothyroxine (SYNTHROID/LEVOTHROID) 137 MCG tablet, TAKE 1 TABLET(137 MCG) BY MOUTH DAILY, Disp: 30 tablet, Rfl: 0     losartan-hydrochlorothiazide (HYZAAR) 100-25 MG tablet, Take 1 tablet by mouth daily, Disp: 90 tablet, Rfl: 1     [facility] MED, \"[place] Trimix #8\" Trimix intercavernosal injection, per mL: PGE1 20 mcg Papaverine 27mg Phentolamine 1 mg   Start at 0.3mL injection May titrate in 0.1mL increments to lowest effective dose. Max use one daily, and 3x/week., Disp: 5 mL, Rfl: 99     rizatriptan (MAXALT) 10 MG tablet, Take 1 tablet (10 mg) by mouth at onset of headache for migraine, Disp: 12 tablet, Rfl: 11     topiramate (TOPAMAX) 25 MG tablet, TAKE 50MG BY MOUTH ONCE A DAY OR 25MG TWICE A DAY, Disp: 90 tablet, Rfl: 0     Urea 40 % CREA, Externally apply topically 2 times daily To surface of toenails, Disp: 198 g, Rfl: 5     zolpidem (AMBIEN) 10 MG tablet, TAKE 1 TABLET BY MOUTH EVERY NIGHT AT BEDTIME PRN.  Pt needs to be seen, Disp: 30 tablet, " "Rfl: 0     lidocaine (PF) (XYLOCAINE) 1 % injection 10 mL, 10 mL, Intradermal, Once, Kathya Lang APRN CNP      Allergies:   Diagnostic x-ray materials; Contrast dye; and Diatrizoate      Past Medical History:  Acquired hypothyroidism  Depression  Hidradenoma   Hypertension  Obstructive sleep apnea  Type 1 diabetes with proliferative retinopathy  Uncomplicated asthma      Past Surgical History:  Excise lesion hand  Orthopedic surgery on achilles tendon  Right hand surgery   Lipoma removal below rib cage on right side     Family History:   pancratitis - mother  Substance abuse - mother  Mental illness - mother  Depression - mother  Hypertension - mother  Obesity - mother  Asthma - father   Leukemia - father   Diabetes - father  Melanoma - father   Liver cancer - brother  Intestinal cancer - brother  Cancer - maternal grandmother, maternal grandfather   Bone cancer - paternal grandmother, paternal grandfather       Social History:   This patient presented alone.   Smoking status: never  Smokeless tobacco: never  Alcohol use: no, history of alcohol abuse, sober since 2015  Drug use: no     Physical Exam:   BP (!) 145/85   Pulse 77   Ht 1.93 m (6' 4\")   Wt 102.2 kg (225 lb 3.2 oz)   BMI 27.41 kg/m        Wt Readings from Last 10 Encounters:   11/14/19 102.2 kg (225 lb 5 oz)   11/14/19 102.2 kg (225 lb 3.2 oz)   05/22/19 99.8 kg (220 lb)   10/12/18 99.5 kg (219 lb 6.4 oz)   09/04/18 101.2 kg (223 lb)   08/15/18 100.2 kg (220 lb 14.4 oz)   08/10/18 99.8 kg (220 lb)   06/21/18 99.8 kg (220 lb 1.6 oz)   04/10/18 106.9 kg (235 lb 11.2 oz)   01/26/18 105.3 kg (232 lb 3.2 oz)      General: Pleasant, well nourished and hydrated male in NAD.   Psych:  Mood is \"good,\" affect is appropriate.  Thought form and content are fluid and coherent.    HEENT: Eyes and sclera are clear. Extraocular movements are grossly intact without proptosis.  Nares are patent, mucous membranes moist.  Neck: No masses or JVD are noted.  "   Resp: Easy and unlabored breathing.   Neuro: Alert and oriented, communicating clearly.   Ext: no swelling or edema      Data:  Lab Results   Component Value Date     06/21/2018    POTASSIUM 4.2 10/05/2018    CHLORIDE 102 06/21/2018    CO2 26 06/21/2018    ANIONGAP 8 06/21/2018     (H) 06/21/2018    BUN 8 06/21/2018    CR 0.78 06/21/2018    NEVA 8.6 06/21/2018     Lab Results   Component Value Date    GFRESTIMATED >90 06/21/2018    GFRESTIMATED >90 04/10/2018    GFRESTIMATED >90  Non  GFR Calc   12/07/2016    GFRESTBLACK >90 06/21/2018    GFRESTBLACK >90 04/10/2018    GFRESTBLACK >90   GFR Calc   12/07/2016      Lab Results   Component Value Date    MICROL <5 04/10/2018    UMALCR Unable to calculate due to low value 04/10/2018     Lab Results   Component Value Date    A1C 7.6 (H) 04/10/2018    A1C 8.7 (H) 12/07/2016    A1C 8.1 (H) 02/17/2015    A1C 10.0 (A) 01/17/2013    HEMOGLOBINA1 7.6 (A) 11/14/2019    HEMOGLOBINA1 7.8 (A) 08/15/2018    HEMOGLOBINA1 8.0 (A) 03/22/2017    HEMOGLOBINA1 9.4 (A) 02/17/2015    HEMOGLOBINA1 12.7 (A) 03/27/2014     No results found for: CPEPT, GADAB, ISCAB    Lab Results   Component Value Date    CHOL 173 04/10/2018    CHOL 194 07/17/2012    TRIG 68 04/10/2018    TRIG 104 07/17/2012    HDL 70 04/10/2018    HDL 56 07/17/2012    LDL 66 10/05/2018    LDL 89 04/10/2018    NHDL 103 04/10/2018     Assessment and Plan:  Maurice has type 1 diabetes that is reasonably well controlled with Medtronic 670G pump. He is able to remain mostly in auto mode despite frequently missing meal time boluses which lead to hyperglycemic episodes and often multiple over corrections that lead to low blood sugar. Will extend active insulin to help cover, however encouraged to ensure he is receiving Bolus before meals. He plans to be more mondful about this.    Type 1 diabetes mellitus without complication (H)  - Hemoglobin A1c POCT  - TSH with free T4 reflex  - Blood  Pressure KIT  Dispense: 1 kit; Refill: 0  - influenza quadrivalent, PF, vacc age 3 yrs and older (FLUZONE OR FLULAVAL) 0.5 ML injection  Dispense: 0.5 mL; Refill: 0    Current Insulin Pump Settings:  Type of Pump: Medtronic 670G  BASAL RATES and times:  12   AM (midnight): 1.2 units/hour    10   AM: 1.0 units/hour   6    PM: 1.2 units/hour   Carb ratio:   CARB RATIO and times:  12   AM (midnight): 15.0  Corection Factor (Sensitivity) and times:  12   AM (midnight): 35 mg/dL  Target BG Ranges:   BLOOD GLUCOSE TARGET and times:  12   AM (midnight): 90 - 120  Amount of Time Insulin is Active:  2:15 hrs    New Insulin Pump Settings:  Type of Pump: Medtronic Minimed  BASAL RATES and times:  12   AM (midnight): 1.2 units/hour    10   AM: 1.0 units/hour   6    PM: 1.2 units/hour   Carb ratio:   CARB RATIO and times:  12   AM (midnight): 15.0  Corection Factor (Sensitivity) and times:  12   AM (midnight): 30 mg/dL  Target BG Ranges:   BLOOD GLUCOSE TARGET and times:  12   AM (midnight): 90 - 120  Amount of Time Insulin is Active:  2:30 hrs     2. Hypothyroidism:  Will recheck level.  - TSH with free T4 reflex    3. Hypertension  Related the importance of BP control in preventing complications.  Encouraged medication adherence.   Education regarding home BP checks and advising clinic of not <140/90.  I have placed a prescription for a blood pressure monitor, and encouraged him to continue checking this at home.   - Blood Pressure KIT  Dispense: 1 kit; Refill: 0     Follow-up: Return in about 6 months (around 5/14/2020).     >50% of 40 minute visit spent in face to face counseling, education and coordination of care related to options for better glycemic control as well as preventing, detecting, and treating hypoglycemia.      It is my privilege to be involved in the care of the above patient.     Arti Kaur PA-C, MPAS  [facility]  Diabetes, Endocrinology, and Metabolism  [phone]  Appointments/Nurse  [phone] Fax  [phone] pager  [phone] nurse line    Scribe Disclosure:  I, Bailey Mason, am serving as a scribe to document services personally performed by Arti Kaur PA-C at this visit, based upon the provider's statements to me. All documentation has been reviewed by the aforementioned provider prior to being entered into the official medical record.     Portions of this medical record were completed by a scribe. UPON MY REVIEW AND AUTHENTICATION BY ELECTRONIC SIGNATURE, this confirms (a) I performed the applicable clinical services, and (b) the record is accurate.

## 2019-11-14 NOTE — PATIENT INSTRUCTIONS
It is good to see you!    Please do work on portions and snacking as well as taking bolus prior to eating!    We did extend out active insulin time to see if can prevent low blood sugars, but most helpful will be enter carbohydrates as they are eaten to prevent the hyperglycemia that needs to be corrected.     I do recommend flu shot.  I also placed an order to review your thyroid status.    Please do check home blood pressure and advise us or primary care office if elevated >140/90.      It is my privilege to be involved in the care of the above patient.     Arti Kaur PA-C, MPAS  [facility]  Diabetes, Endocrinology, and Metabolism  [phone] Appointments/Nurse  [phone] Fax  [phone] pager  [phone] nurse line

## 2019-11-14 NOTE — PROGRESS NOTES
[facility]  Endocrinology  Arti Kaur PA-C  11/14/2019      Chief Complaint:   Diabetes    History of Present Illness:   Luigi Moore is a 58 year old male with a history of Acquired hypothyroidism (12/7/2016); Depressive disorder; Hidradenoma (dx: Feb 2015); Hypertension; Numbness and tingling (2015); TELLY (obstructive sleep apnea) (2007); Type 1 diabetes (H) (1982); and Uncomplicated asthma who presents for diabetes care. He had been working on his diabetes with Dr. Donn Yuan and recently Dr. Szymanski here in our clinic. In 2014, when he was seeing Dr. Yuan, he had hemoglobin A1'c was 12.4. However, in the years since then his hemoglobin A1cs have been in the 7's and 8's. At his most recent visit with Dr. Szymanski, his hemoglobin A1c was 7.8%. Maurice has been using a Diaspora CGM to manage his blood glucose in recent years. For insulin, he has been using Humalog at a basal rate of 1.2 units per hour. When at work he often used a temporary basal rate that was 60% of usual and 50% when he bikes.  Previously, he took premeal bolus insulin using 1 unit for each 7 grams of carbohydrate with 1 additional unit for each 20 mg/dL blood glucose was above 120 mg/dL.     Interval history:   His hemoglobin A1c today is 7.6%. When asked he thinks he could and would like to reduce this by snacking less, buying less snacks, and eating smaller portion sizes. He states that he works night shifts from 4 pm to 3 am. This is typically when he has his low blood sugars and he also has few lows between 6 - 9 am. He will eat in the morning between 6-10 am and lunch between 11-3 pm and dinner. His lunch is typically his largest meal, however he does not have this every day. He uses his Bolus wizard typically very strictly with meals, and only overrides if he is afraid of going too low when actiove. However, he does seem to struggle with forgetting to bolus for a meal and overcorrecting leading to hyperglycemic episodes followed  by hypoglycemic episodes. For exercise, he rides bike and plays tennis, however due to achilles tendonitits he has not biked since June. This is frustrating as he has since gained 20 lbs since then. He believes this injury in from work and has found a new position within [employer] that is not as hard on his tendon. He has been wearing a boot and self treating. Fortunately, he has not been in pain the past few weeks.     He forgot to take his BP medication this morning.     Blood Glucose Monitoring:  We reviewed CGM data together.  Sensor Wear (per week): 93% (6d12h)  Average  mg/dL +/- 76 mg/dL   Average  mg/dL +/- 91 mg/dL   BG/Calibration (per day): 6.7/3.5  Meal (per day) 3.7  Carbs entered: 154 +/- 104 g     Current Insulin Pump Settings:  Type of Pump: Medtronic 670G  BASAL RATES and times:  12   AM (midnight): 1.2 units/hour    10   AM: 1.0 units/hour   6    PM: 1.2 units/hour   Carb ratio:   CARB RATIO and times:  12   AM (midnight): 15.0  Basal 57% 24U :  Bolus 43% 18U  Correction Factor (Sensitivity) and times:  12   AM (midnight): 35 mg/dL  Target BG Ranges:   BLOOD GLUCOSE TARGET and times:  12   AM (midnight): 90 - 120  Amount of Time Insulin is Active:  2:15 hrs  Auto mode (per week): 84% (5d21h)    Diabetes monitoring and complications:  CAD: No  Last eye exam results: retinopathy, next appointment for 11/25/2019  Microalbuminuria: no on 4/10/2018  Neuropathy: Yes mild diminished sensation on last monofilament exam  HTN: Yes on Hyzaar  On Statin: Yes on atorvastatin  On Aspirin: Yes  Depression: Yes  Erectile dysfunction: No    Patient Supplied Answers To Diabetic Questionnaire  including 11/14/2019   1. Since your last visit to our clinic (or if this your first visit, since you last saw your primary care provider), have you experienced any of the following symptoms that may be related to low blood sugars? No, I have not experienced any of these symptoms   2. Since your last visit to our clinic  (or if this your first visit, since you last saw your primary care provider), have you experienced any of the following symptoms that may be related to prolonged high blood sugars? No, I have not experienced any of these symptoms   4. Do you have any female family members who have had heart attacks or strokes before age 60 or male relatives who have had heart attacks or strokes before age 50? No   5. Do you have any family members who have had complications from diabetes such as kidney disease, heart disease or strokes, retinopathy (a vision problem), or amputations? No   6. Who do you live with?  Self   Little interest or pleasure in doing things? Not at all   Feeling down, depressed, or hopeless? Not at all        Hypothyroidism:  Currently taking levothyroxine 137 mcg daily, and has not had levels checked sine last year.     Hypertension:  His blood pressure is 145/85 today, however states that his is mostlikey due to not taking medication today. After rechecking, it remained very similar. He does not check this regularly at home.       Review of Systems:   Pertinent items are noted in HPI.  All other systems are negative.    Active Medications:      acetaminophen-codeine (TYLENOL #3) 300-30 MG per tablet, Take 1-2 tablets by mouth every 6 hours as needed for moderate pain, Disp: 60 tablet, Rfl: 1     albuterol (PROAIR HFA/PROVENTIL HFA/VENTOLIN HFA) 108 (90 Base) MCG/ACT inhaler, Inhale 2 puffs into the lungs every 6 hours as needed for shortness of breath / dyspnea or wheezing - exercise induced wheezing, Disp: 18 g, Rfl: 11     alprostadil (EDEX) 40 MCG kit, Inject 30 - 35 mcg (approx 3/4 ml) as directed., Disp: 240 mcg, Rfl: 11     ARIPiprazole (ABILIFY) 15 MG tablet, Take 1 tablet (15 mg) by mouth daily, Disp: 90 tablet, Rfl: 1     aspirin 81 MG tablet, Take 1 tablet (81 mg) by mouth daily, Disp: 100 tablet, Rfl: 3     atorvastatin (LIPITOR) 40 MG tablet, Take 1 tablet (40 mg) by mouth daily, Disp: 90  "tablet, Rfl: 1     buPROPion (WELLBUTRIN XL) 300 MG 24 hr tablet, Take 1 tablet (300 mg) by mouth every morning, Disp: 90 tablet, Rfl: 3     cetirizine (ZYRTEC) 10 MG tablet, Take 10 mg by mouth, Disp: , Rfl:      DiphenhydrAMINE HCl (BENADRYL PO), , Disp: , Rfl:      gabapentin (NEURONTIN) 400 MG capsule, Take 1 capsule (400 mg) by mouth 3 times daily, Disp: 270 capsule, Rfl: 0     glucagon (GLUCAGON EMERGENCY) 1 MG kit, Inject 1 mg into the muscle once for 1 dose, Disp: 1 mg, Rfl: 3     Insulin Infusion Pump (INSULIN PUMP [id number]) KIT, , Disp: , Rfl:      insulin lispro (HUMALOG) 100 UNIT/ML vial, Use as directed in pump Approx 70-80 Units per day Need to be seen, Disp: 80 mL, Rfl: 1     levothyroxine (SYNTHROID/LEVOTHROID) 137 MCG tablet, TAKE 1 TABLET(137 MCG) BY MOUTH DAILY, Disp: 30 tablet, Rfl: 0     losartan-hydrochlorothiazide (HYZAAR) 100-25 MG tablet, Take 1 tablet by mouth daily, Disp: 90 tablet, Rfl: 1     NEW MED, \"[place] Trimix #8\" Trimix intercavernosal injection, per mL: PGE1 20 mcg Papaverine 27mg Phentolamine 1 mg   Start at 0.3mL injection May titrate in 0.1mL increments to lowest effective dose. Max use one daily, and 3x/week., Disp: 5 mL, Rfl: 99     rizatriptan (MAXALT) 10 MG tablet, Take 1 tablet (10 mg) by mouth at onset of headache for migraine, Disp: 12 tablet, Rfl: 11     topiramate (TOPAMAX) 25 MG tablet, TAKE 50MG BY MOUTH ONCE A DAY OR 25MG TWICE A DAY, Disp: 90 tablet, Rfl: 0     Urea 40 % CREA, Externally apply topically 2 times daily To surface of toenails, Disp: 198 g, Rfl: 5     zolpidem (AMBIEN) 10 MG tablet, TAKE 1 TABLET BY MOUTH EVERY NIGHT AT BEDTIME PRN.  Pt needs to be seen, Disp: 30 tablet, Rfl: 0     lidocaine (PF) (XYLOCAINE) 1 % injection 10 mL, 10 mL, Intradermal, Once, Kathya Lang APRN CNP      Allergies:   Diagnostic x-ray materials; Contrast dye; and Diatrizoate      Past Medical History:  Acquired hypothyroidism  Depression  Hidradenoma " "  Hypertension  Obstructive sleep apnea  Type 1 diabetes with proliferative retinopathy  Uncomplicated asthma      Past Surgical History:  Excise lesion hand  Orthopedic surgery on achilles tendon  Right hand surgery   Lipoma removal below rib cage on right side     Family History:   pancratitis - mother  Substance abuse - mother  Mental illness - mother  Depression - mother  Hypertension - mother  Obesity - mother  Asthma - father   Leukemia - father   Diabetes - father  Melanoma - father   Liver cancer - brother  Intestinal cancer - brother  Cancer - maternal grandmother, maternal grandfather   Bone cancer - paternal grandmother, paternal grandfather       Social History:   This patient presented alone.   Smoking status: never  Smokeless tobacco: never  Alcohol use: no, history of alcohol abuse, sober since 2015  Drug use: no     Physical Exam:   BP (!) 145/85   Pulse 77   Ht 1.93 m (6' 4\")   Wt 102.2 kg (225 lb 3.2 oz)   BMI 27.41 kg/m       Wt Readings from Last 10 Encounters:   11/14/19 102.2 kg (225 lb 5 oz)   11/14/19 102.2 kg (225 lb 3.2 oz)   05/22/19 99.8 kg (220 lb)   10/12/18 99.5 kg (219 lb 6.4 oz)   09/04/18 101.2 kg (223 lb)   08/15/18 100.2 kg (220 lb 14.4 oz)   08/10/18 99.8 kg (220 lb)   06/21/18 99.8 kg (220 lb 1.6 oz)   04/10/18 106.9 kg (235 lb 11.2 oz)   01/26/18 105.3 kg (232 lb 3.2 oz)        General: Pleasant, well nourished and hydrated male in NAD.   Psych:  Mood is \"good,\" affect is appropriate.  Thought form and content are fluid and coherent.    HEENT: Eyes and sclera are clear. Extraocular movements are grossly intact without proptosis.  Nares are patent, mucous membranes moist.  Neck: No masses or JVD are noted.    Resp: Easy and unlabored breathing.   Neuro: Alert and oriented, communicating clearly.   Ext: no swelling or edema      Data:  Lab Results   Component Value Date     06/21/2018    POTASSIUM 4.2 10/05/2018    CHLORIDE 102 06/21/2018    CO2 26 06/21/2018    " ANIONGAP 8 06/21/2018     (H) 06/21/2018    BUN 8 06/21/2018    CR 0.78 06/21/2018    NEVA 8.6 06/21/2018     Lab Results   Component Value Date    GFRESTIMATED >90 06/21/2018    GFRESTIMATED >90 04/10/2018    GFRESTIMATED >90  Non  GFR Calc   12/07/2016    GFRESTBLACK >90 06/21/2018    GFRESTBLACK >90 04/10/2018    GFRESTBLACK >90   GFR Calc   12/07/2016      Lab Results   Component Value Date    MICROL <5 04/10/2018    UMALCR Unable to calculate due to low value 04/10/2018        Lab Results   Component Value Date    A1C 7.6 (H) 04/10/2018    A1C 8.7 (H) 12/07/2016    A1C 8.1 (H) 02/17/2015    A1C 10.0 (A) 01/17/2013    HEMOGLOBINA1 7.6 (A) 11/14/2019    HEMOGLOBINA1 7.8 (A) 08/15/2018    HEMOGLOBINA1 8.0 (A) 03/22/2017    HEMOGLOBINA1 9.4 (A) 02/17/2015    HEMOGLOBINA1 12.7 (A) 03/27/2014     No results found for: CPEPT, GADAB, ISCAB    Lab Results   Component Value Date    CHOL 173 04/10/2018    CHOL 194 07/17/2012    TRIG 68 04/10/2018    TRIG 104 07/17/2012    HDL 70 04/10/2018    HDL 56 07/17/2012    LDL 66 10/05/2018    LDL 89 04/10/2018    NHDL 103 04/10/2018       Assessment and Plan:  Maurice has type 1 diabetes that is reasonably well controlled with Medtronic 670G pump. He is able to remain mostly in auto mode despite frequently missing meal time boluses which lead to hyperglycemic episodes and often multiple over corrections that lead to low blood sugar. Will extend active insulin to help cover, however encouraged to ensure he is receiving Bolus before meals. He plans to be more mondful about this.    Type 1 diabetes mellitus without complication (H)  - Hemoglobin A1c POCT  - TSH with free T4 reflex  - Blood Pressure KIT  Dispense: 1 kit; Refill: 0  - influenza quadrivalent, PF, vacc age 3 yrs and older (FLUZONE OR FLULAVAL) 0.5 ML injection  Dispense: 0.5 mL; Refill: 0    Current Insulin Pump Settings:  Type of Pump: Medtronic 670G  BASAL RATES and times:  12   AM  (midnight): 1.2 units/hour    10   AM: 1.0 units/hour   6    PM: 1.2 units/hour   Carb ratio:   CARB RATIO and times:  12   AM (midnight): 15.0  Corection Factor (Sensitivity) and times:  12   AM (midnight): 35 mg/dL  Target BG Ranges:   BLOOD GLUCOSE TARGET and times:  12   AM (midnight): 90 - 120  Amount of Time Insulin is Active:  2:15 hrs    New Insulin Pump Settings:  Type of Pump: Medtronic Minimed  BASAL RATES and times:  12   AM (midnight): 1.2 units/hour    10   AM: 1.0 units/hour   6    PM: 1.2 units/hour   Carb ratio:   CARB RATIO and times:  12   AM (midnight): 15.0  Corection Factor (Sensitivity) and times:  12   AM (midnight): 30 mg/dL  Target BG Ranges:   BLOOD GLUCOSE TARGET and times:  12   AM (midnight): 90 - 120  Amount of Time Insulin is Active:  2:30 hrs     2. Hypothyroidism:  Will recheck level.  - TSH with free T4 reflex    3. Hypertension  Related the importance of BP control in preventing complications.  Encouraged medication adherence.   Education regarding home BP checks and advising clinic of not <140/90.  I have placed a prescription for a blood pressure monitor, and encouraged him to continue checking this at home.   - Blood Pressure KIT  Dispense: 1 kit; Refill: 0     Follow-up: Return in about 6 months (around 5/14/2020).     >50% of 40 minute visit spent in face to face counseling, education and coordination of care related to options for better glycemic control as well as preventing, detecting, and treating hypoglycemia.      It is my privilege to be involved in the care of the above patient.     Arti Kaur PA-C, MPAS  [facility]  Diabetes, Endocrinology, and Metabolism  [phone] Appointments/Nurse  [phone] Fax  [phone] pager  [phone] nurse line        Scribe Disclosure:  I, Bailey Mason, am serving as a scribe to document services personally performed by Arti Kaur PA-C at this visit, based upon the provider's statements to me.  All documentation has been reviewed by the aforementioned provider prior to being entered into the official medical record.     Portions of this medical record were completed by a scribe. UPON MY REVIEW AND AUTHENTICATION BY ELECTRONIC SIGNATURE, this confirms (a) I performed the applicable clinical services, and (b) the record is accurate.

## 2019-11-14 NOTE — PROGRESS NOTES
"Luigi Moore is a 57 year old male here for erectile dysfunction follow-up.  Past pt of Dr. Carlisle.  Has been using Trimix #8.  Sometimes this dose is adequate, often not.  Denies new lumps, no curvature to erections.    BP (!) 145/85   Pulse 77   Ht 1.93 m (6' 4\")   Wt 102.2 kg (225 lb 5 oz)   BMI 27.43 kg/m     General appearance normal.  resps normal, non-labored.  Abdomen nondistended.  CLINT deferred.    Component      Latest Ref Rng & Units 6/21/2018   Sodium      133 - 144 mmol/L 137   Potassium      3.4 - 5.3 mmol/L 3.7   Chloride      94 - 109 mmol/L 102   Carbon Dioxide      20 - 32 mmol/L 26   Anion Gap      3 - 14 mmol/L 8   Glucose      70 - 99 mg/dL 117 (H)   Urea Nitrogen      7 - 30 mg/dL 8   Creatinine      0.66 - 1.25 mg/dL 0.78   GFR Estimate      >60 mL/min/1.7m2 >90   GFR Estimate If Black      >60 mL/min/1.7m2 >90   Calcium      8.5 - 10.1 mg/dL 8.6      Current Outpatient Medications   Medication     acetaminophen-codeine (TYLENOL #3) 300-30 MG per tablet     albuterol (PROAIR HFA/PROVENTIL HFA/VENTOLIN HFA) 108 (90 Base) MCG/ACT inhaler     alprostadil (EDEX) 40 MCG kit     ARIPiprazole (ABILIFY) 15 MG tablet     aspirin 81 MG tablet     atorvastatin (LIPITOR) 40 MG tablet     blood glucose monitoring (SHEILA CONTOUR NEXT) test strip     Blood Pressure KIT     buPROPion (WELLBUTRIN XL) 300 MG 24 hr tablet     cetirizine (ZYRTEC) 10 MG tablet     DiphenhydrAMINE HCl (BENADRYL PO)     gabapentin (NEURONTIN) 400 MG capsule     glucagon (GLUCAGON EMERGENCY) 1 MG kit     influenza quadrivalent, PF, vacc age 3 yrs and older (FLUZONE OR FLULAVAL) 0.5 ML injection     Insulin Infusion Pump (INSULIN PUMP [id number]) KIT     insulin lispro (HUMALOG) 100 UNIT/ML vial     Insulin Syringe (BD INSULIN SYRINGE) 29G X 1/2\" 1 ML MISC     levothyroxine (SYNTHROID/LEVOTHROID) 137 MCG tablet     losartan-hydrochlorothiazide (HYZAAR) 100-25 MG tablet     NEW MED     ONE TOUCH TEST STRIPS test strip     ONETOUCH " ULTRA test strip     order for DME     rizatriptan (MAXALT) 10 MG tablet     topiramate (TOPAMAX) 25 MG tablet     Urea 40 % CREA     zolpidem (AMBIEN) 10 MG tablet     No current facility-administered medications for this visit.      Facility-Administered Medications Ordered in Other Visits   Medication     lidocaine (PF) (XYLOCAINE) 1 % injection 10 mL         A-  Erectile dysfunction, follow-up on intracavernosal injection prescription.    Plan    Advised moving up to Trimix.  Discussed intracavernosal injections or IPP as alternatives.  He would like to try stronger ICI.    Move up to [place] trimix #4 from #8, 0.3mL starting dose.  Return to clinic 1 year, sooner if needed.    15min visit, over 50% face to face in counseling/discussion of erectile dysfunction treatment options.    Ben TABOR

## 2019-11-14 NOTE — NURSING NOTE
"Chief Complaint   Patient presents with     Follow Up     medication refill       Blood pressure (!) 145/85, pulse 77, height 1.93 m (6' 4\"), weight 102.2 kg (225 lb 5 oz). Body mass index is 27.43 kg/m .    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)     Acquired hypothyroidism     Dyslipidemia     Essential hypertension     Hyperlipidemia     Proliferative diabetic retinopathy (H)     Obstructive sleep apnea syndrome     Insomnia     Low back pain     Type 1 diabetes mellitus (H)     TELLY (obstructive sleep apnea)       Allergies   Allergen Reactions     Diagnostic X-Ray Materials Anaphylaxis     Contrast Dye Hives     Diatrizoate Hives     iodine       Current Outpatient Medications   Medication Sig Dispense Refill     acetaminophen-codeine (TYLENOL #3) 300-30 MG per tablet Take 1-2 tablets by mouth every 6 hours as needed for moderate pain 60 tablet 1     albuterol (PROAIR HFA/PROVENTIL HFA/VENTOLIN HFA) 108 (90 Base) MCG/ACT inhaler Inhale 2 puffs into the lungs every 6 hours as needed for shortness of breath / dyspnea or wheezing - exercise induced wheezing 18 g 11     alprostadil (EDEX) 40 MCG kit Inject 30 - 35 mcg (approx 3/4 ml) as directed. 240 mcg 11     ARIPiprazole (ABILIFY) 15 MG tablet Take 1 tablet (15 mg) by mouth daily 90 tablet 1     aspirin 81 MG tablet Take 1 tablet (81 mg) by mouth daily 100 tablet 3     atorvastatin (LIPITOR) 40 MG tablet Take 1 tablet (40 mg) by mouth daily 90 tablet 1     blood glucose monitoring (SHEILA CONTOUR NEXT) test strip Use to test blood sugar 4 times daily with Medtronic  670 G pump 400 strip 3     Blood Pressure KIT 1 Units once a week If BP at rest >140/90, call office. 1 kit 0     buPROPion (WELLBUTRIN XL) 300 MG 24 hr tablet Take 1 tablet (300 mg) by mouth every morning 90 tablet 3     cetirizine (ZYRTEC) 10 MG tablet Take 10 mg by mouth       DiphenhydrAMINE HCl (BENADRYL PO)        gabapentin (NEURONTIN) 400 MG " "capsule Take 1 capsule (400 mg) by mouth 3 times daily 270 capsule 0     glucagon (GLUCAGON EMERGENCY) 1 MG kit Inject 1 mg into the muscle once for 1 dose 1 mg 3     influenza quadrivalent, PF, vacc age 3 yrs and older (FLUZONE OR FLULAVAL) 0.5 ML injection Inject 0.5 mLs into the muscle once for 1 dose 0.5 mL 0     Insulin Infusion Pump (INSULIN PUMP [id number]) KIT        insulin lispro (HUMALOG) 100 UNIT/ML vial Use as directed in pump Approx 70-80 Units per day Need to be seen 80 mL 1     Insulin Syringe (BD INSULIN SYRINGE) 29G X 1/2\" 1 ML MISC Use as directed 20 each prn     levothyroxine (SYNTHROID/LEVOTHROID) 137 MCG tablet TAKE 1 TABLET(137 MCG) BY MOUTH DAILY 30 tablet 0     losartan-hydrochlorothiazide (HYZAAR) 100-25 MG tablet Take 1 tablet by mouth daily 90 tablet 1     NEW MED \"[place] Trimix #8\"  Trimix intercavernosal injection, per mL:  PGE1 20 mcg  Papaverine 27mg  Phentolamine 1 mg     Start at 0.3mL injection  May titrate in 0.1mL increments to lowest effective dose.  Max use one daily, and 3x/week. 5 mL 99     ONE TOUCH TEST STRIPS test strip Use to test blood sugar 4 times daily or as directed. 400 strip 3     ONETOUCH ULTRA test strip Test  four times daily ultra blue (please schedule clinic appt) 400 strip 0     order for DME Equipment being ordered: CPAP machine 1 each 0     rizatriptan (MAXALT) 10 MG tablet Take 1 tablet (10 mg) by mouth at onset of headache for migraine 12 tablet 11     topiramate (TOPAMAX) 25 MG tablet TAKE 50MG BY MOUTH ONCE A DAY OR 25MG TWICE A DAY 90 tablet 0     Urea 40 % CREA Externally apply topically 2 times daily To surface of toenails 198 g 5     zolpidem (AMBIEN) 10 MG tablet TAKE 1 TABLET BY MOUTH EVERY NIGHT AT BEDTIME PRN.  Pt needs to be seen 30 tablet 0       Social History     Tobacco Use     Smoking status: Never Smoker     Smokeless tobacco: Never Used   Substance Use Topics     Alcohol use: No     Comment: History of alcohol abuse/sober since 2015; went " to treatment      Drug use: Clari Canseco LPN  11/14/2019  2:29 PM

## 2019-11-15 DIAGNOSIS — B35.1 ONYCHOMYCOSIS: ICD-10-CM

## 2019-11-15 DIAGNOSIS — E03.9 ACQUIRED HYPOTHYROIDISM: ICD-10-CM

## 2019-11-15 DIAGNOSIS — E10.8 TYPE 1 DIABETES MELLITUS WITH COMPLICATIONS (H): ICD-10-CM

## 2019-11-15 DIAGNOSIS — E78.5 DYSLIPIDEMIA: ICD-10-CM

## 2019-11-15 NOTE — TELEPHONE ENCOUNTER
Short Acting Insulin Protocol [id number]/15 12:25 PM   Blood pressure less than 140/90 in past 6 months    LDL on file in past 12 months    Microalbumin on file in past 12 months    Serum creatinine on file in past 12 months     Type 1  rtc appt in place 05/14/2020 with Arti Kaur.   Ariana Claire RN on 11/15/2019 at 1:11 PM

## 2019-11-15 NOTE — TELEPHONE ENCOUNTER
insulin lispro (HUMALOG) 100 UNIT/ML vial        Last Written Prescription Date:  03/08/19  Last Fill Quantity: 80mL,   # refills: 1  Last Office Visit : 11/14/19  Future Office visit:  05/14/20    Routing refill request to provider for review/approval because:  Insulin - refilled per clinic

## 2019-11-25 ENCOUNTER — OFFICE VISIT (OUTPATIENT)
Dept: OPHTHALMOLOGY | Facility: CLINIC | Age: 59
End: 2019-11-25
Attending: OPHTHALMOLOGY
Payer: COMMERCIAL

## 2019-11-25 DIAGNOSIS — E10.3293 MILD NONPROLIFERATIVE DIABETIC RETINOPATHY OF BOTH EYES WITHOUT MACULAR EDEMA ASSOCIATED WITH TYPE 1 DIABETES MELLITUS (H): Primary | ICD-10-CM

## 2019-11-25 PROCEDURE — G0463 HOSPITAL OUTPT CLINIC VISIT: HCPCS | Mod: ZF

## 2019-11-25 PROCEDURE — 92015 DETERMINE REFRACTIVE STATE: CPT | Mod: ZF

## 2019-11-25 ASSESSMENT — REFRACTION_MANIFEST
OS_CYLINDER: +0.75
OD_SPHERE: -1.75
OS_AXIS: 175
OD_CYLINDER: +1.50
OS_ADD: +2.25
OD_AXIS: 175
OS_SPHERE: -1.25
OD_ADD: +2.25

## 2019-11-25 ASSESSMENT — VISUAL ACUITY
OS_CC: 20/15
METHOD: SNELLEN - LINEAR
OD_CC+: -1
CORRECTION_TYPE: GLASSES
OD_CC: 20/20
OS_CC+: -2

## 2019-11-25 ASSESSMENT — EXTERNAL EXAM - LEFT EYE: OS_EXAM: NORMAL

## 2019-11-25 ASSESSMENT — TONOMETRY
IOP_METHOD: APPLANATION
OD_IOP_MMHG: 18
OS_IOP_MMHG: 20

## 2019-11-25 ASSESSMENT — REFRACTION_WEARINGRX
SPECS_TYPE: PAL
OD_AXIS: 175
OD_CYLINDER: +1.50
OS_AXIS: 159
OS_CYLINDER: +0.50
OS_SPHERE: -0.50
OS_ADD: +2.00
OD_ADD: +2.00
OD_SPHERE: -1.50

## 2019-11-25 ASSESSMENT — SLIT LAMP EXAM - LIDS
COMMENTS: NORMAL
COMMENTS: NORMAL

## 2019-11-25 ASSESSMENT — CONF VISUAL FIELD
OS_NORMAL: 1
OD_NORMAL: 1

## 2019-11-25 ASSESSMENT — EXTERNAL EXAM - RIGHT EYE: OD_EXAM: NORMAL

## 2019-11-25 ASSESSMENT — CUP TO DISC RATIO
OD_RATIO: 0.3
OS_RATIO: 0.3

## 2019-11-25 NOTE — PROGRESS NOTES
CC -   DM I and EDIC study    INTERVAL HISTORY - Initial visit with me.  Here for EDIC study  Last A1c 7.5 on 11/2019 per patient      HPI -   Luigi Moore is a  58 year old year-old patient with history of   DM I since ~ 1981      PAST OCULAR SURGERY  FML OD ~ 2007      RETINAL IMAGING:        ASSESSMENT & PLAN  1. Mild/mod NPDR OU with DM I no DME   - BP/BG control    2. Vitreous syneresis OU   - advised S/Sx RD 11/2019      3. NS OU   - very mild        return to clinic: 1 year, OCT OU    ATTESTATION     Attending Attestation:     Complete documentation of historical and exam elements from today's encounter can be found in the full encounter summary report (not reduplicated in this progress note).  I personally obtained the chief complaint(s) and history of present illness.  I confirmed and edited as necessary the review of systems, past medical/surgical history, family history, social history, and examination findings as documented by others; and I examined the patient myself.  I personally reviewed the relevant tests, images, and reports as documented above.  I formulated and edited as necessary the assessment and plan and discussed the findings and management plan with the patient and family    Belinda Pack MD, PhD  , Vitreoretinal Surgery  Department of Ophthalmology  [facility]

## 2019-11-25 NOTE — NURSING NOTE
Chief Complaints and History of Present Illnesses   Patient presents with     Research Study     Chief Complaint(s) and History of Present Illness(es)     Research Study     Laterality: both eyes    Frequency: constantly    Timing: throughout the day    Associated symptoms: Negative for eye pain, dryness, tearing, redness, glare, haloes, floaters and flashes    Pain scale: 0/10              Comments     EDIC study.  Pt states eyes comfortable no VA changes. Managing daily activities without ocular problems.  Most recent A1C: 7.5 within last month.  Avg FBS about 120.  ANDREA Mccord COT 8:11 AM 11/25/2019

## 2019-12-06 ENCOUNTER — RESULTS ONLY (OUTPATIENT)
Dept: LAB | Age: 59
End: 2019-12-06

## 2019-12-06 LAB
CREAT UR-MCNC: 248 MG/DL
MICROALBUMIN UR-MCNC: 13 MG/L
MICROALBUMIN/CREAT UR: 5.32 MG/G CR (ref 0–17)
POTASSIUM SERPL-SCNC: 3.7 MMOL/L (ref 3.4–5.3)
T4 FREE SERPL-MCNC: 0.95 NG/DL (ref 0.76–1.46)
TSH SERPL DL<=0.005 MIU/L-ACNC: 4.62 MU/L (ref 0.4–4)

## 2019-12-09 DIAGNOSIS — G47.00 INSOMNIA, UNSPECIFIED TYPE: ICD-10-CM

## 2019-12-09 NOTE — TELEPHONE ENCOUNTER
Patient Requested  zolpidem (AMBIEN) 10 MG tablet  Last Filled  10/16/2019  Last Office Visit  10/12/2018  Next Office Visit  Nothing scheduled   Checked  12/09/2019    DX: Insomnia, unspecified type     Pharmacy: Jose Carlos FUENTES CMA at 12:02 PM on 10/16/2019.

## 2019-12-10 RX ORDER — ZOLPIDEM TARTRATE 10 MG/1
TABLET ORAL
Qty: 30 TABLET | Refills: 0 | Status: SHIPPED | OUTPATIENT
Start: 2019-12-10 | End: 2020-02-04

## 2019-12-18 DIAGNOSIS — E10.9 TYPE 1 DIABETES MELLITUS WITHOUT COMPLICATION (H): ICD-10-CM

## 2019-12-19 RX ORDER — LOSARTAN POTASSIUM AND HYDROCHLOROTHIAZIDE 25; 100 MG/1; MG/1
1 TABLET ORAL DAILY
Qty: 90 TABLET | Refills: 1 | Status: SHIPPED | OUTPATIENT
Start: 2019-12-19 | End: 2020-05-08

## 2019-12-19 NOTE — TELEPHONE ENCOUNTER
LOSARTAN/HCTZ 100/25MG TABLETS      Last Written Prescription Date:  Take 1 tablet by mouth daily  Last Fill Quantity: 90,   # refills: 1  Last Office Visit : 11/14/19  Future Office visit:  5/14/20    Routing refill request to provider for review/approval because:  Over due for creatinine per protocol  BP Readings from Last 3 Encounters:   11/14/19 (!) 145/85   11/14/19 (!) 145/85   10/12/18 123/71     Lab Test 06/21/18  0802   02/06/15  1236   CR 0.78   < >  --    CREAT  --   --  0.7

## 2020-02-04 DIAGNOSIS — G47.00 INSOMNIA, UNSPECIFIED TYPE: ICD-10-CM

## 2020-02-04 RX ORDER — ZOLPIDEM TARTRATE 10 MG/1
TABLET ORAL
Qty: 30 TABLET | Refills: 0 | Status: SHIPPED | OUTPATIENT
Start: 2020-02-04 | End: 2021-12-27

## 2020-02-04 NOTE — TELEPHONE ENCOUNTER
Patient was notified 12/09/2019 that he needed an office visit before additional refills, patient was instructed to follow up in 6 months at 10/12/2018 office visit.  Patient has not followed up or scheduled a future appointment.    PEGGY FUENTES CMA at 7:47 AM on 2/4/2020.      Patient Requested  zolpidem (AMBIEN) 10 MG tablet  Last Filled  12/10/2019  Last Office Visit  10/12/2018  Next Office Visit  Nothing scheduled   Checked  02/04/2020    DX: Insomnia, unspecified type     Pharmacy: Jose Carlos FUENTES CMA at 7:42 AM on 2/4/2020.

## 2020-03-20 DIAGNOSIS — G47.00 INSOMNIA, UNSPECIFIED TYPE: ICD-10-CM

## 2020-03-20 NOTE — TELEPHONE ENCOUNTER
Patient needs to be seen (telephone visit is okay) before additional refills.  RX declined.     PEGGY FUENTES CMA at 10:49 AM on 3/20/2020.

## 2020-03-23 RX ORDER — ZOLPIDEM TARTRATE 10 MG/1
TABLET ORAL
Qty: 30 TABLET | OUTPATIENT
Start: 2020-03-23

## 2020-04-11 DIAGNOSIS — E10.8 TYPE 1 DIABETES MELLITUS WITH COMPLICATIONS (H): ICD-10-CM

## 2020-04-11 DIAGNOSIS — E03.9 ACQUIRED HYPOTHYROIDISM: ICD-10-CM

## 2020-04-11 DIAGNOSIS — E78.5 DYSLIPIDEMIA: ICD-10-CM

## 2020-04-11 DIAGNOSIS — B35.1 ONYCHOMYCOSIS: ICD-10-CM

## 2020-04-14 RX ORDER — ARIPIPRAZOLE 15 MG/1
15 TABLET ORAL DAILY
Qty: 15 TABLET | Refills: 0 | OUTPATIENT
Start: 2020-04-14

## 2020-04-14 NOTE — TELEPHONE ENCOUNTER
Last appointment in [facility] on 10/12/18. Last prescription for aripiprazole prescribed on 4/04/19 for 90 day supply with 1 refill. I called the pharmacy to confirm last fill dates. Per staff, patient filled 90-day supply on 4/04/19 then again on 12/22/19. Based on fill history, patient does not appear to be taking medication consistently.    Message left for patient requesting he schedule telephone or video visit with Kathya. Patient will need clinic appointment for refills due to clinic policy.    Juanita Parra RN ([facility])

## 2020-04-14 NOTE — TELEPHONE ENCOUNTER
"ARIPIPRAZOLE 15MG (FIFTEEN MG) TABS   Last Written Prescription Date:  4/4/2019  Last Fill Quantity: 90,   # refills: 1  Last Office Visit : 10/12/2018  Future Office visit:  None    Routing refill request to provider for review/approval because:  Over Due 6 months office Visit & Labs:  LIPIDS, CBC, A1C  Pt not seen since Oct 2018.  Was supposed to follow up in 6 months.  No office visit noted.  Over 18 months over due.    Refer to Clinic for review.    Esme Pierre RN  Central Triage Red Flags/Med Refills      Assessment and Plan:    10/12/2018  Intractable migraine with aura with status migrainosus  Doing well with Topamax.  Instructed patient he can continue Topamax twice a day or he can just take 50mg once a day.   He is trying to lose weight for winter biking but encouraged him to monitor for excessive weight loss.     Insomnia, unspecified type  Continue good sleep habits, darkened room and no tvs or electronics, and [facility]. Renewed Ambien prescription and cautioned about excess sleepiness when awakening and not to double up on doses especially since he is already on high dose.     DM 1  Stable using new pump (Medtronic 670G)  Last Hgb A1c in August was 7.8. Following with Endocrine     Follow-up: in 6 months, sooner if needed.     IMoni, am serving as a Nurse Practitioner Student working in conjunction with GALILEA Matson CNP. All documentation has been reviewed by the aforementioned provider prior to being entered into the official medical record.     Moni Pérez, MANUELA Student     IKtahya, saw and examined this patient with the NP student and agree with the student's findings and plan of care as documented in the student's note with the following modifications: none\"     GALILEA Escalante CNP      "

## 2020-05-08 ENCOUNTER — VIRTUAL VISIT (OUTPATIENT)
Dept: INTERNAL MEDICINE | Facility: CLINIC | Age: 60
End: 2020-05-08
Payer: COMMERCIAL

## 2020-05-08 ENCOUNTER — TELEPHONE (OUTPATIENT)
Dept: ENDOCRINOLOGY | Facility: CLINIC | Age: 60
End: 2020-05-08

## 2020-05-08 DIAGNOSIS — I10 ESSENTIAL HYPERTENSION: Primary | ICD-10-CM

## 2020-05-08 DIAGNOSIS — E10.9 TYPE 1 DIABETES MELLITUS WITHOUT COMPLICATION (H): ICD-10-CM

## 2020-05-08 DIAGNOSIS — E78.5 DYSLIPIDEMIA: ICD-10-CM

## 2020-05-08 DIAGNOSIS — G43.111 INTRACTABLE MIGRAINE WITH AURA WITH STATUS MIGRAINOSUS: ICD-10-CM

## 2020-05-08 DIAGNOSIS — F33.0 MILD EPISODE OF RECURRENT MAJOR DEPRESSIVE DISORDER (H): ICD-10-CM

## 2020-05-08 RX ORDER — ARIPIPRAZOLE 15 MG/1
15 TABLET ORAL DAILY
Qty: 90 TABLET | Refills: 1 | Status: SHIPPED | OUTPATIENT
Start: 2020-05-08 | End: 2021-05-23

## 2020-05-08 RX ORDER — LOSARTAN POTASSIUM AND HYDROCHLOROTHIAZIDE 25; 100 MG/1; MG/1
1 TABLET ORAL DAILY
Qty: 90 TABLET | Refills: 1 | Status: SHIPPED | OUTPATIENT
Start: 2020-05-08 | End: 2021-06-08

## 2020-05-08 RX ORDER — ATORVASTATIN CALCIUM 40 MG/1
40 TABLET, FILM COATED ORAL DAILY
Qty: 90 TABLET | Refills: 1 | Status: SHIPPED | OUTPATIENT
Start: 2020-05-08 | End: 2021-05-23

## 2020-05-08 RX ORDER — RIZATRIPTAN BENZOATE 10 MG/1
10 TABLET ORAL
Qty: 12 TABLET | Refills: 11 | Status: SHIPPED | OUTPATIENT
Start: 2020-05-08 | End: 2022-11-28

## 2020-05-08 RX ORDER — TOPIRAMATE 50 MG/1
50 TABLET, FILM COATED ORAL DAILY
Qty: 90 TABLET | Refills: 3 | Status: SHIPPED | OUTPATIENT
Start: 2020-05-08 | End: 2021-05-23

## 2020-05-08 RX ORDER — BUPROPION HYDROCHLORIDE 300 MG/1
300 TABLET ORAL EVERY MORNING
Qty: 90 TABLET | Refills: 3 | Status: SHIPPED | OUTPATIENT
Start: 2020-05-08 | End: 2021-06-02

## 2020-05-08 ASSESSMENT — PAIN SCALES - GENERAL: PAINLEVEL: NO PAIN (0)

## 2020-05-08 NOTE — NURSING NOTE
Chief Complaint   Patient presents with     Recheck Medication     Follow up.     FALLON Ritchie 1:04 PM  5/8/2020

## 2020-05-08 NOTE — Clinical Note
Hi, can you get a release of information from Maurice at [facility]? Looking for notes/evaluation he had done for ADHD. Thank you! -Kathya

## 2020-05-08 NOTE — TELEPHONE ENCOUNTER
Called and left voicemail for patient.     Explained that due to COVID-19, [facility] is taking steps to try to limit potential patient exposures by reducing face to face visits. A way to do this is by offering video visits as an alternative option for their follow up appointment.    We have reviewed schedules with Dr. Kaur and together feel that their follow up appointment, currently scheduled on 5/14 could be done via video.    Callback number left for further discussion.     Silvia ATC

## 2020-05-08 NOTE — PROGRESS NOTES
"Luigi Moore is a 59 year old male who is being evaluated via a billable telephone visit.      The patient has been notified of following:     \"This telephone visit will be conducted via a call between you and your physician/provider. We have found that certain health care needs can be provided without the need for a physical exam.  This service lets us provide the care you need with a short phone conversation.  If a prescription is necessary we can send it directly to your pharmacy.  If lab work is needed we can place an order for that and you can then stop by our lab to have the test done at a later time.    Telephone visits are billed at different rates depending on your insurance coverage. During this emergency period, for some insurers they may be billed the same as an in-person visit.  Please reach out to your insurance provider with any questions.    If during the course of the call the physician/provider feels a telephone visit is not appropriate, you will not be charged for this service.\"    Patient has given verbal consent for Telephone visit?  Yes    What phone number would you like to be contacted at? [phone]     How would you like to obtain your AVS? Dami Griffin     Luigi Moore is a 59 year old male who presents to clinic today for the following health issues:    HPI    General follow-up  Things going well overall.  New pump for diabetes, going well, improving glucoses. Hoping to see continued decrease with A1c, has follow-up with Arti Kaur PA-C, in endocrinology next week.    Thinks he had COVID with minor symptoms, just got over it about 2 weeks ago. Isolated himself for about a month, used up a lot of vacation time. Breathing has since improved, back to baseline. Worst part of what he felt was SOB. Has CPAP which helped for sleeping at night, did breathing exercises, seemed to help. Has been back on his bike and done okay with riding without SOB.    Still needs to  " "BP monitor for home. Has been taking meds at home every day, feels like it's going well, no SE's that he's noticed. Fairly active job at [employer], trying to exercise more regularly now that his COVID-symptoms have improved.    Mood--was stable for a \"long, long, long time\" but taken a little step back, not \"serious\" but a little less motivation, a little more depressed a couple days a week. Is unsure if related to what we're going through with the pandemic, normally helps manage depressive symptoms with exercise. Trying to engage with friends through technology, keeping in contact with people/maintain relationships is helpful.    Topamax--significantly decreased migraine frequency. Headaches are as \"good as they've ever been.\" Needs a refill for this and for Maxalt.    Was following with psychiatrist at [facility], did screening, tested positive for Adult ADHD. The psychiatrist had been on fence about starting meds vs stopping ambien. Maurice would prefer to stop the ambien so he can have improved focus at work. Had been moved into a new position, needed more focus, had difficulty completing tasks, and felt like he was \"the worst employee\", improved once they had scanners and didn't have to do as much mental work. But still not going as well as he would like to.  -------------------------------------    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)     Acquired hypothyroidism     Dyslipidemia     Essential hypertension     Hyperlipidemia     Proliferative diabetic retinopathy (H)     Obstructive sleep apnea syndrome     Insomnia     Low back pain     Type 1 diabetes mellitus (H)     TELLY (obstructive sleep apnea)     Past Surgical History:   Procedure Laterality Date     EXCISE LESION HAND Right 2/26/2015    Procedure: EXCISE LESION HAND;  Surgeon: Jeovany Jefferson MD;  Location: [facility] OR     ORTHOPEDIC SURGERY Left     achilles tendon      ORTHOPEDIC " SURGERY Right     hand surgery     SOFT TISSUE SURGERY      lipoma removal, below rib cage on right side       Social History     Tobacco Use     Smoking status: Never Smoker     Smokeless tobacco: Never Used   Substance Use Topics     Alcohol use: No     Comment: History of alcohol abuse/sober since ; went to treatment      Family History   Problem Relation Age of Onset     Other - See Comments Mother         pancreatitis     Substance Abuse Mother         alcohol     Mental Illness Mother      Depression Mother      Hypertension Mother      Obesity Mother      Asthma Father      Cancer Father          of leukemia, also had a h/o asthma     Diabetes Father      Other Cancer Father      Melanoma Father      Liver Cancer Brother 53     Cancer Brother         Intestinal cancer, spread to liver     Cancer Maternal Grandmother      Cancer Maternal Grandfather      Bone Cancer Paternal Grandmother      Bone Cancer Paternal Grandfather          Current Outpatient Medications   Medication Sig Dispense Refill     acetaminophen-codeine (TYLENOL #3) 300-30 MG per tablet Take 1-2 tablets by mouth every 6 hours as needed for moderate pain 60 tablet 1     albuterol (PROAIR HFA/PROVENTIL HFA/VENTOLIN HFA) 108 (90 Base) MCG/ACT inhaler Inhale 2 puffs into the lungs every 6 hours as needed for shortness of breath / dyspnea or wheezing - exercise induced wheezing 18 g 11     alprostadil (EDEX) 40 MCG kit Inject 30 - 35 mcg (approx 3/4 ml) as directed. 240 mcg 11     ARIPiprazole (ABILIFY) 15 MG tablet Take 1 tablet (15 mg) by mouth daily 90 tablet 1     aspirin 81 MG tablet Take 1 tablet (81 mg) by mouth daily 100 tablet 3     atorvastatin (LIPITOR) 40 MG tablet Take 1 tablet (40 mg) by mouth daily 90 tablet 1     blood glucose (CONTOUR NEXT TEST) test strip For diabetes, tests 4 times daily 400 strip 3     Blood Pressure KIT 1 Units once a week If BP at rest >140/90, call office. 1 kit 0     buPROPion (WELLBUTRIN XL) 300 MG  "24 hr tablet Take 1 tablet (300 mg) by mouth every morning 90 tablet 3     cetirizine (ZYRTEC) 10 MG tablet Take 10 mg by mouth       DiphenhydrAMINE HCl (BENADRYL PO)        gabapentin (NEURONTIN) 400 MG capsule Take 1 capsule (400 mg) by mouth 3 times daily 270 capsule 0     Insulin Infusion Pump (INSULIN PUMP DN9866) KIT        insulin lispro (HUMALOG) 100 UNIT/ML vial USE AS DIRECTED IN PUMP APPROXIMATELY 80 UNITS PER DAY. 80 mL 3     Insulin Syringe (BD INSULIN SYRINGE) 29G X 1/2\" 1 ML MISC Use as directed 20 each prn     levothyroxine (SYNTHROID/LEVOTHROID) 137 MCG tablet TAKE 1 TABLET(137 MCG) BY MOUTH DAILY 30 tablet 0     losartan-hydrochlorothiazide (HYZAAR) 100-25 MG tablet Take 1 tablet by mouth daily 90 tablet 1     NEW MED Sig:  Inject 0.3 mL intracavernosal as directed.    Max use once daily and up to 3x/week.  Trimix intracavernosal injection, per mL:  PGE1: 40 mcg  Papaverine: 27.6mg  Phentolamine: 1 mg 10 mL 11     NEW MED \"[place] Trimix #8\"  Trimix intercavernosal injection, per mL:  PGE1 20 mcg  Papaverine 27mg  Phentolamine 1 mg     Start at 0.3mL injection  May titrate in 0.1mL increments to lowest effective dose.  Max use one daily, and 3x/week. 5 mL 99     ONE TOUCH TEST STRIPS test strip Use to test blood sugar 4 times daily or as directed. 400 strip 3     ONETOUCH ULTRA test strip Test  four times daily ultra blue (please schedule clinic appt) 400 strip 0     order for DME Equipment being ordered: CPAP machine 1 each 0     rizatriptan (MAXALT) 10 MG tablet Take 1 tablet (10 mg) by mouth at onset of headache for migraine 12 tablet 11     topiramate (TOPAMAX) 50 MG tablet Take 1 tablet (50 mg) by mouth daily 90 tablet 3     Urea 40 % CREA Externally apply topically 2 times daily To surface of toenails 198 g 5     zolpidem (AMBIEN) 10 MG tablet TAKE 1 TABLET BY MOUTH EVERY NIGHT AT BEDTIME AS NEEDED.  NO ADDITIONAL REFILLS WITHOUT PROVIDER VISIT. 30 tablet 0     glucagon (GLUCAGON EMERGENCY) " 1 MG kit Inject 1 mg into the muscle once for 1 dose 1 mg 3       Reviewed and updated as needed this visit by Provider         Review of Systems   ROS COMP: Constitutional, HEENT, cardiovascular, pulmonary, gi and gu systems are negative, except as otherwise noted.       Objective   Reported vitals:  There were no vitals taken for this visit.   healthy, alert and no distress  PSYCH: Alert and oriented times 3; coherent speech, normal   rate and volume, able to articulate logical thoughts, able   to abstract reason, no tangential thoughts, no hallucinations   or delusions  His affect is pleasant  RESP: No cough, no audible wheezing, able to talk in full sentences  Remainder of exam unable to be completed due to telephone visits    Diagnostic Test Results:  Labs reviewed in Epic        Assessment/Plan:  1. Intractable migraine with aura with status migrainosus  Migraine control improved since starting Topamax, refill provided.  - topiramate (TOPAMAX) 50 MG tablet; Take 1 tablet (50 mg) by mouth daily  Dispense: 90 tablet; Refill: 3    2. Dyslipidemia  Refill provided. Due for routine labs.  - atorvastatin (LIPITOR) 40 MG tablet; Take 1 tablet (40 mg) by mouth daily  Dispense: 90 tablet; Refill: 1  - Lipid panel reflex to direct LDL Fasting; Future    3. Essential hypertension  4. Type 1 diabetes mellitus without complication (H)  Refill provided. Encouraged pt to  BP cuff to monitor BPs at home.   - losartan-hydrochlorothiazide (HYZAAR) 100-25 MG tablet; Take 1 tablet by mouth daily  Dispense: 90 tablet; Refill: 1  - Comprehensive metabolic panel; Future    4. Mild episode of recurrent major depressive disorder (H)  Refill provided  - buPROPion (WELLBUTRIN XL) 300 MG 24 hr tablet; Take 1 tablet (300 mg) by mouth every morning  Dispense: 90 tablet; Refill: 3  - ARIPiprazole (ABILIFY) 15 mg tablet; Take 1 tablet (15 mg) by mouth daily  Dispense 90 tablet; Refill: 1    Requested FERNIE from [facility] to review ADHD testing. He is interested in starting medication to treat ADHD. Discussed weanign down on ambien gradually prior to starting anything; as well as potential risks involved with stimulant medications, particularly given his hx DM1, HTN, & hyperlipidemia.    Follow-up in 6 months, sooner as needed for any changes or concerns    Phone call duration:  19 minutes    GALILEA Escalante CNP

## 2020-05-13 NOTE — PROGRESS NOTES
"Luigi Moore is a 59 year old male who is being evaluated via a billable video visit.      The patient has been notified of following:     \"This video visit will be conducted via a call between you and your physician/provider. We have found that certain health care needs can be provided without the need for an in-person physical exam.  This service lets us provide the care you need with a video conversation.  If a prescription is necessary we can send it directly to your pharmacy.  If lab work is needed we can place an order for that and you can then stop by our lab to have the test done at a later time.    Video visits are billed at different rates depending on your insurance coverage.  Please reach out to your insurance provider with any questions.    If during the course of the call the physician/provider feels a video visit is not appropriate, you will not be charged for this service.\"    Patient has given verbal consent for Video visit? Yes    How would you like to obtain your AVS? Yodithart    Patient would like the video invitation sent by: Text to cell phone: [phone]    Will anyone else be joining your video visit? No        Video-Visit Details    Type of service:  Video Visit    Video Start Time: 12:58 PM  Video End Time: 1:29 PM    Originating Location (pt. Location): Home    Distant Location (provider location):  [facility] ENDOCRINOLOGY     Platform used for Video Visit: Doximity    Am Well did not work. Support did not join call.     [facility]  Endocrinology  Arti Kaur PA-C  May 14, 2020       Chief Complaint:   Diabetes    History of Present Illness:   Luigi Moore is a 58 year old male with a history of Acquired hypothyroidism (12/7/2016); Depressive disorder; Hidradenoma (dx: Feb 2015); Hypertension; Numbness and tingling (2015); TELLY (obstructive sleep apnea) (2007); Type 1 diabetes (H) (1982); and Uncomplicated asthma who scheduled a visit for follow-up of his type 1 diabetes.  His " type 1 diabetes is known to be complicated by neuropathy retinopathy depression and erectile dysfunction.    He has previously  been working on his diabetes with Dr. Donn Yuan and  Dr. Szymanski in our clinic.  I met him when he was last seen in clinic in November 2019.    In 2014,  he had hemoglobin A1'c was 12.4. However, since 2016 his hemoglobin A1cs have been in the 7's and 8's.  Maurice has been using a Medtronic CGM to manage his blood glucose in recent years. For insulin, he has been using Humalog at a basal rate of 1.2 units per hour. When at work he often used a temporary basal rate that was 60% of usual and 50% when he bikes.  Previously, he took premeal bolus insulin using 1 unit for each 7 grams of carbohydrate with 1 additional unit for each 20 mg/dL blood glucose was above 120 mg/dL.  He started using Medtronic 670 G pump in 2018, and worked with Anastasia Cowart to transition to auto mode late that year.    When I last saw him in November of 2019. His A1C was 7.6% and reported wanting to decrease it by snacking less, buying less snacks, and eating smaller portion sizes.  His weight was up to 225 lbs (BMI 27.4)  He reported:  works night shifts from 4 pm to 3 am. This is typically when he has his low blood sugars and he also has few lows between 6 - 9 am. He will eat in the morning between 6-10 am and lunch between 11-3 pm and dinner. His lunch is typically his largest meal, however he does not have this every day. He uses his Bolus wizard typically very strictly with meals, and only overrides if he is afraid of going too low when active. However, he does seem to struggle with forgetting to bolus for a meal and overcorrecting leading to hyperglycemic episodes followed by hypoglycemic episodes. For exercise, he rides bike and plays tennis, however due to achilles tendonitits he has not biked since June. This is frustrating as he has since gained 20 lbs since then. He believes this injury in from work and has  "found a new position within [employer] that is not as hard on his tendon. He has been wearing a boot and self treating. Fortunately, he has not been in pain the past few weeks.  He had forgotten to take BP med that day.   Overall, his diabetes was well controlled with Medtronic 670G pump. He is able to remain mostly in auto mode despite frequently missing meal time boluses which lead to hyperglycemic episodes and often multiple over corrections that lead to low blood sugar. We did extend active insulin to help prevent stacking of bolus leading to low BG, and  encouraged him to be mindful about giving bolus before meals. Extended from 2.25 h to 2.5 h, we also changed correction from 1:30 to 1:35 at midnight.    He had recent visit with Dr Lang, still had not picked up home BP monitor, thought he was recovering from COVID infection for which has isolated times 1 month. Resumed work at [employer].  Was prescribed medications for recurrent MDD.     Interval history:   I reached Donn at home for video visit.  He reports that overall he is feeling well.  He continues to struggle with depression but considering everything feels that is rather stable, recently discussed with with his primary care provider Dr. Lang and renewed his prescriptions.  He works to manage this by taking his medications faithfully and exercising.  Normally he enjoys racing bikes, though he notes he is far above racing weight.  He states as soon as he gets on his bike regularly he will be able to bring his weight down.  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  He still has not picked up his blood pressure monitor though did discuss again last week also with Dr. Lang.  He is taking his blood pressure medication regularly " denies headache difficulties with vision any chest pain shortness of breath lightheadedness.  He denies concerns with any significant hypoglycemia at this point.  He has not needed assistance to treat hypoglycemia in the last several years.  He uses smarties as the drug of choice especially at work, and sometimes he may overreact.  He notes that he does tend to see saw a bit with his blood sugars and they rise quickly when he eats.  He knows he should bolus before eating but just does not have this habit, and at times boluses  after eating.     He recalls that indeed he was not taking his levothyroxine regularly on empty stomach prior to his last TSH draw but he has been doing this since.    He uses a beta version of a newer computer and thus is unable to upload his pump or sensor.    He reports that he has great insurance, and coverage is not an issue.    Blood Glucose ?CGM Monitoring:  We reviewed CGM data together.  Sensor Wear (per week): 93% (6d12h)  Average BG last 14 days :172  The estimated A1c is 7.6% per CGM  CGM report states less than 2% hypoglycemia, 50% in range from 70-1 20, and 37% above range ( greater than 120)      Current Insulin Pump Settings -reviewed:  Type of Pump: Medtronic 670G  BASAL RATES and times:  12   AM (midnight): 1.2 units/hour    10   AM: 1.0 units/hour   6    PM: 1.2 units/hour   Carb ratio:   CARB RATIO and times:  12   AM (midnight): 15.0  Basal 57% 24U :  Bolus 43% 18U  Correction Factor (Sensitivity) and times:  12   AM (midnight): 35 mg/dL  Target BG Ranges:   BLOOD GLUCOSE TARGET and times:  12   AM (midnight): 90 - 120  Amount of Time Insulin is Active:  2.5 hrs      Avg BG last 14 days: 172     24.65 avg basal units /day  TDD 49  Bolus 25.925 at 51%    Meals are stable.  Trying to break habit of     Diabetes monitoring and complications:  CAD: No  Last eye exam results: retinopathy, Last exam 11/25/2019 Mild/mod NPDR OU with DM I no DME - Recc f/u in 1 y.    Microalbuminuria: no on 4/10/2018  Neuropathy: Yes mild diminished sensation on last monofilament exam  HTN: Yes on Hyzaar  On Statin: Yes on atorvastatin  On Aspirin: Yes  Depression: Yes  Erectile dysfunction: No    Patient Supplied Answers To Diabetic Questionnaire  including 5/6/2020   1. Since your last visit to our clinic (or if this your first visit, since you last saw your primary care provider), have you experienced any of the following symptoms that may be related to low blood sugars? No, I have not experienced any of these symptoms   2. Since your last visit to our clinic (or if this your first visit, since you last saw your primary care provider), have you experienced any of the following symptoms that may be related to prolonged high blood sugars? No, I have not experienced any of these symptoms   4. Do you have any female family members who have had heart attacks or strokes before age 60 or male relatives who have had heart attacks or strokes before age 50? No   5. Do you have any family members who have had complications from diabetes such as kidney disease, heart disease or strokes, retinopathy (a vision problem), or amputations? No   6. Who do you live with?  No one   Little interest or pleasure in doing things? Several days   Feeling down, depressed, or hopeless? Several days        Hypothyroidism:  Currently taking levothyroxine 137 mcg daily TSH elevated when checked In December. Dose remains the same.  She is now mindful to try and take on an empty stomach.    Hypertension:  He has had elevated blood pressure in recent clinic visits.  He believes his insurance will cover a home blood pressure cuff but he has not yet picked it up.      Review of Systems:   Pertinent items are noted in HPI.  All other systems are negative.    Active Medications:      acetaminophen-codeine (TYLENOL #3) 300-30 MG per tablet, Take 1-2 tablets by mouth every 6 hours as needed for moderate pain, Disp: 60 tablet, Rfl:  "1     albuterol (PROAIR HFA/PROVENTIL HFA/VENTOLIN HFA) 108 (90 Base) MCG/ACT inhaler, Inhale 2 puffs into the lungs every 6 hours as needed for shortness of breath / dyspnea or wheezing - exercise induced wheezing, Disp: 18 g, Rfl: 11     alprostadil (EDEX) 40 MCG kit, Inject 30 - 35 mcg (approx 3/4 ml) as directed., Disp: 240 mcg, Rfl: 11     ARIPiprazole (ABILIFY) 15 MG tablet, Take 1 tablet (15 mg) by mouth daily, Disp: 90 tablet, Rfl: 1     aspirin 81 MG tablet, Take 1 tablet (81 mg) by mouth daily, Disp: 100 tablet, Rfl: 3     atorvastatin (LIPITOR) 40 MG tablet, Take 1 tablet (40 mg) by mouth daily, Disp: 90 tablet, Rfl: 1     buPROPion (WELLBUTRIN XL) 300 MG 24 hr tablet, Take 1 tablet (300 mg) by mouth every morning, Disp: 90 tablet, Rfl: 3     cetirizine (ZYRTEC) 10 MG tablet, Take 10 mg by mouth, Disp: , Rfl:      DiphenhydrAMINE HCl (BENADRYL PO), , Disp: , Rfl:      gabapentin (NEURONTIN) 400 MG capsule, Take 1 capsule (400 mg) by mouth 3 times daily, Disp: 270 capsule, Rfl: 0     glucagon (GLUCAGON EMERGENCY) 1 MG kit, Inject 1 mg into the muscle once for 1 dose, Disp: 1 mg, Rfl: 3     Insulin Infusion Pump (INSULIN PUMP LI9778) KIT, , Disp: , Rfl:      insulin lispro (HUMALOG) 100 UNIT/ML vial, Use as directed in pump Approx 70-80 Units per day Need to be seen, Disp: 80 mL, Rfl: 1     levothyroxine (SYNTHROID/LEVOTHROID) 137 MCG tablet, TAKE 1 TABLET(137 MCG) BY MOUTH DAILY, Disp: 30 tablet, Rfl: 0     losartan-hydrochlorothiazide (HYZAAR) 100-25 MG tablet, Take 1 tablet by mouth daily, Disp: 90 tablet, Rfl: 1     [facility] MED, \"[place] Trimix #8\" Trimix intercavernosal injection, per mL: PGE1 20 mcg Papaverine 27mg Phentolamine 1 mg   Start at 0.3mL injection May titrate in 0.1mL increments to lowest effective dose. Max use one daily, and 3x/week., Disp: 5 mL, Rfl: 99     rizatriptan (MAXALT) 10 MG tablet, Take 1 tablet (10 mg) by mouth at onset of headache for migraine, Disp: 12 tablet, Rfl: 11     " topiramate (TOPAMAX) 25 MG tablet, TAKE 50MG BY MOUTH ONCE A DAY OR 25MG TWICE A DAY, Disp: 90 tablet, Rfl: 0     Urea 40 % CREA, Externally apply topically 2 times daily To surface of toenails, Disp: 198 g, Rfl: 5     zolpidem (AMBIEN) 10 MG tablet, TAKE 1 TABLET BY MOUTH EVERY NIGHT AT BEDTIME PRN.  Pt needs to be seen, Disp: 30 tablet, Rfl: 0     lidocaine (PF) (XYLOCAINE) 1 % injection 10 mL, 10 mL, Intradermal, Once, Kathya Lang APRN CNP      Allergies:   Diagnostic x-ray materials; Contrast dye; and Diatrizoate      Past Medical History:  Acquired hypothyroidism  Depression  Hidradenoma   Hypertension  Obstructive sleep apnea  Type 1 diabetes with proliferative retinopathy  Uncomplicated asthma      Past Surgical History:  Excise lesion hand  Orthopedic surgery on achilles tendon  Right hand surgery   Lipoma removal below rib cage on right side     Family History:   pancratitis - mother  Substance abuse - mother  Mental illness - mother  Depression - mother  Hypertension - mother  Obesity - mother  Asthma - father   Leukemia - father   Diabetes - father  Melanoma - father   Liver cancer - brother  Intestinal cancer - brother  Cancer - maternal grandmother, maternal grandfather   Bone cancer - paternal grandmother, paternal grandfather       Social History:   This patient presented alone.   Smoking status: never  Smokeless tobacco: never  Alcohol use: no, history of alcohol abuse, sober since 2015  Drug use: no     Physical Exam:   There were no vitals taken for this visit.     Wt Readings from Last 10 Encounters:   11/14/19 102.2 kg (225 lb 5 oz)   11/14/19 102.2 kg (225 lb 3.2 oz)   05/22/19 99.8 kg (220 lb)   10/12/18 99.5 kg (219 lb 6.4 oz)   09/04/18 101.2 kg (223 lb)   08/15/18 100.2 kg (220 lb 14.4 oz)   08/10/18 99.8 kg (220 lb)   06/21/18 99.8 kg (220 lb 1.6 oz)   04/10/18 106.9 kg (235 lb 11.2 oz)   01/26/18 105.3 kg (232 lb 3.2 oz)        PHONE VISIT    Luigi is alerted and  "oriented.  Appears comfortable ambulating around his home, with phone video.  Mood is \"good,\" affect is congruent.  Thoughtful form and content are fluid and coherent.  No signs of distress are appreciated.         Data:  Lab Results   Component Value Date     06/21/2018    POTASSIUM 3.7 12/06/2019    CHLORIDE 102 06/21/2018    CO2 26 06/21/2018    ANIONGAP 8 06/21/2018     (H) 06/21/2018    BUN 8 06/21/2018    CR 0.78 06/21/2018    NEVA 8.6 06/21/2018     Lab Results   Component Value Date    GFRESTIMATED >90 06/21/2018    GFRESTIMATED >90 04/10/2018    GFRESTIMATED >90  Non  GFR Calc   12/07/2016    GFRESTBLACK >90 06/21/2018    GFRESTBLACK >90 04/10/2018    GFRESTBLACK >90   GFR Calc   12/07/2016      Lab Results   Component Value Date    MICROL 13 12/06/2019    UMALCR 5.32 12/06/2019        Lab Results   Component Value Date    A1C 7.6 (H) 04/10/2018    A1C 8.7 (H) 12/07/2016    A1C 8.1 (H) 02/17/2015    A1C 10.0 (A) 01/17/2013    HEMOGLOBINA1 7.6 (A) 11/14/2019    HEMOGLOBINA1 7.8 (A) 08/15/2018    HEMOGLOBINA1 8.0 (A) 03/22/2017    HEMOGLOBINA1 9.4 (A) 02/17/2015    HEMOGLOBINA1 12.7 (A) 03/27/2014     No results found for: CPEPT, GADAB, ISCAB    Lab Results   Component Value Date    CHOL 173 04/10/2018    CHOL 194 07/17/2012    TRIG 68 04/10/2018    TRIG 104 07/17/2012    HDL 70 04/10/2018    HDL 56 07/17/2012    LDL 66 10/05/2018    LDL 89 04/10/2018    NHDL 103 04/10/2018     TSH   Date Value Ref Range Status   12/06/2019 4.62 (H) 0.40 - 4.00 mU/L Final         Assessment and Plan:  Maurice has type 1 diabetes that continues to be well controlled near goal with Medtronic 670G pump.  Estimated A1c in the mid sevens.  He is having a limited hypoglycemia with activity      Type 1 diabetes mellitus with multiple complications:    Advised today:    1. Get BP cuff.  If BP at rest after 5 minutes seated and relaxed is >140/90 (either number above goal) contact myself or  " Precious.    2. Work on giving meal bolus 10 - 15 minutes prior to meal.  You may note with this that you need to less carbohydrate coverage. Do let me know if this is leading to lows 2-4 hours after meals as we would want to decrease carb coverage.  3. Continue to take Levothyroxine daily on empty stomach and have lab drawn.      Continued insulin Pump Settings:  Type of Pump: Medtronic Minimed  BASAL RATES and times:  12   AM (midnight): 1.2 units/hour    10   AM: 1.0 units/hour   6    PM: 1.2 units/hour   Carb ratio:   CARB RATIO and times:  12   AM (midnight): 15.0  Corection Factor (Sensitivity) and times:  12   AM (midnight): 30 mg/dL  Target BG Ranges:   BLOOD GLUCOSE TARGET and times:  12   AM (midnight): 90 - 120  Amount of Time Insulin is Active:  2:30 hrs     2. Hypothyroidism:  Continue to take Levothyroxine daily on empty stomach and have lab drawn.    3. Hypertension  Related the importance of BP control in preventing complications.  Encouraged medication adherence.   Get BP cuff.  If BP at rest after 5 minutes seated and relaxed is >140/90 (either number above goal) contact myself or Dr. Lang.       Follow-up: Return in about 6 months (around 5/14/2020).  Sooner if questions or frequent blood sugar below 70, certainly for any occurrence of severe hypoglycemia necessitating help of another to treat.    >50% of 40 minute visit spent in face to face counseling, education and coordination of care related to options for better glycemic control as well as preventing, detecting, and treating hypoglycemia.      It is my privilege to be involved in the care of the above patient.     Arti Kaur PA-C, MPAS  [facility]  Diabetes, Endocrinology, and Metabolism  [phone] Appointments/Nurse  [phone] Fax  [phone] pager  [phone] nurse line

## 2020-05-14 ENCOUNTER — VIRTUAL VISIT (OUTPATIENT)
Dept: ENDOCRINOLOGY | Facility: CLINIC | Age: 60
End: 2020-05-14
Payer: COMMERCIAL

## 2020-05-14 DIAGNOSIS — E03.9 ACQUIRED HYPOTHYROIDISM: Primary | ICD-10-CM

## 2020-05-14 DIAGNOSIS — E10.8 TYPE 1 DIABETES MELLITUS WITH COMPLICATIONS (H): ICD-10-CM

## 2020-05-14 NOTE — PATIENT INSTRUCTIONS
It is always good to visit with you Maurice!    Keep up your good work managing your diabetes.  Things to work on:    1. Get BP cuff.  If BP at rest after 5 minutes seated and relaxed is >140/90 (either number above goal) contact myself or Dr. Lang.    2. Work on giving meal bolus 10 - 15 minutes prior to meal.  You may note with this that you need to less carbohydrate coverage. Do let me know if this is leading to lows 2-4 hours after meals as we would want to decrease carb coverage.  3. Continue to take Levothyroxine daily on empty stomach and have lab drawn.    Stay safe, stay healthy!    My best wishes,    Arti Kaur PA-C, MPAS  [facility]  Diabetes, Endocrinology, and Metabolism  [phone] Appointments/Nurse  [phone] Fax  [phone] [place]  [phone] [place]  [phone] URGENTafter hours/weekend Endocrinologist on call

## 2020-07-16 ENCOUNTER — AMBULATORY - HEALTHEAST (OUTPATIENT)
Dept: FAMILY MEDICINE | Facility: CLINIC | Age: 60
End: 2020-07-16

## 2020-07-16 ENCOUNTER — VIRTUAL VISIT (OUTPATIENT)
Dept: FAMILY MEDICINE | Facility: OTHER | Age: 60
End: 2020-07-16
Payer: COMMERCIAL

## 2020-07-16 DIAGNOSIS — Z20.822 SUSPECTED COVID-19 VIRUS INFECTION: ICD-10-CM

## 2020-07-16 PROCEDURE — 99421 OL DIG E/M SVC 5-10 MIN: CPT | Performed by: PHYSICIAN ASSISTANT

## 2020-07-16 NOTE — PROGRESS NOTES
"Date: 2020 12:25:05  Clinician: Westley Diaz  Clinician NPI: [id number]  Patient: Luigi Moore  Patient : 1960  Patient Address: [address] [place] [address]  Patient Phone: [phone]  Visit Protocol: URI  Patient Summary:  Luigi is a 59 year old ( : 1960 ) male who initiated a Visit for COVID-19 (Coronavirus) evaluation and screening. When asked the question \"Please sign me up to receive news, health information and promotions from "[employer].\", Luigi responded \"Yes\".    Luigi states his symptoms started suddenly 5-6 days ago.   His symptoms consist of a cough.   Symptom details   Cough: Luigi coughs a few times an hour and his cough is not more bothersome at night. Phlegm comes into his throat when he coughs. He does not believe his cough is caused by post-nasal drip. The color of the phlegm is clear.    Luigi denies having wheezing, nausea, teeth pain, ageusia, diarrhea, vomiting, rhinitis, malaise, ear pain, headache, chills, sore throat, enlarged lymph nodes, myalgias, anosmia, facial pain or pressure, fever, and nasal congestion. He also denies having recent facial or sinus surgery in the past 60 days, taking antibiotic medication in the past month, and double sickening (worsening symptoms after initial improvement). He is not experiencing dyspnea.   Precipitating events  He has not recently been exposed to someone with influenza. Luigi has not been in close contact with any high risk individuals.   Pertinent COVID-19 (Coronavirus) information  In the past 14 days, Luigi has not worked in a congregate living setting.   He does not work or volunteer as healthcare worker or a  and does not work or volunteer in a healthcare facility.   Luigi also has not lived in a congregate living setting in the past 14 days. He does not live with a healthcare worker.   Luigi has not had a close contact with a laboratory-confirmed COVID-19 patient " within 14 days of symptom onset.   Pertinent medical history  Luigi needs a return to work/school note.   Weight: 220 lbs   Luigi does not smoke or use smokeless tobacco.   Weight: 220 lbs    MEDICATIONS: levothyroxine oral, Abilify oral, Humalog U-100 Insulin subcutaneous, bupropion HCl oral, ALLERGIES: NKDA  Clinician Response:  Dear Luigi,   Your symptoms show that you may have coronavirus (COVID-19). This illness can cause fever, cough and trouble breathing. Many people get a mild case and get better on their own. Some people can get very sick.  Based on the symptoms you have shared, I would like you to be re-checked in 2 to 3 days. Please call your family clinic to set up a video or phone visit.  Will I be tested for COVID-19?  We would like to test you for this virus.   Please call [phone] to schedule your visit. Explain that you were referred by [facility] to have a COVID-19 test. Be ready to share your [facility] visit ID number.   The following will serve as your written order for this COVID Test, ordered by me, for the indication of suspected COVID [Z20.828]: The test will be ordered in [email or web address], our electronic health record, after you are scheduled. It will show as ordered and authorized by Elpidio Ortiz MD.  Order: COVID-19 (Coronavirus) PCR for SYMPTOMATIC testing from [facility].  1.When it's time for your COVID test:   Stay at least 6 feet away from others. (If someone will drive you to your test, stay in the backseat, as far away from the  as you can.)   Cover your mouth and nose with a mask, tissue or washcloth.  Go straight to the testing site. Don't make any stops on the way there or back.      2.Starting now: Stay home and away from others (self-isolate) until:   You've had no fever---and no medicine that reduces fever---for 3 full days (72 hours). And...   Your other symptoms have gotten better. For example, your cough or breathing has improved. And...   At least 10 days have passed since your  "symptoms started.       During this time, don't leave the house except for testing or medical care.   Stay in your own room, even for meals. Use your own bathroom if you can.   Stay away from others in your home. No hugging, kissing or shaking hands. No visitors.  Don't go to work, school or anywhere else.    Clean \"high touch\" surfaces often (doorknobs, counters, handles, etc.). Use a household cleaning spray or wipes. You'll find a full list of  on the EPA website: www.epa.gov/pesticide-registration/list-n-disinfectants-use-against-sars-cov-2.   Cover your mouth and nose with a mask, tissue or washcloth to avoid spreading germs.  Wash your hands and face often. Use soap and water.  Caregivers in these groups are at risk for severe illness due to COVID-19:  o People 65 years and older  o People who live in a nursing home or long-term care facility  o People with chronic disease (lung, heart, cancer, diabetes, kidney, liver, immunologic)   o People who have a weakened immune system, including those who:   Are in cancer treatment  Take medicine that weakens the immune system, such as corticosteroids  Had a bone marrow or organ transplant  Have an immune deficiency  Have poorly controlled HIV or AIDS  Are obese (body mass index of 40 or higher)  Smoke regularly   o Caregivers should wear gloves while washing dishes, handling laundry and cleaning bedrooms and bathrooms.  o Use caution when washing and drying laundry: Don't shake dirty laundry, and use the warmest water setting that you can.  o For more tips, go to www.cdc.gov/coronavirus/2019-ncov/downloads/10Things.pdf.      How can I take care of myself?   Get lots of rest. Drink extra fluids (unless a doctor has told you not to)   Take Tylenol (acetaminophen) for fever or pain. If you have liver or kidney problems, ask your family doctor if it's okay to take Tylenol.   Adults can take either:    650 mg (two 325 mg pills) every 4 to 6 hours, or...   1,000 mg " (two 500 mg pills) every 8 hours as needed.    Note: Don't take more than 3,000 mg in one day. Acetaminophen is found in many medicines (both prescribed and over-the-counter medicines). Read all labels to be sure you don't take too much.   For children, check the Tylenol bottle for the right dose. The dose is based on the child's age or weight.    If you have other health problems (like cancer, heart failure, an organ transplant or severe kidney disease): Call your specialty clinic if you don't feel better in the next 2 days.       Know when to call 911. Emergency warning signs include:    Trouble breathing or shortness of breath Pain or pressure in the chest that doesn't go away Feeling confused like you haven't felt before, or not being able to wake up Bluish-colored lips or face  Where can I get more information?   [facility] -- About COVID-19: [email or web address]   CDC -- What to Do If You're Sick: www.cdc.gov/coronavirus/2019-ncov/about/steps-when-sick.html   CDC -- Ending Home Isolation: www.cdc.gov/coronavirus/2019-ncov/hcp/disposition-in-home-patients.html   CDC -- Caring for Someone: www.cdc.gov/coronavirus/2019-ncov/if-you-are-sick/care-for-someone.html   [facility] -- Interim Guidance for Hospital Discharge to Home: www.health.[facility].[place]/diseases/coronavirus/hcp/hospdischarge.pdf   [facility] clinical trials (COVID-19 research studies): clinicalaffairs.[facility]-clinical-trials    Below are the COVID-19 hotlines at the [place] Department of Health ([facility]). Interpreters are available.    For health questions: Call 527-812-8530 or 1-360.352.2384 (7 a.m. to 7 p.m.) For questions about schools and childcare: Call 978-806-6043 or 1-360.537.4184 (7 a.m. to 7 p.m.)       Diagnosis: Cough  Diagnosis ICD: R05

## 2020-07-17 ENCOUNTER — AMBULATORY - HEALTHEAST (OUTPATIENT)
Dept: FAMILY MEDICINE | Facility: CLINIC | Age: 60
End: 2020-07-17

## 2020-07-17 DIAGNOSIS — Z20.822 SUSPECTED COVID-19 VIRUS INFECTION: ICD-10-CM

## 2020-07-23 ENCOUNTER — COMMUNICATION - HEALTHEAST (OUTPATIENT)
Dept: FAMILY MEDICINE | Facility: CLINIC | Age: 60
End: 2020-07-23

## 2020-10-08 ENCOUNTER — MEDICAL CORRESPONDENCE (OUTPATIENT)
Dept: HEALTH INFORMATION MANAGEMENT | Facility: CLINIC | Age: 60
End: 2020-10-08

## 2020-10-08 ENCOUNTER — DOCUMENTATION ONLY (OUTPATIENT)
Dept: ENDOCRINOLOGY | Facility: CLINIC | Age: 60
End: 2020-10-08

## 2020-10-29 DIAGNOSIS — N52.9 VASCULOGENIC ERECTILE DYSFUNCTION, UNSPECIFIED VASCULOGENIC ERECTILE DYSFUNCTION TYPE: ICD-10-CM

## 2020-10-31 NOTE — TELEPHONE ENCOUNTER
"  Insulin Syringe (BD INSULIN SYRINGE) 29G X 1/2\" 1 ML MISC   Last Written Prescription Date:  8/10/18  Last Fill Quantity: 20ea,   # refills: prn  Last Office Visit : 11/14/19  Future Office visit:  None    Routing refill request to provider for review/approval because:  last written 8/18 by urology. Is this for  [place] Trimix #8\"  ? Rf? Pt also on insulin. ?  "

## 2020-11-02 RX ORDER — IBUPROFEN 200 MG
TABLET ORAL
Qty: 20 EACH | Refills: 1 | Status: SHIPPED | OUTPATIENT
Start: 2020-11-02

## 2020-11-12 DIAGNOSIS — R06.2 WHEEZING: ICD-10-CM

## 2020-11-16 DIAGNOSIS — E10.8 TYPE 1 DIABETES MELLITUS WITH COMPLICATIONS (H): ICD-10-CM

## 2020-11-16 DIAGNOSIS — E03.9 ACQUIRED HYPOTHYROIDISM: ICD-10-CM

## 2020-11-16 DIAGNOSIS — B35.1 ONYCHOMYCOSIS: ICD-10-CM

## 2020-11-16 DIAGNOSIS — E78.5 DYSLIPIDEMIA: ICD-10-CM

## 2020-11-16 RX ORDER — ALBUTEROL SULFATE 90 UG/1
2 AEROSOL, METERED RESPIRATORY (INHALATION) EVERY 6 HOURS PRN
Qty: 18 G | Refills: 5 | Status: SHIPPED | OUTPATIENT
Start: 2020-11-16 | End: 2021-12-23

## 2020-11-16 NOTE — TELEPHONE ENCOUNTER
albuterol (PROAIR HFA/PROVENTIL HFA/VENTOLIN HFA) 108 (90 Base) MCG/ACT inhaler  Last Written Prescription Date:  11/5/18  Last Fill Quantity: 18g,   # refills: 11  Last Office Visit : 5/8/20  Future Office visit: none      Routing refill request to provider for review/approval because: gap in med rf. act

## 2020-11-19 NOTE — TELEPHONE ENCOUNTER
gabapentin (NEURONTIN) 400 MG capsule    Last Written Prescription Date:  7/14/2019  Last Fill Quantity: 270,   # refills: 0  Last Office Visit : 5/14/2020  Future Office visit:  None  Routing refill request to provider for review/approval because:  Drug not on the [facility], P or [facility] refill protocol or controlled substance      Esme Pierre RN  Central Triage Red Flags/Med Refills

## 2020-11-23 ENCOUNTER — TELEPHONE (OUTPATIENT)
Dept: ENDOCRINOLOGY | Facility: CLINIC | Age: 60
End: 2020-11-23

## 2020-11-23 NOTE — TELEPHONE ENCOUNTER
My chart message sent to contact PCP for Gabapentin renewal. Paty Coto RN on 11/23/2020 at 4:13 PM

## 2020-11-23 NOTE — TELEPHONE ENCOUNTER
----- Message from Arti Kaur PA-C sent at 11/23/2020 11:52 AM CST -----  Regarding: Renewing old prescription  Please advise he schedule with PCP if he wishes to restart Gabapentin.  With thanks,  Arti   ----- Message -----  From: SYSTEM  Sent: 11/19/2020  11:54 PM CST  To: Arti Kaur PA-C

## 2020-11-24 RX ORDER — GABAPENTIN 400 MG/1
400 CAPSULE ORAL 3 TIMES DAILY
Qty: 270 CAPSULE | OUTPATIENT
Start: 2020-11-24

## 2020-11-24 NOTE — TELEPHONE ENCOUNTER
Per Endocrine - Arti Kaur PA-C, deferring to primary provider to discuss restarting Gabapentin. *see chart note 11/23/2020     I called pharmacy and updated them on status of refill request.  They will let patient know we are working on refill and request has been forwarded to PCP.      Sending message to Kathya Lang's nurse.

## 2020-12-07 DIAGNOSIS — N52.9 VASCULOGENIC ERECTILE DYSFUNCTION, UNSPECIFIED VASCULOGENIC ERECTILE DYSFUNCTION TYPE: ICD-10-CM

## 2020-12-08 NOTE — TELEPHONE ENCOUNTER
"[place] Trimix #8\"      Last Written Prescription Date:  11/14/2019  Last Fill Quantity: 10,   # refills: 11  Last Office Visit : 5/14/2020  Future Office visit:  None    Routing refill request to provider for review/approval because:  Drug not on the [facility], [facility] or  Health refill protocol or controlled substance      Esme Pierre RN  Central Triage Red Flags/Med Refills      "

## 2020-12-11 DIAGNOSIS — E10.8 TYPE 1 DIABETES MELLITUS WITH COMPLICATIONS (H): ICD-10-CM

## 2020-12-15 NOTE — TELEPHONE ENCOUNTER
LEVOTHYROXINE 0.137MG (137MCG) TAB  Last Written Prescription Date:  9/17/2019  Last Fill Quantity: 30,   # refills: 0  Last Office Visit : 5/14/2020  Future Office visit:  None    Routing refill request to provider for review/approval because:  Second Request     Over Due TSH  Refer to Provider for review     Recent Labs   Lab Test 12/06/19  1135   TSH 4.62*       Esme Pierre RN  Central Triage Red Flags/Med Refills

## 2020-12-17 DIAGNOSIS — N52.9 ERECTILE DYSFUNCTION, UNSPECIFIED ERECTILE DYSFUNCTION TYPE: ICD-10-CM

## 2020-12-17 RX ORDER — LEVOTHYROXINE SODIUM 137 UG/1
137 TABLET ORAL DAILY
Qty: 30 TABLET | Refills: 1 | Status: SHIPPED | OUTPATIENT
Start: 2020-12-17 | End: 2021-10-15

## 2021-01-04 ENCOUNTER — DOCUMENTATION ONLY (OUTPATIENT)
Dept: ENDOCRINOLOGY | Facility: CLINIC | Age: 61
End: 2021-01-04

## 2021-01-14 ENCOUNTER — HEALTH MAINTENANCE LETTER (OUTPATIENT)
Age: 61
End: 2021-01-14

## 2021-02-02 ENCOUNTER — MEDICAL CORRESPONDENCE (OUTPATIENT)
Dept: HEALTH INFORMATION MANAGEMENT | Facility: CLINIC | Age: 61
End: 2021-02-02

## 2021-03-12 DIAGNOSIS — E10.8 TYPE 1 DIABETES MELLITUS WITH COMPLICATIONS (H): ICD-10-CM

## 2021-03-12 DIAGNOSIS — E03.9 ACQUIRED HYPOTHYROIDISM: ICD-10-CM

## 2021-03-12 DIAGNOSIS — E78.5 DYSLIPIDEMIA: ICD-10-CM

## 2021-03-12 DIAGNOSIS — B35.1 ONYCHOMYCOSIS: ICD-10-CM

## 2021-03-16 DIAGNOSIS — N52.9 VASCULOGENIC ERECTILE DYSFUNCTION, UNSPECIFIED VASCULOGENIC ERECTILE DYSFUNCTION TYPE: ICD-10-CM

## 2021-03-16 NOTE — TELEPHONE ENCOUNTER
" Health Call Center    Phone Message    May a detailed message be left on voicemail: yes     Reason for Call: Medication Refill Request    Has the patient contacted the pharmacy for the refill? Yes   Name of medication being requested: [place] Trimix #8\"   Provider who prescribed the medication: Kenneth  Pharmacy: [facility] - [place], [place] - [address]  Date medication is needed: asap- per pts mychart      Action Taken: Message routed to:  [facility] Urology    Travel Screening: Not Applicable                                                                        "

## 2021-03-16 NOTE — TELEPHONE ENCOUNTER
"\"[place] Trimix #8\"       Last Written Prescription Date:  8-  Last Fill Quantity: 5ml,   # refills: 99  Last Office Visit : 11-  Future Office visit:  5-    Routing refill request to provider for review/approval because:  Last ordered over 2 years ago.  Not on protocol.        Kathleen M Doege RN        "

## 2021-03-17 NOTE — TELEPHONE ENCOUNTER
Hi all,      Please call the patient to schedule a virtual or in clinic follow up Dr. Cooper to renew his trimix medication for ED.    Thanks, Wally Hair MA

## 2021-03-31 ENCOUNTER — MYC MEDICAL ADVICE (OUTPATIENT)
Dept: ENDOCRINOLOGY | Facility: CLINIC | Age: 61
End: 2021-03-31

## 2021-03-31 DIAGNOSIS — E10.8 TYPE 1 DIABETES MELLITUS WITH COMPLICATIONS (H): Primary | ICD-10-CM

## 2021-03-31 RX ORDER — BLOOD SUGAR DIAGNOSTIC
STRIP MISCELLANEOUS
Qty: 100 STRIP | Refills: 11 | Status: SHIPPED | OUTPATIENT
Start: 2021-03-31 | End: 2022-06-21

## 2021-04-01 ENCOUNTER — TELEPHONE (OUTPATIENT)
Dept: ENDOCRINOLOGY | Facility: CLINIC | Age: 61
End: 2021-04-01

## 2021-04-08 DIAGNOSIS — N52.9 VASCULOGENIC ERECTILE DYSFUNCTION, UNSPECIFIED VASCULOGENIC ERECTILE DYSFUNCTION TYPE: ICD-10-CM

## 2021-04-12 NOTE — TELEPHONE ENCOUNTER
NEW MED  Last Written Prescription Date:  11/14/19  Last Fill Quantity: 10ml,   # refills: 11  Last Office Visit : 11/14/19  Future Office visit: 5/20/21    Routing refill request to provider for review/approval because:  not on protocol

## 2021-05-05 ENCOUNTER — PRE VISIT (OUTPATIENT)
Dept: UROLOGY | Facility: CLINIC | Age: 61
End: 2021-05-05

## 2021-05-05 NOTE — TELEPHONE ENCOUNTER
Reason for visit: Follow up     Relevant information: medication refill; hx of ED    Records/imaging/labs/orders: in EPIC    Pt called: no    At Rooming: normal

## 2021-05-20 ENCOUNTER — VIRTUAL VISIT (OUTPATIENT)
Dept: UROLOGY | Facility: CLINIC | Age: 61
End: 2021-05-20
Payer: COMMERCIAL

## 2021-05-20 DIAGNOSIS — N52.9 ERECTILE DYSFUNCTION, UNSPECIFIED ERECTILE DYSFUNCTION TYPE: ICD-10-CM

## 2021-05-20 PROCEDURE — 99213 OFFICE O/P EST LOW 20 MIN: CPT | Mod: 95 | Performed by: UROLOGY

## 2021-05-20 ASSESSMENT — PAIN SCALES - GENERAL: PAINLEVEL: NO PAIN (0)

## 2021-05-20 NOTE — LETTER
5/20/2021       RE: Luigi Moore  [address] [place]  [address]  [facility] [address]     Dear Colleague,    Thank you for referring your patient, Luigi Moore, to the [facility]OLOGY [facility] [place] at [facility]. Please see a copy of my visit note below.    Maurice is a 60 year old who is being evaluated via a billable video visit.      Video Visit Technology for this patient: avandeo Video Visit- Patient was left in waiting room    How would you like to obtain your AVS? MyChart  If the video visit is dropped, the invitation should be resent by: Text to cell phone: [phone]  Will anyone else be joining your video visit? No      Video Start Time: 8:46 AM  Video-Visit Details    Type of service:  Video Visit    Video End Time:8:55 AM    Originating Location (pt. Location): Home    Distant Location (provider location):  [facility] UROLOGY [facility]     Platform used for Video Visit: avandeo      HPI:  Luigi Moore is a 60 year old male being seen for follow-up Trimix prescription.  Duration of problem: years  Previous treatments: Trimix #4    He has been using Trimix No. 4, at 0.6 mL dosage.  This works so-so for him, it is a bit hit or miss.  When he does work, the erection is there about 1 hour.    Exam:  Exam  General- Alert, oriented, nad.  Pleasant and conversant.  Eyes- anicteric, EOMI.  Resps- normal, non-labored.  No cough  Abdomen-  nondistended.   exam- deferred.   Neurological - no tremors  Skin - no discoloration/ lesions noted  Psychiatric - no anxiety, alert & oriented.      The rest of a comprehensive physical examination is deferred due to video visit restrictions.    Review of Imaging:  The following imaging exams were independently viewed and interpreted by me and discussed with patient:  N/A     Review of Labs:  The following labs were reviewed by me and discussed with the patient:  N/A     Assessment & Plan      Erectile dysfunction, unspecified erectile dysfunction type    I renewed his Trimix prescription, discussed that he can gradually titrate up on the dose little bit.  Caution to avoid priapism.  Trimix #4    Sig:  Inject 0.6 mL intracavernosal as directed.    Discussed that if injections end up not being satisfactory, that a penile implant would be advised, and he is aware of these, and would be interested in the future.  He feels he will eventually need the surgery.  He has also tried a vacuum device which was not satisfactory.    Return to clinic 1 year, sooner if needed.       Johnathan Cooper MD  [facility]OLOGY [facility] [place]      ==========================      Additional Coding Information:    Problems:  3 -- one stable chronic illness    Data Reviewed  none    Level of risk:  4 -- prescription drug management    Time spent:  15 minutes spent on the date of the encounter doing chart review, history and exam, documentation and further activities per the note.  This includes the video chat time.

## 2021-05-20 NOTE — PROGRESS NOTES
Maurice is a 60 year old who is being evaluated via a billable video visit.      Video Visit Technology for this patient: [employer] Video Visit- Patient was left in waiting room    How would you like to obtain your AVS? MyChart  If the video visit is dropped, the invitation should be resent by: Text to cell phone: [phone]  Will anyone else be joining your video visit? No      Video Start Time: 8:46 AM  Video-Visit Details    Type of service:  Video Visit    Video End Time:8:55 AM    Originating Location (pt. Location): Home    Distant Location (provider location):  [facility] UROLOGY CLINIC [place]     Platform used for Video Visit: [employer]      HPI:  Luigi Moore is a 60 year old male being seen for follow-up Trimix prescription.  Duration of problem: years  Previous treatments: Trimix #4    He has been using Trimix No. 4, at 0.6 mL dosage.  This works so-so for him, it is a bit hit or miss.  When he does work, the erection is there about 1 hour.    Exam:  Exam  General- Alert, oriented, nad.  Pleasant and conversant.  Eyes- anicteric, EOMI.  Resps- normal, non-labored.  No cough  Abdomen-  nondistended.   exam- deferred.   Neurological - no tremors  Skin - no discoloration/ lesions noted  Psychiatric - no anxiety, alert & oriented.      The rest of a comprehensive physical examination is deferred due to video visit restrictions.    Review of Imaging:  The following imaging exams were independently viewed and interpreted by me and discussed with patient:  N/A     Review of Labs:  The following labs were reviewed by me and discussed with the patient:  N/A     Assessment & Plan     Erectile dysfunction, unspecified erectile dysfunction type    I renewed his Trimix prescription, discussed that he can gradually titrate up on the dose little bit.  Caution to avoid priapism.  Trimix #4    Sig:  Inject 0.6 mL intracavernosal as directed.    Discussed that if injections end up not being satisfactory, that a  penile implant would be advised, and he is aware of these, and would be interested in the future.  He feels he will eventually need the surgery.  He has also tried a vacuum device which was not satisfactory.    Return to clinic 1 year, sooner if needed.       Johnathan Cooper MD  [facility] [place]      ==========================      Additional Coding Information:    Problems:  3 -- one stable chronic illness    Data Reviewed  none    Level of risk:  4 -- prescription drug management    Time spent:  15 minutes spent on the date of the encounter doing chart review, history and exam, documentation and further activities per the note.  This includes the video chat time.

## 2021-05-20 NOTE — PATIENT INSTRUCTIONS
Please return to the clinic in 1 year or sooner if needed.    It was a pleasure meeting with you today.  Thank you for allowing me and my team the privilege of caring for you today.  YOU are the reason we are here, and I truly hope we provided you with the excellent service you deserve.  Please let us know if there is anything else we can do for you so that we can be sure you are leaving completely satisfied with your care experience.      Francisco Rodriguez EMT

## 2021-05-20 NOTE — NURSING NOTE
Chief Complaint   Patient presents with     Follow Up     medication refill       There were no vitals taken for this visit. There is no height or weight on file to calculate BMI.    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)     Acquired hypothyroidism     Dyslipidemia     Essential hypertension     Hyperlipidemia     Proliferative diabetic retinopathy (H)     Obstructive sleep apnea syndrome     Insomnia     Low back pain     Type 1 diabetes mellitus (H)     TELLY (obstructive sleep apnea)       Allergies   Allergen Reactions     Diagnostic X-Ray Materials Anaphylaxis     Contrast Dye Hives     Diatrizoate Hives     iodine       Current Outpatient Medications   Medication Sig Dispense Refill     albuterol (PROAIR HFA/PROVENTIL HFA/VENTOLIN HFA) 108 (90 Base) MCG/ACT inhaler Inhale 2 puffs into the lungs every 6 hours as needed for shortness of breath / dyspnea or wheezing - exercise induced wheezing 18 g 5     ARIPiprazole (ABILIFY) 15 MG tablet Take 1 tablet (15 mg) by mouth daily 90 tablet 1     aspirin 81 MG tablet Take 1 tablet (81 mg) by mouth daily 100 tablet 3     atorvastatin (LIPITOR) 40 MG tablet Take 1 tablet (40 mg) by mouth daily 90 tablet 1     blood glucose (ACCU-CHEK GUIDE) test strip Use to test blood sugar 3 times daily or as directed. 100 strip 11     blood glucose (CONTOUR NEXT TEST) test strip For diabetes, tests 4 times daily 400 strip 3     Blood Pressure KIT 1 Units once a week If BP at rest >140/90, call office. 1 kit 0     buPROPion (WELLBUTRIN XL) 300 MG 24 hr tablet Take 1 tablet (300 mg) by mouth every morning 90 tablet 3     cetirizine (ZYRTEC) 10 MG tablet Take 10 mg by mouth       DiphenhydrAMINE HCl (BENADRYL PO)        Insulin Infusion Pump (INSULIN PUMP QU2659) KIT        insulin lispro (HUMALOG) 100 UNIT/ML vial USE AS DIRECTED IN PUMP APPROXIMATELY 80 UNITS PER DAY. Follow up needed 80 mL 0     insulin syringe-needle U-100 (BD  "INSULIN SYRINGE) 29G X 1/2\" 1 ML miscellaneous Use as directed 20 each 1     levothyroxine (SYNTHROID/LEVOTHROID) 137 MCG tablet Take 1 tablet (137 mcg) by mouth daily 30 tablet 1     losartan-hydrochlorothiazide (HYZAAR) 100-25 MG tablet Take 1 tablet by mouth daily 90 tablet 1     NEW MED \"[place] Trimix #8\"  Trimix intercavernosal injection, per mL:  PGE1 20 mcg  Papaverine 27mg  Phentolamine 1 mg     Start at 0.3mL injection  May titrate in 0.1mL increments to lowest effective dose.  Max use one daily, and 3x/week. 5 mL 1     NEW MED Sig:  Inject 0.3 mL intracavernosal as directed.    Max use once daily and up to 3x/week.  Trimix intracavernosal injection, per mL:  PGE1: 40 mcg  Papaverine: 27.6mg  Phentolamine: 1 mg 10 mL 11     ONE TOUCH TEST STRIPS test strip Use to test blood sugar 4 times daily or as directed. 400 strip 3     ONETOUCH ULTRA test strip Test  four times daily ultra blue (please schedule clinic appt) 400 strip 0     order for DME Equipment being ordered: CPAP machine 1 each 0     rizatriptan (MAXALT) 10 MG tablet Take 1 tablet (10 mg) by mouth at onset of headache for migraine 12 tablet 11     topiramate (TOPAMAX) 50 MG tablet Take 1 tablet (50 mg) by mouth daily 90 tablet 3     Urea 40 % CREA Externally apply topically 2 times daily To surface of toenails 198 g 5     zolpidem (AMBIEN) 10 MG tablet TAKE 1 TABLET BY MOUTH EVERY NIGHT AT BEDTIME AS NEEDED.  NO ADDITIONAL REFILLS WITHOUT PROVIDER VISIT. 30 tablet 0     acetaminophen-codeine (TYLENOL #3) 300-30 MG per tablet Take 1-2 tablets by mouth every 6 hours as needed for moderate pain (Patient not taking: Reported on 5/20/2021) 60 tablet 1     alprostadil (EDEX) 40 MCG kit Inject 30 - 35 mcg (approx 3/4 ml) as directed. 240 mcg 11     gabapentin (NEURONTIN) 400 MG capsule Take 1 capsule (400 mg) by mouth 3 times daily (Patient not taking: Reported on 5/20/2021) 270 capsule 0     glucagon (GLUCAGON EMERGENCY) 1 MG kit Inject 1 mg into the " muscle once for 1 dose 1 mg 3       Social History     Tobacco Use     Smoking status: Never Smoker     Smokeless tobacco: Never Used   Substance Use Topics     Alcohol use: No     Comment: History of alcohol abuse/sober since 2015; went to treatment      Drug use: No       Polo Rodriguez EMT  5/20/2021  8:25 AM

## 2021-05-21 ENCOUNTER — RESULTS ONLY (OUTPATIENT)
Dept: LAB | Age: 61
End: 2021-05-21

## 2021-05-21 ENCOUNTER — HOSPITAL ENCOUNTER (OUTPATIENT)
Dept: RESEARCH | Facility: CLINIC | Age: 61
End: 2021-05-21
Attending: INTERNAL MEDICINE
Payer: COMMERCIAL

## 2021-05-21 DIAGNOSIS — G43.111 INTRACTABLE MIGRAINE WITH AURA WITH STATUS MIGRAINOSUS: ICD-10-CM

## 2021-05-21 DIAGNOSIS — F33.0 MILD EPISODE OF RECURRENT MAJOR DEPRESSIVE DISORDER (H): Primary | ICD-10-CM

## 2021-05-21 DIAGNOSIS — E78.5 DYSLIPIDEMIA: ICD-10-CM

## 2021-05-21 LAB — TSH SERPL DL<=0.005 MIU/L-ACNC: 3.36 MU/L (ref 0.4–4)

## 2021-05-21 PROCEDURE — 300N000003 HC RESEARCH SPECIMEN PROCESSING, SIMPLE

## 2021-05-21 PROCEDURE — 510N000017 HC CRU PATIENT CARE, PER 15 MIN

## 2021-05-21 PROCEDURE — 510N000016 HC RESEARCH MEALS, PER MEAL

## 2021-05-21 PROCEDURE — 510N000009 HC RESEARCH FACILITY, PER 15 MIN

## 2021-05-21 NOTE — ADDENDUM NOTE
Encounter addended by: Carley Aguilera on: 5/21/2021 11:25 AM   Actions taken: Charge Capture section accepted

## 2021-05-21 NOTE — ADDENDUM NOTE
Encounter addended by: Chayo Muñiz on: 5/21/2021 12:03 PM   Actions taken: Charge Capture section accepted

## 2021-05-23 NOTE — TELEPHONE ENCOUNTER
ARIPiprazole (ABILIFY) 15 MG tablet      Last Written Prescription Date:  5/8/20  Last Fill Quantity: 90,   # refills: 1  Last Office Visit : 5/8/20 recommended 6 month follow up  Future Office visit:  None scheduled    Routing refill request to provider for review/approval because:  Blood pressure out of range and outdated  BP Readings from Last 3 Encounters:   11/14/19 (!) 145/85   11/14/19 (!) 145/85   10/12/18 123/71     Overdue for labs    Lab Test 10/05/18  0853 04/10/18  1512   CHOL  --  173   TRIG  --  68   HDL  --  70   LDL 66 89   NHDL  --  103        Lab Test 06/21/18  0802   WBC 7.4   RBC 4.79   HGB 15.8   HCT 44.1        Lab Test 06/21/18  0802 04/10/18  1512   * 78   A1C  --  7.6*   Nothing more recent in care everywhere nor media  Future orders in que from 1 year ago, except no orders for CBC  Overdue for weight check  Wt Readings from Last 3 Encounters:   11/14/19 102.2 kg (225 lb 5 oz)   11/14/19 102.2 kg (225 lb 3.2 oz)   05/22/19 99.8 kg (220 lb)   Gap in therapy?  How taking    topiramate (TOPAMAX) 50 MG tablet      Last Written Prescription Date:  5/8/20  Last Fill Quantity: 90,   # refills: 3  Last Office Visit : 5/8/20 recommended 6 month follow up  Future Office visit:  None scheduled    Routing refill request to provider for review/approval because:  Overdue for labs  Lab Test 06/21/18  0802   WBC 7.4   RBC 4.79   HGB 15.8   HCT 44.1         Lab Test 12/16/16  1455   ALT 27     Lab Test 12/16/16  1455   AST 25     Nothing more recent in care everywhere nor media  Future orders in que from 1 year ago, except no orders for CBC    atorvastatin (LIPITOR) 40 MG tablet      Last Written Prescription Date:  5/8/20  Last Fill Quantity: 90,   # refills: 1  Last Office Visit : 5/8/20 recommended 6 month follow up  Future Office visit:  None scheduled    Routing refill request to provider for review/approval because:  Overdue for labs  Lab Test 10/05/18  0853   LDL 66   Nothing  more recent in care everywhere nor media  Future orders in que from 1 year ago  Gap in therapy?  How taking?

## 2021-05-26 NOTE — PROGRESS NOTES
ASSESSMENT:   1. Influenza-like illness  Rapid Strep A Screen-Throat    Group A Strep, RNA Direct Detection, Throat       PLAN:  58-year-old male with a complex past medical history including diabetes, hypertension, hypothyroid, hyperlipidemia presents to clinic for evaluation of a week of sore throat and URI symptoms which are overall improving over the past several days.  Patient has not had any measured fever in the past several days.  He presents basically for a strep test as he did learn that several of his coworkers had positive strep test recently.  Physical exam today is unremarkable.  Vital signs stable, mildly elevated blood pressure but chart review reveals that this is actually lower than his normally measured blood pressures.  Rapid strep test in clinic today is negative.  By history, this does sound like seasonal influenza.  Patient is reassured, and because he is overall improving no further testing is pursued as Tamiflu treatment would be unnecessary at this point.  Symptomatic care is advised.  He will follow-up with his primary care provider next week should symptoms persist.    I discussed red flag symptoms, return precautions to clinic/ER and follow up care with patient/parent.  Expected clinical course, symptomatic cares advised. Questions answered. Patient/parent amenable with plan.    Patient Instructions:  Patient Instructions   Rapid Strep test was negative.     If overnight test returns positive, we will call tomorrow and call in antibiotic prescription.    No notification means that overnight test returned negative.    Symptoms are likely due to viral infection that will resolve on its own in 3-7 days.    May continue with symptomatic treatments including:  -salt water gargles  -warm beverages such as tea  -throat drops  -ibuprofen or Tylenol for pain or fever    If fevers not coming down with Tylenol or ibuprofen, shortness of breath, not tolerating oral liquid intake, drooling, or stiff  neck, return for evaluation immediately. Otherwise, if no improvement in the next week, follow up with primary care provider.        SUBJECTIVE:   Luigi Moore is a 58 y.o. male who presents today with 1 week of sore throat, cough, body aches, chills and sweats.  Patient notes that symptoms came on very suddenly about a week ago and were quite bad for about 2 days and have slowly improved since that time.  He presents today essentially for a strep test as he did learn of several coworkers were diagnosed with strep throat.  He denies any odynophagia, notes that the sore throat is actually quite improved from initial onset of symptoms.  Denies any measured fever in the past 3-4 days no abdominal pain, no vomiting, no diarrhea, no chest pain, no shortness of breath.  Cough is nonproductive.  Cough is also improved.  He did have an influenza vaccine, but notes that he got it quite early in the season probably in September.      ROS:  Comprehensive 12 pt ROS completed, positives noted in HPI, otherwise negative.      Past Medical History:  Patient Active Problem List   Diagnosis     Dyslipidemia     Essential hypertension     Hyperlipidemia     Insomnia     Low back pain     Proliferative diabetic retinopathy (H)     Type 1 diabetes mellitus (H)     Obstructive sleep apnea syndrome     Acquired hypothyroidism       Surgical History:  No past surgical history on file.    Reviewed, noncontributory    Family History:  No family history on file.    Reviewed; Non-contributory    Social History     Tobacco Use   Smoking Status Not on file     Current Medications:  Current Outpatient Medications on File Prior to Visit   Medication Sig Dispense Refill     albuterol (PROAIR HFA;PROVENTIL HFA;VENTOLIN HFA) 90 mcg/actuation inhaler Inhale 2 puffs.       alprostadil (EDEX) 40 mcg injection Inject 30 - 35 mcg (approx 3/4 ml) as directed.       ARIPiprazole (ABILIFY) 15 MG tablet Take 15 mg by mouth.       gabapentin (NEURONTIN)  400 MG capsule Take 400 mg by mouth.       insulin lispro (HUMALOG) 100 unit/mL injection Use as directed in pump Approx 70-80 Units per day Need to be seen       levothyroxine (SYNTHROID, LEVOTHROID) 137 MCG tablet Take 137 mcg by mouth.       lisinopril (PRINIVIL,ZESTRIL) 40 MG tablet Take by mouth.       LORazepam (ATIVAN) 0.5 MG tablet Take 0.5 mg by mouth.       losartan-hydrochlorothiazide (HYZAAR) 100-25 mg per tablet Take by mouth.       rizatriptan (MAXALT) 10 MG tablet Take 10 mg by mouth.       topiramate (TOPAMAX) 25 MG tablet 50 mg by mouth once a day or 25 mg twice a day       urea (CARMOL) 40 % Crea Apply topically.       ARIPiprazole (ABILIFY) 15 MG tablet   0     atorvastatin (LIPITOR) 20 MG tablet Take 20 mg by mouth.       buPROPion (WELLBUTRIN XL) 300 MG 24 hr tablet   11     EDEX 40 mcg injection   0     HUMALOG 100 unit/mL injection USE UTD IN PUMP. APPROXIMATELY 70 TO 80 UNITS PER DAY.  3     levothyroxine (SYNTHROID, LEVOTHROID) 137 MCG tablet   2     topiramate (TOPAMAX) 25 MG tablet TK 2 TS PO ONCE A DAY OR 1 T BID.  1     VENTOLIN HFA 90 mcg/actuation inhaler INHALE 2 PUFFS INTO THE LUNGS Q 6 H PRF SHORTNESS OF BREATH/DYSPNEA OR WHEEZING /EXERCISE INDUCED WHEEZING  11     zolpidem (AMBIEN) 10 mg tablet TK 1 T PO HS PRF INSOMNIA  1     No current facility-administered medications on file prior to visit.        Allergies:   Allergies   Allergen Reactions     Iodinated Contrast- Oral And Iv Dye Hives     Diatrizoate Meglumine Hives     iodine  iodine         OBJECTIVE:   Vitals:    03/23/19 0813   BP: 140/72   Pulse: 72   Resp: 19   Temp: 98.1  F (36.7  C)   TempSrc: Oral   SpO2: 100%   Weight: 221 lb (100.2 kg)     Physical exam reveals a pleasant 58 y.o. male.   General: Appears healthy, alert and cooperative. Non-toxic appearance.  Eyes:  No conjunctivitis, lids normal.   Ears:  Normal appearing auricle. External auditory meatus without excessive cerumen, edema or erythema. normal TMs  bilaterally and normal canals bilaterally.  No mastoid tenderness. No pain with palpation over tragus.  Nose:    Mucosa normal. No rhinorrhea. No sinus tenderness.  Mouth:  Mucosa pink and moist.  mild erythema. No trismus. No evidence of PTA. Normal phonation.  Neck: no adenopathy  Pulmonary/Chest: Chest is clear, no wheezing, rhonchi or rales. Symmetric air entry throughout both lung fields. Symmetrical chest wall movement.  Heart: regular rate and rhythm, no murmur, rub or gallop  Abdomen: soft, nontender. No masses or organomegaly  Neuro: Alert, oriented. Non-focal.  Skin: pink, warm, dry, and without lesions on limited skin exam. No rashes.  Psychiatric: Normal mood and affect.  Normal judgement and thought content. Normal behavior.       RADIOLOGY    No results found.    LABORATORY STUDIES    Recent Results (from the past 24 hour(s))   Rapid Strep A Screen-Throat   Result Value Ref Range    Rapid Strep A Antigen No Group A Strep detected, presumptive negative No Group A Strep detected, presumptive negative           Bonnie Burk, CNP

## 2021-05-26 NOTE — PATIENT INSTRUCTIONS - HE
Rapid Strep test was negative.     If overnight test returns positive, we will call tomorrow and call in antibiotic prescription.    No notification means that overnight test returned negative.    Symptoms are likely due to viral infection that will resolve on its own in 3-7 days.    May continue with symptomatic treatments including:  -salt water gargles  -warm beverages such as tea  -throat drops  -ibuprofen or Tylenol for pain or fever    If fevers not coming down with Tylenol or ibuprofen, shortness of breath, not tolerating oral liquid intake, drooling, or stiff neck, return for evaluation immediately. Otherwise, if no improvement in the next week, follow up with primary care provider.

## 2021-05-27 RX ORDER — TOPIRAMATE 50 MG/1
50 TABLET, FILM COATED ORAL DAILY
Qty: 90 TABLET | Refills: 0 | Status: SHIPPED | OUTPATIENT
Start: 2021-05-27 | End: 2021-10-12

## 2021-05-27 RX ORDER — ARIPIPRAZOLE 15 MG/1
15 TABLET ORAL DAILY
Qty: 90 TABLET | Refills: 0 | Status: SHIPPED | OUTPATIENT
Start: 2021-05-27 | End: 2022-05-17

## 2021-05-27 RX ORDER — ATORVASTATIN CALCIUM 40 MG/1
40 TABLET, FILM COATED ORAL DAILY
Qty: 90 TABLET | Refills: 0 | Status: SHIPPED | OUTPATIENT
Start: 2021-05-27 | End: 2022-12-28

## 2021-05-28 DIAGNOSIS — F33.0 MILD EPISODE OF RECURRENT MAJOR DEPRESSIVE DISORDER (H): ICD-10-CM

## 2021-06-02 VITALS — BODY MASS INDEX: 26.9 KG/M2 | WEIGHT: 221 LBS

## 2021-06-02 RX ORDER — BUPROPION HYDROCHLORIDE 300 MG/1
300 TABLET ORAL EVERY MORNING
Qty: 30 TABLET | Refills: 0 | Status: SHIPPED | OUTPATIENT
Start: 2021-06-02 | End: 2021-06-08

## 2021-06-02 NOTE — TELEPHONE ENCOUNTER
Last Clinic Visit: 5/8/2020  [facility]  Future Visit: 6/8/2021    Test(s)- PHQ-9 past due.  Adri refill of Bupropion was sent for a 30 day supply and FYI to [facility] clinic.

## 2021-06-08 ENCOUNTER — VIRTUAL VISIT (OUTPATIENT)
Dept: INTERNAL MEDICINE | Facility: CLINIC | Age: 61
End: 2021-06-08
Payer: COMMERCIAL

## 2021-06-08 DIAGNOSIS — I10 ESSENTIAL HYPERTENSION: Primary | ICD-10-CM

## 2021-06-08 DIAGNOSIS — E10.9 TYPE 1 DIABETES MELLITUS WITHOUT COMPLICATION (H): ICD-10-CM

## 2021-06-08 DIAGNOSIS — R41.840 INATTENTION: ICD-10-CM

## 2021-06-08 DIAGNOSIS — F33.0 MILD EPISODE OF RECURRENT MAJOR DEPRESSIVE DISORDER (H): ICD-10-CM

## 2021-06-08 DIAGNOSIS — G47.00 INSOMNIA, UNSPECIFIED TYPE: ICD-10-CM

## 2021-06-08 PROCEDURE — 99215 OFFICE O/P EST HI 40 MIN: CPT | Mod: 95 | Performed by: NURSE PRACTITIONER

## 2021-06-08 RX ORDER — LOSARTAN POTASSIUM AND HYDROCHLOROTHIAZIDE 25; 100 MG/1; MG/1
1 TABLET ORAL DAILY
Qty: 90 TABLET | Refills: 1 | Status: SHIPPED | OUTPATIENT
Start: 2021-06-08 | End: 2021-10-29

## 2021-06-08 RX ORDER — BUPROPION HYDROCHLORIDE 150 MG/1
150 TABLET ORAL EVERY MORNING
Qty: 60 TABLET | Refills: 1 | Status: SHIPPED | OUTPATIENT
Start: 2021-06-08 | End: 2021-06-16

## 2021-06-08 RX ORDER — BUPROPION HYDROCHLORIDE 300 MG/1
300 TABLET ORAL EVERY MORNING
Qty: 60 TABLET | Refills: 1 | Status: SHIPPED | OUTPATIENT
Start: 2021-06-08 | End: 2022-03-09

## 2021-06-08 NOTE — NURSING NOTE
Chief Complaint   Patient presents with     Recheck Medication     med follow up per pt        Rocio Uribe EMT at 1:21 PM on 6/8/2021.

## 2021-06-08 NOTE — PATIENT INSTRUCTIONS
[facility] Medication Refill Request Information:  * Please contact your pharmacy regarding ANY request for medication refills.  ** [facility] Prescription Fax = [phone]  * Please allow 3 business days for routine medication refills.  * Please allow 5 business days for controlled substance medication refills.     [facility] Test Result notification information:  *You will be notified with in 7-10 days of your appointment day regarding the results of your test.  If you are on MyChart you will be notified as soon as the provider has reviewed the results and signed off on them.    [facility]: [phone]

## 2021-06-08 NOTE — PROGRESS NOTES
Maurice is a 60 year old who is being evaluated via a billable video visit.      How would you like to obtain your AVS? [employer]  If the video visit is dropped, the invitation should be resent by: Send to e-mail at: [email or web address]  Will anyone else be joining your video visit? No      Video Start Time: 1:31 PM    Assessment & Plan     Essential hypertension   Appears BPs are well-controlled on his home monitor, will continue with current regimen. Refill provided. He is due for BMP, which he believes is being monitored with the research study he is participating in, requested that he upload these results if he has them available, otherwise would recommend rechecking.  - losartan-hydrochlorothiazide (HYZAAR) 100-25 MG tablet; Take 1 tablet by mouth daily    Type 1 diabetes mellitus without complication (H)  Has been >1 year since he last saw Arti Kaur PA-C, in Endocrinology for his diabetes and management of his insulin pump. Last A1c 7.6; again, he thinks this is being monitored through a research study, and he will look for these results to upload to [employer], but if these are unavailable will need to recheck A1c and microalbumin. Encouraged him to schedule follow-up with Endocrinology.    Mild episode of recurrent major depressive disorder (H)  Feels mood is suboptimally controlled, certainly impacted by stressors related to the pandemic and associated social isolation. Will increase Wellbutrin to 450 mg, continue with Abilify 15 mg and encouraged to establish for psychotherapy to work on healthy coping skills.   - [facility]  - buPROPion (WELLBUTRIN XL) 150 MG 24 hr tablet; Take 1 tablet (150 mg) by mouth every morning Take with 300 mg tablet (total daily dose 450 mg)  - buPROPion (WELLBUTRIN XL) 300 MG 24 hr tablet; Take 1 tablet (300 mg) by mouth every morning Take with 150 mg tablet (total daily dose 450 mg)      40 minutes spent on the date of the encounter doing chart review, history  and exam, documentation and further activities per the note           Return in about 4 weeks (around 7/6/2021).    GALILEA Escalante CNP  [facility] INTERNAL MEDICINE [place]    Gaby Richards is a 60 year old who presents for the following health issues     HPI     HTN--Blood pressures at home generally around 124/68, on Losartan-HCTZ. Tolerating well.    DM--continues on insulin pump and Humalog. Following with Dr. Yuan as part of a research study, reports they are monitoring labs with this. I am only seeing a TSH done from 5/21/21. Otherwise appears overdue on A1c, BMP, lipids, urine microalbumin, etc.  Endorses a couple low readings, always has been able to treat, and had another episode where his pump came off in the middle of the night and levels were high, but he was able to bring these down.    Mood--doing okay on Aripiprazole, less energetic and less happy than normally, feels like related to the pandemic/situational vs medication. Has been feeling this way for at least 9 months. Had used gabapentin for panic attacks previously, that is not the issue now. Has not been connected with a therapist in a few years. Previously saw [facility] in [place], when he had 4 people in his family diagnosed with cancer within a month of each other about 5 years ago. The therapist helped him work thorough that but then stopped going as it didn't feel as needed. Fluoxetine [id number], initial boost and then faded out.   Had been diagnosed with Adult ADHD a while ago, didn't follow-through completely, is wanting to get those records over to us. Feels he is missing out on a quality of life component by not treating ADHD. . Hasn't been using Ambien, sleep is still poor. Trazodone hasn't been really effective in the past (I can't see in his records within our system when this was used), but uses benadryl which works occasionally.     Review of Systems   Constitutional, HEENT, cardiovascular,  pulmonary, gi and gu systems are negative, except as otherwise noted.      Objective           Vitals:  No vitals were obtained today due to virtual visit.    Physical Exam   GENERAL: Healthy, alert and no distress  EYES: Eyes grossly normal to inspection.  No discharge or erythema, or obvious scleral/conjunctival abnormalities.  RESP: No audible wheeze, cough, or visible cyanosis.  No visible retractions or increased work of breathing.    SKIN: Visible skin clear. No significant rash, abnormal pigmentation or lesions.  NEURO: Cranial nerves grossly intact.  Mentation and speech appropriate for age.  PSYCH: Mentation appears normal, affect normal/bright, judgement and insight intact, normal speech and appearance well-groomed.                Video-Visit Details    Type of service:  Video Visit    Video End Time:1:42 PM 21 minute phone call, 5 min video    Originating Location (pt. Location): Home    Distant Location (provider location):  [facility] INTERNAL MEDICINE [place]     Platform used for Video Visit: [employer]

## 2021-06-09 ENCOUNTER — TELEPHONE (OUTPATIENT)
Dept: INTERNAL MEDICINE | Facility: CLINIC | Age: 61
End: 2021-06-09

## 2021-06-09 NOTE — TELEPHONE ENCOUNTER
Call patient to help schedule a follow up in about 4 weeks with Kathya Lang per provider yesterday visit. Left voice message with [facility] phone number to call back for scheduling.    Earnestine Tran, Clinic Coordinator, June 9, 2021 at 8:33 AM

## 2021-06-10 ENCOUNTER — MYC MEDICAL ADVICE (OUTPATIENT)
Dept: INTERNAL MEDICINE | Facility: CLINIC | Age: 61
End: 2021-06-10

## 2021-06-10 DIAGNOSIS — F33.0 MILD EPISODE OF RECURRENT MAJOR DEPRESSIVE DISORDER (H): ICD-10-CM

## 2021-06-15 ENCOUNTER — MYC MEDICAL ADVICE (OUTPATIENT)
Dept: INTERNAL MEDICINE | Facility: CLINIC | Age: 61
End: 2021-06-15

## 2021-06-16 RX ORDER — BUPROPION HYDROCHLORIDE 150 MG/1
150 TABLET ORAL EVERY MORNING
Qty: 60 TABLET | Refills: 1 | Status: SHIPPED | OUTPATIENT
Start: 2021-06-16 | End: 2022-03-09

## 2021-06-20 NOTE — LETTER
Letter by Severson, Tammie F, LPN at      Author: Severson, Tammie F, LPN Service: -- Author Type: --    Filed:  Encounter Date: 7/23/2020 Status: (Other)       7/23/2020        Luigi Moore  [address]  [place] [address]    This letter provides a written record that you were tested for COVID-19 on 7/17/20.     Your result was negative. This means that we didnt find the virus that causes COVID-19 in your sample. A test may show negative when you do actually have the virus. This can happen when the virus is in the early stages of infection, before you feel illness symptoms.    If you have symptoms   Stay home and away from others (self-isolate) until you meet ALL of the guidelines below:    Youve had no fever--and no medicine that reduces fever--for 3 full days (72 hours). And ?    Your other symptoms have gotten better. For example, your cough or breathing has improved. And?    At least 10 days have passed since your symptoms started.    During this time:    Stay home. Dont go to work, school or anywhere else.     Stay in your own room, including for meals. Use your own bathroom if you can.    Stay away from others in your home. No hugging, kissing or shaking hands. No visitors.    Clean high touch surfaces often (doorknobs, counters, handles, etc.). Use a household cleaning spray or wipes. You can find a full list on the EPA website at www.epa.gov/pesticide-registration/list-n-disinfectants-use-against-sars-cov-2.    Cover your mouth and nose with a mask, tissue or washcloth to avoid spreading germs.    Wash your hands and face often with soap and water.    Going back to work  Check with your employer for any guidelines to follow for going back to work.    Employers: This document serves as formal notice that your employee tested negative for COVID-19, as of the testing date shown above.

## 2021-06-28 ENCOUNTER — TELEPHONE (OUTPATIENT)
Dept: INTERNAL MEDICINE | Facility: CLINIC | Age: 61
End: 2021-06-28

## 2021-06-28 NOTE — TELEPHONE ENCOUNTER
----- Message from GALILEA Morataya CNP sent at 6/25/2021  4:47 PM CDT -----  Regarding: schedule appointment  Hi, team,  I received records from his psychiatry office. I'd like him to schedule an in-person follow-up so we can do a physical exam and review the records and next steps together.  Thanks!   -Kathya

## 2021-06-28 NOTE — TELEPHONE ENCOUNTER
Call patient, no answered. Left message with [facility] number asking patient to call to schedule in-person physical exam with Kathya Lang per provider.    Earnestine Tran, Clinic Coordinator, June 28, 2021 at 8:14 AM

## 2021-07-08 DIAGNOSIS — E03.9 ACQUIRED HYPOTHYROIDISM: ICD-10-CM

## 2021-07-08 DIAGNOSIS — E10.8 TYPE 1 DIABETES MELLITUS WITH COMPLICATIONS (H): ICD-10-CM

## 2021-07-08 DIAGNOSIS — E78.5 DYSLIPIDEMIA: ICD-10-CM

## 2021-07-08 DIAGNOSIS — B35.1 ONYCHOMYCOSIS: ICD-10-CM

## 2021-07-08 NOTE — TELEPHONE ENCOUNTER
insulin lispro (HUMALOG) 100 UNIT/ML vial        Last Written Prescription Date:  03/12/21  Last Fill Quantity: 80mL,   # refills: 0  Last Office Visit : 05/14/20  Future Office visit:  None Scheduled    Routing refill request to provider for review/approval because:  Insulin - refilled per clinic

## 2021-07-08 NOTE — TELEPHONE ENCOUNTER
Short Acting Insulin Protocol Failed07/08/2021 11:14 AM   Serum creatinine on file in past 12 months Protocol Details    HgbA1C in past 3 or 6 months     Recent (6 mo) or future (30 days) visit within the authorizing provider's specialty      PLEASE HELP SCHEDULE THE FOLLOWING APPT(S):     TIME FRAME NEEDED BY:        SCHEDULING COMMENTS (optional):

## 2021-07-14 ENCOUNTER — VIRTUAL VISIT (OUTPATIENT)
Dept: BEHAVIORAL HEALTH | Facility: CLINIC | Age: 61
End: 2021-07-14
Attending: NURSE PRACTITIONER
Payer: COMMERCIAL

## 2021-07-14 DIAGNOSIS — F33.0 MAJOR DEPRESSIVE DISORDER, RECURRENT EPISODE, MILD WITH ANXIOUS DISTRESS (H): Primary | ICD-10-CM

## 2021-07-14 PROCEDURE — 90791 PSYCH DIAGNOSTIC EVALUATION: CPT | Mod: 95 | Performed by: PSYCHOLOGIST

## 2021-07-14 ASSESSMENT — ANXIETY QUESTIONNAIRES
8. IF YOU CHECKED OFF ANY PROBLEMS, HOW DIFFICULT HAVE THESE MADE IT FOR YOU TO DO YOUR WORK, TAKE CARE OF THINGS AT HOME, OR GET ALONG WITH OTHER PEOPLE?: SOMEWHAT DIFFICULT
7. FEELING AFRAID AS IF SOMETHING AWFUL MIGHT HAPPEN: NOT AT ALL
4. TROUBLE RELAXING: SEVERAL DAYS
1. FEELING NERVOUS, ANXIOUS, OR ON EDGE: NOT AT ALL
7. FEELING AFRAID AS IF SOMETHING AWFUL MIGHT HAPPEN: NOT AT ALL
GAD7 TOTAL SCORE: 3
GAD7 TOTAL SCORE: 3
3. WORRYING TOO MUCH ABOUT DIFFERENT THINGS: SEVERAL DAYS
GAD7 TOTAL SCORE: 3
5. BEING SO RESTLESS THAT IT IS HARD TO SIT STILL: SEVERAL DAYS
2. NOT BEING ABLE TO STOP OR CONTROL WORRYING: NOT AT ALL
6. BECOMING EASILY ANNOYED OR IRRITABLE: NOT AT ALL

## 2021-07-14 ASSESSMENT — COLUMBIA-SUICIDE SEVERITY RATING SCALE - C-SSRS
1. IN THE PAST MONTH, HAVE YOU WISHED YOU WERE DEAD OR WISHED YOU COULD GO TO SLEEP AND NOT WAKE UP?: NO
2. HAVE YOU ACTUALLY HAD ANY THOUGHTS OF KILLING YOURSELF LIFETIME?: NO
4. HAVE YOU HAD THESE THOUGHTS AND HAD SOME INTENTION OF ACTING ON THEM?: NO
ATTEMPT LIFETIME: NO
6. HAVE YOU EVER DONE ANYTHING, STARTED TO DO ANYTHING, OR PREPARED TO DO ANYTHING TO END YOUR LIFE?: NO
TOTAL  NUMBER OF ABORTED OR SELF INTERRUPTED ATTEMPTS PAST 3 MONTHS: NO
TOTAL  NUMBER OF ABORTED OR SELF INTERRUPTED ATTEMPTS PAST LIFETIME: NO
5. HAVE YOU STARTED TO WORK OUT OR WORKED OUT THE DETAILS OF HOW TO KILL YOURSELF? DO YOU INTEND TO CARRY OUT THIS PLAN?: NO
3. HAVE YOU BEEN THINKING ABOUT HOW YOU MIGHT KILL YOURSELF?: NO
6. HAVE YOU EVER DONE ANYTHING, STARTED TO DO ANYTHING, OR PREPARED TO DO ANYTHING TO END YOUR LIFE?: NO
ATTEMPT PAST THREE MONTHS: NO
2. HAVE YOU ACTUALLY HAD ANY THOUGHTS OF KILLING YOURSELF?: NO
TOTAL  NUMBER OF INTERRUPTED ATTEMPTS PAST 3 MONTHS: NO
1. IN THE PAST MONTH, HAVE YOU WISHED YOU WERE DEAD OR WISHED YOU COULD GO TO SLEEP AND NOT WAKE UP?: YES
4. HAVE YOU HAD THESE THOUGHTS AND HAD SOME INTENTION OF ACTING ON THEM?: NO
TOTAL  NUMBER OF INTERRUPTED ATTEMPTS LIFETIME: NO

## 2021-07-14 ASSESSMENT — PATIENT HEALTH QUESTIONNAIRE - PHQ9
10. IF YOU CHECKED OFF ANY PROBLEMS, HOW DIFFICULT HAVE THESE PROBLEMS MADE IT FOR YOU TO DO YOUR WORK, TAKE CARE OF THINGS AT HOME, OR GET ALONG WITH OTHER PEOPLE: SOMEWHAT DIFFICULT
SUM OF ALL RESPONSES TO PHQ QUESTIONS 1-9: 9
SUM OF ALL RESPONSES TO PHQ QUESTIONS 1-9: 9
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
## 2021-07-15 ASSESSMENT — ANXIETY QUESTIONNAIRES: GAD7 TOTAL SCORE: 3

## 2021-07-15 ASSESSMENT — PATIENT HEALTH QUESTIONNAIRE - PHQ9: SUM OF ALL RESPONSES TO PHQ QUESTIONS 1-9: 9

## 2021-07-23 ENCOUNTER — LAB (OUTPATIENT)
Dept: LAB | Facility: CLINIC | Age: 61
End: 2021-07-23

## 2021-07-23 ENCOUNTER — OFFICE VISIT (OUTPATIENT)
Dept: INTERNAL MEDICINE | Facility: CLINIC | Age: 61
End: 2021-07-23
Payer: COMMERCIAL

## 2021-07-23 VITALS
TEMPERATURE: 98 F | HEART RATE: 89 BPM | BODY MASS INDEX: 28.89 KG/M2 | DIASTOLIC BLOOD PRESSURE: 77 MMHG | WEIGHT: 237.3 LBS | RESPIRATION RATE: 20 BRPM | SYSTOLIC BLOOD PRESSURE: 137 MMHG | OXYGEN SATURATION: 98 %

## 2021-07-23 DIAGNOSIS — E10.8 TYPE 1 DIABETES MELLITUS WITH COMPLICATIONS (H): ICD-10-CM

## 2021-07-23 DIAGNOSIS — F90.2 ATTENTION DEFICIT HYPERACTIVITY DISORDER (ADHD), COMBINED TYPE: Primary | ICD-10-CM

## 2021-07-23 DIAGNOSIS — E78.5 DYSLIPIDEMIA: ICD-10-CM

## 2021-07-23 DIAGNOSIS — I10 ESSENTIAL HYPERTENSION: ICD-10-CM

## 2021-07-23 DIAGNOSIS — R41.840 INATTENTION: ICD-10-CM

## 2021-07-23 LAB
ALBUMIN SERPL-MCNC: 3.9 G/DL (ref 3.4–5)
ALP SERPL-CCNC: 118 U/L (ref 40–150)
ALT SERPL W P-5'-P-CCNC: 18 U/L (ref 0–70)
ANION GAP SERPL CALCULATED.3IONS-SCNC: 5 MMOL/L (ref 3–14)
AST SERPL W P-5'-P-CCNC: 16 U/L (ref 0–45)
BILIRUB SERPL-MCNC: 0.6 MG/DL (ref 0.2–1.3)
BUN SERPL-MCNC: 8 MG/DL (ref 7–30)
CALCIUM SERPL-MCNC: 8.5 MG/DL (ref 8.5–10.1)
CHLORIDE BLD-SCNC: 102 MMOL/L (ref 94–109)
CHOLEST SERPL-MCNC: 194 MG/DL
CO2 SERPL-SCNC: 27 MMOL/L (ref 20–32)
CREAT SERPL-MCNC: 0.96 MG/DL (ref 0.66–1.25)
FASTING STATUS PATIENT QL REPORTED: ABNORMAL
GFR SERPL CREATININE-BSD FRML MDRD: 86 ML/MIN/1.73M2
GLUCOSE BLD-MCNC: 396 MG/DL (ref 70–99)
HBA1C MFR BLD: 7.2 % (ref 0–5.6)
HDLC SERPL-MCNC: 52 MG/DL
LDLC SERPL CALC-MCNC: 124 MG/DL
NONHDLC SERPL-MCNC: 142 MG/DL
POTASSIUM BLD-SCNC: 4.2 MMOL/L (ref 3.4–5.3)
PROT SERPL-MCNC: 7.4 G/DL (ref 6.8–8.8)
SODIUM SERPL-SCNC: 134 MMOL/L (ref 133–144)
TRIGL SERPL-MCNC: 92 MG/DL

## 2021-07-23 PROCEDURE — 99214 OFFICE O/P EST MOD 30 MIN: CPT | Performed by: NURSE PRACTITIONER

## 2021-07-23 PROCEDURE — 36415 COLL VENOUS BLD VENIPUNCTURE: CPT | Performed by: PATHOLOGY

## 2021-07-23 PROCEDURE — 83036 HEMOGLOBIN GLYCOSYLATED A1C: CPT | Performed by: PATHOLOGY

## 2021-07-23 PROCEDURE — 80061 LIPID PANEL: CPT | Performed by: PATHOLOGY

## 2021-07-23 PROCEDURE — 80053 COMPREHEN METABOLIC PANEL: CPT | Performed by: PATHOLOGY

## 2021-07-23 RX ORDER — ATOMOXETINE 40 MG/1
40 CAPSULE ORAL DAILY
Qty: 30 CAPSULE | Refills: 1 | Status: SHIPPED | OUTPATIENT
Start: 2021-07-23 | End: 2021-09-03 | Stop reason: SINTOL

## 2021-07-23 ASSESSMENT — PAIN SCALES - GENERAL: PAINLEVEL: NO PAIN (0)

## 2021-07-23 NOTE — PROGRESS NOTES
Assessment & Plan     Attention deficit hyperactivity disorder (ADHD), combined type  Continue with therapy and non-pharmacologic strategies to help manage symptoms. Will start Atomoxetine 40 mg, reviewed potential side effects, discontinue use if any CP, SOB, seek emergency care if this occurs.   - atomoxetine (STRATTERA) 40 MG capsule; Take 1 capsule (40 mg) by mouth daily    Essential hypertension  BPs are slightly better controlled on home monitor, will continue to check these at home.  - Comprehensive metabolic panel; Future    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)  Due for recheck, encouraged follow-up with Endocrinology.  - Hemoglobin A1c; Future    Dyslipidemia  Due for lipid panel, will have this done with above lab work.  - Lipid Profile NON-FASTING; Future     Return in about 4 weeks (around 8/20/2021). to assess medication tolerance, recheck BP and HR.    GALILEA Escalante [facility] INTERNAL MEDICINE [place]    Gaby Richards is a 60 year old who presents for the following health issues     HPI     BPs 120s/60s at home, feeling well.     Mood--feels better but still some ups and downs, hard to get going sometimes. Not getting as much exercise as he's used to getting. Trying to increase that. Played tennis 3x last week.     Needs to schedule follow-up with Endocrinology. Reports blood sugars stable.   Testing from [facility] confirmed ADHD in 2015 (see scanned media 6/30/21). He is now following with Dr. High for psychotherapy to help with depression and ADHD symptoms, but is interested in starting medication to help manage ADHD symptoms.     Review of Systems   Constitutional, HEENT, cardiovascular, pulmonary, gi and gu systems are negative, except as otherwise noted.      Objective    /77 (BP Location: Right arm, Patient Position: Sitting, Cuff Size: Adult Regular)   Pulse 89   Temp 98  F (36.7  C) (Oral)   Resp 20   Wt 107.6 kg (237 lb 4.8 oz)   SpO2  98%   BMI 28.89 kg/m    Body mass index is 28.89 kg/m .  Physical Exam   GENERAL: healthy, alert and no distress  RESP: lungs clear to auscultation - no rales, rhonchi or wheezes  CV: regular rate and rhythm, normal S1 S2, no S3 or S4, no murmur, click or rub, no peripheral edema and peripheral pulses strong  ABDOMEN: soft, nontender, no hepatosplenomegaly, no masses and bowel sounds normal, CGM and insulin pump in place  NEURO: Normal strength and tone, mentation intact and speech normal  PSYCH: mentation appears normal, affect normal/bright, well-groomed, demonstrates intact judgement and logical thought processes, maintains good eye contact

## 2021-07-29 ENCOUNTER — TELEPHONE (OUTPATIENT)
Dept: ENDOCRINOLOGY | Facility: CLINIC | Age: 61
End: 2021-07-29

## 2021-07-29 DIAGNOSIS — E10.8 TYPE 1 DIABETES MELLITUS WITH COMPLICATIONS (H): Primary | ICD-10-CM

## 2021-07-29 NOTE — TELEPHONE ENCOUNTER
CLINIC COORDINATOR SCHEDULING NOTES    CALL RESULT: LVM + MyC sent    APPT TYPE: [facility]MP RETURN DIABETES / VIDEO VISIT RETURN    PROVIDER: Vincent    DATE APPT NEEDED: 1st available

## 2021-07-29 NOTE — TELEPHONE ENCOUNTER
Short Acting Insulin Protocol [id number]/08/2021 11:26 AM   Serum creatinine on file in past 12 months Protocol Details    HgbA1C in past 3 or 6 months     Medication is active on med list     Recent (6 mo) or future (30 days) visit within the authorizing provider's specialty      FW: bonilla sanchez in person appt.  Received: Today  [facility] T[facility]-  Caller: Unspecified (3 weeks ago)  LVM for patient to schedule and created separate encounter. Please address unsigned medication as coordinators cannot and it is currently under our purview.     Approved Prescriptions     insulin lispro (HUMALOG) 100 UNIT/ML vial         Sig: USE AS DIRECTED IN PUMP APPROXIMATELY 80 UNITS PER DAY. Follow up needed    Disp:  80 mL    Refills:  0    Start: 7/8/2021 - 7/8/2021    Class: E-Prescribe    Authorized by: Paty Coto, RN    For: 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), Acquired hypothyroidism, Dyslipidemia, Onychomycosis

## 2021-07-30 ENCOUNTER — VIRTUAL VISIT (OUTPATIENT)
Dept: BEHAVIORAL HEALTH | Facility: CLINIC | Age: 61
End: 2021-07-30
Payer: COMMERCIAL

## 2021-07-30 DIAGNOSIS — Z53.9 NO SHOW: Primary | ICD-10-CM

## 2021-07-30 NOTE — PROGRESS NOTES
No Show:    This patient was a no show for this scheduled appointment. Made two call attempts, both went to . Left a message with instructions of how to reschedule.     Onofre High LP    July 30, 2021

## 2021-08-27 ENCOUNTER — MYC MEDICAL ADVICE (OUTPATIENT)
Dept: INTERNAL MEDICINE | Facility: CLINIC | Age: 61
End: 2021-08-27

## 2021-09-03 ENCOUNTER — OFFICE VISIT (OUTPATIENT)
Dept: INTERNAL MEDICINE | Facility: CLINIC | Age: 61
End: 2021-09-03
Payer: COMMERCIAL

## 2021-09-03 VITALS
WEIGHT: 239 LBS | HEART RATE: 71 BPM | BODY MASS INDEX: 29.09 KG/M2 | OXYGEN SATURATION: 97 % | SYSTOLIC BLOOD PRESSURE: 159 MMHG | DIASTOLIC BLOOD PRESSURE: 80 MMHG

## 2021-09-03 DIAGNOSIS — F90.2 ATTENTION DEFICIT HYPERACTIVITY DISORDER (ADHD), COMBINED TYPE: Primary | ICD-10-CM

## 2021-09-03 DIAGNOSIS — I10 ESSENTIAL HYPERTENSION: ICD-10-CM

## 2021-09-03 DIAGNOSIS — E10.8 TYPE 1 DIABETES MELLITUS WITH COMPLICATIONS (H): ICD-10-CM

## 2021-09-03 PROCEDURE — 99213 OFFICE O/P EST LOW 20 MIN: CPT | Performed by: NURSE PRACTITIONER

## 2021-09-03 PROCEDURE — 99207 E-CONSULT TO BEHAVIORAL HEALTH (ADULT OUTPT PROVIDER TO SPECIALIST WRITTEN QUESTION & RESPONSE): CPT | Performed by: NURSE PRACTITIONER

## 2021-09-03 RX ORDER — AMLODIPINE BESYLATE 5 MG/1
5 TABLET ORAL DAILY
Qty: 60 TABLET | Refills: 1 | Status: SHIPPED | OUTPATIENT
Start: 2021-09-03 | End: 2021-10-29

## 2021-09-03 ASSESSMENT — PAIN SCALES - GENERAL: PAINLEVEL: NO PAIN (0)

## 2021-09-03 NOTE — NURSING NOTE
Chief Complaint   Patient presents with     Recheck Medication     pt here to follow up on records       Jacqueline Azar CMA, EMT at 3:10 PM on 9/3/2021.

## 2021-09-03 NOTE — PROGRESS NOTES
Assessment & Plan     Attention deficit hyperactivity disorder (ADHD), combined type  He reports dizziness on Atomoxetine 40 mg, which improved after self-discontinuation. He reports only marginal benefit from the medication, noting that he noticed only mild improvement in his ability to focus. BP is quite elevated in clinic, so I am hesitant to start other stimulant medication given his cardiac risk factors (HTN, dyslipidemia, DM1). He agrees to e-consult to behavioral health to evaluate for alternative options for treatment for ADHD (testing done to confirm diagnosis at [facility] in 2015, see scanned media 6/30/21). In the meantime, will start Amlodipine for his elevated BP and he will continue to monitor BP at home.   - E-CONSULT TO BEHAVIORAL HEALTH (ADULT OUTPT PROVIDER TO SPECIALIST WRITTEN QUESTION & RESPONSE)    Essential hypertension  See above. Continue working on healthy lifestyle, regular exercise, etc.  - amLODIPine (NORVASC) 5 MG tablet; Take 1 tablet (5 mg) by mouth daily    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)  He is overdue for follow-up in Endocrinology, most recent A1c just slightly above goal.   - Adult Endocrinology Referral      23 minutes spent on the date of the encounter doing chart review, history and exam, documentation and further activities per the note           Return in about 3 weeks (around 9/24/2021).    GALILEA Escalante [facility] INTERNAL MEDICINE [facility]   Maurice is a 60 year old who presents for the following health issues     HPI     Here for follow-up. ADHD initially diagnosed in 2015 at [facility], though had not pursued treatment with medication initially. Started Atomoxetine 40 mg after visit on 7/23/21.  He took this for approximately 5 weeks and noticed that there was some mild improvement in ability to focus, though hadn't seen as much improvement as he was hoping he would have. However, about 2 weeks  "after starting Strattera, he developed dizziness, which he felt was impacting his safety at work, so he stopped taking it; he subsequently noted improvement in dizziness over 3-5 days after discontinuing the medication. He denies any other notable side effects while taking it, no palpitations, etc.    Today, he is alarmed by his elevated BP, but physically notes he is \"feeling great,\" and denies chest pain or SOB, no headache or vision changes. BPs generally 120s/high-70, up to 140/80s at home. Losartan-hydrochlorothiazide had been controlling well. Pandemic has changed a lot of his routine, but bikes thousands of miles per year.  Still using CPAP, but did go through a brief period where he wasn't using, was falling asleep before he put it on, but now being more regular with it.     Review of Systems   Constitutional, HEENT, cardiovascular, pulmonary, gi and gu systems are negative, except as otherwise noted.      Objective    BP (!) 159/80 (BP Location: Right arm)   Pulse 71   Wt 108.4 kg (239 lb)   SpO2 97%   BMI 29.09 kg/m    Body mass index is 29.09 kg/m .  Physical Exam   GENERAL: healthy, alert and no distress  EYES: Eyes grossly normal to inspection, PERRL and conjunctivae and sclerae normal  HENT: ear canals and TM's normal, nose and mouth without ulcers or lesions  NECK: no adenopathy, no asymmetry, masses, or scars and thyroid normal to palpation  RESP: lungs clear to auscultation - no rales, rhonchi or wheezes  CV: regular rate and rhythm, normal S1 S2, no S3 or S4, no murmur, click or rub, no peripheral edema and peripheral pulses strong  MS: no gross musculoskeletal defects noted, no edema  NEURO: Normal strength and tone, mentation intact and speech normal  PSYCH: mentation appears normal, affect normal/bright                "

## 2021-09-08 ENCOUNTER — E-CONSULT (OUTPATIENT)
Dept: BEHAVIORAL HEALTH | Facility: CLINIC | Age: 61
End: 2021-09-08
Payer: COMMERCIAL

## 2021-09-08 PROCEDURE — 99451 NTRPROF PH1/NTRNET/EHR 5/>: CPT | Performed by: PSYCHIATRY & NEUROLOGY

## 2021-09-08 NOTE — PROGRESS NOTES
ALL SMARTFIELDS MUST BE COMPLETED FOR PATIENT CARE AND BILLING    9/8/2021     E-Consult has been accepted.    Interprofessional consultation requested by:  Kathya Lang APRN CNP      Clinical Question/Purpose: MY CLINICAL QUESTION IS: ADHD diagnosed at [facility] in 2015, not previously treated with medication. Patient started on Strattera ~2 months ago, had mild improvement in focus but experienced dizziness, which improved with self-discontinuation. Hx DM, HTN (BP elevated, working on getting this under control). Next best option for medication for ADHD management?     Patient assessment and information reviewed: chart review    Recommendations:  This can be a tricky issue with his HTN.  Stimulants usually are okay with HTN but they do need to be monitored.  More worrisome is how often does he need to use Maxalt.  This puts a higher risk for a hypertensive crisis and serotonin syndrome.  If he is using this just a few times a year, it is probably okay, but education on trying not to combine maxalt with a stimulant would be good.  Due to this, I would choose more short acting stimulants to decrease this risk as the medication will wear off faster.  This can be an issue in trying to remember to take medications twice a day and there can be some up's and down's associated with the short acting medications.  Choosing either adderall or concerta is fine.  I would avoid vyvanse as the half life is longer, which in some cases is good but in this case, not knowing how quickly he can metabolize the prodrug, may add to higher risk with use of maxalt.      Two other options that could help with his HTN are clonidine and tenex. They are both second line agents for ADHD.  Both can cause some sleepiness so may need to be given at bedtime if get too sleepy.  Starting tenex at 1 mg at bedtime with the ability to go up to 4 mg total.  May need to split the dose bid and watch blood pressure to make sure it doesn't get too low.   Same would apply to clonidine which would start at 0.1 mg with a max of 0.3 mg.      Thank you for the consult.      The recommendations provided in this E-Consult are based on the clinical data available to me at this time, and are furnished without the benefit of a comprehensive in-person or virtual patient evaluation, Any new clinical issues or changes in patient status since the filing of this E-Consult will need to be taken into account when assessing these recommendations. Please contact me if you have further questions.    My total time spent reviewing clinical information and formulating assessment was 15 minutes.    Report sent automatically to requesting provider once signed.     Charge codes - 9151302 (5+ minutes) or [id number] (No charge code)    Dewayne Horton MD

## 2021-09-24 ENCOUNTER — OFFICE VISIT (OUTPATIENT)
Dept: INTERNAL MEDICINE | Facility: CLINIC | Age: 61
End: 2021-09-24
Payer: COMMERCIAL

## 2021-09-24 VITALS
OXYGEN SATURATION: 98 % | SYSTOLIC BLOOD PRESSURE: 132 MMHG | BODY MASS INDEX: 29.72 KG/M2 | HEIGHT: 76 IN | WEIGHT: 244.1 LBS | RESPIRATION RATE: 16 BRPM | DIASTOLIC BLOOD PRESSURE: 75 MMHG | HEART RATE: 88 BPM

## 2021-09-24 DIAGNOSIS — I10 ESSENTIAL HYPERTENSION: ICD-10-CM

## 2021-09-24 DIAGNOSIS — F90.2 ATTENTION DEFICIT HYPERACTIVITY DISORDER (ADHD), COMBINED TYPE: Primary | ICD-10-CM

## 2021-09-24 PROCEDURE — 90471 IMMUNIZATION ADMIN: CPT | Performed by: NURSE PRACTITIONER

## 2021-09-24 PROCEDURE — 90686 IIV4 VACC NO PRSV 0.5 ML IM: CPT | Performed by: NURSE PRACTITIONER

## 2021-09-24 PROCEDURE — 99214 OFFICE O/P EST MOD 30 MIN: CPT | Mod: 25 | Performed by: NURSE PRACTITIONER

## 2021-09-24 RX ORDER — LOSARTAN POTASSIUM AND HYDROCHLOROTHIAZIDE 12.5; 5 MG/1; MG/1
1 TABLET ORAL DAILY
Qty: 60 TABLET | Refills: 1 | Status: SHIPPED | OUTPATIENT
Start: 2021-09-24 | End: 2021-10-31

## 2021-09-24 RX ORDER — DEXTROAMPHETAMINE SACCHARATE, AMPHETAMINE ASPARTATE, DEXTROAMPHETAMINE SULFATE AND AMPHETAMINE SULFATE 2.5; 2.5; 2.5; 2.5 MG/1; MG/1; MG/1; MG/1
10 TABLET ORAL 2 TIMES DAILY
Qty: 60 TABLET | Refills: 0 | Status: SHIPPED | OUTPATIENT
Start: 2021-09-24 | End: 2021-10-24

## 2021-09-24 RX ORDER — DEXTROAMPHETAMINE SACCHARATE, AMPHETAMINE ASPARTATE, DEXTROAMPHETAMINE SULFATE AND AMPHETAMINE SULFATE 2.5; 2.5; 2.5; 2.5 MG/1; MG/1; MG/1; MG/1
10 TABLET ORAL 2 TIMES DAILY
Qty: 60 TABLET | Refills: 0 | Status: SHIPPED | OUTPATIENT
Start: 2021-10-25 | End: 2021-10-29

## 2021-09-24 RX ORDER — DEXTROAMPHETAMINE SACCHARATE, AMPHETAMINE ASPARTATE, DEXTROAMPHETAMINE SULFATE AND AMPHETAMINE SULFATE 2.5; 2.5; 2.5; 2.5 MG/1; MG/1; MG/1; MG/1
10 TABLET ORAL 2 TIMES DAILY
Qty: 60 TABLET | Refills: 0 | Status: SHIPPED | OUTPATIENT
Start: 2021-11-25 | End: 2021-10-29

## 2021-09-24 ASSESSMENT — MIFFLIN-ST. JEOR: SCORE: 2023.73

## 2021-09-24 ASSESSMENT — PAIN SCALES - GENERAL: PAINLEVEL: NO PAIN (0)

## 2021-09-24 NOTE — PROGRESS NOTES
"  Assessment & Plan     Essential hypertension  BPs at home slightly above goal. Will restart Hyzaar at lower dose, discussed benefit of ARB for renal protection given his hx DM1. He will continue to monitor his BP at home. If levels drop (100-110s/60s-70s) can reduce amlodipine back to 5 mg.   - losartan-hydrochlorothiazide (HYZAAR) 50-12.5 MG tablet; Take 1 tablet by mouth daily    Attention deficit hyperactivity disorder (ADHD), combined type  Will start Adderall. He uses Maxalt very infrequently (estimates about 2x per year). Reviewed potential side effects as well as monitoring for s/s serotonin syndrome. If he requires maxalt for a migraine, advised to hold afternoon Adderall. He agrees with the plan.   - amphetamine-dextroamphetamine (ADDERALL) 10 MG tablet; Take 1 tablet (10 mg) by mouth 2 times daily  - amphetamine-dextroamphetamine (ADDERALL) 10 MG tablet; Take 1 tablet (10 mg) by mouth 2 times daily  - amphetamine-dextroamphetamine (ADDERALL) 10 MG tablet; Take 1 tablet (10 mg) by mouth 2 times daily       Return in about 6 weeks (around 11/5/2021). to recheck BP and medication     GALILEA Escalante CNP  [facility] INTERNAL MEDICINE [facility]   Maurice is a 60 year old who presents for the following health issues     HPI       BP at home ~142/72, higher when comes home from work. Taking 10 mg amlodipine but stopped Hyzaar after last visit, didn't think he should be on both Amlodipine and Hyzaar.   Migraines a couple times a year, significantly reduced on Topamax, uses Maxalt about 2x per year.  ADHD--see E-consult from Dr. Horton. Had dizziness with the Strattera.    Review of Systems   Constitutional, HEENT, cardiovascular, pulmonary, gi and gu systems are negative, except as otherwise noted.      Objective    /75 (BP Location: Right arm)   Pulse 88   Resp 16   Ht 1.93 m (6' 4\")   Wt 110.7 kg (244 lb 1.6 oz)   SpO2 98%   BMI 29.71 kg/m    Body mass index is " 29.71 kg/m .  Physical Exam   GENERAL: healthy, alert and no distress  RESP: lungs clear to auscultation - no rales, rhonchi or wheezes  CV: regular rate and rhythm, normal S1 S2, no S3 or S4, no murmur, click or rub, no peripheral edema and peripheral pulses strong  MS: no gross musculoskeletal defects noted, no edema  PSYCH: mentation appears normal, affect normal/bright

## 2021-09-24 NOTE — PROGRESS NOTES
At the request of Kathya CALERO CNP, Luigi Moore received the Influenza Vaccine Fluzone Quadrivalent [id number] Formula No Preservative vaccine today in clinic. The flu shot was given under the supervision of Dr. Warren Tee if assistance was needed. The immunization site was cleaned with an alcohol prep wipe. The flu shot was given without incident--see immunization list for administration details. No swelling or redness was observed at the site of injection after the immunization was given. The patient was advised to remain in [facility] [place] for 15 minutes after the injection in case of an averse reaction.     Tanvir Banerjee, EMT at 4:28 PM on 9/24/2021

## 2021-09-24 NOTE — NURSING NOTE
Luigi Moore is a 60 year old male patient that presents today in clinic for the following:    Chief Complaint   Patient presents with     RECHECK     Three week follow-up     The patient's allergies and medications were reviewed as noted. A set of vitals were recorded as noted without incident. The patient does not have any other questions for the provider.    Tanvir Banerjee, EMT at 2:58 PM on 9/24/2021

## 2021-09-24 NOTE — PATIENT INSTRUCTIONS
"Restart Losartan-hydrochlorothiazide (50-12.5 mg). If any lightheadeness or dizziness, or if blood pressures consistently ~100s/60s, reduce the Amlodipine to 5 mg.     What is serotonin syndrome?  Serotonin syndrome is a problem that can happen after taking certain medicines. It is uncommon but can be serious or even deadly if it does happen.  Serotonin syndrome causes symptoms such as:  ?Feeling anxious, restless, or confused  ?Sweating  ?Muscle spasms or muscles that cannot relax normally  ?Very fast back-and-forth eye movements  ?Shaking or trembling  ?Fever  ?A fast heartbeat  ?Vomiting  ?Diarrhea    What causes serotonin syndrome?  Serotonin syndrome can happen to people after they take certain medicines or combinations of medicines. It can also happen with certain herbal products and street drugs. There are many medicines and drugs that can lead to serotonin syndrome. In general, they increase a chemical in the brain and body called \"serotonin.\" Serotonin syndrome happens when the levels of serotonin in your brain get too high.  Examples of the medicines and drugs that can cause serotonin syndrome include:  ?Some medicines used to treat depression, such as selective serotonin reuptake inhibitors (\"SSRIs\")    ?Some medicines used to treat Parkinson disease, such as selegiline (sample brand names: Eldepryl, Emsam) and rasagiline (brand name: Azilect)    ?Some pain relievers, such as meperidine (sample brand name: Demerol), tramadol (sample brand name: Ultram), fentanyl, and cyclobenzaprine (sample brand name: Flexeril)    ?Saint Donn's wort (an herbal medicine sometimes used for depression)    ?Medicines used to treat migraine headaches, called \"triptans\"    ?Some medicines used to treat attention deficit hyperactivity disorder (ADHD)    ?An antibiotic called linezolid (brand name: Zyvox)    ?A cough medicine called dextromethorphan (sample brand names: Delsym, Robitussin DM)    ?Street drugs, such as ecstasy, " cocaine, and amphetamines    Doctors do not know why some people get serotonin syndrome and others do not. But they do know that people usually get it within hours of taking a new medicine or drug, a higher dose, or a new combination of medicines or drugs.  Should I see a doctor or nurse?  Yes. If you have symptoms of serotonin syndrome, and especially if you recently took a new drug or medicine or a new dose, call your doctor right away. Without proper treatment, severe serotonin syndrome can be deadly.  Can serotonin syndrome be serious?  Yes. Severe symptoms include seizures, high fever, sudden changes in blood pressure or heart rate, and passing out. If you or someone you know has severe symptoms, go to the emergency room or call for an ambulance (in the [place] and [place], dial 9-1-1).  Will I need tests?  Maybe. There is no one test that can show whether or not you have serotonin syndrome. Still, some of the things the doctor will do during the exam can help him or her figure out if you have it. For instance, the doctor might test your leg muscles to see if they spasm. The doctor will also ask questions about any medicines, herbal products, or street drugs you might have taken.  If you have serotonin syndrome, it's very important to be honest with your doctor about what you took, how much, and when. This way they will know how to properly treat you.  How is serotonin syndrome treated?  As part of treatment, the doctor will stop the medicines or drugs that caused the serotonin syndrome in the first place. They will also monitor your breathing, heart rate, blood pressure, and body temperature, and try to keep these as close to normal as possible.  Depending on what you need, they might also:  ?Give you medicines to calm you  ?Give you medicines to block the effects of serotonin  ?Work with you to decide whether you should keep taking the medicines that caused your serotonin syndrome    Can serotonin syndrome be  prevented?  Not always. But there are things you can do to lower the risk.  Doctors have no way to predict who will get serotonin syndrome, so it's not possible to prevent cases caused by properly prescribed medicines. Even so, there are things you can do to protect yourself from dangerous reactions to medicines:  ?Always tell any doctor who prescribes medicines for you about all the medicines, herbal products, and street drugs you take. That way, the doctor can be careful not to give you medicines that could cause problems when combined.    ?If you are already taking any medicines, check with your doctor, nurse, or pharmacist before taking anything else. This includes over-the-counter medicines, supplements, or herbs.    ?When starting a new medicine, have the pharmacist check for drug interactions.    ?Have your doctor, nurse, or pharmacist regularly review your medications list with you to be sure it's correct. They can also make sure you are taking the correct amounts and not taking any medicine you were supposed to stop.

## 2021-10-11 DIAGNOSIS — G43.111 INTRACTABLE MIGRAINE WITH AURA WITH STATUS MIGRAINOSUS: ICD-10-CM

## 2021-10-11 NOTE — TELEPHONE ENCOUNTER
topiramate (TOPAMAX) 50 MG   Last Written Prescription Date:  5/27/21  Last Fill Quantity: 90,   # refills: 0  Last Office Visit : 9/24/21  Future Office visit:  10/29/21  Routing refill request to provider for review/approval because:  Do you want to continue med/RF ?   *OVER DUE LABS CBC PLT

## 2021-10-12 RX ORDER — TOPIRAMATE 50 MG/1
50 TABLET, FILM COATED ORAL DAILY
Qty: 90 TABLET | Refills: 1 | Status: SHIPPED | OUTPATIENT
Start: 2021-10-12 | End: 2022-01-18

## 2021-10-15 ENCOUNTER — TELEPHONE (OUTPATIENT)
Dept: ENDOCRINOLOGY | Facility: CLINIC | Age: 61
End: 2021-10-15

## 2021-10-15 ENCOUNTER — TELEPHONE (OUTPATIENT)
Dept: UROLOGY | Facility: CLINIC | Age: 61
End: 2021-10-15

## 2021-10-15 DIAGNOSIS — E10.8 TYPE 1 DIABETES MELLITUS WITH COMPLICATIONS (H): ICD-10-CM

## 2021-10-15 RX ORDER — LEVOTHYROXINE SODIUM 137 UG/1
137 TABLET ORAL DAILY
Qty: 30 TABLET | Refills: 0 | Status: SHIPPED | OUTPATIENT
Start: 2021-10-15 | End: 2022-02-21

## 2021-10-15 NOTE — TELEPHONE ENCOUNTER
[facility] Call Center    Phone Message    May a detailed message be left on voicemail: yes     Reason for Call: Other: Pt called regarding message he received for an earlier appt on 10/29 with Dr Cooper.   I looked at schedule and no appt available.   Please call Pt to schedule    Action Taken: Message routed to:  [facility] ([facility] Urology    Travel Screening: Not Applicable

## 2021-10-15 NOTE — TELEPHONE ENCOUNTER
levothyroxine (SYNTHROID/LEVOTHROID) 137 MCG tablet  Last Written Prescription Date:  12/17/2020  Last Fill Quantity: 30,   # refills: 1  Last Office Visit : 5/14/2020  Future Office visit: None    Routing refill request to provider for review/approval because:  Several Request     Over due office visit  Clinic/Provider notified       Esme Pierre RN  Central Triage Red Flags/Med Refills

## 2021-10-15 NOTE — TELEPHONE ENCOUNTER
SCHEDULING NOTES    RESULT: Letter sent    APPT TYPE: RETURN DIABETES    PROVIDER: Vincent    DATE APPT NEEDED: 1st available    ADDITIONAL NOTES: Previous scheduling attempts made. Pt aware of need to schedule.

## 2021-10-16 DIAGNOSIS — N52.9 ERECTILE DYSFUNCTION, UNSPECIFIED ERECTILE DYSFUNCTION TYPE: Primary | ICD-10-CM

## 2021-10-16 RX ORDER — CEFAZOLIN SODIUM 2 G/50ML
2 SOLUTION INTRAVENOUS
Status: CANCELLED | OUTPATIENT
Start: 2021-10-16

## 2021-10-16 RX ORDER — CEFAZOLIN SODIUM 2 G/50ML
2 SOLUTION INTRAVENOUS SEE ADMIN INSTRUCTIONS
Status: CANCELLED | OUTPATIENT
Start: 2021-10-16

## 2021-10-26 ENCOUNTER — PRE VISIT (OUTPATIENT)
Dept: UROLOGY | Facility: CLINIC | Age: 61
End: 2021-10-26

## 2021-10-26 NOTE — TELEPHONE ENCOUNTER
Reason for visit: Follow up     Relevant information: discuss IPP    Records/imaging/labs/orders: in EPIC    Pt called: no    At Rooming: normal

## 2021-10-29 ENCOUNTER — OFFICE VISIT (OUTPATIENT)
Dept: INTERNAL MEDICINE | Facility: CLINIC | Age: 61
End: 2021-10-29
Payer: COMMERCIAL

## 2021-10-29 ENCOUNTER — OFFICE VISIT (OUTPATIENT)
Dept: UROLOGY | Facility: CLINIC | Age: 61
End: 2021-10-29
Payer: COMMERCIAL

## 2021-10-29 VITALS
RESPIRATION RATE: 16 BRPM | HEART RATE: 92 BPM | BODY MASS INDEX: 29.71 KG/M2 | SYSTOLIC BLOOD PRESSURE: 145 MMHG | HEIGHT: 76 IN | DIASTOLIC BLOOD PRESSURE: 81 MMHG | OXYGEN SATURATION: 96 % | WEIGHT: 244 LBS

## 2021-10-29 VITALS
SYSTOLIC BLOOD PRESSURE: 137 MMHG | DIASTOLIC BLOOD PRESSURE: 73 MMHG | HEART RATE: 92 BPM | BODY MASS INDEX: 29.22 KG/M2 | HEIGHT: 76 IN | WEIGHT: 240 LBS

## 2021-10-29 DIAGNOSIS — N52.01 ERECTILE DYSFUNCTION DUE TO ARTERIAL INSUFFICIENCY: Primary | ICD-10-CM

## 2021-10-29 DIAGNOSIS — F90.2 ATTENTION DEFICIT HYPERACTIVITY DISORDER (ADHD), COMBINED TYPE: ICD-10-CM

## 2021-10-29 DIAGNOSIS — I10 ESSENTIAL HYPERTENSION: ICD-10-CM

## 2021-10-29 PROCEDURE — 99214 OFFICE O/P EST MOD 30 MIN: CPT | Performed by: NURSE PRACTITIONER

## 2021-10-29 PROCEDURE — 51798 US URINE CAPACITY MEASURE: CPT | Performed by: UROLOGY

## 2021-10-29 PROCEDURE — 99213 OFFICE O/P EST LOW 20 MIN: CPT | Mod: 25 | Performed by: UROLOGY

## 2021-10-29 RX ORDER — AMLODIPINE BESYLATE 10 MG/1
10 TABLET ORAL DAILY
Qty: 90 TABLET | Refills: 3 | Status: SHIPPED | OUTPATIENT
Start: 2021-10-29 | End: 2022-12-28

## 2021-10-29 RX ORDER — DEXTROAMPHETAMINE SACCHARATE, AMPHETAMINE ASPARTATE, DEXTROAMPHETAMINE SULFATE AND AMPHETAMINE SULFATE 3.75; 3.75; 3.75; 3.75 MG/1; MG/1; MG/1; MG/1
15 TABLET ORAL 2 TIMES DAILY
Qty: 60 TABLET | Refills: 0 | Status: SHIPPED | OUTPATIENT
Start: 2021-12-14 | End: 2022-01-28

## 2021-10-29 RX ORDER — DEXTROAMPHETAMINE SACCHARATE, AMPHETAMINE ASPARTATE, DEXTROAMPHETAMINE SULFATE AND AMPHETAMINE SULFATE 3.75; 3.75; 3.75; 3.75 MG/1; MG/1; MG/1; MG/1
15 TABLET ORAL 2 TIMES DAILY
Qty: 60 TABLET | Refills: 0 | Status: SHIPPED | OUTPATIENT
Start: 2021-11-14 | End: 2022-02-15

## 2021-10-29 RX ORDER — DEXTROAMPHETAMINE SACCHARATE, AMPHETAMINE ASPARTATE, DEXTROAMPHETAMINE SULFATE AND AMPHETAMINE SULFATE 3.75; 3.75; 3.75; 3.75 MG/1; MG/1; MG/1; MG/1
15 TABLET ORAL 2 TIMES DAILY
Qty: 60 TABLET | Refills: 0 | Status: SHIPPED | OUTPATIENT
Start: 2022-01-13 | End: 2022-01-28

## 2021-10-29 ASSESSMENT — MIFFLIN-ST. JEOR
SCORE: 2005.13
SCORE: 2023.28

## 2021-10-29 ASSESSMENT — PAIN SCALES - GENERAL
PAINLEVEL: NO PAIN (0)
PAINLEVEL: NO PAIN (0)

## 2021-10-29 NOTE — LETTER
"10/29/2021       RE: Luigi Moore  [address]  [address]  [facility] [address]     Dear Colleague,    Thank you for referring your patient, Luigi Moore, to the [facility]OLOG[facility] [place] [facility]. Please see a copy of my visit note below.    Urology Clinic Note      Date: 10/29/2021  Time: 12:14 PM  Patient: Luigi Moore  MRN: [id number]    Reason for Visit: ED    HPI/Subjective: Luigi Moore is a 60 year old male w/ h/o ED currently on Trimix No 4 at 0.6mL dosage.      He has been using Trimix No. 4, at 0.6 mL dosage, attempted as high as 1.4mL without obtaining erections sufficient for penetrative intercourse. Erections last maybe a couple hours. Has tried it with vacuum as well without success. Frustrated with his performance and interested in IPP.    No other pelvic surgery. No history of prostatectomy.  Not a smoker. Has an insulin pump for glucose control.    Objective:  /73   Pulse 92   Ht 1.93 m (6' 4\")   Wt 108.9 kg (240 lb)   BMI 29.21 kg/m    Gen: In NAD, conversant.  Resp: Breathing non-labored on room air.  CV: Warm and well perfused, RRR on peripheral pulse.  Abd: Soft, nondistended, non-tender.  : Circumcised, penis shaft and head normal consistency without plaques, no hernias, normal location of meatus, b/l descended testes normal  Ext: Moving all 4, no BLE edema noted  Neuro: No focal deficits noted    Labs/Imaging:     A1C - 7.2      Assessment & Plan: Luigi Moore is a 60 year old male with longstanding DM2 and ED, previously managed with Trimix but notes a precipitous decline in his erectile function over the last 5 months. He is interested in IPP placement. 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 We discussed the potential of autoinflation, infection (higher risk given his insulin-dependent diabetes) requiring " explant and washout, bleeding requiring further intervention, need for replacement in the future for device failure, damage to adjacent structures including bladder, urethra, blood vessels, nerves, loss of length.    - Plan for IPP with inflatable device  - UA/UCx at pre-op visit  - PVR 30cc today     Jerzy Mercer MD  Urology, PGY-2    I saw and examined the patient with the resident today.  I agree with the resident note and plan of care as above.     I counseled the patient on the risks of penile implant surgery. These include, but are not limited to infection, scarring, penile shortening, damage to urethra and bladder, pain and erosion. I explained to him the risk of infection and the need for explantation in those cases; that other treatments for ED cannot be used after placing an implant, and that implants have a mechanical failure rate.  Discussed possible ectopic reservoir.  I answered all of his questions to the best of my ability and to the patient's satisfaction.  Discussed he's at increased risk of infection secondary to DM.    Orders are in for IPP surgery.  Will schedule.    Johnathan Cooper MD  Urology Staff     Additional Coding Information:    Problems:  3 -- one stable chronic illness    Data Reviewed  Review of the result(s) of each unique test - a1c    Tests ordered/pending: PVR      Notes from other providers reviewed: N/A       Level of risk:  4 -- minor surgery with patient or procedure risks    Time spent:  13 minutes spent on the date of the encounter doing chart review, history and exam, documentation and further activities per the note

## 2021-10-29 NOTE — NURSING NOTE
Luigi Moore is a 60 year old male patient that presents today in clinic for the following:    Chief Complaint   Patient presents with     RECHECK     Changed to 15mg on adderall     The patient's allergies and medications were reviewed as noted. A set of vitals were recorded as noted without incident. The patient does not have any other questions for the provider.    Valencia Domínguez, EMT at 3:10 PM on 10/29/2021

## 2021-10-29 NOTE — PROGRESS NOTES
"Urology Clinic Note      Date: 10/29/2021  Time: 12:14 PM  Patient: Luigi Moore  MRN: [id number]    Reason for Visit: ED    HPI/Subjective: Luigi Moore is a 60 year old male w/ h/o ED currently on Trimix No 4 at 0.6mL dosage.      He has been using Trimix No. 4, at 0.6 mL dosage, attempted as high as 1.4mL without obtaining erections sufficient for penetrative intercourse. Erections last maybe a couple hours. Has tried it with vacuum as well without success. Frustrated with his performance and interested in IPP.    No other pelvic surgery. No history of prostatectomy.  Not a smoker. Has an insulin pump for glucose control.    Objective:  /73   Pulse 92   Ht 1.93 m (6' 4\")   Wt 108.9 kg (240 lb)   BMI 29.21 kg/m    Gen: In NAD, conversant.  Resp: Breathing non-labored on room air.  CV: Warm and well perfused, RRR on peripheral pulse.  Abd: Soft, nondistended, non-tender.  : Circumcised, penis shaft and head normal consistency without plaques, no hernias, normal location of meatus, b/l descended testes normal  Ext: Moving all 4, no BLE edema noted  Neuro: No focal deficits noted    Labs/Imaging:     A1C - 7.2      Assessment & Plan: Luigi Moore is a 60 year old male with longstanding DM2 and ED, previously managed with Trimix but notes a precipitous decline in his erectile function over the last 5 months. He is interested in IPP placement. 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 We discussed the potential of autoinflation, infection (higher risk given his insulin-dependent diabetes) requiring explant and washout, bleeding requiring further intervention, need for replacement in the future for device failure, damage to adjacent structures including bladder, urethra, blood vessels, nerves, loss of length.    - Plan for IPP with inflatable device  - UA/UCx at pre-op visit  - PVR 30cc today     Jerzy Mercer MD  Urology, " PGY-2    I saw and examined the patient with the resident today.  I agree with the resident note and plan of care as above.     I counseled the patient on the risks of penile implant surgery. These include, but are not limited to infection, scarring, penile shortening, damage to urethra and bladder, pain and erosion. I explained to him the risk of infection and the need for explantation in those cases; that other treatments for ED cannot be used after placing an implant, and that implants have a mechanical failure rate.  Discussed possible ectopic reservoir.  I answered all of his questions to the best of my ability and to the patient's satisfaction.  Discussed he's at increased risk of infection secondary to DM.    Orders are in for IPP surgery.  Will schedule.    Johnathan Cooper MD  Urology Staff     Additional Coding Information:    Problems:  3 -- one stable chronic illness    Data Reviewed  Review of the result(s) of each unique test - a1c    Tests ordered/pending: PVR      Notes from other providers reviewed: N/A       Level of risk:  4 -- minor surgery with patient or procedure risks    Time spent:  13 minutes spent on the date of the encounter doing chart review, history and exam, documentation and further activities per the note

## 2021-10-29 NOTE — PROGRESS NOTES
"  Assessment & Plan     Essential hypertension  BPs within goal range on his home monitor (missed his medication this morning). He will continue to monitor this and notify us if readings consistently >130/80. Continue with Amlodipine 10 mg and Hyzaar 50-12.5 mg.  - amLODIPine (NORVASC) 10 MG tablet; Take 1 tablet (10 mg) by mouth daily    Attention deficit hyperactivity disorder (ADHD), combined type  Reviewed do not increase medication without prior approval from medical team. He is tolerating Adderall well, feels this is working well for him.   - amphetamine-dextroamphetamine (ADDERALL) 15 MG tablet; Take 1 tablet (15 mg) by mouth 2 times daily  - amphetamine-dextroamphetamine (ADDERALL) 15 MG tablet; Take 1 tablet (15 mg) by mouth 2 times daily  - amphetamine-dextroamphetamine (ADDERALL) 15 MG tablet; Take 1 tablet (15 mg) by mouth 2 times daily     Return in about 3 months (around 1/29/2022) for in person, preop.    GALILEA Escalante [facility] INTERNAL MEDICINE [facility]   Maurice is a 60 year old who presents for the following health issues     HPI       Didn't take his BP meds this morning.  BPs at home seem well-controlled, 130s/60-70s. Continues on Amlodipine 10 mg and Losartan-hydrochlorothiazide 50-12.5 mg. Tolerating well.     ADHD--started Adderall 10 mg BID following appointment 6-8 weeks ago. Increased to 15 mg BID. No side effects. Has been helpful, helps focus, actually sleeping better. No reduced appetite.     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      Review of Systems   Constitutional, HEENT, cardiovascular, pulmonary, gi and gu systems are negative, except as otherwise noted.      Objective    BP (!) 145/81 (BP Location: Right arm, Patient Position: Sitting, Cuff Size: Adult Regular)   Pulse 92   Resp 16   Ht 1.93 m (6' 4\")   Wt 110.7 kg (244 lb)   SpO2 96%   BMI 29.70 kg/m    Body mass " index is 29.7 kg/m .  Physical Exam   GENERAL: healthy, alert and no distress  EYES: Eyes grossly normal to inspection, PERRL and conjunctivae and sclerae normal  NECK: no adenopathy, no asymmetry, masses, or scars and thyroid normal to palpation  RESP: lungs clear to auscultation - no rales, rhonchi or wheezes  CV: regular rate and rhythm, normal S1 S2, no S3 or S4, no murmur, click or rub, no peripheral edema and peripheral pulses strong  NEURO: Normal strength and tone, mentation intact and speech normal, no tremor  PSYCH: mentation appears normal, affect normal/bright

## 2021-10-29 NOTE — NURSING NOTE
"Chief Complaint   Patient presents with     Follow Up     discuss surgery       Height 1.93 m (6' 4\"), weight 108.9 kg (240 lb). Body mass index is 29.21 kg/m .    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)     Acquired hypothyroidism     Dyslipidemia     Essential hypertension     Hyperlipidemia     Proliferative diabetic retinopathy (H)     Obstructive sleep apnea syndrome     Insomnia     Low back pain     Type 1 diabetes mellitus (H)     TELLY (obstructive sleep apnea)       Allergies   Allergen Reactions     Diagnostic X-Ray Materials Anaphylaxis     Contrast Dye Hives     Diatrizoate Hives     iodine       Current Outpatient Medications   Medication Sig Dispense Refill     acetaminophen-codeine (TYLENOL #3) 300-30 MG per tablet Take 1-2 tablets by mouth every 6 hours as needed for moderate pain 60 tablet 1     albuterol (PROAIR HFA/PROVENTIL HFA/VENTOLIN HFA) 108 (90 Base) MCG/ACT inhaler Inhale 2 puffs into the lungs every 6 hours as needed for shortness of breath / dyspnea or wheezing - exercise induced wheezing 18 g 5     amLODIPine (NORVASC) 5 MG tablet Take 1 tablet (5 mg) by mouth daily 60 tablet 1     amphetamine-dextroamphetamine (ADDERALL) 10 MG tablet Take 1 tablet (10 mg) by mouth 2 times daily 60 tablet 0     [START ON 11/25/2021] amphetamine-dextroamphetamine (ADDERALL) 10 MG tablet Take 1 tablet (10 mg) by mouth 2 times daily 60 tablet 0     ARIPiprazole (ABILIFY) 15 MG tablet Take 1 tablet (15 mg) by mouth daily 90 tablet 0     aspirin 81 MG tablet Take 1 tablet (81 mg) by mouth daily 100 tablet 3     atorvastatin (LIPITOR) 40 MG tablet Take 1 tablet (40 mg) by mouth daily 90 tablet 0     blood glucose (ACCU-CHEK GUIDE) test strip Use to test blood sugar 3 times daily or as directed. 100 strip 11     blood glucose (CONTOUR NEXT TEST) test strip For diabetes, tests 4 times daily 400 strip 3     Blood Pressure KIT 1 Units once a week If BP at rest " ">140/90, call office. 1 kit 0     buPROPion (WELLBUTRIN XL) 150 MG 24 hr tablet Take 1 tablet (150 mg) by mouth every morning Take with 300 mg tablet (total daily dose 450 mg) 60 tablet 1     buPROPion (WELLBUTRIN XL) 300 MG 24 hr tablet Take 1 tablet (300 mg) by mouth every morning Take with 150 mg tablet (total daily dose 450 mg) 60 tablet 1     cetirizine (ZYRTEC) 10 MG tablet Take 10 mg by mouth       DiphenhydrAMINE HCl (BENADRYL PO)        gabapentin (NEURONTIN) 400 MG capsule Take 1 capsule (400 mg) by mouth 3 times daily 270 capsule 0     Insulin Infusion Pump (INSULIN PUMP [id number]) KIT        insulin lispro (HUMALOG) 100 UNIT/ML vial USE AS DIRECTED IN PUMP APPROXIMATELY 80 UNITS PER DAY. Clinic visit and lab draw needed. Call  to schedule. 10 mL 0     insulin lispro (HUMALOG) 100 UNIT/ML vial USE AS DIRECTED IN PUMP APPROXIMATELY 80 UNITS PER DAY. Follow up needed 80 mL 0     insulin syringe-needle U-100 (BD INSULIN SYRINGE) 29G X 1/2\" 1 ML miscellaneous Use as directed 20 each 1     levothyroxine (SYNTHROID/LEVOTHROID) 137 MCG tablet Take 1 tablet (137 mcg) by mouth daily 30 tablet 0     losartan-hydrochlorothiazide (HYZAAR) 100-25 MG tablet Take 1 tablet by mouth daily 90 tablet 1     losartan-hydrochlorothiazide (HYZAAR) 50-12.5 MG tablet Take 1 tablet by mouth daily 60 tablet 1     NEW MED Trimix #4    Sig:  Inject 0.6 mL intracavernosal as directed.    Max use once daily and up to 3x/week.  Trimix intracavernosal injection, per mL:  PGE1: 40 mcg  Papaverine: 27.6mg  Phentolamine: 1 mg 10 mL 11     ONE TOUCH TEST STRIPS test strip Use to test blood sugar 4 times daily or as directed. 400 strip 3     ONETOUCH ULTRA test strip Test  four times daily ultra blue (please schedule clinic appt) 400 strip 0     order for DME Equipment being ordered: CPAP machine 1 each 0     rizatriptan (MAXALT) 10 MG tablet Take 1 tablet (10 mg) by mouth at onset of headache for migraine 12 tablet 11     topiramate " (TOPAMAX) 50 MG tablet Take 1 tablet (50 mg) by mouth daily 90 tablet 1     Urea 40 % CREA Externally apply topically 2 times daily To surface of toenails 198 g 5     glucagon (GLUCAGON EMERGENCY) 1 MG kit Inject 1 mg into the muscle once for 1 dose 1 mg 3     zolpidem (AMBIEN) 10 MG tablet TAKE 1 TABLET BY MOUTH EVERY NIGHT AT BEDTIME AS NEEDED.  NO ADDITIONAL REFILLS WITHOUT PROVIDER VISIT. (Patient not taking: Reported on 10/29/2021) 30 tablet 0       Social History     Tobacco Use     Smoking status: Never Smoker     Smokeless tobacco: Never Used   Substance Use Topics     Alcohol use: No     Comment: History of alcohol abuse/sober since 2015; went to treatment      Drug use: No       Polo Rodriguez EMT  10/29/2021  12:09 PM

## 2021-10-31 RX ORDER — LOSARTAN POTASSIUM AND HYDROCHLOROTHIAZIDE 12.5; 5 MG/1; MG/1
1 TABLET ORAL DAILY
Qty: 90 TABLET | Refills: 3 | Status: SHIPPED | OUTPATIENT
Start: 2021-10-31 | End: 2022-10-20

## 2021-11-08 ENCOUNTER — MYC MEDICAL ADVICE (OUTPATIENT)
Dept: INTERNAL MEDICINE | Facility: CLINIC | Age: 61
End: 2021-11-08
Payer: COMMERCIAL

## 2021-11-08 DIAGNOSIS — M67.90 TENDON NODULE: Primary | ICD-10-CM

## 2021-11-17 NOTE — TELEPHONE ENCOUNTER
Would recommend refilling this med from PCP or Psychiatry for appropriate monitoring.    Detail Level: Detailed General Sunscreen Counseling: I recommended a broad spectrum sunscreen with a SPF of 30 or higher.  I explained that SPF 30 sunscreens block approximately 97 percent of the sun's harmful rays.  Sunscreens should be applied at least 15 minutes prior to expected sun exposure and then every 2 hours after that as long as sun exposure continues. If swimming or exercising sunscreen should be reapplied every 45 minutes to an hour after getting wet or sweating.  One ounce, or the equivalent of a shot glass full of sunscreen, is adequate to protect the skin not covered by a bathing suit. I also recommended a lip balm with a sunscreen as well. Sun protective clothing can be used in lieu of sunscreen but must be worn the entire time you are exposed to the sun's rays.

## 2021-12-21 ENCOUNTER — DOCUMENTATION ONLY (OUTPATIENT)
Dept: ENDOCRINOLOGY | Facility: CLINIC | Age: 61
End: 2021-12-21
Payer: COMMERCIAL

## 2021-12-21 ENCOUNTER — OFFICE VISIT (OUTPATIENT)
Dept: ENDOCRINOLOGY | Facility: CLINIC | Age: 61
End: 2021-12-21
Payer: COMMERCIAL

## 2021-12-21 ENCOUNTER — MEDICAL CORRESPONDENCE (OUTPATIENT)
Dept: HEALTH INFORMATION MANAGEMENT | Facility: CLINIC | Age: 61
End: 2021-12-21

## 2021-12-21 ENCOUNTER — LAB (OUTPATIENT)
Dept: LAB | Facility: CLINIC | Age: 61
End: 2021-12-21
Payer: COMMERCIAL

## 2021-12-21 VITALS
HEART RATE: 80 BPM | HEIGHT: 76 IN | BODY MASS INDEX: 29.1 KG/M2 | SYSTOLIC BLOOD PRESSURE: 142 MMHG | DIASTOLIC BLOOD PRESSURE: 84 MMHG | WEIGHT: 239 LBS

## 2021-12-21 DIAGNOSIS — R06.2 WHEEZING: ICD-10-CM

## 2021-12-21 DIAGNOSIS — E78.5 DYSLIPIDEMIA: ICD-10-CM

## 2021-12-21 DIAGNOSIS — B35.1 ONYCHOMYCOSIS: ICD-10-CM

## 2021-12-21 DIAGNOSIS — G43.111 INTRACTABLE MIGRAINE WITH AURA WITH STATUS MIGRAINOSUS: ICD-10-CM

## 2021-12-21 DIAGNOSIS — Z13.5 SCREENING FOR DIABETIC RETINOPATHY: ICD-10-CM

## 2021-12-21 DIAGNOSIS — E10.8 TYPE 1 DIABETES MELLITUS WITH COMPLICATIONS (H): Primary | ICD-10-CM

## 2021-12-21 DIAGNOSIS — E03.9 ACQUIRED HYPOTHYROIDISM: ICD-10-CM

## 2021-12-21 DIAGNOSIS — E10.8 TYPE 1 DIABETES MELLITUS WITH COMPLICATIONS (H): ICD-10-CM

## 2021-12-21 LAB
BASOPHILS # BLD AUTO: 0.1 10E3/UL (ref 0–0.2)
BASOPHILS NFR BLD AUTO: 1 %
CREAT UR-MCNC: 49 MG/DL
EOSINOPHIL # BLD AUTO: 0.5 10E3/UL (ref 0–0.7)
EOSINOPHIL NFR BLD AUTO: 7 %
ERYTHROCYTE [DISTWIDTH] IN BLOOD BY AUTOMATED COUNT: 12.8 % (ref 10–15)
HBA1C MFR BLD: 7.5 % (ref 0–5.6)
HBA1C MFR BLD: 7.9 % (ref 4.3–?)
HCT VFR BLD AUTO: 44.8 % (ref 40–53)
HGB BLD-MCNC: 15.3 G/DL (ref 13.3–17.7)
IMM GRANULOCYTES # BLD: 0 10E3/UL
IMM GRANULOCYTES NFR BLD: 0 %
LYMPHOCYTES # BLD AUTO: 2.4 10E3/UL (ref 0.8–5.3)
LYMPHOCYTES NFR BLD AUTO: 33 %
MCH RBC QN AUTO: 32.1 PG (ref 26.5–33)
MCHC RBC AUTO-ENTMCNC: 34.2 G/DL (ref 31.5–36.5)
MCV RBC AUTO: 94 FL (ref 78–100)
MICROALBUMIN UR-MCNC: <5 MG/L
MICROALBUMIN/CREAT UR: NORMAL MG/G{CREAT}
MONOCYTES # BLD AUTO: 0.4 10E3/UL (ref 0–1.3)
MONOCYTES NFR BLD AUTO: 5 %
NEUTROPHILS # BLD AUTO: 3.9 10E3/UL (ref 1.6–8.3)
NEUTROPHILS NFR BLD AUTO: 54 %
NRBC # BLD AUTO: 0 10E3/UL
NRBC BLD AUTO-RTO: 0 /100
PLATELET # BLD AUTO: 237 10E3/UL (ref 150–450)
RBC # BLD AUTO: 4.76 10E6/UL (ref 4.4–5.9)
WBC # BLD AUTO: 7.3 10E3/UL (ref 4–11)

## 2021-12-21 PROCEDURE — 85025 COMPLETE CBC W/AUTO DIFF WBC: CPT | Performed by: PATHOLOGY

## 2021-12-21 PROCEDURE — 82043 UR ALBUMIN QUANTITATIVE: CPT | Performed by: PATHOLOGY

## 2021-12-21 PROCEDURE — 83036 HEMOGLOBIN GLYCOSYLATED A1C: CPT | Performed by: PHYSICIAN ASSISTANT

## 2021-12-21 PROCEDURE — 83036 HEMOGLOBIN GLYCOSYLATED A1C: CPT | Performed by: PATHOLOGY

## 2021-12-21 PROCEDURE — 36415 COLL VENOUS BLD VENIPUNCTURE: CPT | Performed by: PATHOLOGY

## 2021-12-21 PROCEDURE — 99215 OFFICE O/P EST HI 40 MIN: CPT | Performed by: PHYSICIAN ASSISTANT

## 2021-12-21 ASSESSMENT — PAIN SCALES - GENERAL: PAINLEVEL: NO PAIN (0)

## 2021-12-21 ASSESSMENT — MIFFLIN-ST. JEOR: SCORE: 1995.6

## 2021-12-21 NOTE — PATIENT INSTRUCTIONS
We appreciate your assistance in coordinating your healthcare.     Please upload your insulin pump, blood sugar meter and/or continuous glucose monitor at home 1-2 days before your next diabetes-related appointment.   This will allow your provider to review your  data before your scheduled virtual visit.    To ask a question to your Endocrine care team, please send them a Contour message, or reach them by phone at [phone]     To expedite your medication refill(s), please contact your pharmacy and have them   fax a refill request to: [phone].  *Please allow 3 business days for routine medication refills.  *Please allow 5 business days for controlled substance medication refills.    For after-hours urgent Endocrine issues, that do not require 551, please dial [phone], and ask to speak with the Endocrinologist On-Call

## 2021-12-21 NOTE — PROGRESS NOTES
[facility]  Endocrinology  Arti Kaur PA-C  December 21, 2021          Chief Complaint:   Diabetes     History of Present Illness:   Luigi Moore is a 58 year old male with a history of Acquired hypothyroidism (12/7/2016); Depressive disorder; Hidradenoma (dx: Feb 2015); Hypertension; Numbness and tingling (2015); TELLY (obstructive sleep apnea) (2007); Type 1 diabetes (H) (1982); and Uncomplicated asthma who scheduled a visit for follow-up of his type 1 diabetes.  His type 1 diabetes is known to be complicated by neuropathy retinopathy depression and erectile dysfunction.     He has previously  been working on his diabetes with Dr. Donn Yuan and  Dr. Szymanski in our clinic.  I met him when he was last seen in clinic in May,  2020.     In 2014,  he had hemoglobin A1'c was 12.4. However, since 2016 his hemoglobin A1cs have been in the 7's and 8's.  Today his  POC  A1C is 7.9. Maurice has been using a Medtronic CGM to manage his blood glucose in recent years. For insulin, he has been using Humalog at a basal rate of 1.2 units per hour. When at work he often used a temporary basal rate that was 60% of usual and 50% when he bikes.  Previously, he took premeal bolus insulin using 1 unit for each 7 grams of carbohydrate with 1 additional unit for each 20 mg/dL blood glucose was above 120 mg/dL.  He started using Medtronic 670 G pump in 2018, and worked with Anastasia Cowart to transition to auto mode late that year.         Interval history:   Today Maurice reports that overall he is feeling well.  He continues to use Medtronic 770G with sensor and feels it is going well. I note that indeed he is in auto mode 94% of time with GMI 7.3%.  His A1C today is 7.9%.  His blood pressure is elevated (142/84) and he states that he forgot to take his BP meds. He admits that he does forget at least 1 x/week to take his medications.   He still works at [employer] and due to holidays is currently working 16 hour shifts and reports that he gets a  10 minute break every 8 hours.  He sets his target blood glucose level to 150 when he's working. Noted that he has had several highs and lows on his CGM. He  treats his hypoglycemia with concentrated gatorade that he makes at home or smarties. He states he has been trying to do portion control for his diet. Currently he is playing tennis 2-3x/week for 1.5-3 hours in addition to his physically demanding job.  He agrees that he is likely over treating lows, he does not check with fingerstick and wonders why it takes so long and then gets so high. He does feel that both his carb ratio and his correction are too strong as he underestimates carbs eating and is he gives advise correction bolus - which he usually does do nonetheless - leads to low blood sugar.             Blood Glucose ?CGM Monitoring:  We reviewed CGM data together.  Sensor Wear (per week): 92% (6d12h)  Average BG last 14 days : 207  The estimated A1c is 7.3% per CGM  CGM report states less than 3% hypoglycemia, 50% in range from 70-1 20, and 63% above range ( greater than 120)        Current Insulin Pump Settings -reviewed:  Type of Pump: Medtronic 770G  BASAL RATES and times:  12   AM (midnight): 1.2 units/hour    10   AM: 1.0 units/hour   6    PM: 1.2 units/hour   Carb ratio:   CARB RATIO and times:  12   AM (midnight): 10.0    Correction Factor (Sensitivity) and times:  12   AM (midnight): 30 mg/dL  Target BG Ranges:   BLOOD GLUCOSE TARGET and times:  12   AM (midnight): 90 - 140  Amount of Time Insulin is Active:  2.5 hrs     CGM, Pump Data:   Avg BG last 14 days: 207     24.65 avg basal units /day  TDD 47 U  Bolus 29.9 at 62%    4.4 meals/day with 215 g carb   Site change q 5d.             Diabetes monitoring and complications:  CAD: No  Last eye exam results: retinopathy, Last exam 11/25/2019 Mild/mod NPDR OU with DM I no DME - Recc f/u in 1 y.   Microalbuminuria: no on 4/10/2018  Neuropathy: Yes mild diminished sensation on last monofilament  "exam  HTN: Yes on Hyzaar  On Statin: Yes on atorvastatin  On Aspirin: Yes  Depression: Yes  Erectile dysfunction: Yes.   Has surgery 2/2022    Past Medical History:   Diagnosis Date     Acquired hypothyroidism 12/7/2016     Depressive disorder      Hidradenoma dx: Feb 2015    right hand/2 surgeries     Hypertension      Numbness and tingling 2015    right hand, related to surgery for hidradenoma     TELLY (obstructive sleep apnea) 2007    New [place]ap [place] 1 year ago[place] [facility] [place]     Type 1 diabetes (H) 1982     Uncomplicated asthma        ROS:   Patient denies any fevers, chills or sweats as well as any changes in vision, problems with floaters or field cuts in vision, pain or problems with dentition, new or different headaches.  Patient denies symptoms of hypo and hyperglycemia except as above.   Patients denies marked fatigue, cough, shortness of breath, chest pain or pressure.  There has been no pain with or other changes in urination or  itching or pain in genital areas.  Patient denies any noted swelling in feet, ankles or otherwise, loss of sensation or pain  in feet or other areas.    Patient also denies current difficulties with depressed mood, anhedonia or worrying too much.        Exam:    BP (!) 142/84 (BP Location: Left arm, Patient Position: Sitting, Cuff Size: Adult Large)   Pulse 80   Ht 1.93 m (6' 4\")   Wt 108.4 kg (239 lb)   BMI 29.09 kg/m      General: Pleasant, well nourished and hydrated male in NAD.   Psych:  Mood is \"good,\" affect is appropriate.  Thought form and content are fluid and coherent.    HEENT: Eyes and sclera are clear. Extraocular movements are grossly intact without proptosis.  Nares are patent, mucous membranes moist.  Neck: No masses or JVD are noted.    Resp: Easy and unlabored breathing.   Neuro: Alert and oriented, communicating clearly.   Ext: no swelling or edema  Data:      Recent Labs   Lab Test 12/21/21  1551 12/21/21  1543 12/21/21  1426 07/23/21  1657 " 05/21/21  0816 12/06/19  1135 11/14/19  0000 10/05/18  0853 08/15/18  0000 06/21/18  0802 04/10/18  1512   A1C  --  7.5*  --  7.2*  --   --   --   --   --   --  7.6*   HEMOGLOBINA1  --   --  7.9  --   --   --  7.6*  --    < >  --   --    TSH  --   --   --   --  3.36 4.62*  --  0.01*  --   --  2.68   T4  --   --   --   --   --  0.95  --  2.35*  --   --   --    LDL  --   --   --  124*  --   --   --  66  --   --  89   HDL  --   --   --  52  --   --   --   --   --   --  70   TRIG  --   --   --  92  --   --   --   --   --   --  68   CR  --   --   --  0.96  --   --   --   --   --  0.78 0.86   MICROL <5  --   --   --   --  13  --   --   --   --   --     < > = values in this interval not displayed.       Last Basic Metabolic Panel: reviewed  Most recent GFR:       GFR Estimate   Date Value Ref Range Status   07/23/2021 86 >60 mL/min/1.73m2 Final     Comment:     As of July 11, 2021, eGFR is calculated by the CKD-EPI creatinine equation, without race adjustment. eGFR can be influenced by muscle mass, exercise, and diet. The reported eGFR is an estimation only and is only applicable if the renal function is stable.   06/21/2018 >90 >60 mL/min/1.7m2 Final     Comment:     Non  GFR Calc   04/10/2018 >90 >60 mL/min/1.7m2 Final     Comment:     Non  GFR Calc   12/07/2016 >90  Non  GFR Calc   >60 mL/min/1.7m2 Final         Assessment/Plan:        Type 1 diabetes mellitus with multiple complications, currently uncontrolled with Coefficoient f Variation (43%) despite being in Medtronic 770 G auto mode 94% of time:     Advised today:     1.  Take BP medications daily to prevent any complications    2. Take care when treating lows, not to over treat,  Education that CGMS is 20 minutes behind actual BG and only fingerstick accurate when treating a low.  Generally few carbs needed once pump is suspended.    3. We also discussed and number of pump changes and implications.  Agreed to:       Update insulin Pump Settings:  Type of Pump: Medtronic Minimed  BASAL RATES and times:  12   AM (midnight): 1.1units/hour    10   AM: 0.9 units/hour   6    PM: 1.1 units/hour   Carb ratio:   CARB RATIO and times:  12   AM (midnight): 12.0  Corection Factor (Sensitivity) and times:  12   AM (midnight): 35 mg/dL (from 30)  Target BG Ranges:   BLOOD GLUCOSE TARGET and times:  12   AM (midnight): 90 - 120  Amount of Time Insulin is Active:  3.5 hrs (extended from 2.5h)      2. Hypothyroidism:  Continue to take Levothyroxine daily on empty stomach and have lab drawn(5/22)     3. Hypertension  Related the importance of BP control in preventing complications.  Emphasized the importance of taking his medications daily.   Check BP.  If BP at rest after 5 minutes seated and relaxed is >140/90 (either number above goal) contact myself or Dr. Lang.       Follow-up: Return in about 3 months (around 3/2022).  Sooner if questions or frequent blood sugar below 70, certainly for any occurrence of severe hypoglycemia necessitating help of another to treat    It is my privilege to be involved in the care of the above patient.     Arti Kaur PA-C, MPAS  [facility]  Diabetes, Endocrinology, and Metabolism  [phone] Appointments/Nurse  [phone] pager  [phone] nurse line    This note was completed in part using Dragon voice recognition, and may contain word and grammatical errors.  45 minutes in preparation for visit reviewing chart, labs and documentation, visiting with patient gathering history and in exam, education and counseling, as well as coordination of care, further chart review and documentation following visit on this date of service and as alluded to documented above.

## 2021-12-21 NOTE — NURSING NOTE
"Chief Complaint   Patient presents with     Diabetes     Type 1     Vital signs:      BP: (!) 142/84 Pulse: 80           Height: 193 cm (6' 4\") Weight: 108.4 kg (239 lb)  Estimated body mass index is 29.09 kg/m  as calculated from the following:    Height as of this encounter: 1.93 m (6' 4\").    Weight as of this encounter: 108.4 kg (239 lb).        "

## 2021-12-21 NOTE — LETTER
12/21/2021       RE: Luigi Moore  [address]  [address]  [facility] [address]     Dear Colleague,    Thank you for referring your patient, Luigi Moore, to the [facility] [place] at [facility]. Please see a copy of my visit note below.    [facility]  Endocrinology  Arti Kaur PA-C  December 21, 2021          Chief Complaint:   Diabetes     History of Present Illness:   Luigi Moore is a 58 year old male with a history of Acquired hypothyroidism (12/7/2016); Depressive disorder; Hidradenoma (dx: Feb 2015); Hypertension; Numbness and tingling (2015); TELLY (obstructive sleep apnea) (2007); Type 1 diabetes (H) (1982); and Uncomplicated asthma who scheduled a visit for follow-up of his type 1 diabetes.  His type 1 diabetes is known to be complicated by neuropathy retinopathy depression and erectile dysfunction.     He has previously  been working on his diabetes with Dr. Donn Yuan and  Dr. Szymanski in our clinic.  I met him when he was last seen in clinic in May,  2020.     In 2014,  he had hemoglobin A1'c was 12.4. However, since 2016 his hemoglobin A1cs have been in the 7's and 8's.  Today his  POC  A1C is 7.9. Maurice has been using a Medtronic CGM to manage his blood glucose in recent years. For insulin, he has been using Humalog at a basal rate of 1.2 units per hour. When at work he often used a temporary basal rate that was 60% of usual and 50% when he bikes.  Previously, he took premeal bolus insulin using 1 unit for each 7 grams of carbohydrate with 1 additional unit for each 20 mg/dL blood glucose was above 120 mg/dL.  He started using Medtronic 670 G pump in 2018, and worked with Anastasia Cowart to transition to auto mode late that year.         Interval history:   Today Maurice reports that overall he is feeling well.  He continues to use Medtronic 770G with sensor and feels it is going well. I note that indeed he is  in auto mode 94% of time with GMI 7.3%.  His A1C today is 7.9%.  His blood pressure is elevated (142/84) and he states that he forgot to take his BP meds. He admits that he does forget at least 1 x/week to take his medications.   He still works at [employer] and due to holidays is currently working 16 hour shifts and reports that he gets a 10 minute break every 8 hours.  He sets his target blood glucose level to 150 when he's working. Noted that he has had several highs and lows on his CGM. He  treats his hypoglycemia with concentrated gatorade that he makes at home or smarties. He states he has been trying to do portion control for his diet. Currently he is playing tennis 2-3x/week for 1.5-3 hours in addition to his physically demanding job.  He agrees that he is likely over treating lows, he does not check with fingerstick and wonders why it takes so long and then gets so high. He does feel that both his carb ratio and his correction are too strong as he underestimates carbs eating and is he gives advise correction bolus - which he usually does do nonetheless - leads to low blood sugar.             Blood Glucose ?CGM Monitoring:  We reviewed CGM data together.  Sensor Wear (per week): 92% (6d12h)  Average BG last 14 days : 207  The estimated A1c is 7.3% per CGM  CGM report states less than 3% hypoglycemia, 50% in range from 70-1 20, and 63% above range ( greater than 120)        Current Insulin Pump Settings -reviewed:  Type of Pump: Medtronic 770G  BASAL RATES and times:  12   AM (midnight): 1.2 units/hour    10   AM: 1.0 units/hour   6    PM: 1.2 units/hour   Carb ratio:   CARB RATIO and times:  12   AM (midnight): 10.0    Correction Factor (Sensitivity) and times:  12   AM (midnight): 30 mg/dL  Target BG Ranges:   BLOOD GLUCOSE TARGET and times:  12   AM (midnight): 90 - 140  Amount of Time Insulin is Active:  2.5 hrs     CGM, Pump Data:   Avg BG last 14 days: 207     24.65 avg basal units /day  TDD 47 U  Bolus 29.9  "at 62%    4.4 meals/day with 215 g carb   Site change q 5d.             Diabetes monitoring and complications:  CAD: No  Last eye exam results: retinopathy, Last exam 11/25/2019 Mild/mod NPDR OU with DM I no DME - Recc f/u in 1 y.   Microalbuminuria: no on 4/10/2018  Neuropathy: Yes mild diminished sensation on last monofilament exam  HTN: Yes on Hyzaar  On Statin: Yes on atorvastatin  On Aspirin: Yes  Depression: Yes  Erectile dysfunction: Yes.   Has surgery 2/2022    Past Medical History:   Diagnosis Date     Acquired hypothyroidism 12/7/2016     Depressive disorder      Hidradenoma dx: Feb 2015    right hand/2 surgeries     Hypertension      Numbness and tingling 2015    right hand, related to surgery for hidradenoma     TELLY (obstructive sleep apnea) 2007    New cpap machine 1 year ago. follows at [facility] [place]     Type 1 diabetes (H) 1982     Uncomplicated asthma        ROS:   Patient denies any fevers, chills or sweats as well as any changes in vision, problems with floaters or field cuts in vision, pain or problems with dentition, new or different headaches.  Patient denies symptoms of hypo and hyperglycemia except as above.   Patients denies marked fatigue, cough, shortness of breath, chest pain or pressure.  There has been no pain with or other changes in urination or  itching or pain in genital areas.  Patient denies any noted swelling in feet, ankles or otherwise, loss of sensation or pain  in feet or other areas.    Patient also denies current difficulties with depressed mood, anhedonia or worrying too much.        Exam:    BP (!) 142/84 (BP Location: Left arm, Patient Position: Sitting, Cuff Size: Adult Large)   Pulse 80   Ht 1.93 m (6' 4\")   Wt 108.4 kg (239 lb)   BMI 29.09 kg/m      General: Pleasant, well nourished and hydrated male in NAD.   Psych:  Mood is \"good,\" affect is appropriate.  Thought form and content are fluid and coherent.    HEENT: Eyes and sclera are clear. Extraocular movements are " grossly intact without proptosis.  Nares are patent, mucous membranes moist.  Neck: No masses or JVD are noted.    Resp: Easy and unlabored breathing.   Neuro: Alert and oriented, communicating clearly.   Ext: no swelling or edema  Data:      Recent Labs   Lab Test 12/21/21  1551 12/21/21  1543 12/21/21  1426 07/23/21  1657 05/21/21  0816 12/06/19  1135 11/14/19  0000 10/05/18  0853 08/15/18  0000 06/21/18  0802 04/10/18  1512   A1C  --  7.5*  --  7.2*  --   --   --   --   --   --  7.6*   HEMOGLOBINA1  --   --  7.9  --   --   --  7.6*  --    < >  --   --    TSH  --   --   --   --  3.36 4.62*  --  0.01*  --   --  2.68   T4  --   --   --   --   --  0.95  --  2.35*  --   --   --    LDL  --   --   --  124*  --   --   --  66  --   --  89   HDL  --   --   --  52  --   --   --   --   --   --  70   TRIG  --   --   --  92  --   --   --   --   --   --  68   CR  --   --   --  0.96  --   --   --   --   --  0.78 0.86   MICROL <5  --   --   --   --  13  --   --   --   --   --     < > = values in this interval not displayed.       Last Basic Metabolic Panel: reviewed  Most recent GFR:       GFR Estimate   Date Value Ref Range Status   07/23/2021 86 >60 mL/min/1.73m2 Final     Comment:     As of July 11, 2021, eGFR is calculated by the CKD-EPI creatinine equation, without race adjustment. eGFR can be influenced by muscle mass, exercise, and diet. The reported eGFR is an estimation only and is only applicable if the renal function is stable.   06/21/2018 >90 >60 mL/min/1.7m2 Final     Comment:     Non  GFR Calc   04/10/2018 >90 >60 mL/min/1.7m2 Final     Comment:     Non  GFR Calc   12/07/2016 >90  Non  GFR Calc   >60 mL/min/1.7m2 Final         Assessment/Plan:        Type 1 diabetes mellitus with multiple complications, currently uncontrolled with Coefficoient f Variation (43%) despite being in Medtronic 770 G auto mode 94% of time:     Advised today:     1.  Take BP medications daily  to prevent any complications    2. Take care when treating lows, not to over treat,  Education that CGMS is 20 minutes behind actual BG and only fingerstick accurate when treating a low.  Generally few carbs needed once pump is suspended.    3. We also discussed and number of pump changes and implications.  Agreed to:      Update insulin Pump Settings:  Type of Pump: Medtronic Minimed  BASAL RATES and times:  12   AM (midnight): 1.1units/hour    10   AM: 0.9 units/hour   6    PM: 1.1 units/hour   Carb ratio:   CARB RATIO and times:  12   AM (midnight): 12.0  Corection Factor (Sensitivity) and times:  12   AM (midnight): 35 mg/dL (from 30)  Target BG Ranges:   BLOOD GLUCOSE TARGET and times:  12   AM (midnight): 90 - 120  Amount of Time Insulin is Active:  3.5 hrs (extended from 2.5h)      2. Hypothyroidism:  Continue to take Levothyroxine daily on empty stomach and have lab drawn(5/22)     3. Hypertension  Related the importance of BP control in preventing complications.  Emphasized the importance of taking his medications daily.   Check BP.  If BP at rest after 5 minutes seated and relaxed is >140/90 (either number above goal) contact myself or Dr. Lang.       Follow-up: Return in about 3 months (around 3/2022).  Sooner if questions or frequent blood sugar below 70, certainly for any occurrence of severe hypoglycemia necessitating help of another to treat    It is my privilege to be involved in the care of the above patient.     Arti Kaur PA-C, MPAS  [facility]  Diabetes, Endocrinology, and Metabolism  [phone] Appointments/Nurse  [phone] pager  [phone] nurse line    This note was completed in part using Dragon voice recognition, and may contain word and grammatical errors.  45 minutes in preparation for visit reviewing chart, labs and documentation, visiting with patient gathering history and in exam, education and counseling, as well as coordination of care, further  chart review and documentation following visit on this date of service and as alluded to documented above.          Again, thank you for allowing me to participate in the care of your patient.      Sincerely,    Arti Kaur PA-C

## 2021-12-23 RX ORDER — ALBUTEROL SULFATE 90 UG/1
2 AEROSOL, METERED RESPIRATORY (INHALATION) EVERY 6 HOURS PRN
Qty: 18 G | Refills: 5 | Status: SHIPPED | OUTPATIENT
Start: 2021-12-23 | End: 2022-12-28

## 2021-12-23 NOTE — TELEPHONE ENCOUNTER
ALBUTEROL HFA INH(200 PUFFS)18GM  Last Written Prescription Date:  11/16/2020  Last Fill Quantity: 18,   # refills: 5  Last Office Visit :  10/29/2021  Future Office visit:  1/14/2022  18 g, 5 Refills sent to pharm 12/23/2021      Esme Pierre RN  Central Triage Red Flags/Med Refills

## 2022-01-03 DIAGNOSIS — N39.0 URINARY TRACT INFECTION WITHOUT HEMATURIA, SITE UNSPECIFIED: Primary | ICD-10-CM

## 2022-01-05 DIAGNOSIS — Z11.59 ENCOUNTER FOR SCREENING FOR OTHER VIRAL DISEASES: ICD-10-CM

## 2022-01-14 ENCOUNTER — LAB (OUTPATIENT)
Dept: LAB | Facility: CLINIC | Age: 62
End: 2022-01-14
Payer: COMMERCIAL

## 2022-01-14 ENCOUNTER — OFFICE VISIT (OUTPATIENT)
Dept: INTERNAL MEDICINE | Facility: CLINIC | Age: 62
End: 2022-01-14
Payer: COMMERCIAL

## 2022-01-14 VITALS
SYSTOLIC BLOOD PRESSURE: 142 MMHG | HEART RATE: 97 BPM | BODY MASS INDEX: 29.01 KG/M2 | OXYGEN SATURATION: 97 % | WEIGHT: 238.2 LBS | DIASTOLIC BLOOD PRESSURE: 78 MMHG | RESPIRATION RATE: 16 BRPM | HEIGHT: 76 IN

## 2022-01-14 DIAGNOSIS — N52.01 ERECTILE DYSFUNCTION DUE TO ARTERIAL INSUFFICIENCY: ICD-10-CM

## 2022-01-14 DIAGNOSIS — G47.33 OSA (OBSTRUCTIVE SLEEP APNEA): ICD-10-CM

## 2022-01-14 DIAGNOSIS — Z01.818 PREOP GENERAL PHYSICAL EXAM: ICD-10-CM

## 2022-01-14 DIAGNOSIS — N39.0 URINARY TRACT INFECTION WITHOUT HEMATURIA, SITE UNSPECIFIED: ICD-10-CM

## 2022-01-14 DIAGNOSIS — E10.8 TYPE 1 DIABETES MELLITUS WITH COMPLICATIONS (H): ICD-10-CM

## 2022-01-14 DIAGNOSIS — Z01.818 PREOP GENERAL PHYSICAL EXAM: Primary | ICD-10-CM

## 2022-01-14 LAB
ALBUMIN UR-MCNC: NEGATIVE MG/DL
ANION GAP SERPL CALCULATED.3IONS-SCNC: 10 MMOL/L (ref 3–14)
APPEARANCE UR: CLEAR
BILIRUB UR QL STRIP: NEGATIVE
BUN SERPL-MCNC: 19 MG/DL (ref 7–30)
CALCIUM SERPL-MCNC: 8.7 MG/DL (ref 8.5–10.1)
CHLORIDE BLD-SCNC: 104 MMOL/L (ref 94–109)
CO2 SERPL-SCNC: 25 MMOL/L (ref 20–32)
COLOR UR AUTO: YELLOW
CREAT SERPL-MCNC: 0.87 MG/DL (ref 0.66–1.25)
GFR SERPL CREATININE-BSD FRML MDRD: >90 ML/MIN/1.73M2
GLUCOSE BLD-MCNC: 187 MG/DL (ref 70–99)
GLUCOSE UR STRIP-MCNC: 150 MG/DL
HGB UR QL STRIP: NEGATIVE
KETONES UR STRIP-MCNC: NEGATIVE MG/DL
LEUKOCYTE ESTERASE UR QL STRIP: NEGATIVE
MUCOUS THREADS #/AREA URNS LPF: PRESENT /LPF
NITRATE UR QL: NEGATIVE
PH UR STRIP: 6 [PH] (ref 5–7)
POTASSIUM BLD-SCNC: 3.8 MMOL/L (ref 3.4–5.3)
RBC URINE: <1 /HPF
SODIUM SERPL-SCNC: 139 MMOL/L (ref 133–144)
SP GR UR STRIP: 1.02 (ref 1–1.03)
UROBILINOGEN UR STRIP-MCNC: 2 MG/DL
WBC URINE: 1 /HPF

## 2022-01-14 PROCEDURE — 36415 COLL VENOUS BLD VENIPUNCTURE: CPT | Performed by: PATHOLOGY

## 2022-01-14 PROCEDURE — 99214 OFFICE O/P EST MOD 30 MIN: CPT | Performed by: NURSE PRACTITIONER

## 2022-01-14 PROCEDURE — 81001 URINALYSIS AUTO W/SCOPE: CPT | Performed by: PATHOLOGY

## 2022-01-14 PROCEDURE — 80048 BASIC METABOLIC PNL TOTAL CA: CPT | Performed by: PATHOLOGY

## 2022-01-14 ASSESSMENT — MIFFLIN-ST. JEOR: SCORE: 1991.97

## 2022-01-14 NOTE — LETTER
1/14/2022    RE: Luigi Moore  [address] [place]  [address]  [facility] [address]     [facility] INTERNAL MEDICINE [address]  4TH FLOOR  [facility] [address]  Phone: [phone]  Fax: [phone]  Primary Provider: Kathya Lang    PREOPERATIVE EVALUATION:  Today's date: 1/14/2022    Luigi Moore is a 61 year old male who presents for a preoperative evaluation.    Surgical Information:  Surgery/Procedure: Insertion of inflatable penile prosthesis  Surgery Location:  OR     Surgeon: Dr. Johnathan Cooper  Surgery Date: 2/1/2022    Time of Surgery: 0830   Where patient plans to recover: At home with family  Fax number for surgical facility: Note does not need to be faxed, will be available electronically in Epic.    Type of Anesthesia Anticipated: General    Assessment & Plan     The proposed surgical procedure is considered LOW risk.    Preop general physical exam  Erectile dysfunction due to arterial insufficiency  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)  Recheck BMP. Requested he bring CPAP machine for his post-op recovery d/t hx TELLY.  - Basic metabolic panel; Future    Risks and Recommendations:  The patient has the following additional risks and recommendations for perioperative complications:  Diabetes:  - Patient is on insulin therapy; diabetic NPO guidelines provided and discussed.  Obstructive Sleep Apnea:   He will bring CPAP for use in PACU.    Medication Instructions:  Consulted with Arti Kaur PA-C in endocrinology, recommended adjustments as follows:  insulin lispro: 90% x12 hours night before surgery to prevent overnight lows or need for carbs in AM, and set basal rate to 80% x24 hours the morning of the procedure, to resume normal infusion rate once he is eating.    RECOMMENDATION:  APPROVAL GIVEN to proceed with proposed procedure, without further diagnostic evaluation.      Subjective     HPI related to upcoming procedure: ED  previously managed with Trimix, no longer effective, interested in IPP placement. Hx type 1 diabetes.    /68 this AM.    COVID--symptom onset >10 days ago, recovered, symptoms were mild, had one day when used albuterol more. Reports that 6/9 of his co-workers had COVID at the same time. He is fully immunized and boosted.  Preop Questions 1/7/2022   1. Have you ever had a heart attack or stroke? No   2. Have you ever had surgery on your heart or blood vessels, such as a stent placement, a coronary artery bypass, or surgery on an artery in your head, neck, heart, or legs? No   3. Do you have chest pain with activity? No   4. Do you have a history of  heart failure? No   5. Do you currently have a cold, bronchitis or symptoms of other infection? No   6. Do you have a cough, shortness of breath, or wheezing? No   7. Do you or anyone in your family have previous history of blood clots? No   8. Do you or does anyone in your family have a serious bleeding problem such as prolonged bleeding following surgeries or cuts? No   9. Have you ever had problems with anemia or been told to take iron pills? No   10. Have you had any abnormal blood loss such as black, tarry or bloody stools? No   11. Have you ever had a blood transfusion? No   12. Are you willing to have a blood transfusion if it is medically needed before, during, or after your surgery? Yes   13. Have you or any of your relatives ever had problems with anesthesia? No   14. Do you have sleep apnea, excessive snoring or daytime drowsiness? YES - hx TELLY, will bring CPAP for post-op   14a. Do you have a CPAP machine? Yes   15. Do you have any artifical heart valves or other implanted medical devices like a pacemaker, defibrillator, or continuous glucose monitor? No   16. Do you have artificial joints? No   17. Are you allergic to latex? No       Health Care Directive:  Patient does not have a Health Care Directive or Living Will: Discussed advance care planning  with patient; information given to patient to review.    Preoperative Review of :   reviewed - controlled substances reflected in medication list.      Review of Systems  CONSTITUTIONAL: NEGATIVE for fever, chills, change in weight  INTEGUMENTARY/SKIN: NEGATIVE for worrisome rashes, moles or lesions  EYES: NEGATIVE for vision changes or irritation  ENT/MOUTH: NEGATIVE for ear, mouth and throat problems  RESP: NEGATIVE for significant cough or SOB  CV: NEGATIVE for chest pain, palpitations or peripheral edema  GI: NEGATIVE for nausea, abdominal pain, heartburn, or change in bowel habits  : NEGATIVE for frequency, dysuria, or hematuria  MUSCULOSKELETAL: NEGATIVE for significant arthralgias or myalgia  NEURO: NEGATIVE for weakness, dizziness or paresthesias  ENDOCRINE: NEGATIVE for temperature intolerance, skin/hair changes  HEME: NEGATIVE for bleeding problems  PSYCHIATRIC: NEGATIVE for changes in mood or affect  Answers for HPI/ROS submitted by the patient on 1/7/2022  General Symptoms: No  Skin Symptoms: No  HENT Symptoms: No  EYE SYMPTOMS: No  HEART SYMPTOMS: No  LUNG SYMPTOMS: No  INTESTINAL SYMPTOMS: No  URINARY SYMPTOMS: No  REPRODUCTIVE SYMPTOMS: No  SKELETAL SYMPTOMS: No  BLOOD SYMPTOMS: No  NERVOUS SYSTEM SYMPTOMS: No  MENTAL HEALTH SYMPTOMS: No  Patient Active Problem List    Diagnosis Date Noted     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) 12/07/2016     Priority: Medium     Acquired hypothyroidism 12/07/2016     Priority: Medium     Dyslipidemia 12/07/2016     Priority: Medium     Obstructive sleep apnea syndrome 02/04/2008     Priority: Medium     Insomnia 02/04/2008     Priority: Medium     TELLY (obstructive sleep apnea) 02/04/2008     Priority: Medium     Essential hypertension 12/18/2006     Priority: Medium     Hyperlipidemia 12/07/2006     Priority: Medium     Overview:   rx'd lipitor   Overview:   rx'd lipitor     rx'd lipitor   Overview:   Overview:   rx'd lipitor   Overview:   rx'd  lipitor       Proliferative diabetic retinopathy (H) 12/07/2006     Priority: Medium     Overview:   treated R eye 2002, no further problems  Overview:   treated R eye 2002, no further problems    treated R eye 2002, no further problems  Overview:   Overview:   treated R eye 2002, no further problems  Overview:   treated R eye 2002, no further problems       Low back pain 12/07/2006     Priority: Medium     muscular pain       Type 1 diabetes mellitus (H) 12/07/2006     Priority: Medium     dx'd age 22  involved in study at [facility] for diabetes mgmt  Minimed pump humalog  had laser surgery R eye 2002        Past Medical History:   Diagnosis Date     Acquired hypothyroidism 12/7/2016     Depressive disorder      Hidradenoma dx: Feb 2015    right hand/2 surgeries     Hypertension      Numbness and tingling 2015    right hand, related to surgery for hidradenoma     TELLY (obstructive sleep apnea) 2007    New cpap machine 1 year ago. follows at [facility] [place]     Type 1 diabetes (H) 1982     Uncomplicated asthma      Past Surgical History:   Procedure Laterality Date     EXCISE LESION HAND Right 2/26/2015    Procedure: EXCISE LESION HAND;  Surgeon: Jeovany Jefferson MD;  Location: [facility] OR     ORTHOPEDIC SURGERY Left     achilles tendon      ORTHOPEDIC SURGERY Right     hand surgery     SOFT TISSUE SURGERY      lipoma removal, below rib cage on right side     Current Outpatient Medications   Medication Sig Dispense Refill     acetaminophen-codeine (TYLENOL #3) 300-30 MG per tablet Take 1-2 tablets by mouth every 6 hours as needed for moderate pain 60 tablet 1     albuterol (PROAIR HFA/PROVENTIL HFA/VENTOLIN HFA) 108 (90 Base) MCG/ACT inhaler Inhale 2 puffs into the lungs every 6 hours as needed for shortness of breath / dyspnea or wheezing 18 g 5     amLODIPine (NORVASC) 10 MG tablet Take 1 tablet (10 mg) by mouth daily 90 tablet 3     amphetamine-dextroamphetamine (ADDERALL) 15 MG tablet Take 1 tablet (15 mg) by mouth 2 times  "daily 60 tablet 0     amphetamine-dextroamphetamine (ADDERALL) 15 MG tablet Take 1 tablet (15 mg) by mouth 2 times daily 60 tablet 0     amphetamine-dextroamphetamine (ADDERALL) 15 MG tablet Take 1 tablet (15 mg) by mouth 2 times daily 60 tablet 0     ARIPiprazole (ABILIFY) 15 MG tablet Take 1 tablet (15 mg) by mouth daily 90 tablet 0     aspirin 81 MG tablet Take 1 tablet (81 mg) by mouth daily 100 tablet 3     atorvastatin (LIPITOR) 40 MG tablet Take 1 tablet (40 mg) by mouth daily 90 tablet 0     blood glucose (ACCU-CHEK GUIDE) test strip Use to test blood sugar 3 times daily or as directed. 100 strip 11     blood glucose (CONTOUR NEXT TEST) test strip For diabetes, tests 4 times daily 400 strip 3     Blood Pressure KIT 1 Units once a week If BP at rest >140/90, call office. 1 kit 0     buPROPion (WELLBUTRIN XL) 150 MG 24 hr tablet Take 1 tablet (150 mg) by mouth every morning Take with 300 mg tablet (total daily dose 450 mg) 60 tablet 1     buPROPion (WELLBUTRIN XL) 300 MG 24 hr tablet Take 1 tablet (300 mg) by mouth every morning Take with 150 mg tablet (total daily dose 450 mg) 60 tablet 1     cetirizine (ZYRTEC) 10 MG tablet Take 10 mg by mouth       DiphenhydrAMINE HCl (BENADRYL PO)        Insulin Infusion Pump (INSULIN PUMP FE7726) KIT        insulin lispro (HUMALOG) 100 UNIT/ML vial USE AS DIRECTED IN PUMP APPROXIMATELY 80 UNITS PER DAY. Clinic visit and lab draw needed. Call  to schedule. 10 mL 0     insulin lispro (HUMALOG) 100 UNIT/ML vial USE AS DIRECTED IN PUMP APPROXIMATELY 80 UNITS PER DAY. Follow up needed 80 mL 0     insulin syringe-needle U-100 (BD INSULIN SYRINGE) 29G X 1/2\" 1 ML miscellaneous Use as directed 20 each 1     levothyroxine (SYNTHROID/LEVOTHROID) 137 MCG tablet Take 1 tablet (137 mcg) by mouth daily 30 tablet 0     losartan-hydrochlorothiazide (HYZAAR) 50-12.5 MG tablet Take 1 tablet by mouth daily 90 tablet 3     NEW MED Trimix #4    Sig:  Inject 0.6 mL intracavernosal " "as directed.    Max use once daily and up to 3x/week.  Trimix intracavernosal injection, per mL:  PGE1: 40 mcg  Papaverine: 27.6mg  Phentolamine: 1 mg 10 mL 11     ONE TOUCH TEST STRIPS test strip Use to test blood sugar 4 times daily or as directed. 400 strip 3     ONETOUCH ULTRA test strip Test  four times daily ultra blue (please schedule clinic appt) 400 strip 0     order for DME Equipment being ordered: CPAP machine 1 each 0     rizatriptan (MAXALT) 10 MG tablet Take 1 tablet (10 mg) by mouth at onset of headache for migraine 12 tablet 11     topiramate (TOPAMAX) 50 MG tablet Take 1 tablet (50 mg) by mouth daily 90 tablet 1     Urea 40 % CREA Externally apply topically 2 times daily To surface of toenails 198 g 5     glucagon 1 MG kit Inject 1 mg into the muscle once for 1 dose 1 each 0       Allergies   Allergen Reactions     Diagnostic X-Ray Materials Anaphylaxis     Contrast Dye Hives     Diatrizoate Hives     iodine        Social History     Tobacco Use     Smoking status: Never Smoker     Smokeless tobacco: Never Used   Substance Use Topics     Alcohol use: No     Comment: History of alcohol abuse/sober since 2015; went to treatment        History   Drug Use No         Objective     BP (!) 142/78 (BP Location: Right arm, Patient Position: Sitting, Cuff Size: Adult Regular)   Pulse 97   Resp 16   Ht 1.93 m (6' 4\")   Wt 108 kg (238 lb 3.2 oz)   SpO2 97%   BMI 28.99 kg/m      Physical Exam    GENERAL APPEARANCE: healthy, alert and no distress     EYES: EOMI,  PERRL     HENT: ear canals and TM's normal and nose and mouth without ulcers or lesions     NECK: no adenopathy, no asymmetry, masses, or scars and thyroid normal to palpation     RESP: lungs clear to auscultation - no rales, rhonchi or wheezes     CV: regular rates and rhythm, normal S1 S2, no S3 or S4 and no murmur, click or rub     ABDOMEN:  soft, nontender, no HSM or masses and bowel sounds normal, insulin pump in place     MS: extremities " normal- no gross deformities noted, no evidence of inflammation in joints, FROM in all extremities.     SKIN: no suspicious lesions or rashes     NEURO: Normal strength and tone, sensory exam grossly normal, mentation intact and speech normal     PSYCH: mentation appears normal. and affect normal/bright     LYMPHATICS: No cervical adenopathy    Recent Labs   Lab Test 12/21/21  1543 07/23/21  1657   HGB 15.3  --      --    NA  --  134   POTASSIUM  --  4.2   CR  --  0.96   A1C 7.5* 7.2*        Diagnostics:  Labs pending at this time.  Results will be reviewed when available.   No EKG required, no history of coronary heart disease, significant arrhythmia, peripheral arterial disease or other structural heart disease.    Revised Cardiac Risk Index (RCRI):  The patient has the following serious cardiovascular risks for perioperative complications:   - Diabetes Mellitus (on Insulin) = 1 point     RCRI Interpretation: 1 point: Class II (low risk - 0.9% complication rate)      Signed Electronically by: GALILEA Escalante CNP  Copy of this evaluation report is provided to requesting physician.      Luigi Moore presents on 1/14/2022 for a pre-operative exam.    Patient Name: Luigi ROBERTO Moore  Location of Surgery: [facility] OR [facility]   Surgeon: Johnathan Cooper MD  Type of Surgery or Procedure: INSERTION OF INFLATABLE PENILE PROSTHESIS  Date and Time of Surgery or Procedure: 2/1/2022 at 8:30 AM  Have you or anyone in your family ever had problems with anesthesia or sedation? Clari Banerjee, EMT at 3:37 PM on 1/14/2022

## 2022-01-14 NOTE — PROGRESS NOTES
[facility] INTERNAL MEDICINE [address]  [address] [facility] [address]  Phone: [phone]  Fax: [phone]  Primary Provider: Kathya Lang    PREOPERATIVE EVALUATION:  Today's date: 1/14/2022    Luigi Moore is a 61 year old male who presents for a preoperative evaluation.    Surgical Information:  Surgery/Procedure: Insertion of inflatable penile prosthesis  Surgery Location: [place] [address]    Surgeon: Dr. Johnathan Cooper  Surgery Date: 2/1/2022    Time of Surgery: 0830   Where patient plans to recover: At home with family  Fax number for surgical facility: Note does not need to be faxed, will be available electronically in Epic.    Type of Anesthesia Anticipated: General    Assessment & Plan     The proposed surgical procedure is considered LOW risk.    Preop general physical exam  Erectile dysfunction due to arterial insufficiency  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)  Recheck BMP. Requested he bring CPAP machine for his post-op recovery d/t hx TELLY.  - Basic metabolic panel; Future    Risks and Recommendations:  The patient has the following additional risks and recommendations for perioperative complications:  Diabetes:  - Patient is on insulin therapy; diabetic NPO guidelines provided and discussed.  Obstructive Sleep Apnea:   He will bring CPAP for use in PACU.    Medication Instructions:  Consulted with Arti Kaur PA-C in endocrinology, recommended adjustments as follows:  insulin lispro: 90% x12 hours night before surgery to prevent overnight lows or need for carbs in AM, and set basal rate to 80% x24 hours the morning of the procedure, to resume normal infusion rate once he is eating.    RECOMMENDATION:  APPROVAL GIVEN to proceed with proposed procedure, without further diagnostic evaluation.      Subjective     HPI related to upcoming procedure: ED previously managed with Trimix, no longer effective, interested in IPP placement. Hx type 1  diabetes.    /68 this AM.    COVID--symptom onset >10 days ago, recovered, symptoms were mild, had one day when used albuterol more. Reports that 6/9 of his co-workers had COVID at the same time. He is fully immunized and boosted.  Preop Questions 1/7/2022   1. Have you ever had a heart attack or stroke? No   2. Have you ever had surgery on your heart or blood vessels, such as a stent placement, a coronary artery bypass, or surgery on an artery in your head, neck, heart, or legs? No   3. Do you have chest pain with activity? No   4. Do you have a history of  heart failure? No   5. Do you currently have a cold, bronchitis or symptoms of other infection? No   6. Do you have a cough, shortness of breath, or wheezing? No   7. Do you or anyone in your family have previous history of blood clots? No   8. Do you or does anyone in your family have a serious bleeding problem such as prolonged bleeding following surgeries or cuts? No   9. Have you ever had problems with anemia or been told to take iron pills? No   10. Have you had any abnormal blood loss such as black, tarry or bloody stools? No   11. Have you ever had a blood transfusion? No   12. Are you willing to have a blood transfusion if it is medically needed before, during, or after your surgery? Yes   13. Have you or any of your relatives ever had problems with anesthesia? No   14. Do you have sleep apnea, excessive snoring or daytime drowsiness? YES - hx TELLY, will bring CPAP for post-op   14a. Do you have a CPAP machine? Yes   15. Do you have any artifical heart valves or other implanted medical devices like a pacemaker, defibrillator, or continuous glucose monitor? No   16. Do you have artificial joints? No   17. Are you allergic to latex? No       Health Care Directive:  Patient does not have a Health Care Directive or Living Will: Discussed advance care planning with patient; information given to patient to review.    Preoperative Review of :    reviewed - controlled substances reflected in medication list.      Review of Systems  CONSTITUTIONAL: NEGATIVE for fever, chills, change in weight  INTEGUMENTARY/SKIN: NEGATIVE for worrisome rashes, moles or lesions  EYES: NEGATIVE for vision changes or irritation  ENT/MOUTH: NEGATIVE for ear, mouth and throat problems  RESP: NEGATIVE for significant cough or SOB  CV: NEGATIVE for chest pain, palpitations or peripheral edema  GI: NEGATIVE for nausea, abdominal pain, heartburn, or change in bowel habits  : NEGATIVE for frequency, dysuria, or hematuria  MUSCULOSKELETAL: NEGATIVE for significant arthralgias or myalgia  NEURO: NEGATIVE for weakness, dizziness or paresthesias  ENDOCRINE: NEGATIVE for temperature intolerance, skin/hair changes  HEME: NEGATIVE for bleeding problems  PSYCHIATRIC: NEGATIVE for changes in mood or affect  Answers for HPI/ROS submitted by the patient on 1/7/2022  General Symptoms: No  Skin Symptoms: No  HENT Symptoms: No  EYE SYMPTOMS: No  HEART SYMPTOMS: No  LUNG SYMPTOMS: No  INTESTINAL SYMPTOMS: No  URINARY SYMPTOMS: No  REPRODUCTIVE SYMPTOMS: No  SKELETAL SYMPTOMS: No  BLOOD SYMPTOMS: No  NERVOUS SYSTEM SYMPTOMS: No  MENTAL HEALTH SYMPTOMS: No  Patient Active Problem List    Diagnosis Date Noted     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) 12/07/2016     Priority: Medium     Acquired hypothyroidism 12/07/2016     Priority: Medium     Dyslipidemia 12/07/2016     Priority: Medium     Obstructive sleep apnea syndrome 02/04/2008     Priority: Medium     Insomnia 02/04/2008     Priority: Medium     TELLY (obstructive sleep apnea) 02/04/2008     Priority: Medium     Essential hypertension 12/18/2006     Priority: Medium     Hyperlipidemia 12/07/2006     Priority: Medium     Overview:   rx'd lipitor   Overview:   rx'd lipitor     rx'd lipitor   Overview:   Overview:   rx'd lipitor   Overview:   rx'd lipitor       Proliferative diabetic retinopathy (H) 12/07/2006     Priority: Medium      Overview:   treated R eye 2002, no further problems  Overview:   treated R eye 2002, no further problems    treated R eye 2002, no further problems  Overview:   Overview:   treated R eye 2002, no further problems  Overview:   treated R eye 2002, no further problems       Low back pain 12/07/2006     Priority: Medium     muscular pain       Type 1 diabetes mellitus (H) 12/07/2006     Priority: Medium     dx'd age 22  involved in study at [facility] for diabetes mgmt  Minimed pump humalog  had laser surgery R eye 2002        Past Medical History:   Diagnosis Date     Acquired hypothyroidism 12/7/2016     Depressive disorder      Hidradenoma dx: Feb 2015    right hand/2 surgeries     Hypertension      Numbness and tingling 2015    right hand, related to surgery for hidradenoma     TELLY (obstructive sleep apnea) 2007    New cpap machine 1 year ago. follows at [facility]     Type 1 diabetes (H) 1982     Uncomplicated asthma      Past Surgical History:   Procedure Laterality Date     EXCISE LESION HAND Right 2/26/2015    Procedure: EXCISE LESION HAND;  Surgeon: Jeovany Jefferson MD;  Location: [facility] OR     ORTHOPEDIC SURGERY Left     achilles tendon      ORTHOPEDIC SURGERY Right     hand surgery     SOFT TISSUE SURGERY      lipoma removal, below rib cage on right side     Current Outpatient Medications   Medication Sig Dispense Refill     acetaminophen-codeine (TYLENOL #3) 300-30 MG per tablet Take 1-2 tablets by mouth every 6 hours as needed for moderate pain 60 tablet 1     albuterol (PROAIR HFA/PROVENTIL HFA/VENTOLIN HFA) 108 (90 Base) MCG/ACT inhaler Inhale 2 puffs into the lungs every 6 hours as needed for shortness of breath / dyspnea or wheezing 18 g 5     amLODIPine (NORVASC) 10 MG tablet Take 1 tablet (10 mg) by mouth daily 90 tablet 3     amphetamine-dextroamphetamine (ADDERALL) 15 MG tablet Take 1 tablet (15 mg) by mouth 2 times daily 60 tablet 0     amphetamine-dextroamphetamine (ADDERALL) 15 MG tablet Take 1 tablet  "(15 mg) by mouth 2 times daily 60 tablet 0     amphetamine-dextroamphetamine (ADDERALL) 15 MG tablet Take 1 tablet (15 mg) by mouth 2 times daily 60 tablet 0     ARIPiprazole (ABILIFY) 15 MG tablet Take 1 tablet (15 mg) by mouth daily 90 tablet 0     aspirin 81 MG tablet Take 1 tablet (81 mg) by mouth daily 100 tablet 3     atorvastatin (LIPITOR) 40 MG tablet Take 1 tablet (40 mg) by mouth daily 90 tablet 0     blood glucose (ACCU-CHEK GUIDE) test strip Use to test blood sugar 3 times daily or as directed. 100 strip 11     blood glucose (CONTOUR NEXT TEST) test strip For diabetes, tests 4 times daily 400 strip 3     Blood Pressure KIT 1 Units once a week If BP at rest >140/90, call office. 1 kit 0     buPROPion (WELLBUTRIN XL) 150 MG 24 hr tablet Take 1 tablet (150 mg) by mouth every morning Take with 300 mg tablet (total daily dose 450 mg) 60 tablet 1     buPROPion (WELLBUTRIN XL) 300 MG 24 hr tablet Take 1 tablet (300 mg) by mouth every morning Take with 150 mg tablet (total daily dose 450 mg) 60 tablet 1     cetirizine (ZYRTEC) 10 MG tablet Take 10 mg by mouth       DiphenhydrAMINE HCl (BENADRYL PO)        Insulin Infusion Pump (INSULIN PUMP WC2035) KIT        insulin lispro (HUMALOG) 100 UNIT/ML vial USE AS DIRECTED IN PUMP APPROXIMATELY 80 UNITS PER DAY. Clinic visit and lab draw needed. Call  to schedule. 10 mL 0     insulin lispro (HUMALOG) 100 UNIT/ML vial USE AS DIRECTED IN PUMP APPROXIMATELY 80 UNITS PER DAY. Follow up needed 80 mL 0     insulin syringe-needle U-100 (BD INSULIN SYRINGE) 29G X 1/2\" 1 ML miscellaneous Use as directed 20 each 1     levothyroxine (SYNTHROID/LEVOTHROID) 137 MCG tablet Take 1 tablet (137 mcg) by mouth daily 30 tablet 0     losartan-hydrochlorothiazide (HYZAAR) 50-12.5 MG tablet Take 1 tablet by mouth daily 90 tablet 3     NEW MED Trimix #4    Sig:  Inject 0.6 mL intracavernosal as directed.    Max use once daily and up to 3x/week.  Trimix intracavernosal injection, " "per mL:  PGE1: 40 mcg  Papaverine: 27.6mg  Phentolamine: 1 mg 10 mL 11     ONE TOUCH TEST STRIPS test strip Use to test blood sugar 4 times daily or as directed. 400 strip 3     ONETOUCH ULTRA test strip Test  four times daily ultra blue (please schedule clinic appt) 400 strip 0     order for DME Equipment being ordered: CPAP machine 1 each 0     rizatriptan (MAXALT) 10 MG tablet Take 1 tablet (10 mg) by mouth at onset of headache for migraine 12 tablet 11     topiramate (TOPAMAX) 50 MG tablet Take 1 tablet (50 mg) by mouth daily 90 tablet 1     Urea 40 % CREA Externally apply topically 2 times daily To surface of toenails 198 g 5     glucagon 1 MG kit Inject 1 mg into the muscle once for 1 dose 1 each 0       Allergies   Allergen Reactions     Diagnostic X-Ray Materials Anaphylaxis     Contrast Dye Hives     Diatrizoate Hives     iodine        Social History     Tobacco Use     Smoking status: Never Smoker     Smokeless tobacco: Never Used   Substance Use Topics     Alcohol use: No     Comment: History of alcohol abuse/sober since 2015; went to treatment        History   Drug Use No         Objective     BP (!) 142/78 (BP Location: Right arm, Patient Position: Sitting, Cuff Size: Adult Regular)   Pulse 97   Resp 16   Ht 1.93 m (6' 4\")   Wt 108 kg (238 lb 3.2 oz)   SpO2 97%   BMI 28.99 kg/m      Physical Exam    GENERAL APPEARANCE: healthy, alert and no distress     EYES: EOMI,  PERRL     HENT: ear canals and TM's normal and nose and mouth without ulcers or lesions     NECK: no adenopathy, no asymmetry, masses, or scars and thyroid normal to palpation     RESP: lungs clear to auscultation - no rales, rhonchi or wheezes     CV: regular rates and rhythm, normal S1 S2, no S3 or S4 and no murmur, click or rub     ABDOMEN:  soft, nontender, no HSM or masses and bowel sounds normal, insulin pump in place     MS: extremities normal- no gross deformities noted, no evidence of inflammation in joints, FROM in all " extremities.     SKIN: no suspicious lesions or rashes     NEURO: Normal strength and tone, sensory exam grossly normal, mentation intact and speech normal     PSYCH: mentation appears normal. and affect normal/bright     LYMPHATICS: No cervical adenopathy    Recent Labs   Lab Test 12/21/21  1543 07/23/21  1657   HGB 15.3  --      --    NA  --  134   POTASSIUM  --  4.2   CR  --  0.96   A1C 7.5* 7.2*        Diagnostics:  Labs pending at this time.  Results will be reviewed when available.   No EKG required, no history of coronary heart disease, significant arrhythmia, peripheral arterial disease or other structural heart disease.    Revised Cardiac Risk Index (RCRI):  The patient has the following serious cardiovascular risks for perioperative complications:   - Diabetes Mellitus (on Insulin) = 1 point     RCRI Interpretation: 1 point: Class II (low risk - 0.9% complication rate)             Signed Electronically by: GALILEA Escalante CNP  Copy of this evaluation report is provided to requesting physician.

## 2022-01-14 NOTE — PROGRESS NOTES
Luigi Moore presents on 1/14/2022 for a pre-operative exam.    Patient Name: Luigi Moore  Location of Surgery: [facility]   Surgeon: Johnathan Cooper MD  Type of Surgery or Procedure: INSERTION OF INFLATABLE PENILE PROSTHESIS  Date and Time of Surgery or Procedure: 2/1/2022 at 8:30 AM  Have you or anyone in your family ever had problems with anesthesia or sedation? PAOLA Smart at 3:37 PM on 1/14/2022

## 2022-01-14 NOTE — NURSING NOTE
"Luigi Moore is a 61 year old male patient that presents today in clinic for the following:    Chief Complaint   Patient presents with     Pre-Op Exam     The patient's allergies and medications were reviewed as noted. A set of vitals were recorded as noted without incident: BP (!) 142/78 (BP Location: Right arm, Patient Position: Sitting, Cuff Size: Adult Regular)   Pulse 97   Resp 16   Ht 1.93 m (6' 4\")   Wt 108 kg (238 lb 3.2 oz)   SpO2 97%   BMI 28.99 kg/m  . The patient was asked if they had any of the following symptoms in the last forty-eight hours: (1) fever or chills, (2) cough, (3) shortness of breath or difficulty breathing, (4) fatigue, (5) muscle or body aches, (6) headache, (7) new loss of taste or smell, (8) sore throat, (9) congestion or runny nose, (10) nausea or vomiting, and (11) diarrhea. Luigi Moore denies having any of the following symptoms in the last forty-eight hours: (1) fever or chills, (2) cough, (3) shortness of breath or difficulty breathing, (4) fatigue, (5) muscle or body aches, (6) headache, (7) new loss of taste or smell, (8) sore throat, (9) congestion or runny nose, (10) nausea or vomiting, and (11) diarrhea. The patient does not have any other questions for the provider.    Tanvir Banerjee, EMT at 3:43 PM on 1/14/2022  "

## 2022-01-15 DIAGNOSIS — G43.111 INTRACTABLE MIGRAINE WITH AURA WITH STATUS MIGRAINOSUS: ICD-10-CM

## 2022-01-18 RX ORDER — TOPIRAMATE 50 MG/1
50 TABLET, FILM COATED ORAL DAILY
Qty: 90 TABLET | Refills: 3 | Status: SHIPPED | OUTPATIENT
Start: 2022-01-18 | End: 2022-12-28

## 2022-01-18 NOTE — PATIENT INSTRUCTIONS
Insulin adjustments for preop period:  Reduce your rate to 90% of your normal rate 12 hours before surgery (0.99 unit/hr)  The morning of the procedure, set your basal rate to 80% (0.88 unit/hr), and continue that until you are eating again. Once you are eating, you may resume your typical insulin rates.   Preparing for Your Surgery  Getting started  A nurse will call you to review your health history and instructions. They will give you an arrival time based on your scheduled surgery time. Please be ready to share:    Your doctor's clinic name and phone number    Your medical, surgical and anesthesia history    A list of allergies and sensitivities    A list of medicines, including herbal treatments and over-the-counter drugs    Whether the patient has a legal guardian (ask how to send us the papers in advance)  Please tell us if you're pregnant--or if there's any chance you might be pregnant. Some surgeries may injure a fetus (unborn baby), so they require a pregnancy test. Surgeries that are safe for a fetus don't always need a test, and you can choose whether to have one.   If you have a child who's having surgery, please ask for a copy of Preparing for Your Child's Surgery.    Preparing for surgery    Within 30 days of surgery: Have a pre-op exam (sometimes called an H&P, or History and Physical). This can be done at a clinic or pre-operative center.  ? If you're having a , you may not need this exam. Talk to your care team.    At your pre-op exam, talk to your care team about all medicines you take. If you need to stop any medicines before surgery, ask when to start taking them again.  ? We do this for your safety. Many medicines can make you bleed too much during surgery. Some change how well surgery (anesthesia) drugs work.    Call your insurance company to let them know you're having surgery. (If you don't have insurance, call [phone].)    Call your clinic if there's any change in your health.  This includes signs of a cold or flu (sore throat, runny nose, cough, rash, fever). It also includes a scrape or scratch near the surgery site.    If you have questions on the day of surgery, call your hospital or surgery center.  COVID testing  You may need to be tested for COVID-19 before having surgery. If so, your surgical team will give you instructions for scheduling this test, separate from your preoperative history and physical.  Eating and drinking guidelines  For your safety: Unless your surgeon tells you otherwise, follow the guidelines below.    Eat and drink as usual until 8 hours before surgery. After that, no food or milk.    Drink clear liquids until 2 hours before surgery. These are liquids you can see through, like water, Gatorade and Propel Water. You may also have black coffee and tea (no cream or milk).    Nothing by mouth within 2 hours of surgery. This includes gum, candy and breath mints.    If you drink alcohol: Stop drinking it the night before surgery.    If your care team tells you to take medicine on the morning of surgery, it's okay to take it with a sip of water.  Preventing infection    Shower or bathe the night before and morning of your surgery. Follow the instructions your clinic gave you. (If no instructions, use regular soap.)    Don't shave or clip hair near your surgery site. We'll remove the hair if needed.    Don't smoke or vape the morning of surgery. You may chew nicotine gum up to 2 hours before surgery. A nicotine patch is okay.  ? Note: Some surgeries require you to completely quit smoking and nicotine. Check with your surgeon.    Your care team will make every effort to keep you safe from infection. We will:  ? Clean our hands often with soap and water (or an alcohol-based hand rub).  ? Clean the skin at your surgery site with a special soap that kills germs.  ? Give you a special gown to keep you warm. (Cold raises the risk of infection.)  ? Wear special hair covers,  masks, gowns and gloves during surgery.  ? Give antibiotic medicine, if prescribed. Not all surgeries need antibiotics.  What to bring on the day of surgery    Photo ID and insurance card    Copy of your health care directive, if you have one    Glasses and hearing aides (bring cases)  ? You can't wear contacts during surgery    Inhaler and eye drops, if you use them (tell us about these when you arrive)    CPAP machine or breathing device, if you use them    A few personal items, if spending the night    If you have . . .  ? A pacemaker, ICD (cardiac defibrillator) or other implant: Bring the ID card.  ? An implanted stimulator: Bring the remote control.  ? A legal guardian: Bring a copy of the certified (court-stamped) guardianship papers.  Please remove any jewelry, including body piercings. Leave jewelry and other valuables at home.  If you're going home the day of surgery    You must have a responsible adult drive you home. They should stay with you overnight as well.    If you don't have someone to stay with you, and you aren't safe to go home alone, we may keep you overnight. Insurance often won't pay for this.  After surgery  If it's hard to control your pain or you need more pain medicine, please call your surgeon's office.  Questions?   If you have any questions for your care team, list them here: _________________________________________________________________________________________________________________________________________________________________________ ____________________________________ ____________________________________ ____________________________________  For informational purposes only. Not to replace the advice of your health care provider. Copyright   2003, 2019 [facility]. All rights reserved. Clinically reviewed by Rema Whitney MD. [employer] 174281 - REV 07/21.

## 2022-01-18 NOTE — TELEPHONE ENCOUNTER
topiramate (TOPAMAX) 50 MG tablet      Last Written Prescription Date:  10/12/2021  Last Fill Quantity: 90,   # refills: 1  Last Office Visit : 1/14/2022  Future Office visit:  none    Routing refill request to provider for review/approval because:  Refill needs review:  Migraine Dx not listed on medication protocol

## 2022-01-25 ENCOUNTER — PRE VISIT (OUTPATIENT)
Dept: UROLOGY | Facility: CLINIC | Age: 62
End: 2022-01-25
Payer: COMMERCIAL

## 2022-01-26 ENCOUNTER — PATIENT OUTREACH (OUTPATIENT)
Dept: UROLOGY | Facility: CLINIC | Age: 62
End: 2022-01-26
Payer: COMMERCIAL

## 2022-01-28 ENCOUNTER — LAB (OUTPATIENT)
Dept: LAB | Facility: CLINIC | Age: 62
End: 2022-01-28
Payer: COMMERCIAL

## 2022-01-28 DIAGNOSIS — Z11.59 ENCOUNTER FOR SCREENING FOR OTHER VIRAL DISEASES: ICD-10-CM

## 2022-01-28 LAB — SARS-COV-2 RNA RESP QL NAA+PROBE: NEGATIVE

## 2022-01-28 PROCEDURE — U0005 INFEC AGEN DETEC AMPLI PROBE: HCPCS | Mod: 90 | Performed by: PATHOLOGY

## 2022-01-28 PROCEDURE — 99000 SPECIMEN HANDLING OFFICE-LAB: CPT | Performed by: PATHOLOGY

## 2022-01-28 PROCEDURE — U0003 INFECTIOUS AGENT DETECTION BY NUCLEIC ACID (DNA OR RNA); SEVERE ACUTE RESPIRATORY SYNDROME CORONAVIRUS 2 (SARS-COV-2) (CORONAVIRUS DISEASE [COVID-19]), AMPLIFIED PROBE TECHNIQUE, MAKING USE OF HIGH THROUGHPUT TECHNOLOGIES AS DESCRIBED BY CMS-2020-01-R: HCPCS | Mod: 90 | Performed by: PATHOLOGY

## 2022-01-31 ENCOUNTER — ANESTHESIA EVENT (OUTPATIENT)
Dept: SURGERY | Facility: CLINIC | Age: 62
End: 2022-01-31
Payer: COMMERCIAL

## 2022-01-31 ASSESSMENT — LIFESTYLE VARIABLES: TOBACCO_USE: 0

## 2022-01-31 NOTE — ANESTHESIA PREPROCEDURE EVALUATION
Anesthesia Pre-Procedure Evaluation    Patient: Luigi Moore   MRN: [id number] : 1960        Preoperative Diagnosis: Erectile dysfunction, unspecified erectile dysfunction type [N52.9]    Procedure : Procedure(s):  INSERTION of INFLATABLE PENILE PROSTHESIS          Past Medical History:   Diagnosis Date     Acquired hypothyroidism 2016     Depressive disorder      Hidradenoma dx: 2015    right hand/2 surgeries     Hypertension      Numbness and tingling     right hand, related to surgery for hidradenoma     TELLY (obstructive sleep apnea)     [place] 1 year ago[place] [facility] [place]     Type 1 diabetes (H) 1982     Uncomplicated asthma       Past Surgical History:   Procedure Laterality Date     EXCISE LESION HAND Right 2015    Procedure: EXCISE LESION HAND;  Surgeon: Jeovany Jefferson MD;  Location: [facility] OR     ORTHOPEDIC SURGERY Left     achilles tendon      ORTHOPEDIC SURGERY Right     hand surgery     SOFT TISSUE SURGERY      lipoma removal, below rib cage on right side      Allergies   Allergen Reactions     Diagnostic X-Ray Materials Anaphylaxis     Contrast Dye Hives     Diatrizoate Hives     Iodine dye; iodine externally or topically is OK      Social History     Tobacco Use     Smoking status: Never Smoker     Smokeless tobacco: Never Used   Substance Use Topics     Alcohol use: No     Comment: History of alcohol abuse/sober since ; went to treatment       Wt Readings from Last 1 Encounters:   22 108 kg (238 lb 3.2 oz)        Anesthesia Evaluation   Pt has had prior anesthetic. Type: General.    No history of anesthetic complications       ROS/MED HX  ENT/Pulmonary:     (+) sleep apnea, uses CPAP, Intermittent, asthma Treatment: Inhaler prn,   (-) tobacco use   Neurologic:     (+) migraines,     Cardiovascular:     (+) hypertension-range: 120-140s/60-80s/ ----    METS/Exercise Tolerance: >4 METS    Hematologic:  - neg hematologic  ROS  (-) history of  blood clots, anemia and history of blood transfusion   Musculoskeletal: Comment: - Hx of Low back pain      GI/Hepatic:       Renal/Genitourinary:       Endo:     (+) type I DM, Last HgA1c: 7.5, date: 12/21/21, Using insulin, - using insulin pump. Normal glucose range: 207 ave over 14 days (mid 12/2021), Diabetic complications: retinopathy. thyroid problem, hypothyroidism,     Psychiatric/Substance Use:     (+) psychiatric history depression alcohol abuse (sober since 2015)     Infectious Disease:       Malignancy:       Other:            Physical Exam    Airway        Mallampati: II   TM distance: > 3 FB      Respiratory Devices and Support         Dental           Cardiovascular          Rhythm and rate: regular     Pulmonary           breath sounds clear to auscultation           OUTSIDE LABS:  CBC:   Lab Results   Component Value Date    WBC 7.3 12/21/2021    WBC 7.4 06/21/2018    HGB 15.3 12/21/2021    HGB 15.8 06/21/2018    HCT 44.8 12/21/2021    HCT 44.1 06/21/2018     12/21/2021     06/21/2018     BMP:   Lab Results   Component Value Date     01/14/2022     07/23/2021    POTASSIUM 3.8 01/14/2022    POTASSIUM 4.2 07/23/2021    CHLORIDE 104 01/14/2022    CHLORIDE 102 07/23/2021    CO2 25 01/14/2022    CO2 27 07/23/2021    BUN 19 01/14/2022    BUN 8 07/23/2021    CR 0.87 01/14/2022    CR 0.96 07/23/2021     (H) 01/14/2022     (H) 07/23/2021     COAGS: No results found for: PTT, INR, FIBR  POC:   Lab Results   Component Value Date     (H) 05/08/2017     HEPATIC:   Lab Results   Component Value Date    ALBUMIN 3.9 07/23/2021    PROTTOTAL 7.4 07/23/2021    ALT 18 07/23/2021    AST 16 07/23/2021    ALKPHOS 118 07/23/2021    BILITOTAL 0.6 07/23/2021     OTHER:   Lab Results   Component Value Date    A1C 7.5 (H) 12/21/2021    NEVA 8.7 01/14/2022    TSH 3.36 05/21/2021    T4 0.95 12/06/2019       Anesthesia Plan    ASA Status:  3   NPO Status:  NPO Appropriate     Anesthesia Type: General.     - Airway: LMA   Induction: Intravenous.   Maintenance: Inhalation.   Techniques and Equipment:     - Lines/Monitors: BIS     Consents    Anesthesia Plan(s) and associated risks, benefits, and realistic alternatives discussed. Questions answered and patient/representative(s) expressed understanding.    - Discussed:     - Discussed with:  Patient         Postoperative Care    Pain management: Oral pain medications.   PONV prophylaxis: Ondansetron (or other 5HT-3)     Comments:                Chester Conley MD

## 2022-02-01 ENCOUNTER — HOSPITAL ENCOUNTER (OUTPATIENT)
Facility: CLINIC | Age: 62
LOS: 1 days | Discharge: HOME | End: 2022-02-02
Attending: UROLOGY | Admitting: UROLOGY
Payer: COMMERCIAL

## 2022-02-01 ENCOUNTER — ANESTHESIA (OUTPATIENT)
Dept: SURGERY | Facility: CLINIC | Age: 62
End: 2022-02-01
Payer: COMMERCIAL

## 2022-02-01 DIAGNOSIS — N52.9: Primary | ICD-10-CM

## 2022-02-01 DIAGNOSIS — I10 ESSENTIAL HYPERTENSION: ICD-10-CM

## 2022-02-01 DIAGNOSIS — E10.8 TYPE 1 DIABETES MELLITUS WITH COMPLICATIONS (H): ICD-10-CM

## 2022-02-01 DIAGNOSIS — G47.33 OSA (OBSTRUCTIVE SLEEP APNEA): ICD-10-CM

## 2022-02-01 DIAGNOSIS — G47.33 OBSTRUCTIVE SLEEP APNEA SYNDROME: ICD-10-CM

## 2022-02-01 DIAGNOSIS — E10.69: Primary | ICD-10-CM

## 2022-02-01 DIAGNOSIS — E10.9 TYPE 1 DIABETES MELLITUS WITHOUT COMPLICATION (H): ICD-10-CM

## 2022-02-01 DIAGNOSIS — E03.9 ACQUIRED HYPOTHYROIDISM: ICD-10-CM

## 2022-02-01 DIAGNOSIS — E78.5 DYSLIPIDEMIA: ICD-10-CM

## 2022-02-01 LAB
GLUCOSE BLDC GLUCOMTR-MCNC: 274 MG/DL (ref 70–99)
GLUCOSE BLDC GLUCOMTR-MCNC: 280 MG/DL (ref 70–99)
GLUCOSE BLDC GLUCOMTR-MCNC: 286 MG/DL (ref 70–99)
GLUCOSE BLDC GLUCOMTR-MCNC: 358 MG/DL (ref 70–99)
GLUCOSE BLDC GLUCOMTR-MCNC: 381 MG/DL (ref 70–99)
HGB BLD-MCNC: 13.6 G/DL (ref 13.3–17.7)
POTASSIUM BLD-SCNC: 4.7 MMOL/L (ref 3.4–5.3)

## 2022-02-01 PROCEDURE — 250N000013 HC RX MED GY IP 250 OP 250 PS 637: Performed by: UROLOGY

## 2022-02-01 PROCEDURE — 250N000011 HC RX IP 250 OP 636: Performed by: UROLOGY

## 2022-02-01 PROCEDURE — 250N000012 HC RX MED GY IP 250 OP 636 PS 637: Performed by: NURSE PRACTITIONER

## 2022-02-01 PROCEDURE — 370N000017 HC ANESTHESIA TECHNICAL FEE, PER MIN: Performed by: UROLOGY

## 2022-02-01 PROCEDURE — 258N000003 HC RX IP 258 OP 636: Performed by: UROLOGY

## 2022-02-01 PROCEDURE — 85018 HEMOGLOBIN: CPT | Performed by: ANESTHESIOLOGY

## 2022-02-01 PROCEDURE — 84132 ASSAY OF SERUM POTASSIUM: CPT | Performed by: ANESTHESIOLOGY

## 2022-02-01 PROCEDURE — C1813 PROSTHESIS, PENILE, INFLATAB: HCPCS | Performed by: UROLOGY

## 2022-02-01 PROCEDURE — 82962 GLUCOSE BLOOD TEST: CPT

## 2022-02-01 PROCEDURE — C1713 ANCHOR/SCREW BN/BN,TIS/BN: HCPCS | Performed by: UROLOGY

## 2022-02-01 PROCEDURE — 250N000025 HC SEVOFLURANE, PER MIN: Performed by: UROLOGY

## 2022-02-01 PROCEDURE — 36415 COLL VENOUS BLD VENIPUNCTURE: CPT | Performed by: ANESTHESIOLOGY

## 2022-02-01 PROCEDURE — 54405 INSERT MULTI-COMP PENIS PROS: CPT | Mod: GC

## 2022-02-01 PROCEDURE — 250N000009 HC RX 250: Performed by: STUDENT IN AN ORGANIZED HEALTH CARE EDUCATION/TRAINING PROGRAM

## 2022-02-01 PROCEDURE — 250N000013 HC RX MED GY IP 250 OP 250 PS 637: Performed by: STUDENT IN AN ORGANIZED HEALTH CARE EDUCATION/TRAINING PROGRAM

## 2022-02-01 PROCEDURE — 360N000076 HC SURGERY LEVEL 3, PER MIN: Performed by: UROLOGY

## 2022-02-01 PROCEDURE — 250N000009 HC RX 250: Performed by: UROLOGY

## 2022-02-01 PROCEDURE — 258N000003 HC RX IP 258 OP 636: Performed by: STUDENT IN AN ORGANIZED HEALTH CARE EDUCATION/TRAINING PROGRAM

## 2022-02-01 PROCEDURE — 710N000010 HC RECOVERY PHASE 1, LEVEL 2, PER MIN: Performed by: UROLOGY

## 2022-02-01 PROCEDURE — 96372 THER/PROPH/DIAG INJ SC/IM: CPT | Mod: 59 | Performed by: NURSE PRACTITIONER

## 2022-02-01 PROCEDURE — 272N000001 HC OR GENERAL SUPPLY STERILE: Performed by: UROLOGY

## 2022-02-01 PROCEDURE — 999N000141 HC STATISTIC PRE-PROCEDURE NURSING ASSESSMENT: Performed by: UROLOGY

## 2022-02-01 PROCEDURE — 250N000011 HC RX IP 250 OP 636: Performed by: STUDENT IN AN ORGANIZED HEALTH CARE EDUCATION/TRAINING PROGRAM

## 2022-02-01 DEVICE — IMPLANTABLE DEVICE: Type: IMPLANTABLE DEVICE | Site: PENIS | Status: FUNCTIONAL

## 2022-02-01 DEVICE — IMP PROSTHESIS PENILE TITAN STD ASSEMBLY KIT 91-9480SC: Type: IMPLANTABLE DEVICE | Site: PENIS | Status: FUNCTIONAL

## 2022-02-01 DEVICE — CL RESERVOIR
Type: IMPLANTABLE DEVICE | Site: ABDOMEN | Status: FUNCTIONAL
Brand: TITAN

## 2022-02-01 RX ORDER — CEFAZOLIN SODIUM 2 G/100ML
2 INJECTION, SOLUTION INTRAVENOUS SEE ADMIN INSTRUCTIONS
Status: DISCONTINUED | OUTPATIENT
Start: 2022-02-01 | End: 2022-02-01 | Stop reason: HOSPADM

## 2022-02-01 RX ORDER — ONDANSETRON 2 MG/ML
4 INJECTION INTRAMUSCULAR; INTRAVENOUS EVERY 6 HOURS PRN
Status: DISCONTINUED | OUTPATIENT
Start: 2022-02-01 | End: 2022-02-02 | Stop reason: HOSPADM

## 2022-02-01 RX ORDER — METOPROLOL TARTRATE 1 MG/ML
1-2 INJECTION, SOLUTION INTRAVENOUS EVERY 5 MIN PRN
Status: DISCONTINUED | OUTPATIENT
Start: 2022-02-01 | End: 2022-02-01 | Stop reason: HOSPADM

## 2022-02-01 RX ORDER — ATORVASTATIN CALCIUM 40 MG/1
40 TABLET, FILM COATED ORAL DAILY
Status: DISCONTINUED | OUTPATIENT
Start: 2022-02-01 | End: 2022-02-02 | Stop reason: HOSPADM

## 2022-02-01 RX ORDER — FENTANYL CITRATE 50 UG/ML
INJECTION, SOLUTION INTRAMUSCULAR; INTRAVENOUS PRN
Status: DISCONTINUED | OUTPATIENT
Start: 2022-02-01 | End: 2022-02-01

## 2022-02-01 RX ORDER — LIDOCAINE 40 MG/G
CREAM TOPICAL
Status: DISCONTINUED | OUTPATIENT
Start: 2022-02-01 | End: 2022-02-01 | Stop reason: HOSPADM

## 2022-02-01 RX ORDER — CEFAZOLIN SODIUM 2 G/100ML
2 INJECTION, SOLUTION INTRAVENOUS EVERY 8 HOURS
Status: DISCONTINUED | OUTPATIENT
Start: 2022-02-01 | End: 2022-02-02 | Stop reason: HOSPADM

## 2022-02-01 RX ORDER — SODIUM CHLORIDE, SODIUM LACTATE, POTASSIUM CHLORIDE, CALCIUM CHLORIDE 600; 310; 30; 20 MG/100ML; MG/100ML; MG/100ML; MG/100ML
INJECTION, SOLUTION INTRAVENOUS CONTINUOUS
Status: DISCONTINUED | OUTPATIENT
Start: 2022-02-01 | End: 2022-02-01 | Stop reason: HOSPADM

## 2022-02-01 RX ORDER — DEXTROSE MONOHYDRATE 25 G/50ML
25-50 INJECTION, SOLUTION INTRAVENOUS
Status: DISCONTINUED | OUTPATIENT
Start: 2022-02-01 | End: 2022-02-02 | Stop reason: HOSPADM

## 2022-02-01 RX ORDER — SULFAMETHOXAZOLE/TRIMETHOPRIM 800-160 MG
1 TABLET ORAL 2 TIMES DAILY
Status: DISCONTINUED | OUTPATIENT
Start: 2022-02-01 | End: 2022-02-01

## 2022-02-01 RX ORDER — LIDOCAINE HYDROCHLORIDE 20 MG/ML
INJECTION, SOLUTION INFILTRATION; PERINEURAL PRN
Status: DISCONTINUED | OUTPATIENT
Start: 2022-02-01 | End: 2022-02-01

## 2022-02-01 RX ORDER — PROCHLORPERAZINE MALEATE 10 MG
10 TABLET ORAL EVERY 6 HOURS PRN
Status: DISCONTINUED | OUTPATIENT
Start: 2022-02-01 | End: 2022-02-02 | Stop reason: HOSPADM

## 2022-02-01 RX ORDER — HYDROMORPHONE HCL IN WATER/PF 6 MG/30 ML
0.4 PATIENT CONTROLLED ANALGESIA SYRINGE INTRAVENOUS
Status: DISCONTINUED | OUTPATIENT
Start: 2022-02-01 | End: 2022-02-02 | Stop reason: HOSPADM

## 2022-02-01 RX ORDER — ONDANSETRON 2 MG/ML
INJECTION INTRAMUSCULAR; INTRAVENOUS PRN
Status: DISCONTINUED | OUTPATIENT
Start: 2022-02-01 | End: 2022-02-01

## 2022-02-01 RX ORDER — NICOTINE POLACRILEX 4 MG
15-30 LOZENGE BUCCAL
Status: DISCONTINUED | OUTPATIENT
Start: 2022-02-01 | End: 2022-02-01

## 2022-02-01 RX ORDER — NALOXONE HYDROCHLORIDE 0.4 MG/ML
0.4 INJECTION, SOLUTION INTRAMUSCULAR; INTRAVENOUS; SUBCUTANEOUS
Status: DISCONTINUED | OUTPATIENT
Start: 2022-02-01 | End: 2022-02-01

## 2022-02-01 RX ORDER — ACETAMINOPHEN 325 MG/1
975 TABLET ORAL ONCE
Status: COMPLETED | OUTPATIENT
Start: 2022-02-01 | End: 2022-02-01

## 2022-02-01 RX ORDER — TOPIRAMATE 25 MG/1
50 TABLET, FILM COATED ORAL DAILY
Status: DISCONTINUED | OUTPATIENT
Start: 2022-02-01 | End: 2022-02-02 | Stop reason: HOSPADM

## 2022-02-01 RX ORDER — BUPROPION HYDROCHLORIDE 300 MG/1
300 TABLET ORAL EVERY MORNING
Status: DISCONTINUED | OUTPATIENT
Start: 2022-02-02 | End: 2022-02-02 | Stop reason: HOSPADM

## 2022-02-01 RX ORDER — NEOMYCIN/BACITRACIN/POLYMYXINB 3.5-400-5K
OINTMENT (GRAM) TOPICAL 4 TIMES DAILY PRN
Status: DISCONTINUED | OUTPATIENT
Start: 2022-02-01 | End: 2022-02-02 | Stop reason: HOSPADM

## 2022-02-01 RX ORDER — LOSARTAN POTASSIUM AND HYDROCHLOROTHIAZIDE 12.5; 5 MG/1; MG/1
1 TABLET ORAL DAILY
Status: DISCONTINUED | OUTPATIENT
Start: 2022-02-02 | End: 2022-02-02 | Stop reason: HOSPADM

## 2022-02-01 RX ORDER — CEFAZOLIN SODIUM 2 G/100ML
2 INJECTION, SOLUTION INTRAVENOUS
Status: COMPLETED | OUTPATIENT
Start: 2022-02-01 | End: 2022-02-01

## 2022-02-01 RX ORDER — HYDROMORPHONE HCL IN WATER/PF 6 MG/30 ML
0.2 PATIENT CONTROLLED ANALGESIA SYRINGE INTRAVENOUS
Status: DISCONTINUED | OUTPATIENT
Start: 2022-02-01 | End: 2022-02-02 | Stop reason: HOSPADM

## 2022-02-01 RX ORDER — BUPIVACAINE HYDROCHLORIDE 2.5 MG/ML
INJECTION, SOLUTION INFILTRATION; PERINEURAL PRN
Status: DISCONTINUED | OUTPATIENT
Start: 2022-02-01 | End: 2022-02-01 | Stop reason: HOSPADM

## 2022-02-01 RX ORDER — OXYCODONE HYDROCHLORIDE 5 MG/1
5 TABLET ORAL EVERY 4 HOURS PRN
Status: DISCONTINUED | OUTPATIENT
Start: 2022-02-01 | End: 2022-02-02 | Stop reason: HOSPADM

## 2022-02-01 RX ORDER — HYDRALAZINE HYDROCHLORIDE 20 MG/ML
2.5-5 INJECTION INTRAMUSCULAR; INTRAVENOUS EVERY 10 MIN PRN
Status: DISCONTINUED | OUTPATIENT
Start: 2022-02-01 | End: 2022-02-01 | Stop reason: HOSPADM

## 2022-02-01 RX ORDER — OXYCODONE HYDROCHLORIDE 5 MG/1
5 TABLET ORAL EVERY 4 HOURS PRN
Status: DISCONTINUED | OUTPATIENT
Start: 2022-02-01 | End: 2022-02-01 | Stop reason: HOSPADM

## 2022-02-01 RX ORDER — HYDROMORPHONE HYDROCHLORIDE 1 MG/ML
0.2 INJECTION, SOLUTION INTRAMUSCULAR; INTRAVENOUS; SUBCUTANEOUS EVERY 5 MIN PRN
Status: DISCONTINUED | OUTPATIENT
Start: 2022-02-01 | End: 2022-02-01 | Stop reason: HOSPADM

## 2022-02-01 RX ORDER — FAMOTIDINE 20 MG/1
20 TABLET, FILM COATED ORAL 2 TIMES DAILY
Status: DISCONTINUED | OUTPATIENT
Start: 2022-02-01 | End: 2022-02-02 | Stop reason: HOSPADM

## 2022-02-01 RX ORDER — NICOTINE POLACRILEX 4 MG
15-30 LOZENGE BUCCAL
Status: DISCONTINUED | OUTPATIENT
Start: 2022-02-01 | End: 2022-02-02 | Stop reason: HOSPADM

## 2022-02-01 RX ORDER — OXYCODONE HYDROCHLORIDE 5 MG/1
5-10 TABLET ORAL EVERY 6 HOURS PRN
Qty: 20 TABLET | Refills: 0 | Status: SHIPPED | OUTPATIENT
Start: 2022-02-01 | End: 2022-02-06

## 2022-02-01 RX ORDER — DEXAMETHASONE SODIUM PHOSPHATE 4 MG/ML
INJECTION, SOLUTION INTRA-ARTICULAR; INTRALESIONAL; INTRAMUSCULAR; INTRAVENOUS; SOFT TISSUE PRN
Status: DISCONTINUED | OUTPATIENT
Start: 2022-02-01 | End: 2022-02-01

## 2022-02-01 RX ORDER — ONDANSETRON 4 MG/1
4 TABLET, ORALLY DISINTEGRATING ORAL EVERY 6 HOURS PRN
Status: DISCONTINUED | OUTPATIENT
Start: 2022-02-01 | End: 2022-02-02 | Stop reason: HOSPADM

## 2022-02-01 RX ORDER — GABAPENTIN 100 MG/1
300 CAPSULE ORAL
Status: COMPLETED | OUTPATIENT
Start: 2022-02-01 | End: 2022-02-01

## 2022-02-01 RX ORDER — DEXTROSE MONOHYDRATE 25 G/50ML
25-50 INJECTION, SOLUTION INTRAVENOUS
Status: DISCONTINUED | OUTPATIENT
Start: 2022-02-01 | End: 2022-02-01

## 2022-02-01 RX ORDER — DIPHENHYDRAMINE HCL 25 MG
25-50 CAPSULE ORAL EVERY 6 HOURS PRN
Status: DISCONTINUED | OUTPATIENT
Start: 2022-02-01 | End: 2022-02-02 | Stop reason: HOSPADM

## 2022-02-01 RX ORDER — PROPOFOL 10 MG/ML
INJECTION, EMULSION INTRAVENOUS PRN
Status: DISCONTINUED | OUTPATIENT
Start: 2022-02-01 | End: 2022-02-01

## 2022-02-01 RX ORDER — ALBUTEROL SULFATE 90 UG/1
2 AEROSOL, METERED RESPIRATORY (INHALATION) EVERY 6 HOURS PRN
Status: DISCONTINUED | OUTPATIENT
Start: 2022-02-01 | End: 2022-02-02 | Stop reason: HOSPADM

## 2022-02-01 RX ORDER — ONDANSETRON 4 MG/1
4 TABLET, ORALLY DISINTEGRATING ORAL EVERY 30 MIN PRN
Status: DISCONTINUED | OUTPATIENT
Start: 2022-02-01 | End: 2022-02-01 | Stop reason: HOSPADM

## 2022-02-01 RX ORDER — ACETAMINOPHEN 325 MG/1
975 TABLET ORAL EVERY 6 HOURS
Status: DISCONTINUED | OUTPATIENT
Start: 2022-02-01 | End: 2022-02-02 | Stop reason: HOSPADM

## 2022-02-01 RX ORDER — NALOXONE HYDROCHLORIDE 0.4 MG/ML
0.2 INJECTION, SOLUTION INTRAMUSCULAR; INTRAVENOUS; SUBCUTANEOUS
Status: DISCONTINUED | OUTPATIENT
Start: 2022-02-01 | End: 2022-02-01

## 2022-02-01 RX ORDER — BUPROPION HYDROCHLORIDE 150 MG/1
150 TABLET ORAL EVERY MORNING
Status: DISCONTINUED | OUTPATIENT
Start: 2022-02-02 | End: 2022-02-02 | Stop reason: HOSPADM

## 2022-02-01 RX ORDER — FENTANYL CITRATE 50 UG/ML
25 INJECTION, SOLUTION INTRAMUSCULAR; INTRAVENOUS EVERY 5 MIN PRN
Status: DISCONTINUED | OUTPATIENT
Start: 2022-02-01 | End: 2022-02-01 | Stop reason: HOSPADM

## 2022-02-01 RX ORDER — RIZATRIPTAN BENZOATE 10 MG/1
10 TABLET ORAL
Status: DISCONTINUED | OUTPATIENT
Start: 2022-02-01 | End: 2022-02-02 | Stop reason: HOSPADM

## 2022-02-01 RX ORDER — ATROPA BELLADONNA AND OPIUM 16.2; 3 MG/1; MG/1
30 SUPPOSITORY RECTAL 3 TIMES DAILY PRN
Status: DISCONTINUED | OUTPATIENT
Start: 2022-02-01 | End: 2022-02-02 | Stop reason: HOSPADM

## 2022-02-01 RX ORDER — ONDANSETRON 2 MG/ML
4 INJECTION INTRAMUSCULAR; INTRAVENOUS EVERY 30 MIN PRN
Status: DISCONTINUED | OUTPATIENT
Start: 2022-02-01 | End: 2022-02-01 | Stop reason: HOSPADM

## 2022-02-01 RX ORDER — SODIUM CHLORIDE, SODIUM LACTATE, POTASSIUM CHLORIDE, CALCIUM CHLORIDE 600; 310; 30; 20 MG/100ML; MG/100ML; MG/100ML; MG/100ML
INJECTION, SOLUTION INTRAVENOUS CONTINUOUS
Status: DISCONTINUED | OUTPATIENT
Start: 2022-02-01 | End: 2022-02-02 | Stop reason: HOSPADM

## 2022-02-01 RX ORDER — OXYCODONE HYDROCHLORIDE 10 MG/1
10 TABLET ORAL EVERY 4 HOURS PRN
Status: DISCONTINUED | OUTPATIENT
Start: 2022-02-01 | End: 2022-02-02 | Stop reason: HOSPADM

## 2022-02-01 RX ORDER — LIDOCAINE 40 MG/G
CREAM TOPICAL
Status: DISCONTINUED | OUTPATIENT
Start: 2022-02-01 | End: 2022-02-02 | Stop reason: HOSPADM

## 2022-02-01 RX ORDER — AMLODIPINE BESYLATE 10 MG/1
10 TABLET ORAL DAILY
Status: DISCONTINUED | OUTPATIENT
Start: 2022-02-02 | End: 2022-02-02 | Stop reason: HOSPADM

## 2022-02-01 RX ORDER — BACITRACIN ZINC 500 [USP'U]/G
OINTMENT TOPICAL PRN
Status: DISCONTINUED | OUTPATIENT
Start: 2022-02-01 | End: 2022-02-01 | Stop reason: HOSPADM

## 2022-02-01 RX ADMIN — DEXAMETHASONE SODIUM PHOSPHATE 4 MG: 4 INJECTION, SOLUTION INTRAMUSCULAR; INTRAVENOUS at 10:34

## 2022-02-01 RX ADMIN — SODIUM CHLORIDE, POTASSIUM CHLORIDE, SODIUM LACTATE AND CALCIUM CHLORIDE: 600; 310; 30; 20 INJECTION, SOLUTION INTRAVENOUS at 16:45

## 2022-02-01 RX ADMIN — GENTAMICIN SULFATE 480 MG: 40 INJECTION, SOLUTION INTRAMUSCULAR; INTRAVENOUS at 10:30

## 2022-02-01 RX ADMIN — ONDANSETRON 4 MG: 2 INJECTION INTRAMUSCULAR; INTRAVENOUS at 12:07

## 2022-02-01 RX ADMIN — ACETAMINOPHEN 975 MG: 325 TABLET, FILM COATED ORAL at 16:44

## 2022-02-01 RX ADMIN — LIDOCAINE HYDROCHLORIDE 100 MG: 20 INJECTION, SOLUTION INFILTRATION; PERINEURAL at 10:21

## 2022-02-01 RX ADMIN — SODIUM CHLORIDE, POTASSIUM CHLORIDE, SODIUM LACTATE AND CALCIUM CHLORIDE: 600; 310; 30; 20 INJECTION, SOLUTION INTRAVENOUS at 10:14

## 2022-02-01 RX ADMIN — ARIPIPRAZOLE 15 MG: 10 TABLET ORAL at 16:45

## 2022-02-01 RX ADMIN — Medication 2 G: at 10:30

## 2022-02-01 RX ADMIN — INSULIN HUMAN 20 UNITS: 100 INJECTION, SUSPENSION SUBCUTANEOUS at 17:40

## 2022-02-01 RX ADMIN — DEXTROSE MONOHYDRATE 2 G: 50 INJECTION, SOLUTION INTRAVENOUS at 18:59

## 2022-02-01 RX ADMIN — FENTANYL CITRATE 50 MCG: 50 INJECTION, SOLUTION INTRAMUSCULAR; INTRAVENOUS at 11:32

## 2022-02-01 RX ADMIN — FENTANYL CITRATE 25 MCG: 50 INJECTION, SOLUTION INTRAMUSCULAR; INTRAVENOUS at 10:21

## 2022-02-01 RX ADMIN — GABAPENTIN 300 MG: 300 CAPSULE ORAL at 09:31

## 2022-02-01 RX ADMIN — OXYCODONE HYDROCHLORIDE 5 MG: 5 TABLET ORAL at 13:07

## 2022-02-01 RX ADMIN — ACETAMINOPHEN 975 MG: 325 TABLET, FILM COATED ORAL at 22:06

## 2022-02-01 RX ADMIN — OXYCODONE HYDROCHLORIDE 5 MG: 5 TABLET ORAL at 22:06

## 2022-02-01 RX ADMIN — LEVOTHYROXINE SODIUM 137 MCG: 25 TABLET ORAL at 16:45

## 2022-02-01 RX ADMIN — FAMOTIDINE 20 MG: 20 TABLET ORAL at 22:06

## 2022-02-01 RX ADMIN — HYDROMORPHONE HYDROCHLORIDE 0.5 MG: 1 INJECTION, SOLUTION INTRAMUSCULAR; INTRAVENOUS; SUBCUTANEOUS at 11:55

## 2022-02-01 RX ADMIN — ACETAMINOPHEN 975 MG: 325 TABLET, FILM COATED ORAL at 09:31

## 2022-02-01 RX ADMIN — PROPOFOL 200 MG: 10 INJECTION, EMULSION INTRAVENOUS at 10:22

## 2022-02-01 RX ADMIN — ATORVASTATIN CALCIUM 40 MG: 40 TABLET, FILM COATED ORAL at 16:45

## 2022-02-01 RX ADMIN — TOPIRAMATE 50 MG: 25 TABLET, FILM COATED ORAL at 16:45

## 2022-02-01 ASSESSMENT — ACTIVITIES OF DAILY LIVING (ADL)
ADLS_ACUITY_SCORE: 4
ADLS_ACUITY_SCORE: 2

## 2022-02-01 ASSESSMENT — MIFFLIN-ST. JEOR: SCORE: 1994.5

## 2022-02-01 NOTE — ANESTHESIA POSTPROCEDURE EVALUATION
Patient: Luigi Moore    Procedure: Procedure(s):  INSERTION of INFLATABLE PENILE PROSTHESIS       Diagnosis:Erectile dysfunction, unspecified erectile dysfunction type [N52.9]  Diagnosis Additional Information: No value filed.    Anesthesia Type:  General    Note:  Disposition: Inpatient   Postop Pain Control: Uneventful            Sign Out: Well controlled pain   PONV: No   Neuro/Psych: Uneventful            Sign Out: Acceptable/Baseline neuro status   Airway/Respiratory: Uneventful            Sign Out: Acceptable/Baseline resp. status   CV/Hemodynamics: Uneventful            Sign Out: Acceptable CV status; No obvious hypovolemia; No obvious fluid overload   Other NRE: NONE   DID A NON-ROUTINE EVENT OCCUR? No           Last vitals:  Vitals Value Taken Time   /64 02/01/22 1345   Temp 36.8  C (98.2  F) 02/01/22 1330   Pulse 83 02/01/22 1402   Resp 0 02/01/22 1402   SpO2 98 % 02/01/22 1404   Vitals shown include unvalidated device data.    Electronically Signed By: Fidel Graf MD  February 1, 2022  2:17 PM

## 2022-02-01 NOTE — ANESTHESIA CARE TRANSFER NOTE
Patient: Luigi Moore    Procedure: Procedure(s):  INSERTION of INFLATABLE PENILE PROSTHESIS       Diagnosis: Erectile dysfunction, unspecified erectile dysfunction type [N52.9]  Diagnosis Additional Information: No value filed.    Anesthesia Type:   General     Note:    Oropharynx: oropharynx clear of all foreign objects  Level of Consciousness: awake  Oxygen Supplementation: face mask  Level of Supplemental Oxygen (L/min / FiO2): 6  Independent Airway: airway patency satisfactory and stable  Dentition: dentition unchanged  Vital Signs Stable: post-procedure vital signs reviewed and stable  Report to RN Given: handoff report given  Patient transferred to: PACU  Comments: VSS. Breathing spontaneously at a regular rate with adequate tidal volumes and maintaining O2 sats. Denies nausea or pain. No apparent complications from anesthesia.     Chester Conley MD   Anesthesia CA-1    Handoff Report: Identifed the Patient, Identified the Reponsible Provider, Reviewed the pertinent medical history, Discussed the surgical course, Reviewed Intra-OP anesthesia mangement and issues during anesthesia, Set expectations for post-procedure period and Allowed opportunity for questions and acknowledgement of understanding      Vitals:  Vitals Value Taken Time   BP     Temp     Pulse     Resp 19 02/01/22 1234   SpO2 99 % 02/01/22 1234   Vitals shown include unvalidated device data.    Electronically Signed By: Chester Conley MD  February 1, 2022  12:36 PM

## 2022-02-01 NOTE — PROGRESS NOTES
Pt admitted from PACU to  Med surg. Assist of 1 transferring from cart to bed. Good strength. A/O x4. Capno on. VSS. 2L NC sats 94%.  Reports minimal to moderate pain. Dressing with minimal drainage, Abd pad applied. Urology notified. Pt turned on insulin pump in PACU at basal rate 1.0. Provider notified and asked if more frequent BG checks are needed.    3:45pm- Pt says he spoke to endocrine regarding his insulin pump.     Per urology ok to wrap with kerlix if warrented. Dressing rechecked no change.

## 2022-02-01 NOTE — OR NURSING
Maurice transported without incidence on cart with oxygen, oximetry and capnography to . Hand off given to Fabi Omalley RN. Signed paper discharge prescription for oxycodone handed off with Rx chain of custody form completed in chart.

## 2022-02-01 NOTE — OR NURSING
PACU to Inpatient Nursing Handoff    Patient Luigi Moore is a 61 year old male who speaks English.   Procedure Procedure(s):  INSERTION of INFLATABLE PENILE PROSTHESIS   Surgeon(s) Primary: Johnathan Cooper MD  Resident - Assisting: Jerzy Mercer MD     Allergies   Allergen Reactions     Diagnostic X-Ray Materials Anaphylaxis     Contrast Dye Hives     Diatrizoate Hives     Iodine dye; iodine externally or topically is OK         Past Medical History   has a past medical history of Acquired hypothyroidism (12/7/2016), Depressive disorder, Hidradenoma (dx: Feb 2015), Hypertension, Numbness and tingling (2015), TELLY (obstructive sleep apnea) (2007), Type 1 diabetes (H) (1982), and Uncomplicated asthma.    Anesthesia General   Dermatome Level     Preop Meds acetaminophen (Tylenol) - time given: 0930  celecoxib (Celebrex) - time given: 0930   Nerve block Not applicable   Intraop Meds dexamethasone (Decadron)  fentanyl (Sublimaze): 75 mcg total  ondansetron (Zofran): last given at 1207   Local Meds No   Antibiotics cefazolin (Ancef) - last given at 1030  gentamicin (Garamycin) - last given at 1030     Pain Patient Currently in Pain: sleeping, patient not able to self report   PACU meds  oxycodone (Roxicodone): 5 mg (total dose) last given at 1307    PCA / epidural No   Capnography     Telemetry ECG Rhythm: Sinus rhythm   Inpatient Telemetry Monitor Ordered? No        Labs Glucose Lab Results   Component Value Date     02/01/2022     01/14/2022     06/21/2018       Hgb Lab Results   Component Value Date    HGB 13.6 02/01/2022    HGB 15.8 06/21/2018       INR No results found for: INR   PACU Imaging Not applicable     Wound/Incision Incision/Surgical Site 02/26/15 Right Hand (Active)   Number of days: 2532       Incision/Surgical Site 02/01/22 Scrotum (Active)   Incision Assessment UTV 02/01/22 1330   Dian-Incision Assessment UTV 02/01/22 1230   Closure Sutures 02/01/22 1330   Incision  Drainage Amount None 02/01/22 1330   Dressing Intervention Clean, dry, intact 02/01/22 1330   Number of days: 0      CMS        Equipment Not applicable   Other LDA       IV Access Peripheral IV 02/01/22 Right Hand (Active)   Site Assessment WDL 02/01/22 1330   Line Status Infusing 02/01/22 1330   Dressing Intervention New dressing  02/01/22 0943   Phlebitis Scale 0-->no symptoms 02/01/22 1330   Infiltration Scale 0 02/01/22 1330   Infiltration Site Treatment Method  None 02/01/22 0943   Number of days: 0      Blood Products Not applicable EBL 10   mL   Intake/Output Date 02/01/22 0700 - 02/02/22 0659   Shift 3917-8462 3152-6648 4342-4056 24 Hour Total   INTAKE   I.V. 850   850   IV Piggyback 102   102   Shift Total(mL/kg) 952(8.79)   952(8.79)   OUTPUT   Blood 10   10   Shift Total(mL/kg) 10(0.09)   10(0.09)   Weight (kg) 108.3 108.3 108.3 108.3      Drains / Albright Closed/Suction Drain 1 Left Other (Comment) Bulb 10 Luxembourgish (Active)   Site Description UTV 02/01/22 1330   Dressing Status Normal: Clean, Dry & Intact 02/01/22 1330   Drainage Appearance Bloody/Bright Red 02/01/22 1330   Status To bulb suction 02/01/22 1330   Number of days: 0       Urethral Catheter Latex;Straight-tip 16 fr (Active)   Catheter Care Done 02/01/22 1230   Collection Container Standard 02/01/22 1330   Securement Method Tape 02/01/22 1330   Rationale for Continued Use /GI/GYN Pelvic Procedure 02/01/22 1230   Number of days: 0      Time of void PreOp Void Prior to Procedure: 0600 (02/01/22 0918)    PostOp  Catheter in place    Diapered? No   Bladder Scan     PO    water     Vitals    B/P: 112/66  T: 98.2  F (36.8  C)    Temp src: Temporal  P:  Pulse: 85 (02/01/22 1330)          R: 14  O2:  SpO2: 97 %    O2 Device: Nasal cannula (02/01/22 1330)    Oxygen Delivery: 2 LPM (02/01/22 1330)         Family/support present significant other available by phone   Patient belongings  1 backpack, 2 pt belongins bags, 1 CPAP   Patient transported on  cart   DC meds/scripts (obs/outpt) Yes, scripts   Inpatient Pain Meds Released? Yes       Special needs/considerations None   Tasks needing completion None       Monique Goins RN  ASCOM [phone]

## 2022-02-01 NOTE — ANESTHESIA PROCEDURE NOTES
Airway       Patient location during procedure: OR  Staff -        Anesthesiologist:  Fidel Graf MD       Resident/Fellow: Chester Conley MD       Performed By: resident and anesthesiologist  Consent for Airway        Urgency: elective  Indications and Patient Condition       Indications for airway management: villa-procedural         Mask difficulty assessment: 1 - vent by mask    Final Airway Details       Final airway type: supraglottic airway    Supraglottic Airway Details        Type: LMA       Brand: Ambu AuraGain       LMA size: 5    Post intubation assessment        Placement verified by: capnometry, equal breath sounds and chest rise        Number of attempts at approach: 1       Number of other approaches attempted: 0       Secured with: pink tape       Ease of procedure: easy       Dentition: Intact and Unchanged

## 2022-02-01 NOTE — OP NOTE
OPERATIVE REPORT    PREOPERATIVE DIAGNOSIS: Erectile dysfunction    POSTOPERATIVE DIAGNOSIS: Same    PROCEDURES PERFORMED:   1. Implant inflatable penile prosthesis - Coloplast Titan 18cm zero degree with 2cm rear tip bilateral     STAFF SURGEON: Johnathan Cooper MD, present for entire case.     RESIDENT(S): Jerzy Mercer MD    ANESTHESIA: General    ESTIMATED BLOOD LOSS: 10 mL.     IV FLUIDS: See anesthesia record    COMPLICATIONS: None.     SIGNIFICANT FINDINGS: Proper fitting of implants equally with distal tips in mid glans    BRIEF OPERATIVE INDICATIONS: Luigi Moore is a 61 year old male with history of erectile dysfunction secondary to diabetes. He has failed injection therapy and desires a prosthesis for erectile function. The patient was counseled on the alternatives, risks, and benefits and elected to proceed with the above stated procedure.      DESCRIPTION OF PROCEDURE: After full informed voluntary consent was obtained, the patient was transported to the operating room, placed supine on the table. A brief was held. Pneumo boots were applied, and a general anesthetic was induced without complication then they were prepped and draped in the usual sterile fashion. A timeout was taken to confirm correct patient, procedure and laterality. The genitals were shaved, prepped and draped in the supine position using an alcohol based prep. A Lone Star (SKW) retractor was placed at the start of the case followed by a 16Fr smith catheter without difficulty. 10cc of sterile water was placed in the balloon. A sharp retractor was placed in the distal dorsal urethra to retract the penis cephalad.     Next, we proceeded by making a penoscrotal incision transversely with a 15 blade scalpel, approximately 4 cm in length. We then bluntly dissected down to identify the corporal bodies. Two 2-0 PDS sutures were then placed vertically in each of the corporal bodies to aid in marking and holding the corpora, and a scalpel was  used to incise the corpora. Metzenbaum scissors were then used to first gently dilate proximally down to the base of the corporal bodies and then distally to the mid glans bilaterally. We then used the Stoll dilators starting at size 8 and increasing up to size 13 to dilate the tract proximally and distally to the glans easily. We noted an element of crossover proximally, so the proximal dilations were re-performed slightly laterally to ensure there was no crossover for placement, and the repeat dilation showed satisfactory positioning of the dilators.     Next, the Cisco device was used to identify and measure the corporal lengths; measurements were 12cm proximal and 8cm distal right while 12cm distal and 8cm proximal left.  The Coloplast Titan implant was then prepared on the backtable and soaked in rifampin/gentamycin antibiotic solution.  Next, vancomycin irrigation as well as aseptic irrigation was used to irrigate each corporal cavity copiously. A Coloplast titan inflatable implant, 18cm zero degree with a 2cm rear tip, was placed in each corpora. The tubing was positioned so as to avoid entanglement.  It inflated easily with proper filling and bilateral implants in the mid glans; no evidence of crossover, with symmetric and cosmetically ideal appearance to the erection. The stay sutures of 2-0 PDS were tied over the corporotomies over the implants to complete the closure.  We then turned our attention to fashioning a scrotal pouch. A finger was used to develop a space anterior to the testicles and the pump was gently positioned into the scrotal pouch, taking care to position the pump with the button facing out and the tubing not entangled.  Additional aseptic solution was used to washout the scrotal cavity.     Next we digitally probed the right external ring and then exposed the right retropubic space to place the implant reservoir by elevating the ring with a baby Britney retractor and blunt finger  dissection into the right space of Retzius. A 75cc reservoir was used with 68cc of sterile water inside. Care was taken to ensure the lock-out valve was positioned anteriorly.  There was no back-pressure, even with the reservoir over-inflated to 78cc.  68cc final fill volume.    For the peno-scrotal incision, two layers of dartos muscle were approximated using running 2-0 Vicryls, irrigating the incision copiously after closure of each skin layer. A 10 Bulgarian round EMILY drain was brought out the dependent portion of the left hemiscrotum, and the tip of this was positioned deep within the scrotum, towards the right external inguinal ring. This was placed to bulb suction and secured to the skin using a nylon drain stitch. The skin was closed using interrupted 4-0 monocryl running horizontal mattress sutures. The prosthesis was left semi-inflated at the conclusion of the case. Bacitracin ointment and a kerlex mummy wrapping were placed around the penis and scrotum.     Postop plan:  - Admit to outpatient observation overnight  - Deflate implant tomorrow AM with pain medication beforehand  - Drain to be removed prior to discharge if output <30 ml per shift  - Discharge home tomorrow on bactrim x 7 days    Jerzy Mercer MD  Urology Resident    I was present and scrubbed for the entire procedure.  Standard IPP surgery without complications.  Very good device position and function.    Johnathan Cooper MD  Urology Staff

## 2022-02-01 NOTE — OR NURSING
Patient arrived to PACU from OR.   Blood glucose checked with POCT and resulted at 381.  Dr. Graf notified.  Pt has own insulin pump in place at this time running at 60% of basal rate.  Plan to wait for patient to become more awake to help self manage glucose level at this time.

## 2022-02-01 NOTE — CONSULTS
Brief Diabetes Service Note, full consult to follow tomorrow.    Luigi Moore is a 58 year old male with a history of Acquired hypothyroidism, depressive disorder, hidradenoma, hypertension, TELLY, Type 1 diabetes (H) (1982); and Uncomplicated asthma who is post op from insertion of inflatable penile prosthesis on 2/1/22.   His type 1 diabetes is known to be complicated by neuropathy retinopathy depression and erectile dysfunction.  He last saw Arti Kaur PA-C with [facility] on 12/21/21.      CDE consulted and insulin pump was running at 60% temp basal prior to surgery.   Per patient, basal rate is now 1 unit/hour, but slightly different from last endocrine visit on 12/21/21.  He was not able to clearly articulate his settings and I am not confident he can change settings on his own right now.  He received 4 mg Dexamethasone in the OR.  Needs more insulin for steroid hyperglycemia now.            Plan:  -continue ambulatory insulin pump (Medtronic Minimed) w/humalog insulin and settings below (Patient's home settings, will verify in person tomorrow):  Increase to a temp basal rate  BASAL RATES and times:  12   AM (midnight): 1.1units/hour    10   AM: 0.9 units/hour   6    PM: 1.1 units/hour   Carb ratio:   CARB RATIO and times:  12   AM (midnight): 12.0  Corection Factor (Sensitivity) and times:  12   AM (midnight): 35 mg/dL   Target BG Ranges:   BLOOD GLUCOSE TARGET and times:  12   AM (midnight): 90 - 120  Amount of Time Insulin is Active:  3.5 hrs     -NPH 20 units once now for steroid hyperglycemia   -BG checks before meals, bedtime, 0200  -hypoglycemia protocol  -full diabetes consult to follow tomorrow    GALILEA August, CNP  Inpatient Diabetes Service  Pager #[phone]

## 2022-02-01 NOTE — OR NURSING
Per MDA, patient ok to self manage insulin pump. At this time patient increased basal rate from 0.6 units/hr to 1.0 units/hr. Patient states follow up glucose ok to be obtained at 1500. Full report given to receiving SAIDA Omalley.

## 2022-02-01 NOTE — CONSULTS
"Notified by PAN of scheduled surgery today for Luigi Moore \"Maurice\", age 61, for penile implant.  PMH significan for Acquired hypothyroidism (12/7/2016); Depressive disorder; Hidradenoma (dx: Feb 2015); Hypertension; Numbness and tingling (2015); TELLY (obstructive sleep apnea) (2007); Type 1 diabetes (H) (1982); and Uncomplicated asthma. His type 1 diabetes is known to be complicated by neuropathy retinopathy depression and erectile dysfunction.    From last endo visit 12/21/21 with [facility]:    Type of Pump: Medtronic 770 G with sensor; auto mode typically  Insulin:  Insulin Lispro (Humalog)    BASAL RATES and times:  12   AM (midnight): 1.1units/hour    10   AM: 0.9 units/hour   6    PM: 1.1 units/hour     Carb ratio:   CARB RATIO and times:  12   AM (midnight): 12.0    Corection Factor (Sensitivity) and times:  12   AM (midnight): 35 mg/dL (from 30)    Target BG Ranges:   BLOOD GLUCOSE TARGET and times:  12   AM (midnight): 90 - 120    Amount of Time Insulin is Active:  3.5 hrs (extended from 2.5h)      Spoke with Dr. Graf, anesthesia, and he states basal on pump is continuing at 60%,    Requested patient be placed on Adult Continuous Insulin Infusion if necessary and patient unable to manage his pump post-operatively.  Also, obtain Endocrinology Consult for glycemic management.    GALILEA Doherty  Diabetes Clinical Nurse Specialist/[facility]  [phone]  "

## 2022-02-02 VITALS
SYSTOLIC BLOOD PRESSURE: 133 MMHG | RESPIRATION RATE: 16 BRPM | HEIGHT: 76 IN | OXYGEN SATURATION: 98 % | DIASTOLIC BLOOD PRESSURE: 68 MMHG | HEART RATE: 72 BPM | BODY MASS INDEX: 29.07 KG/M2 | TEMPERATURE: 95.9 F | WEIGHT: 238.76 LBS

## 2022-02-02 LAB
ANION GAP SERPL CALCULATED.3IONS-SCNC: 6 MMOL/L (ref 3–14)
BUN SERPL-MCNC: 15 MG/DL (ref 7–30)
CALCIUM SERPL-MCNC: 8 MG/DL (ref 8.5–10.1)
CHLORIDE BLD-SCNC: 103 MMOL/L (ref 94–109)
CO2 SERPL-SCNC: 24 MMOL/L (ref 20–32)
CREAT SERPL-MCNC: 0.74 MG/DL (ref 0.66–1.25)
ERYTHROCYTE [DISTWIDTH] IN BLOOD BY AUTOMATED COUNT: 12.7 % (ref 10–15)
GFR SERPL CREATININE-BSD FRML MDRD: >90 ML/MIN/1.73M2
GLUCOSE BLD-MCNC: 276 MG/DL (ref 70–99)
GLUCOSE BLDC GLUCOMTR-MCNC: 228 MG/DL (ref 70–99)
GLUCOSE BLDC GLUCOMTR-MCNC: 235 MG/DL (ref 70–99)
HCT VFR BLD AUTO: 38.3 % (ref 40–53)
HGB BLD-MCNC: 13.1 G/DL (ref 13.3–17.7)
MCH RBC QN AUTO: 32.8 PG (ref 26.5–33)
MCHC RBC AUTO-ENTMCNC: 34.2 G/DL (ref 31.5–36.5)
MCV RBC AUTO: 96 FL (ref 78–100)
PLATELET # BLD AUTO: 233 10E3/UL (ref 150–450)
POTASSIUM BLD-SCNC: 3.8 MMOL/L (ref 3.4–5.3)
RBC # BLD AUTO: 4 10E6/UL (ref 4.4–5.9)
SODIUM SERPL-SCNC: 133 MMOL/L (ref 133–144)
WBC # BLD AUTO: 9.7 10E3/UL (ref 4–11)

## 2022-02-02 PROCEDURE — 250N000011 HC RX IP 250 OP 636: Performed by: UROLOGY

## 2022-02-02 PROCEDURE — 36415 COLL VENOUS BLD VENIPUNCTURE: CPT | Performed by: UROLOGY

## 2022-02-02 PROCEDURE — 82962 GLUCOSE BLOOD TEST: CPT

## 2022-02-02 PROCEDURE — 80048 BASIC METABOLIC PNL TOTAL CA: CPT | Performed by: UROLOGY

## 2022-02-02 PROCEDURE — 250N000013 HC RX MED GY IP 250 OP 250 PS 637: Performed by: UROLOGY

## 2022-02-02 PROCEDURE — 99222 1ST HOSP IP/OBS MODERATE 55: CPT | Performed by: NURSE PRACTITIONER

## 2022-02-02 PROCEDURE — 258N000003 HC RX IP 258 OP 636: Performed by: UROLOGY

## 2022-02-02 PROCEDURE — 85027 COMPLETE CBC AUTOMATED: CPT | Performed by: UROLOGY

## 2022-02-02 RX ORDER — SULFAMETHOXAZOLE/TRIMETHOPRIM 800-160 MG
1 TABLET ORAL 2 TIMES DAILY
Qty: 14 TABLET | Refills: 0 | Status: SHIPPED | OUTPATIENT
Start: 2022-02-02 | End: 2022-11-15

## 2022-02-02 RX ORDER — AMOXICILLIN 250 MG
1 CAPSULE ORAL 2 TIMES DAILY PRN
Qty: 14 TABLET | Refills: 0 | Status: SHIPPED | OUTPATIENT
Start: 2022-02-02 | End: 2022-11-15

## 2022-02-02 RX ORDER — HYDROMORPHONE HYDROCHLORIDE 1 MG/ML
0.5 INJECTION, SOLUTION INTRAMUSCULAR; INTRAVENOUS; SUBCUTANEOUS ONCE
Status: COMPLETED | OUTPATIENT
Start: 2022-02-02 | End: 2022-02-02

## 2022-02-02 RX ADMIN — TOPIRAMATE 50 MG: 25 TABLET, FILM COATED ORAL at 09:19

## 2022-02-02 RX ADMIN — ACETAMINOPHEN 975 MG: 325 TABLET, FILM COATED ORAL at 09:18

## 2022-02-02 RX ADMIN — ATORVASTATIN CALCIUM 40 MG: 40 TABLET, FILM COATED ORAL at 09:20

## 2022-02-02 RX ADMIN — LEVOTHYROXINE SODIUM 137 MCG: 25 TABLET ORAL at 09:20

## 2022-02-02 RX ADMIN — BUPROPION HYDROCHLORIDE 150 MG: 150 TABLET, EXTENDED RELEASE ORAL at 09:19

## 2022-02-02 RX ADMIN — LOSARTAN POTASSIUM AND HYDROCHLOROTHIAZIDE 1 TABLET: 50; 12.5 TABLET, FILM COATED ORAL at 09:23

## 2022-02-02 RX ADMIN — ACETAMINOPHEN 975 MG: 325 TABLET, FILM COATED ORAL at 04:45

## 2022-02-02 RX ADMIN — DEXTROSE MONOHYDRATE 2 G: 50 INJECTION, SOLUTION INTRAVENOUS at 02:10

## 2022-02-02 RX ADMIN — ARIPIPRAZOLE 15 MG: 10 TABLET ORAL at 09:19

## 2022-02-02 RX ADMIN — HYDROMORPHONE HYDROCHLORIDE 0.5 MG: 1 INJECTION, SOLUTION INTRAMUSCULAR; INTRAVENOUS; SUBCUTANEOUS at 06:33

## 2022-02-02 RX ADMIN — AMLODIPINE BESYLATE 10 MG: 10 TABLET ORAL at 09:20

## 2022-02-02 RX ADMIN — FAMOTIDINE 20 MG: 20 TABLET ORAL at 09:20

## 2022-02-02 RX ADMIN — BUPROPION HYDROCHLORIDE 300 MG: 300 TABLET, FILM COATED, EXTENDED RELEASE ORAL at 09:19

## 2022-02-02 RX ADMIN — DEXTROSE MONOHYDRATE 2 G: 50 INJECTION, SOLUTION INTRAVENOUS at 10:17

## 2022-02-02 NOTE — PLAN OF CARE
"Vitals: /65 (BP Location: Right arm)   Pulse 85   Temp 96.9  F (36.1  C) (Oral)   Resp 16   Ht 1.93 m (6' 4\")   Wt 108.3 kg (238 lb 12.1 oz)   SpO2 97%   BMI 29.06 kg/m    Lung sounds CTA. Denies CP, SOB, cough. On room air.    Output: Albright patent with good output.   Last BM: 2/1 prior to surgery   Activity: Not OOB this shift    Skin: Intact ex incision    Pain: Incisional pain managed with prn oxycodone and scheduled tylenol. Given x1.    CMS/Neuro: Intact. A&O x 4.   Dressing: Dried drainage. None on ABD.    Diet: Reg, thin. DM 1. 1.1 basal rate. Bolus coverage at HS per scale. Snacks at bedside for hypoglycemic episode.    LDA: PIV infusing maintenance fluids    Equipment: IV pole, PCDs, pt belongings   Plan/Add'l info: Able to make needs known. Call light within reach. Continue with POC.  Discharge plan: TBD       "

## 2022-02-02 NOTE — PROGRESS NOTES
"Urology  Progress Note    NAEO  Pain controlled  Tolerated dinner, no nausea  No other complaints    Exam  /69 (BP Location: Right arm)   Pulse 72   Temp (!) 96.2  F (35.7  C) (Oral)   Resp 18   Ht 1.93 m (6' 4\")   Wt 108.3 kg (238 lb 12.1 oz)   SpO2 99%   BMI 29.06 kg/m    No acute distress  Unlabored breathing  Abdomen soft, nontender, nondistended. Incision c/d/i  EMILY with minimal serosang output, IPP taken down at bedside, EMILY removed  Smith with clear yellow urine in tubing    UOP 1025/280   EMILY 60/5    Labs  WBC 9.1  Hgb 13.1  Cr 0.74    Assessment/Plan  61 year old y/o male w/ h/o T1DM managed with insulin pump and ED who is POD#1 s/p IPP.     Neuro: APAP, oxy, dilaudid for pain control  CV: ELVA  Pulm: incentive spirometry while awake  FEN/GI: regular diet, MIVF @ 100/hr  Endo: Insulin pump, endo consulted appreciate recs  :  IPP taken down, EMILY out, remove smith, will discharge on 1 week bactrim  Heme/ID: ELVA  Activity: Up ad elizabet  PPx: SCDs  Dispo: Discharge today    Seen and examined with the chief resident. Will discuss with Dr. Cooper.    Jerzy Mercer MD, PGY-2  Urology Resident     Contacting the Urology Team     Please use the following job codes to reach the Urology Team. Note that you must use an in house phone and that job codes cannot receive text pages.     On weekdays, dial [phone] (or star-star-star [phone] on the new [employer] telephones) then [phone] to reach the Adult Urology resident or PA on call    On weekdays, dial [phone] (or star-star-star [phone] on the new [employer] telephones) then [phone] to reach the Pediatric Urology resident    On weeknights and weekends, dial [phone] (or star-star-star [phone] on the new [employer] telephones) then [phone] to reach the Urology resident on call (for both Adult and Pediatrics)              "

## 2022-02-02 NOTE — PLAN OF CARE
Luigi Moore is a 61 year old male who is here for penile prothesis. Pt is POD#0.    NURSING ASSESSMENT:  Pt's dressing on penis has some bloody drainage. Urology made aware by day shift RN and stated to reinforce with Kerlix if needed.    PAIN MANAGEMENT:   Pain is controlled with current analgesics.  Medication(s) being used: acetaminophen and oxycodone.    NURSING PLAN:  Continue pain management. Follow POC.    OUTPATIENT GOALS:  ~ Patient vital signs are at baseline: MET  ~ Patient able to ambulate as they were prior to admission or with assist devices provided by therapies during their stay NOT MET  ~ Patient MUST void prior to discharge: NOT MET   ~ Patient able to tolerate oral intake to maintain hydration status: NOT MET  ~ Patient has adequate pain control using Oral analgesics: MET  ~ Hypercapnia, hypoventilation or hypoxia resolved for at least 2 hours without supplemental oxygen: NOT MET  ~ Deficits in sensation, mobility or coordination have resolved if spinal or regional anesthesia was used: MET  ~ Patient has returned to baseline mental status: MET    DISCHARGE DISPOSITION:  TBD    Jennifer Michael RN

## 2022-02-02 NOTE — DISCHARGE INSTRUCTIONS
IPP    Activity  - No strenuous exercise or sexual activity for 6 weeks.  - wait to activate the device for the two week appointment at a minimum  - No lifting, pushing, pulling more than 15 pounds for 4 weeks.   - Do not strain with bowel movements.  - Do not drive until you can press the brake pedal quickly and fully without pain.   - Do not operate a motor vehicle while taking narcotic pain medications.     Incisions  - you should wait to inflate your device until the 2 week follow up appointment; you should however feel your device pump within your scrotum daily.   - You may shower and get incisions wet starting 48 hrs after surgery.  - Do not scrub incisions or submerge wounds (aka, bath, pool, hot tub, ect.) for 2 weeks.   - Remove wound dressing 48 hours after surgery.   - you may notice small sutures in your scrotum over the incision. These will slowly dissolve and fall out in the coming weeks  - Leave incision open to air.  Cover with gauze only if needed for comfort or to protect clothing from drainage.   - It is normal for the penis and scrotum to appear bruised and swollen after surgery. This may look worse the first few days after your surgery before it starts to improve.     Medications.  - Do not take more than 4,000mg of Tylenol (acetaminophen) in any 24 hour period, as this can cause liver damage.  - Take stool softeners such as Senna while you are using narcotics, but stop if you develop diarrhea.   - Wean yourself off of narcotic pain medications by transitioning to tylenol keeping in mind the total amount of tylenol ingested as listed above.  - take your antibiotics for the next 7 days as prescribed    Follow-Up:  - Follow up with Dr. Cooper in 2 weeks for wound check  - Call your primary care provider to touch base regarding your recent admission.    - Call or return sooner than your regularly scheduled visit if you develop any of the following:  fever, uncontrolled pain, uncontrolled nausea or  "vomiting, as well as increased redness, swelling, or drainage from your wound.     Phone numbers:   - Monday through Friday 8am to 4:30pm: Call [phone] with questions, requests for medication refills, or to schedule or confirm an appointment.  - Nights or weekends: call the after hours emergency pager - [phone] and tell the  \"I would like to page the Urology Resident on call.\" Please note, due to prescribing laws, resident physicians are unable to prescribe narcotics after-hours. If you feel as though you will need a refill of a narcotic pain medication, you will need to call the clinic during business hours OR seek emergency care.  - For emergencies, call [phone]      "

## 2022-02-02 NOTE — CONSULTS
NEW INPATIENT DIABETES MANAGEMENT CONSULT  Luigi Moore  Age: 61 year old  MRN # [id number]   YOB: 1960    Chief Complaint: post op  Reason for Consult: Please assist with insulin pump mgmt  Consulting Provider: Tasia Savage PA-C    History of Present Illness: Luigi Moore is a 58 year old male with a history of Acquired hypothyroidism, depressive disorder, hidradenoma, hypertension, TELLY, Type 1 diabetes (H) (1982); and Uncomplicated asthma who is post op from insertion of inflatable penile prosthesis on 2/1/22.   His type 1 diabetes is known to be complicated by neuropathy retinopathy depression and erectile dysfunction.  He last saw Arti Kaur PA-C with [facility] on 12/21/21.      He has been on an insulin pump since 1987.      His pump was reduced to 60% basal rate yesterday pre-op, post op-he resumed his PTA basal rates and settings.  He was given 4 mg Dex in the OR around 10 am yesterday.  He was given an additional NPH 20 units once last night around dinner.  BG 200s.  See trend:            Other Active Medical Problems: hypothyroidism, depressive disorder, TELLY, HTN, asthma  Diabetes Mellitus Type: 1  Duration:   40 year duration  Diabetic Complications: mild retinopathy, neuropathy  Prior to Admission Diabetes Regimen:      Medtronic 770G insulin pump w/humalog  BASAL RATES and times:  12   AM (midnight): 1.1units/hour    10   AM: 0.9 units/hour   6    PM: 1.1 units/hour   Carb ratio:   CARB RATIO and times:  12   AM (midnight): 12.0  Corection Factor (Sensitivity) and times:  12   AM (midnight): 35 mg/dL   Target BG Ranges:   BLOOD GLUCOSE TARGET and times:  12   AM (midnight): 90 - 120  Amount of Time Insulin is Active:  3.5 hrs   BG monitor: CGM  Frequency of checks: multiple times daily  BG 150s on average prior to surgery  Diet: eats 3 meals per day, snacks  Usual BG control PTA:  Semi-controlled, with A1c 7.5 on 12/21/22  History of DKA: once in 1985  Able to  "Detect Hypoglycemia: yes  Usual Diabetes Care Provider: Arti Kaur PA-C, Dr. Donn Yuan, Dr. Szymanski  Primary Care Provider: Kathya Lang  Factors Impacting IP Glucose Control: post op, steroids  Current Diet:  regular    10 point ROS completed with pertinent positives and negatives noted in the HPI  Past medical, family and social histories are reviewed and updated.    Social History    Tobacco: denies    Alcohol: denies    Place of Residence: [place], [place]    Occupation: [employer]    Family History   Father with DM 1    Physical Exam   /68 (BP Location: Right arm)   Pulse 72   Temp (!) 95.9  F (35.5  C) (Oral)   Resp 16   Ht 1.93 m (6' 4\")   Wt 108.3 kg (238 lb 12.1 oz)   SpO2 98%   BMI 29.06 kg/m    General: pleasant, in no distress.   HEENT: normocephalic, atraumatic. Oral mucous membranes moist.   Lungs: unlabored respiration, no cough  ABD: rounded, nondistended  Skin: warm and dry, no obvious lesions  MSK:  moves all extremities  Lymp:  no LE edema   Mental status:  alert, oriented to self, place, time  Psych:   calm and appropriate interaction     Most Recent Laboratory Tests:  Recent Labs   Lab 02/02/22  0538   HGB 13.1*     No results for input(s): A1C in the last 168 hours.  Recent Labs   Lab 02/02/22  0538   CR 0.74     Recent Labs   Lab 02/02/22  0538 02/02/22  0506 02/02/22  0200 02/01/22  2213 02/01/22  1547 02/01/22  1353   * 228* 235* 286* 280* 358*       Assessment:   1) DM type 1 moderately controlled (A1C 7.5) w/ retinopathy and neuropathy  2) ambulatory insulin pump     Plan:      -continue ambulatory insulin pump (Medtronic 770G) w/humalog insulin and settings below:  BASAL RATES and times:  12   AM (midnight): 1.1units/hour    10   AM: 0.9 units/hour   6    PM: 1.1 units/hour   Carb ratio:   CARB RATIO and times:  12   AM (midnight): 12.0  Corection Factor (Sensitivity) and times:  12   AM (midnight): 35 mg/dL   Target BG Ranges:   BLOOD GLUCOSE TARGET and times:  12 "   AM (midnight): 90 - 120  Amount of Time Insulin is Active:  3.5 hrs       -BG monitoring TID AC, HS, 0200   -hypoglycemia protocol   -recommend regular diet with carb counting protocol   -no DM education needs   -on discharge, will recommend outpatient follow up with [facility]ocr[facility]ology service, Arti Kaur PA-C    Discussed plan of care with patient, nursing, and primary team through this note.  Thank you for this consult; Inpatient Diabetes will continue to follow.     To contact Endocrine Diabetes service:   From 8AM-4PM: page inpatient diabetes provider that is following the patient  For questions or updates from 4PM-8AM: page the diabetes job code for on call fellow: [phone]      80 minutes spent on the date of the encounter doing chart review, history and exam, documentation and further activities per the note      Over 50% of my time on the unit was spent counseling the patient and/or coordinating care regarding acute hyperglycemia management.  See note for details.    GALILEA August, CNP  Inpatient Diabetes Management Service  Pager [phone]

## 2022-02-02 NOTE — PLAN OF CARE
Pt. discharged at 1220  to home, and left with personal belongings. Pt. received complete discharge paperwork and  medications as filled by discharge pharmacy. Pt. was given times of last dose for all discharge medications in writing on discharge medication sheets. Discharge teaching completed. Pt. Aware of follow up appointment. Pt. had no further questions at the time of discharge and no unmet needs were identified.     Pt ambulating independently. BS active and audible in all quadrants. No BM yet but pt is passing gas. Voiding without difficulty (low PVRs x2). Skin intact ex for incision site. Dressing is intact. Regular diet, tolerating well. Ambulatory insulin pump on. PIV removed. Pt pain managed well with Tylenol. Discharging home.

## 2022-02-02 NOTE — PLAN OF CARE
CPAP used overrnight and have been satting well from 93-99%.  Penile drsg,CDI.  EMILY emptied 5ml,sang drainage.  Smith 280ml,clear louise colored.  BGs 235 and 228.Managing his own insulin pump.  Plan to deflate implant,remove EMILY if output low & smith out.  Awaiting urology this morning.  0705.Premedicated with 0.5mg IVdilaudid prior to urology removing drsg and EMILY out. Bladder filled in with 300ml prior to smith removal.Pt voided shortly about 280ml.Will have AM aide check PVR.

## 2022-02-03 ENCOUNTER — HOSPITAL (OUTPATIENT)
Dept: EDUCATION SERVICES | Facility: CLINIC | Age: 62
End: 2022-02-03
Payer: COMMERCIAL

## 2022-02-03 NOTE — PROGRESS NOTES
Due to Patient's expeditious discharge, note had to be entered post discharge.    Diabetes clinical educator saw the patient prior to discharge in patient's room at [facility].   Current Pump settings, confirmed by bedside pump assessment are as follows:    Type of Pump: Medtronic 770 G with sensor; auto mode typically  Insulin: insulin Lispro (Humalog)    BASAL RATES and times:  12 AM (midnight): 1.1 unites/hour  10 AM: 0.9 unites/hour  6 PM: 1.1 units/hour    Carb Ratio: CARB RATIO and times:  12 AM - 12 AM: 1unit per 12 grams CHO  Correction Factor (Sensitivity) and times:  12 AM - 12AM: 35mg/dL  Target BG ranges: Blood Glucose Target and times:  12 AM - 12 AM: 90 - 140    Amount of time Insulin is Active: 3.3 hours  Patient uses Guardian sensor    Patient was provided contact information for any follow up appointment or to contact Endocrinology regarding diabetes related concerns.     -Kelly Madsen MSN, RN NL- Diabetes *[phone]

## 2022-02-12 ENCOUNTER — HEALTH MAINTENANCE LETTER (OUTPATIENT)
Age: 62
End: 2022-02-12

## 2022-02-12 ENCOUNTER — MYC MEDICAL ADVICE (OUTPATIENT)
Dept: INTERNAL MEDICINE | Facility: CLINIC | Age: 62
End: 2022-02-12
Payer: COMMERCIAL

## 2022-02-12 DIAGNOSIS — F90.2 ATTENTION DEFICIT HYPERACTIVITY DISORDER (ADHD), COMBINED TYPE: ICD-10-CM

## 2022-02-14 RX ORDER — DEXTROAMPHETAMINE SACCHARATE, AMPHETAMINE ASPARTATE, DEXTROAMPHETAMINE SULFATE AND AMPHETAMINE SULFATE 3.75; 3.75; 3.75; 3.75 MG/1; MG/1; MG/1; MG/1
15 TABLET ORAL 2 TIMES DAILY
Qty: 60 TABLET | Refills: 0 | Status: CANCELLED | OUTPATIENT
Start: 2022-02-14

## 2022-02-15 RX ORDER — DEXTROAMPHETAMINE SACCHARATE, AMPHETAMINE ASPARTATE, DEXTROAMPHETAMINE SULFATE AND AMPHETAMINE SULFATE 3.75; 3.75; 3.75; 3.75 MG/1; MG/1; MG/1; MG/1
15 TABLET ORAL 2 TIMES DAILY
Qty: 60 TABLET | Refills: 0 | Status: SHIPPED | OUTPATIENT
Start: 2022-04-15 | End: 2022-08-22

## 2022-02-15 RX ORDER — DEXTROAMPHETAMINE SACCHARATE, AMPHETAMINE ASPARTATE, DEXTROAMPHETAMINE SULFATE AND AMPHETAMINE SULFATE 3.75; 3.75; 3.75; 3.75 MG/1; MG/1; MG/1; MG/1
15 TABLET ORAL 2 TIMES DAILY
Qty: 60 TABLET | Refills: 0 | Status: SHIPPED | OUTPATIENT
Start: 2022-02-15 | End: 2022-08-22

## 2022-02-15 RX ORDER — DEXTROAMPHETAMINE SACCHARATE, AMPHETAMINE ASPARTATE, DEXTROAMPHETAMINE SULFATE AND AMPHETAMINE SULFATE 3.75; 3.75; 3.75; 3.75 MG/1; MG/1; MG/1; MG/1
15 TABLET ORAL 2 TIMES DAILY
Qty: 60 TABLET | Refills: 0 | Status: SHIPPED | OUTPATIENT
Start: 2022-03-15 | End: 2022-08-22

## 2022-02-18 ENCOUNTER — OFFICE VISIT (OUTPATIENT)
Dept: UROLOGY | Facility: CLINIC | Age: 62
End: 2022-02-18
Payer: COMMERCIAL

## 2022-02-18 VITALS
SYSTOLIC BLOOD PRESSURE: 137 MMHG | HEIGHT: 76 IN | BODY MASS INDEX: 27.92 KG/M2 | WEIGHT: 229.3 LBS | HEART RATE: 97 BPM | DIASTOLIC BLOOD PRESSURE: 87 MMHG

## 2022-02-18 DIAGNOSIS — N52.9 ERECTILE DYSFUNCTION, UNSPECIFIED ERECTILE DYSFUNCTION TYPE: Primary | ICD-10-CM

## 2022-02-18 DIAGNOSIS — Z09 POSTOP CHECK: ICD-10-CM

## 2022-02-18 PROCEDURE — 99024 POSTOP FOLLOW-UP VISIT: CPT | Performed by: UROLOGY

## 2022-02-18 ASSESSMENT — PAIN SCALES - GENERAL: PAINLEVEL: NO PAIN (0)

## 2022-02-18 NOTE — NURSING NOTE
"Chief Complaint   Patient presents with     Surgical Followup     IPP insertion       Blood pressure 137/87, pulse 97, height 1.93 m (6' 4\"), weight 104 kg (229 lb 4.8 oz). Body mass index is 27.91 kg/m .    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)     Acquired hypothyroidism     Dyslipidemia     Essential hypertension     Hyperlipidemia     Proliferative diabetic retinopathy (H)     Obstructive sleep apnea syndrome     Insomnia     Low back pain     Type 1 diabetes mellitus (H)     TELLY (obstructive sleep apnea)     Erectile dysfunction       Allergies   Allergen Reactions     Diagnostic X-Ray Materials Anaphylaxis     Contrast Dye Hives     Diatrizoate Hives     Iodine dye; iodine externally or topically is OK       Current Outpatient Medications   Medication Sig Dispense Refill     acetaminophen-codeine (TYLENOL #3) 300-30 MG per tablet Take 1-2 tablets by mouth every 6 hours as needed for moderate pain 60 tablet 1     albuterol (PROAIR HFA/PROVENTIL HFA/VENTOLIN HFA) 108 (90 Base) MCG/ACT inhaler Inhale 2 puffs into the lungs every 6 hours as needed for shortness of breath / dyspnea or wheezing 18 g 5     amLODIPine (NORVASC) 10 MG tablet Take 1 tablet (10 mg) by mouth daily 90 tablet 3     [START ON 4/15/2022] amphetamine-dextroamphetamine (ADDERALL) 15 MG tablet Take 1 tablet (15 mg) by mouth 2 times daily 60 tablet 0     [START ON 3/15/2022] amphetamine-dextroamphetamine (ADDERALL) 15 MG tablet Take 1 tablet (15 mg) by mouth 2 times daily 60 tablet 0     amphetamine-dextroamphetamine (ADDERALL) 15 MG tablet Take 1 tablet (15 mg) by mouth 2 times daily 60 tablet 0     ARIPiprazole (ABILIFY) 15 MG tablet Take 1 tablet (15 mg) by mouth daily 90 tablet 0     aspirin 81 MG tablet Take 1 tablet (81 mg) by mouth daily 100 tablet 3     atorvastatin (LIPITOR) 40 MG tablet Take 1 tablet (40 mg) by mouth daily 90 tablet 0     blood glucose (ACCU-CHEK GUIDE) test strip " "Use to test blood sugar 3 times daily or as directed. 100 strip 11     blood glucose (CONTOUR NEXT TEST) test strip For diabetes, tests 4 times daily 400 strip 3     buPROPion (WELLBUTRIN XL) 150 MG 24 hr tablet Take 1 tablet (150 mg) by mouth every morning Take with 300 mg tablet (total daily dose 450 mg) 60 tablet 1     buPROPion (WELLBUTRIN XL) 300 MG 24 hr tablet Take 1 tablet (300 mg) by mouth every morning Take with 150 mg tablet (total daily dose 450 mg) 60 tablet 1     cetirizine (ZYRTEC) 10 MG tablet Take 10 mg by mouth       DiphenhydrAMINE HCl (BENADRYL PO)        glucagon 1 MG kit Inject 1 mg into the muscle once for 1 dose 1 each 0     Insulin Infusion Pump (INSULIN PUMP NV0335) KIT        insulin lispro (HUMALOG) 100 UNIT/ML vial USE AS DIRECTED IN PUMP APPROXIMATELY 80 UNITS PER DAY. Clinic visit and lab draw needed. Call  to schedule. 10 mL 0     insulin lispro (HUMALOG) 100 UNIT/ML vial USE AS DIRECTED IN PUMP APPROXIMATELY 80 UNITS PER DAY. Follow up needed 80 mL 0     insulin syringe-needle U-100 (BD INSULIN SYRINGE) 29G X 1/2\" 1 ML miscellaneous Use as directed 20 each 1     levothyroxine (SYNTHROID/LEVOTHROID) 137 MCG tablet Take 1 tablet (137 mcg) by mouth daily 30 tablet 0     losartan-hydrochlorothiazide (HYZAAR) 50-12.5 MG tablet Take 1 tablet by mouth daily 90 tablet 3     NEW MED Trimix #4    Sig:  Inject 0.6 mL intracavernosal as directed.    Max use once daily and up to 3x/week.  Trimix intracavernosal injection, per mL:  PGE1: 40 mcg  Papaverine: 27.6mg  Phentolamine: 1 mg 10 mL 11     ONE TOUCH TEST STRIPS test strip Use to test blood sugar 4 times daily or as directed. 400 strip 3     ONETOUCH ULTRA test strip Test  four times daily ultra blue (please schedule clinic appt) 400 strip 0     order for DME Equipment being ordered: CPAP machine 1 each 0     rizatriptan (MAXALT) 10 MG tablet Take 1 tablet (10 mg) by mouth at onset of headache for migraine 12 tablet 11     " senna-docusate (SENOKOT-S/PERICOLACE) 8.6-50 MG tablet Take 1 tablet by mouth 2 times daily as needed for constipation 14 tablet 0     sulfamethoxazole-trimethoprim (BACTRIM DS) 800-160 MG tablet Take 1 tablet by mouth 2 times daily 14 tablet 0     topiramate (TOPAMAX) 50 MG tablet Take 1 tablet (50 mg) by mouth daily 90 tablet 3     Urea 40 % CREA Externally apply topically 2 times daily To surface of toenails 198 g 5       Social History     Tobacco Use     Smoking status: Never Smoker     Smokeless tobacco: Never Used   Substance Use Topics     Alcohol use: No     Comment: History of alcohol abuse/sober since 2015; went to treatment      Drug use: No       Shwetha Hinton, EMT  2/18/2022  9:37 AM   37

## 2022-02-18 NOTE — PROGRESS NOTES
"  CC: Luigi Moore  is post-op from IPP, Titan classic, done on 2/1/22.  HPI: Patient is 2+ wks post-op.  He has been doing well. No fevers or chills. Pain decreasing.     Exam: /87   Pulse 97   Ht 1.93 m (6' 4\")   Wt 104 kg (229 lb 4.8 oz)   BMI 27.91 kg/m   . NAD.   Incision healing well. No discharge, erythema or fluctuence suggestive of infection.   Penis is deflated , pump is easily palpable in the anterior scrotum.  Model pump given.  His device was about 70% inflated.  I deflated this and instructed how to deflate if noting some autoinflation.  Overall position and healing look great.    PLAN:     Instructed him not to activate the device, to continue to feel for the pump. Use bacitracin on the incision.    Return to clinic 3-4 weeks for activation.             "

## 2022-02-18 NOTE — PATIENT INSTRUCTIONS
Please see Dr. Cooper on 3/11 at 12PM for activation.    It was a pleasure meeting with you today.  Thank you for allowing me and my team the privilege of caring for you today.  YOU are the reason we are here, and I truly hope we provided you with the excellent service you deserve.  Please let us know if there is anything else we can do for you so that we can be sure you are leaving completely satisfied with your care experience.

## 2022-02-18 NOTE — LETTER
"2/18/2022       RE: Luigi Moore  [address]  [address]  [facility] [address]     Dear Colleague,    Thank you for referring your patient, Luigi Moore, to the [facility] [place] at [facility]. Please see a copy of my visit note below.      CC: Luigi Moore  is post-op from IPP, Titan classic, done on 2/1/22.  HPI: Patient is 2+ wks post-op.  He has been doing well. No fevers or chills. Pain decreasing.     Exam: /87   Pulse 97   Ht 1.93 m (6' 4\")   Wt 104 kg (229 lb 4.8 oz)   BMI 27.91 kg/m   . NAD.   Incision healing well. No discharge, erythema or fluctuence suggestive of infection.   Penis is deflated , pump is easily palpable in the anterior scrotum.  Model pump given.  His device was about 70% inflated.  I deflated this and instructed how to deflate if noting some autoinflation.  Overall position and healing look great.    PLAN:     Instructed him not to activate the device, to continue to feel for the pump. Use bacitracin on the incision.    Return to clinic 3-4 weeks for activation.     Sincerely,    Johnathan Cooper MD      "

## 2022-02-20 ENCOUNTER — MYC MEDICAL ADVICE (OUTPATIENT)
Dept: INTERNAL MEDICINE | Facility: CLINIC | Age: 62
End: 2022-02-20
Payer: COMMERCIAL

## 2022-02-20 DIAGNOSIS — E10.8 TYPE 1 DIABETES MELLITUS WITH COMPLICATIONS (H): ICD-10-CM

## 2022-02-21 ENCOUNTER — PRE VISIT (OUTPATIENT)
Dept: UROLOGY | Facility: CLINIC | Age: 62
End: 2022-02-21
Payer: COMMERCIAL

## 2022-02-21 RX ORDER — LEVOTHYROXINE SODIUM 137 UG/1
137 TABLET ORAL DAILY
Qty: 90 TABLET | Refills: 1 | Status: SHIPPED | OUTPATIENT
Start: 2022-02-21 | End: 2022-12-30

## 2022-02-21 NOTE — TELEPHONE ENCOUNTER
Last Clinic Visit: 12/21/2021  [facility] [place]  Hypothyroidism:  Continue to take Levothyroxine daily on empty stomach and have lab drawn(5/22)

## 2022-03-09 DIAGNOSIS — F33.0 MILD EPISODE OF RECURRENT MAJOR DEPRESSIVE DISORDER (H): ICD-10-CM

## 2022-03-09 RX ORDER — BUPROPION HYDROCHLORIDE 300 MG/1
300 TABLET ORAL EVERY MORNING
Qty: 60 TABLET | Refills: 1 | Status: SHIPPED | OUTPATIENT
Start: 2022-03-09 | End: 2022-12-28

## 2022-03-09 RX ORDER — BUPROPION HYDROCHLORIDE 150 MG/1
150 TABLET ORAL EVERY MORNING
Qty: 60 TABLET | Refills: 1 | Status: SHIPPED | OUTPATIENT
Start: 2022-03-09 | End: 2022-12-28

## 2022-03-11 ENCOUNTER — OFFICE VISIT (OUTPATIENT)
Dept: UROLOGY | Facility: CLINIC | Age: 62
End: 2022-03-11
Payer: COMMERCIAL

## 2022-03-11 VITALS
BODY MASS INDEX: 28.62 KG/M2 | DIASTOLIC BLOOD PRESSURE: 92 MMHG | WEIGHT: 235 LBS | HEIGHT: 76 IN | HEART RATE: 93 BPM | SYSTOLIC BLOOD PRESSURE: 162 MMHG

## 2022-03-11 DIAGNOSIS — Z09 POSTOP CHECK: Primary | ICD-10-CM

## 2022-03-11 DIAGNOSIS — N52.9 ERECTILE DYSFUNCTION, UNSPECIFIED ERECTILE DYSFUNCTION TYPE: ICD-10-CM

## 2022-03-11 PROCEDURE — 99024 POSTOP FOLLOW-UP VISIT: CPT | Performed by: UROLOGY

## 2022-03-11 ASSESSMENT — PAIN SCALES - GENERAL: PAINLEVEL: NO PAIN (0)

## 2022-03-11 NOTE — LETTER
[facility] UROLOG[facility] [address]  [address]  [facility] [address]  [phone]          March 11, 2022    RE:  Luigi A Teresa                                                                                                                                                       [address] [place]  [address]  [facility] [address]            To whom it may concern:        Luigi Moore is under my professional care in urology clinic. He underwent surgery in our department 2/1/22.      He  may return to work Monday 3/14/22 without any particular activity or lifting restrictions.        Sincerely,    Johnathan Cooper MD

## 2022-03-11 NOTE — NURSING NOTE
"Chief Complaint   Patient presents with     Follow Up     IPP activation       Height 1.93 m (6' 4\"), weight 106.6 kg (235 lb). Body mass index is 28.61 kg/m .    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)     Acquired hypothyroidism     Dyslipidemia     Essential hypertension     Hyperlipidemia     Proliferative diabetic retinopathy (H)     Obstructive sleep apnea syndrome     Insomnia     Low back pain     Type 1 diabetes mellitus (H)     TELLY (obstructive sleep apnea)     Erectile dysfunction       Allergies   Allergen Reactions     Diagnostic X-Ray Materials Anaphylaxis     Contrast Dye Hives     Diatrizoate Hives     Iodine dye; iodine externally or topically is OK       Current Outpatient Medications   Medication Sig Dispense Refill     acetaminophen-codeine (TYLENOL #3) 300-30 MG per tablet Take 1-2 tablets by mouth every 6 hours as needed for moderate pain 60 tablet 1     albuterol (PROAIR HFA/PROVENTIL HFA/VENTOLIN HFA) 108 (90 Base) MCG/ACT inhaler Inhale 2 puffs into the lungs every 6 hours as needed for shortness of breath / dyspnea or wheezing 18 g 5     amLODIPine (NORVASC) 10 MG tablet Take 1 tablet (10 mg) by mouth daily 90 tablet 3     [START ON 4/15/2022] amphetamine-dextroamphetamine (ADDERALL) 15 MG tablet Take 1 tablet (15 mg) by mouth 2 times daily 60 tablet 0     [START ON 3/15/2022] amphetamine-dextroamphetamine (ADDERALL) 15 MG tablet Take 1 tablet (15 mg) by mouth 2 times daily 60 tablet 0     amphetamine-dextroamphetamine (ADDERALL) 15 MG tablet Take 1 tablet (15 mg) by mouth 2 times daily 60 tablet 0     ARIPiprazole (ABILIFY) 15 MG tablet Take 1 tablet (15 mg) by mouth daily 90 tablet 0     aspirin 81 MG tablet Take 1 tablet (81 mg) by mouth daily 100 tablet 3     atorvastatin (LIPITOR) 40 MG tablet Take 1 tablet (40 mg) by mouth daily 90 tablet 0     blood glucose (ACCU-CHEK GUIDE) test strip Use to test blood sugar 3 times daily or as " "directed. 100 strip 11     blood glucose (CONTOUR NEXT TEST) test strip For diabetes, tests 4 times daily 400 strip 3     buPROPion (WELLBUTRIN XL) 150 MG 24 hr tablet Take 1 tablet (150 mg) by mouth every morning Take with 300 mg tablet (total daily dose 450 mg) 60 tablet 1     buPROPion (WELLBUTRIN XL) 300 MG 24 hr tablet Take 1 tablet (300 mg) by mouth every morning Take with 150 mg tablet (total daily dose 450 mg) 60 tablet 1     cetirizine (ZYRTEC) 10 MG tablet Take 10 mg by mouth       DiphenhydrAMINE HCl (BENADRYL PO)        Insulin Infusion Pump (INSULIN PUMP [id number]) KIT        insulin lispro (HUMALOG) 100 UNIT/ML vial USE AS DIRECTED IN PUMP APPROXIMATELY 80 UNITS PER DAY. Clinic visit and lab draw needed. Call  to schedule. 10 mL 0     insulin lispro (HUMALOG) 100 UNIT/ML vial USE AS DIRECTED IN PUMP APPROXIMATELY 80 UNITS PER DAY. Follow up needed 80 mL 0     insulin syringe-needle U-100 (BD INSULIN SYRINGE) 29G X 1/2\" 1 ML miscellaneous Use as directed 20 each 1     levothyroxine (SYNTHROID/LEVOTHROID) 137 MCG tablet Take 1 tablet (137 mcg) by mouth daily 90 tablet 1     losartan-hydrochlorothiazide (HYZAAR) 50-12.5 MG tablet Take 1 tablet by mouth daily 90 tablet 3     NEW MED Trimix #4    Sig:  Inject 0.6 mL intracavernosal as directed.    Max use once daily and up to 3x/week.  Trimix intracavernosal injection, per mL:  PGE1: 40 mcg  Papaverine: 27.6mg  Phentolamine: 1 mg 10 mL 11     ONE TOUCH TEST STRIPS test strip Use to test blood sugar 4 times daily or as directed. 400 strip 3     ONETOUCH ULTRA test strip Test  four times daily ultra blue (please schedule clinic appt) 400 strip 0     order for DME Equipment being ordered: CPAP machine 1 each 0     rizatriptan (MAXALT) 10 MG tablet Take 1 tablet (10 mg) by mouth at onset of headache for migraine 12 tablet 11     senna-docusate (SENOKOT-S/PERICOLACE) 8.6-50 MG tablet Take 1 tablet by mouth 2 times daily as needed for constipation 14 " tablet 0     sulfamethoxazole-trimethoprim (BACTRIM DS) 800-160 MG tablet Take 1 tablet by mouth 2 times daily 14 tablet 0     topiramate (TOPAMAX) 50 MG tablet Take 1 tablet (50 mg) by mouth daily 90 tablet 3     Urea 40 % CREA Externally apply topically 2 times daily To surface of toenails 198 g 5     glucagon 1 MG kit Inject 1 mg into the muscle once for 1 dose 1 each 0       Social History     Tobacco Use     Smoking status: Never Smoker     Smokeless tobacco: Never Used   Substance Use Topics     Alcohol use: No     Comment: History of alcohol abuse/sober since 2015; went to treatment      Drug use: No       Polo Rodriguez EMT  3/11/2022  11:57 AM

## 2022-03-11 NOTE — LETTER
"3/11/2022       RE: Luigi Moore  [address]  [address]  [facility] [address]     Dear Colleague,    Thank you for referring your patient, Luigi Moore, to the [facility] [place] at [facility]. Please see a copy of my visit note below.    CC: Luigi Moore is post-op from [facility] IPP done 2/1/22.    HPI: Patient is 6 wks post-op.  he has been doing well. No fevers or chills. Pain resolved, no concerns..     Exam:     BP (!) 162/92   Pulse 93   Ht 1.93 m (6' 4\")   Wt 106.6 kg (235 lb)   BMI 28.61 kg/m      Alert, NAD.     Respirations normal, non-labored.    Incision healing well. No discharge, erythema or fluctuence suggestive of infection.     Pump in good position in the right scrotum.  Device cycles fully.  Tips symmetric in the mid glans.  No concerns with healing.    PLAN:     OK to cycle device, OK for intercourse.    Very good cosmetic and functional outcome.     PRN follow-up with urology       Ben TABOR       "

## 2022-03-11 NOTE — PATIENT INSTRUCTIONS
Please follow up as needed with Dr. Cooper.    It was a pleasure meeting with you today.  Thank you for allowing me and my team the privilege of caring for you today.  YOU are the reason we are here, and I truly hope we provided you with the excellent service you deserve.  Please let us know if there is anything else we can do for you so that we can be sure you are leaving completely satisfied with your care experience.

## 2022-03-12 NOTE — PROGRESS NOTES
"CC: Luigi Moore is post-op from [facility] IPP done 2/1/22.    HPI: Patient is 6 wks post-op.  he has been doing well. No fevers or chills. Pain resolved, no concerns..     Exam:     BP (!) 162/92   Pulse 93   Ht 1.93 m (6' 4\")   Wt 106.6 kg (235 lb)   BMI 28.61 kg/m      Alert, NAD.     Respirations normal, non-labored.    Incision healing well. No discharge, erythema or fluctuence suggestive of infection.     Pump in good position in the right scrotum.  Device cycles fully.  Tips symmetric in the mid glans.  No concerns with healing.    PLAN:     OK to cycle device, OK for intercourse.    Very good cosmetic and functional outcome.     PRN follow-up with urology       Ben TABOR     "

## 2022-05-16 ENCOUNTER — MYC MEDICAL ADVICE (OUTPATIENT)
Dept: INTERNAL MEDICINE | Facility: CLINIC | Age: 62
End: 2022-05-16
Payer: COMMERCIAL

## 2022-05-16 DIAGNOSIS — F33.0 MILD EPISODE OF RECURRENT MAJOR DEPRESSIVE DISORDER (H): ICD-10-CM

## 2022-05-16 DIAGNOSIS — F90.2 ATTENTION DEFICIT HYPERACTIVITY DISORDER (ADHD), COMBINED TYPE: Primary | ICD-10-CM

## 2022-05-17 RX ORDER — DEXTROAMPHETAMINE SACCHARATE, AMPHETAMINE ASPARTATE, DEXTROAMPHETAMINE SULFATE AND AMPHETAMINE SULFATE 3.75; 3.75; 3.75; 3.75 MG/1; MG/1; MG/1; MG/1
15 TABLET ORAL 2 TIMES DAILY
Qty: 60 TABLET | Refills: 0 | Status: SHIPPED | OUTPATIENT
Start: 2022-07-15 | End: 2022-08-22

## 2022-05-17 RX ORDER — DEXTROAMPHETAMINE SACCHARATE, AMPHETAMINE ASPARTATE, DEXTROAMPHETAMINE SULFATE AND AMPHETAMINE SULFATE 3.75; 3.75; 3.75; 3.75 MG/1; MG/1; MG/1; MG/1
15 TABLET ORAL 2 TIMES DAILY
Qty: 60 TABLET | Refills: 0 | Status: SHIPPED | OUTPATIENT
Start: 2022-05-17 | End: 2022-08-22

## 2022-05-17 RX ORDER — DEXTROAMPHETAMINE SACCHARATE, AMPHETAMINE ASPARTATE, DEXTROAMPHETAMINE SULFATE AND AMPHETAMINE SULFATE 3.75; 3.75; 3.75; 3.75 MG/1; MG/1; MG/1; MG/1
15 TABLET ORAL 2 TIMES DAILY
Qty: 60 TABLET | Refills: 0 | Status: SHIPPED | OUTPATIENT
Start: 2022-06-16 | End: 2022-08-22

## 2022-05-17 RX ORDER — ARIPIPRAZOLE 15 MG/1
15 TABLET ORAL DAILY
Qty: 90 TABLET | Refills: 1 | Status: SHIPPED | OUTPATIENT
Start: 2022-05-17 | End: 2022-12-28

## 2022-05-31 ENCOUNTER — TELEPHONE (OUTPATIENT)
Dept: ENDOCRINOLOGY | Facility: CLINIC | Age: 62
End: 2022-05-31
Payer: COMMERCIAL

## 2022-05-31 NOTE — TELEPHONE ENCOUNTER
[facility] Call Center    Phone Message    May a detailed message be left on voicemail: no     Reason for Call: Order(s): Other:   Reason for requested: Prescription orders faxed last week. Per [employer] rep can be faxed back to #[phone].  Date needed: as soon as possible  Provider name: Dr Kaur      Action Taken: Message routed to:  Other: endo    Travel Screening: Not Applicable

## 2022-06-03 ENCOUNTER — MEDICAL CORRESPONDENCE (OUTPATIENT)
Dept: HEALTH INFORMATION MANAGEMENT | Facility: CLINIC | Age: 62
End: 2022-06-03
Payer: COMMERCIAL

## 2022-06-21 ENCOUNTER — MYC MEDICAL ADVICE (OUTPATIENT)
Dept: ENDOCRINOLOGY | Facility: CLINIC | Age: 62
End: 2022-06-21
Payer: COMMERCIAL

## 2022-06-21 ENCOUNTER — TELEPHONE (OUTPATIENT)
Dept: ENDOCRINOLOGY | Facility: CLINIC | Age: 62
End: 2022-06-21

## 2022-06-21 DIAGNOSIS — E10.8 TYPE 1 DIABETES MELLITUS WITH COMPLICATIONS (H): ICD-10-CM

## 2022-06-21 RX ORDER — BLOOD SUGAR DIAGNOSTIC
STRIP MISCELLANEOUS
Qty: 100 STRIP | Refills: 11 | Status: SHIPPED | OUTPATIENT
Start: 2022-06-21 | End: 2024-09-27

## 2022-06-21 NOTE — TELEPHONE ENCOUNTER
Accu-Chek Guide strips are non formulary. One Touch products are formulary. If you would like to switch, please send script for new meter, test strips and lancets. Thank you!

## 2022-06-23 DIAGNOSIS — E11.9 DM (DIABETES MELLITUS) (H): ICD-10-CM

## 2022-07-30 ENCOUNTER — HEALTH MAINTENANCE LETTER (OUTPATIENT)
Age: 62
End: 2022-07-30

## 2022-09-02 DIAGNOSIS — E10.8 TYPE 1 DIABETES MELLITUS WITH COMPLICATIONS (H): ICD-10-CM

## 2022-09-02 DIAGNOSIS — B35.1 ONYCHOMYCOSIS: ICD-10-CM

## 2022-09-02 DIAGNOSIS — E78.5 DYSLIPIDEMIA: ICD-10-CM

## 2022-09-02 DIAGNOSIS — E03.9 ACQUIRED HYPOTHYROIDISM: ICD-10-CM

## 2022-09-12 DIAGNOSIS — E10.8 TYPE 1 DIABETES MELLITUS WITH COMPLICATIONS (H): ICD-10-CM

## 2022-09-12 NOTE — TELEPHONE ENCOUNTER
Short Acting Insulin Protocol Failed 09/12/2022 10:52 AM   Protocol Details  HgbA1C in past 3 or 6 months    Recent (6 mo) or future (30 days) visit within the authorizing provider's specialty

## 2022-09-13 NOTE — TELEPHONE ENCOUNTER
ARIPiprazole (ABILIFY) 15 MG tablet   Last Written Prescription Date:  12/7/16  Last Fill Quantity: 30,   # refills: 11  Last Office Visit :3/22/17  Future Office visit: none      Routing refill request to provider for review/approval because:  Not on protocol           8

## 2022-09-15 ENCOUNTER — MEDICAL CORRESPONDENCE (OUTPATIENT)
Dept: HEALTH INFORMATION MANAGEMENT | Facility: CLINIC | Age: 62
End: 2022-09-15

## 2022-09-19 ENCOUNTER — TELEPHONE (OUTPATIENT)
Dept: ENDOCRINOLOGY | Facility: CLINIC | Age: 62
End: 2022-09-19

## 2022-09-27 NOTE — TELEPHONE ENCOUNTER
[employer] calling in and stated CMN Form states patient was a type 2 diabetic, however per patient notes he is a type 1.  Please advise or resend form with correct information. Thank you.  Fax # is [phone]

## 2022-10-10 ENCOUNTER — HEALTH MAINTENANCE LETTER (OUTPATIENT)
Age: 62
End: 2022-10-10

## 2022-10-16 DIAGNOSIS — I10 ESSENTIAL HYPERTENSION: ICD-10-CM

## 2022-10-20 RX ORDER — LOSARTAN POTASSIUM AND HYDROCHLOROTHIAZIDE 12.5; 5 MG/1; MG/1
1 TABLET ORAL DAILY
Qty: 90 TABLET | Refills: 0 | Status: SHIPPED | OUTPATIENT
Start: 2022-10-20 | End: 2022-12-28

## 2022-10-20 NOTE — TELEPHONE ENCOUNTER
LCV:10/29/2021  [facility] [place]  BP above protocol - FYI to clinic  RF 90 day      BP Readings from Last 3 Encounters:   03/11/22 (!) 162/92   02/18/22 137/87   02/02/22 133/68

## 2022-11-11 NOTE — PROGRESS NOTES
Outcome for 11/11/22 1:43 PM :Glucose data sent in Beaker Message [employer]onic Weekly Review only.  Outcome for 11/14/22 9:47 AM :Glucose data sent in Beaker Message full pump report in [employer] attachment.  Shraddha Garrison      [facility]  Endocrinology  Arti Kaur PA-C  November 15, 2022            Chief Complaint:   Diabetes     History of Present Illness:   Luigi Moore is a 58 year old male with a history of Acquired hypothyroidism (12/7/2016); Depressive disorder; Hidradenoma (dx: Feb 2015); Hypertension; Numbness and tingling (2015); TELLY (obstructive sleep apnea) (2007); Type 1 diabetes (H) (1982); and Uncomplicated asthma who scheduled a visit for follow-up of his type 1 diabetes.  His type 1 diabetes is known to be complicated by neuropathy retinopathy depression and erectile dysfunction.     He has previously  been working on his diabetes with Dr. Donn Yuan and  Dr. Szymanski in our clinic.  I met him when he was last seen in clinic in May,  2020.     In 2014,  he had hemoglobin A1'c was 12.4. However, since 2016 his hemoglobin A1cs have been in the 7's and 8's.  Today his  POC  A1C is 7.9. Maurice has been using a Medtronic CGM to manage his blood glucose in recent years. For insulin, he has been using Humalog at a basal rate of 1.2 units per hour. When at work he often used a temporary basal rate that was 60% of usual and 50% when he bikes.  Previously, he took premeal bolus insulin using 1 unit for each 7 grams of carbohydrate with 1 additional unit for each 20 mg/dL blood glucose was above 120 mg/dL.  He started using Medtronic 670 G pump in 2018, and worked with Anastasia Cowart to transition to auto mode late that year.    Last saw Donn in December 2021 and he continued to use the Medtronic 770 G with sensor, be very active working at [employer] and had a GMI of 7.3 with this.  He had a very high coefficient of variation however in lability with frequent hypo and hyperglycemia overall so we did make some  changes in attempt to address this including decreasing basal rates increase in sensitivity and extending active insulin time from 2.5 to 3.5 hours.           Interval history:   He was hospitalized in early February due to 2 insertion of inflatable penile prosthesis on February 1 and did need to see the inpatient diabetes team due to hyperglycemia following 4 mg of dexamethasone in the OR and following hyperglycemia.    Today:  Notes forgets taking medication when coming to doctor when routine is messed up.  At home Bp high 120s/ high 60s. 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  Between this and his work at [employer] he is very very active. He    A1C up to 7.7.    He feels he has been a bit sloppy with his control. He did upgrade pump and feels a bit better with this.    He recently realized hadn't turned on notification for highs and feels this is helping as catching missed doses in low 200s, rather than high 200s , 300 and even rarely 400.  Now having notification at 140 and heading things off more quickly.       His weight is a continuous moser despite his high level of activity.  Works at it.  Mom and family obese at 40 and feels this happened to him.  He wonders if he may be able to take a GLP-1 agonist though he notes state that it is only for type II diabetics.    He denies any personal or family history of thyroid cancer multiple endocrine neoplasias or pancreatitis.  Denies any symptoms of pancreatitis he does not use alcohol.    Medication notes:  Humalog via Medtronic pump.  He also takes Adderall for ADHD and bupropion.       Blood Glucose /CGM Monitoring:  Average blood sugar in the last 2 weeks is 163 64 mg/dL.  This correlates to an GMI of 7.2% with a 39% coefficient of variation.    Has 5% blood sugars  less than 8037% 88 and 140 45% 1 40 - 240 and 13% greater than 240.     Current Insulin Pump Settings -reviewed:  BASAL RATES and times:  12   AM (midnight): 1.1units/hour    10   AM: 0.9 units/hour   6    PM: 1.1 units/hour   Carb ratio:   CARB RATIO and times:  12   AM (midnight): 12.0  Corection Factor (Sensitivity) and times:  12   AM (midnight): 35 mg/dL (from 30)  Target BG Ranges:   BLOOD GLUCOSE TARGET and times:  12   AM (midnight): 90 - 120     Active insulin time 3-1/2 hours  Carbohydrate ratio 1 unit per 10 g, sensitivity 1 unit per 30, target blood glucose 90-1 40     Pump Data:     Using an average total daily dosage of 42 units 55% of that is basal 45% is auto bolus.  Changing his set every 7 days.  Entering 5 meals daily and average carbohydrate intake is 176 carbs per day active insulin time 3.30.    Reviewing his CGM tracings, it appears he is frequently missing meal bolus due to the very rapid rise in both carbohydrate intake.  Corrections appear to be leading to hypoglycemia, which is frequently followed by reactive hypoglycemia.      Diabetes monitoring and complications:  CAD: No  Last eye exam results: retinopathy, Last exam 11/25/2019 Mild/mod NPDR OU with DM I no DME - Recc f/u in 1 y.   Microalbuminuria: no on 4/10/2018  Neuropathy: Yes mild diminished sensation on last monofilament exam  HTN: Yes on Hyzaar  On Statin: Yes on atorvastatin  On Aspirin: Yes  Depression: Yes  Erectile dysfunction: Yes.   Has surgery 2/2022    Past Medical History:   Diagnosis Date     Acquired hypothyroidism 12/7/2016     Depressive disorder      Hidradenoma dx: Feb 2015    right hand/2 surgeries     Hypertension      Numbness and tingling 2015    right hand, related to surgery for hidradenoma     TELLY (obstructive sleep apnea) 2007    New cpap machine 1 year ago. follows at [facility]     Type 1 diabetes (H) 1982     Uncomplicated asthma        ROS:   Patient denies any fevers, chills or sweats as well as any  "changes in vision, problems with floaters or field cuts in vision, pain or problems with dentition, new or different headaches.  Patient denies symptoms of hypo and hyperglycemia except as above.   Patients denies marked fatigue, cough, shortness of breath, chest pain or pressure.  There has been no pain with or other changes in urination or  itching or pain in genital areas.  Patient denies any noted swelling in feet, ankles or otherwise, loss of sensation or pain  in feet or other areas.    Patient also denies current difficulties with depressed mood, anhedonia or worrying too much.        Exam:    BP (!) 147/66   Pulse 88   Wt 106.2 kg (234 lb 1.6 oz)   SpO2 99%   BMI 28.50 kg/m    Wt Readings from Last 10 Encounters:   11/15/22 106.2 kg (234 lb 1.6 oz)   03/11/22 106.6 kg (235 lb)   02/18/22 104 kg (229 lb 4.8 oz)   02/01/22 108.3 kg (238 lb 12.1 oz)   01/14/22 108 kg (238 lb 3.2 oz)   12/21/21 108.4 kg (239 lb)   10/29/21 110.7 kg (244 lb)   10/29/21 108.9 kg (240 lb)   09/24/21 110.7 kg (244 lb 1.6 oz)   09/03/21 108.4 kg (239 lb)       General: Pleasant, well nourished and hydrated male in NAD.   Psych:  Mood is \"good,\" affect is appropriate.  Thought form and content are fluid and coherent.    HEENT: Eyes and sclera are clear. Extraocular movements are grossly intact without proptosis.  Nares are patent, mucous membranes moist.  Neck: No masses or JVD are noted.    Resp: Easy and unlabored breathing.   Neuro: Alert and oriented, communicating clearly.   Ext: no swelling or edema.   Distal pulses are diminished normal hair pattern.  Skin somewhat dry but without any lesions or ulcerations in good repair.  Monofilament station is intact in the toes but at intermittently absent in the distal foot.  Data:      Recent Labs   Lab Test 02/02/22  0538 01/14/22  1616 12/21/21  1551 12/21/21  1543 12/21/21  1426 07/23/21  1657 07/23/21  1657 05/21/21  0816 12/06/19  1135 11/14/19  0000 10/05/18  0853 " 06/21/18  0802 04/10/18  1512   A1C  --   --   --  7.5*  --   --  7.2*  --   --   --   --   --  7.6*   HEMOGLOBINA1  --   --   --   --  7.9  --   --   --   --  7.6*  --    < >  --    TSH  --   --   --   --   --   --   --  3.36 4.62*  --  0.01*  --  2.68   T4  --   --   --   --   --   --   --   --  0.95  --  2.35*  --   --    LDL  --   --   --   --   --   --  124*  --   --   --  66  --  89   HDL  --   --   --   --   --   --  52  --   --   --   --   --  70   TRIG  --   --   --   --   --   --  92  --   --   --   --   --  68   CR 0.74 0.87  --   --   --    < > 0.96  --   --   --   --    < > 0.86   MICROL  --   --  <5  --   --   --   --   --  13  --   --   --   --     < > = values in this interval not displayed.       Last Basic Metabolic Panel: reviewed  Most recent GFR:       GFR Estimate   Date Value Ref Range Status   02/02/2022 >90 >60 mL/min/1.73m2 Final     Comment:     Effective December 21, 2021 eGFRcr in adults is calculated using the 2021 CKD-EPI creatinine equation which includes age and gender (Viviana et al., NEJ, DOI: 10.1056/LCUGrs9248084)   01/14/2022 >90 >60 mL/min/1.73m2 Final     Comment:     Effective December 21, 2021 eGFRcr in adults is calculated using the 2021 CKD-EPI creatinine equation which includes age and gender (Viviana shaw al., NE, DOI: 10.1056/SVVKwk3029838)   07/23/2021 86 >60 mL/min/1.73m2 Final     Comment:     As of July 11, 2021, eGFR is calculated by the CKD-EPI creatinine equation, without race adjustment. eGFR can be influenced by muscle mass, exercise, and diet. The reported eGFR is an estimation only and is only applicable if the renal function is stable.   06/21/2018 >90 >60 mL/min/1.7m2 Final     Comment:     Non  GFR Calc   04/10/2018 >90 >60 mL/min/1.7m2 Final     Comment:     Non  GFR Calc   12/07/2016 >90  Non  GFR Calc   >60 mL/min/1.7m2 Final         Assessment/Plan:        Type 1 diabetes mellitus with multiple  complications, A1c is up above goal although in recent weeks his average blood sugar is near goal he is having significant variability.    Today we reduced his basal rates beginning at midnight from 1.1 units prior to 1.0 and 10 AM from 0.9 units/h to 0.8 units/h and beginning at p.m. from 1.1 units/h to 1.0.  We also reduced his sensitivity from 1  Unit per 30 mg/dL to 1 unit per 33.      We also discussed the risks and benefits of a trial of GLP-1 agonist to assist with his postmeal hyperglycemia, appetite and maintaining his weight.  I am not recommending this but I am willing to supervise a trial.     Advised today:  Your microalbuminuria lab is due after December 21, please see any [place] lab to update this and check for any early diabetic kidney disease.  Your thyroid lab also known as TSH is also due and I did place an order for that as well.    We did reduce basal by ~10% as well as correction factor.  Please work on consistently giving dose 10 - 15 minutes before meals and accurately counting carbs.     I am not recommending Trulicity, but it is reasonable to try to limit higher blood sugars.  If side effects, I recommend that you discontinue.            2. Hypothyroidism:  Continue to take Levothyroxine daily on empty stomach.  Updated TSH is due in May.    3. Hypertension  Reports that his home blood pressures are at goal.  He will continue to follow this with  Dr. Lang.       Follow-up: Return in about 3 months.  Sooner if needed.    It is my privilege to be involved in the care of the above patient.     Arti Kaur PA-C, MPAS  [facility]  Diabetes, Endocrinology, and Metabolism  [phone] Appointments/Nurse  [phone] pager  [phone] nurse line    This note was completed in part using Dragon voice recognition, and may contain word and grammatical errors.    42 minutes in preparation for visit reviewing chart, labs and documentation, visiting with patient  gathering history and in exam, education and counseling, as well as coordination of care, further chart review and documentation following visit on this date of service and as alluded to documented above.

## 2022-11-14 ENCOUNTER — TELEPHONE (OUTPATIENT)
Dept: ENDOCRINOLOGY | Facility: CLINIC | Age: 62
End: 2022-11-14

## 2022-11-15 ENCOUNTER — OFFICE VISIT (OUTPATIENT)
Dept: ENDOCRINOLOGY | Facility: CLINIC | Age: 62
End: 2022-11-15
Payer: COMMERCIAL

## 2022-11-15 VITALS
SYSTOLIC BLOOD PRESSURE: 130 MMHG | BODY MASS INDEX: 28.5 KG/M2 | DIASTOLIC BLOOD PRESSURE: 74 MMHG | OXYGEN SATURATION: 99 % | HEART RATE: 88 BPM | WEIGHT: 234.1 LBS

## 2022-11-15 DIAGNOSIS — E03.9 ACQUIRED HYPOTHYROIDISM: ICD-10-CM

## 2022-11-15 DIAGNOSIS — E10.8 TYPE 1 DIABETES MELLITUS WITH COMPLICATIONS (H): Primary | ICD-10-CM

## 2022-11-15 LAB — HBA1C MFR BLD: 7.7 % (ref 4.3–?)

## 2022-11-15 PROCEDURE — 83036 HEMOGLOBIN GLYCOSYLATED A1C: CPT | Performed by: PHYSICIAN ASSISTANT

## 2022-11-15 PROCEDURE — 99215 OFFICE O/P EST HI 40 MIN: CPT | Performed by: PHYSICIAN ASSISTANT

## 2022-11-15 NOTE — PATIENT INSTRUCTIONS
Dear Maurice,    It is always good to see you!    Your microalbuminuria lab is due after December 21, please see any [place] lab to update this and check for any early diabetic kidney disease.  Your thyroid lab also known as TSH is also due and I did place an order for that as well.    We did reduce basal by ~10% as well as correction factor.  Please work on consistently giving dose 10 - 15 minutes before meals and accurately counting carbs.     I am not recommending Trulicity, but it is reasonable to try to limit higher blood sugars.  If side effects, I recommend that you discontinue.       My best wishes,    Arti Kaur PA-C, MPAS  [facility]  Diabetes, Endocrinology, and Metabolism  [phone] Appointments/Nurse  [phone] Fax  [phone] URGENTafter hours/weekend Endocrinologist on call  Is

## 2022-11-15 NOTE — LETTER
11/15/2022       RE: Luigi Moore  [address]  [address]  [facility] [address]     Dear Colleague,    Thank you for referring your patient, Luigi Moore, to the [facility] ENDOCRINOLOGY [facility]INIC [place] at [facility]. Please see a copy of my visit note below.      Outcome for 11/11/22 1:43 PM :Glucose data sent in Searchbox Message Medtronic Weekly Review only.  Outcome for 11/14/22 9:47 AM :Glucose data sent in Searchbox Message full pump report in Mychart attachment.  [facility]  Endocrinology  Arti Kaur PA-C  November 15, 2022            Chief Complaint:   Diabetes     History of Present Illness:   Luigi Moore is a 58 year old male with a history of Acquired hypothyroidism (12/7/2016); Depressive disorder; Hidradenoma (dx: Feb 2015); Hypertension; Numbness and tingling (2015); TELLY (obstructive sleep apnea) (2007); Type 1 diabetes (H) (1982); and Uncomplicated asthma who scheduled a visit for follow-up of his type 1 diabetes.  His type 1 diabetes is known to be complicated by neuropathy retinopathy depression and erectile dysfunction.     He has previously  been working on his diabetes with Dr. Donn Yuan and  Dr. Szymanski in our clinic.  I met him when he was last seen in clinic in May,  2020.     In 2014,  he had hemoglobin A1'c was 12.4. However, since 2016 his hemoglobin A1cs have been in the 7's and 8's.  Today his  POC  A1C is 7.9. Maurice has been using a Squid [employer]il CGM to manage his blood glucose in recent years. For insulin, he has been using Humalog at a basal rate of 1.2 units per hour. When at work he often used a temporary basal rate that was 60% of usual and 50% when he bikes.  Previously, he took premeal bolus insulin using 1 unit for each 7 grams of carbohydrate with 1 additional unit for each 20 mg/dL blood glucose was above 120 mg/dL.  He started using Medtronic 670 G pump in 2018, and worked with Anastasia  Sofya to transition to auto mode late that year.    Last saw Donn in December 2021 and he continued to use the Medtronic 770 G with sensor, be very active working at [employer] and had a GMI of 7.3 with this.  He had a very high coefficient of variation however in lability with frequent hypo and hyperglycemia overall so we did make some changes in attempt to address this including decreasing basal rates increase in sensitivity and extending active insulin time from 2.5 to 3.5 hours.           Interval history:   He was hospitalized in early February due to 2 insertion of inflatable penile prosthesis on February 1 and did need to see the inpatient diabetes team due to hyperglycemia following 4 mg of dexamethasone in the OR and following hyperglycemia.    Today:  Notes forgets taking medication when coming to doctor when routine is messed up.  At home Bp high 120s/ high 60s. Still playing tennis - in two leagues and hoping to get to Nationals this year, was a national champion in college (at [employer].)  His previous  has taken him back on and he is very excited about how well he is playing in an 18+ league.  Between this and his work at [employer] he is very very active. He    A1C up to 7.7.    He feels he has been a bit sloppy with his control. He did upgrade pump and feels a bit better with this.    He recently realized hadn't turned on notification for highs and feels this is helping as catching missed doses in low 200s, rather than high 200s , 300 and even rarely 400.  Now having notification at 140 and heading things off more quickly.       His weight is a continuous moser despite his high level of activity.  Works at it.  Mom and family obese at 40 and feels this happened to him.  He wonders if he may be able to take a GLP-1 agonist though he notes state that it is only for type II diabetics.    He denies any personal or family history of thyroid cancer multiple endocrine neoplasias or pancreatitis.  Denies any symptoms  of pancreatitis he does not use alcohol.    Medication notes:  Humalog via Medtronic pump.  He also takes Adderall for ADHD and bupropion.       Blood Glucose /CGM Monitoring:  Average blood sugar in the last 2 weeks is 163 64 mg/dL.  This correlates to an GMI of 7.2% with a 39% coefficient of variation.    Has 5% blood sugars less than 8037% 88 and 140 45% 1 40 - 240 and 13% greater than 240.     Current Insulin Pump Settings -reviewed:  BASAL RATES and times:  12   AM (midnight): 1.1units/hour    10   AM: 0.9 units/hour   6    PM: 1.1 units/hour   Carb ratio:   CARB RATIO and times:  12   AM (midnight): 12.0  Corection Factor (Sensitivity) and times:  12   AM (midnight): 35 mg/dL (from 30)  Target BG Ranges:   BLOOD GLUCOSE TARGET and times:  12   AM (midnight): 90 - 120     Active insulin time 3-1/2 hours  Carbohydrate ratio 1 unit per 10 g, sensitivity 1 unit per 30, target blood glucose 90-1 40     Pump Data:     Using an average total daily dosage of 42 units 55% of that is basal 45% is auto bolus.  Changing his set every 7 days.  Entering 5 meals daily and average carbohydrate intake is 176 carbs per day active insulin time 3.30.    Reviewing his CGM tracings, it appears he is frequently missing meal bolus due to the very rapid rise in both carbohydrate intake.  Corrections appear to be leading to hypoglycemia, which is frequently followed by reactive hypoglycemia.      Diabetes monitoring and complications:  CAD: No  Last eye exam results: retinopathy, Last exam 11/25/2019 Mild/mod NPDR OU with DM I no DME - Recc f/u in 1 y.   Microalbuminuria: no on 4/10/2018  Neuropathy: Yes mild diminished sensation on last monofilament exam  HTN: Yes on Hyzaar  On Statin: Yes on atorvastatin  On Aspirin: Yes  Depression: Yes  Erectile dysfunction: Yes.   Has surgery 2/2022    Past Medical History:   Diagnosis Date     Acquired hypothyroidism 12/7/2016     Depressive disorder      Hidradenoma dx: Feb 2015    right hand/2  "surgeries     Hypertension      Numbness and tingling 2015    right hand, related to surgery for hidradenoma     TELLY (obstructive sleep apnea) 2007    New cpap machine 1 year ago. follows at [facility]     Type 1 diabetes (H) 1982     Uncomplicated asthma        ROS:   Patient denies any fevers, chills or sweats as well as any changes in vision, problems with floaters or field cuts in vision, pain or problems with dentition, new or different headaches.  Patient denies symptoms of hypo and hyperglycemia except as above.   Patients denies marked fatigue, cough, shortness of breath, chest pain or pressure.  There has been no pain with or other changes in urination or  itching or pain in genital areas.  Patient denies any noted swelling in feet, ankles or otherwise, loss of sensation or pain  in feet or other areas.    Patient also denies current difficulties with depressed mood, anhedonia or worrying too much.        Exam:    BP (!) 147/66   Pulse 88   Wt 106.2 kg (234 lb 1.6 oz)   SpO2 99%   BMI 28.50 kg/m    Wt Readings from Last 10 Encounters:   11/15/22 106.2 kg (234 lb 1.6 oz)   03/11/22 106.6 kg (235 lb)   02/18/22 104 kg (229 lb 4.8 oz)   02/01/22 108.3 kg (238 lb 12.1 oz)   01/14/22 108 kg (238 lb 3.2 oz)   12/21/21 108.4 kg (239 lb)   10/29/21 110.7 kg (244 lb)   10/29/21 108.9 kg (240 lb)   09/24/21 110.7 kg (244 lb 1.6 oz)   09/03/21 108.4 kg (239 lb)       General: Pleasant, well nourished and hydrated male in NAD.   Psych:  Mood is \"good,\" affect is appropriate.  Thought form and content are fluid and coherent.    HEENT: Eyes and sclera are clear. Extraocular movements are grossly intact without proptosis.  Nares are patent, mucous membranes moist.  Neck: No masses or JVD are noted.    Resp: Easy and unlabored breathing.   Neuro: Alert and oriented, communicating clearly.   Ext: no swelling or edema.   Distal pulses are diminished normal hair pattern.  Skin somewhat dry but without any lesions or ulcerations " in good repair.  Monofilament station is intact in the toes but at intermittently absent in the distal foot.  Data:      Recent Labs   Lab Test 02/02/22  0538 01/14/22  1616 12/21/21  1551 12/21/21  1543 12/21/21  1426 07/23/21  1657 07/23/21  1657 05/21/21  0816 12/06/19  1135 11/14/19  0000 10/05/18  0853 06/21/18  0802 04/10/18  1512   A1C  --   --   --  7.5*  --   --  7.2*  --   --   --   --   --  7.6*   HEMOGLOBINA1  --   --   --   --  7.9  --   --   --   --  7.6*  --    < >  --    TSH  --   --   --   --   --   --   --  3.36 4.62*  --  0.01*  --  2.68   T4  --   --   --   --   --   --   --   --  0.95  --  2.35*  --   --    LDL  --   --   --   --   --   --  124*  --   --   --  66  --  89   HDL  --   --   --   --   --   --  52  --   --   --   --   --  70   TRIG  --   --   --   --   --   --  92  --   --   --   --   --  68   CR 0.74 0.87  --   --   --    < > 0.96  --   --   --   --    < > 0.86   MICROL  --   --  <5  --   --   --   --   --  13  --   --   --   --     < > = values in this interval not displayed.       Last Basic Metabolic Panel: reviewed  Most recent GFR:       GFR Estimate   Date Value Ref Range Status   02/02/2022 >90 >60 mL/min/1.73m2 Final     Comment:     Effective December 21, 2021 eGFRcr in adults is calculated using the 2021 CKD-EPI creatinine equation which includes age and gender (Viviana shaw al., NEJ, DOI: 10.1056/RCLIeo0536909)   01/14/2022 >90 >60 mL/min/1.73m2 Final     Comment:     Effective December 21, 2021 eGFRcr in adults is calculated using the 2021 CKD-EPI creatinine equation which includes age and gender (Viviana et al., NEJM, DOI: 10.1056/KQOGsq6414573)   07/23/2021 86 >60 mL/min/1.73m2 Final     Comment:     As of July 11, 2021, eGFR is calculated by the CKD-EPI creatinine equation, without race adjustment. eGFR can be influenced by muscle mass, exercise, and diet. The reported eGFR is an estimation only and is only applicable if the renal function is stable.   06/21/2018 >90 >60  mL/min/1.7m2 Final     Comment:     Non  GFR Calc   04/10/2018 >90 >60 mL/min/1.7m2 Final     Comment:     Non  GFR Calc   12/07/2016 >90  Non  GFR Calc   >60 mL/min/1.7m2 Final         Assessment/Plan:        Type 1 diabetes mellitus with multiple complications, A1c is up above goal although in recent weeks his average blood sugar is near goal he is having significant variability.    Today we reduced his basal rates beginning at midnight from 1.1 units prior to 1.0 and 10 AM from 0.9 units/h to 0.8 units/h and beginning at p.m. from 1.1 units/h to 1.0.  We also reduced his sensitivity from 1  Unit per 30 mg/dL to 1 unit per 33.      We also discussed the risks and benefits of a trial of GLP-1 agonist to assist with his postmeal hyperglycemia, appetite and maintaining his weight.  I am not recommending this but I am willing to supervise a trial.     Advised today:  Your microalbuminuria lab is due after December 21, please see any [place] lab to update this and check for any early diabetic kidney disease.  Your thyroid lab also known as TSH is also due and I did place an order for that as well.    We did reduce basal by ~10% as well as correction factor.  Please work on consistently giving dose 10 - 15 minutes before meals and accurately counting carbs.     I am not recommending Trulicity, but it is reasonable to try to limit higher blood sugars.  If side effects, I recommend that you discontinue.            2. Hypothyroidism:  Continue to take Levothyroxine daily on empty stomach.  Updated TSH is due in May.    3. Hypertension  Reports that his home blood pressures are at goal.  He will continue to follow this with  Dr. Lang.       Follow-up: Return in about 3 months.  Sooner if needed.    It is my privilege to be involved in the care of the above patient.     Arti Kaur PA-C, MPAS  [facility]  Diabetes, Endocrinology, and  Metabolism  [phone] Appointments/Nurse  [phone] pager  [phone] nurse line    This note was completed in part using Dragon voice recognition, and may contain word and grammatical errors.    42 minutes in preparation for visit reviewing chart, labs and documentation, visiting with patient gathering history and in exam, education and counseling, as well as coordination of care, further chart review and documentation following visit on this date of service and as alluded to documented above.             No oral lesions; no gross abnormalities negative

## 2022-12-10 ENCOUNTER — MYC MEDICAL ADVICE (OUTPATIENT)
Dept: INTERNAL MEDICINE | Facility: CLINIC | Age: 62
End: 2022-12-10

## 2022-12-12 DIAGNOSIS — E10.8 TYPE 1 DIABETES MELLITUS WITH COMPLICATIONS (H): Primary | ICD-10-CM

## 2022-12-13 NOTE — TELEPHONE ENCOUNTER
Spoke with pharmacy and they stated patient picked up amphetamine-dextroamphetamine (ADDERALL) 15 MG tablet on 12/12/22, Qty 60, 30 day supply.    Lina Zapata RN on 12/13/2022 at 12:56 PM

## 2022-12-28 ENCOUNTER — LAB (OUTPATIENT)
Dept: LAB | Facility: CLINIC | Age: 62
End: 2022-12-28
Payer: COMMERCIAL

## 2022-12-28 ENCOUNTER — OFFICE VISIT (OUTPATIENT)
Dept: INTERNAL MEDICINE | Facility: CLINIC | Age: 62
End: 2022-12-28
Payer: COMMERCIAL

## 2022-12-28 VITALS
BODY MASS INDEX: 28.85 KG/M2 | TEMPERATURE: 98.2 F | HEART RATE: 89 BPM | DIASTOLIC BLOOD PRESSURE: 82 MMHG | HEIGHT: 76 IN | OXYGEN SATURATION: 96 % | WEIGHT: 236.9 LBS | SYSTOLIC BLOOD PRESSURE: 151 MMHG

## 2022-12-28 DIAGNOSIS — E78.5 DYSLIPIDEMIA: ICD-10-CM

## 2022-12-28 DIAGNOSIS — F33.0 MILD EPISODE OF RECURRENT MAJOR DEPRESSIVE DISORDER (H): ICD-10-CM

## 2022-12-28 DIAGNOSIS — F90.2 ATTENTION DEFICIT HYPERACTIVITY DISORDER (ADHD), COMBINED TYPE: ICD-10-CM

## 2022-12-28 DIAGNOSIS — N52.01 ERECTILE DYSFUNCTION DUE TO ARTERIAL INSUFFICIENCY: ICD-10-CM

## 2022-12-28 DIAGNOSIS — E10.8 TYPE 1 DIABETES MELLITUS WITH COMPLICATIONS (H): ICD-10-CM

## 2022-12-28 DIAGNOSIS — I10 ESSENTIAL HYPERTENSION: Primary | ICD-10-CM

## 2022-12-28 DIAGNOSIS — R06.2 WHEEZING: ICD-10-CM

## 2022-12-28 DIAGNOSIS — G43.111 INTRACTABLE MIGRAINE WITH AURA WITH STATUS MIGRAINOSUS: ICD-10-CM

## 2022-12-28 DIAGNOSIS — I10 ESSENTIAL HYPERTENSION: ICD-10-CM

## 2022-12-28 DIAGNOSIS — E03.9 HYPOTHYROIDISM, UNSPECIFIED TYPE: ICD-10-CM

## 2022-12-28 LAB
ALBUMIN SERPL BCG-MCNC: 4.1 G/DL (ref 3.5–5.2)
ALP SERPL-CCNC: 94 U/L (ref 40–129)
ALT SERPL W P-5'-P-CCNC: 10 U/L (ref 10–50)
ANION GAP SERPL CALCULATED.3IONS-SCNC: 9 MMOL/L (ref 7–15)
AST SERPL W P-5'-P-CCNC: 21 U/L (ref 10–50)
BILIRUB SERPL-MCNC: 0.5 MG/DL
BUN SERPL-MCNC: 11.2 MG/DL (ref 8–23)
CALCIUM SERPL-MCNC: 9.4 MG/DL (ref 8.8–10.2)
CHLORIDE SERPL-SCNC: 98 MMOL/L (ref 98–107)
CHOLEST SERPL-MCNC: 214 MG/DL
CREAT SERPL-MCNC: 0.69 MG/DL (ref 0.67–1.17)
CREAT UR-MCNC: 69.4 MG/DL
DEPRECATED HCO3 PLAS-SCNC: 28 MMOL/L (ref 22–29)
ERYTHROCYTE [DISTWIDTH] IN BLOOD BY AUTOMATED COUNT: 12.9 % (ref 10–15)
GFR SERPL CREATININE-BSD FRML MDRD: >90 ML/MIN/1.73M2
GLUCOSE SERPL-MCNC: 193 MG/DL (ref 70–99)
HCT VFR BLD AUTO: 43.2 % (ref 40–53)
HDLC SERPL-MCNC: 55 MG/DL
HGB BLD-MCNC: 15.2 G/DL (ref 13.3–17.7)
LDLC SERPL CALC-MCNC: 135 MG/DL
MCH RBC QN AUTO: 32.3 PG (ref 26.5–33)
MCHC RBC AUTO-ENTMCNC: 35.2 G/DL (ref 31.5–36.5)
MCV RBC AUTO: 92 FL (ref 78–100)
MICROALBUMIN UR-MCNC: <12 MG/L
MICROALBUMIN/CREAT UR: NORMAL MG/G{CREAT}
NONHDLC SERPL-MCNC: 159 MG/DL
PLATELET # BLD AUTO: 259 10E3/UL (ref 150–450)
POTASSIUM SERPL-SCNC: 4.1 MMOL/L (ref 3.4–5.3)
PROT SERPL-MCNC: 7.1 G/DL (ref 6.4–8.3)
RBC # BLD AUTO: 4.71 10E6/UL (ref 4.4–5.9)
SODIUM SERPL-SCNC: 135 MMOL/L (ref 136–145)
T4 FREE SERPL-MCNC: 1.31 NG/DL (ref 0.9–1.7)
TRIGL SERPL-MCNC: 121 MG/DL
TSH SERPL DL<=0.005 MIU/L-ACNC: 6.99 UIU/ML (ref 0.3–4.2)
WBC # BLD AUTO: 7 10E3/UL (ref 4–11)

## 2022-12-28 PROCEDURE — 99214 OFFICE O/P EST MOD 30 MIN: CPT | Mod: 25 | Performed by: NURSE PRACTITIONER

## 2022-12-28 PROCEDURE — 90471 IMMUNIZATION ADMIN: CPT | Performed by: NURSE PRACTITIONER

## 2022-12-28 PROCEDURE — 82043 UR ALBUMIN QUANTITATIVE: CPT | Performed by: PATHOLOGY

## 2022-12-28 PROCEDURE — 80061 LIPID PANEL: CPT | Performed by: PATHOLOGY

## 2022-12-28 PROCEDURE — 91312 COVID-19 VACCINE BIVALENT BOOSTER 12+ (PFIZER): CPT | Performed by: NURSE PRACTITIONER

## 2022-12-28 PROCEDURE — 85027 COMPLETE CBC AUTOMATED: CPT | Performed by: PATHOLOGY

## 2022-12-28 PROCEDURE — 84439 ASSAY OF FREE THYROXINE: CPT | Performed by: PATHOLOGY

## 2022-12-28 PROCEDURE — 80053 COMPREHEN METABOLIC PANEL: CPT | Performed by: PATHOLOGY

## 2022-12-28 PROCEDURE — 36415 COLL VENOUS BLD VENIPUNCTURE: CPT | Performed by: PATHOLOGY

## 2022-12-28 PROCEDURE — 90686 IIV4 VACC NO PRSV 0.5 ML IM: CPT | Performed by: NURSE PRACTITIONER

## 2022-12-28 PROCEDURE — 0124A COVID-19 VACCINE BIVALENT BOOSTER 12+ (PFIZER): CPT | Performed by: NURSE PRACTITIONER

## 2022-12-28 PROCEDURE — 82570 ASSAY OF URINE CREATININE: CPT | Performed by: PATHOLOGY

## 2022-12-28 PROCEDURE — 90677 PCV20 VACCINE IM: CPT | Performed by: NURSE PRACTITIONER

## 2022-12-28 PROCEDURE — 99000 SPECIMEN HANDLING OFFICE-LAB: CPT | Performed by: PATHOLOGY

## 2022-12-28 PROCEDURE — 84443 ASSAY THYROID STIM HORMONE: CPT | Performed by: PATHOLOGY

## 2022-12-28 PROCEDURE — 90472 IMMUNIZATION ADMIN EACH ADD: CPT | Performed by: NURSE PRACTITIONER

## 2022-12-28 RX ORDER — LOSARTAN POTASSIUM AND HYDROCHLOROTHIAZIDE 12.5; 5 MG/1; MG/1
1 TABLET ORAL DAILY
Qty: 90 TABLET | Refills: 3 | Status: SHIPPED | OUTPATIENT
Start: 2022-12-28 | End: 2024-01-05

## 2022-12-28 RX ORDER — BUPROPION HYDROCHLORIDE 150 MG/1
150 TABLET ORAL EVERY MORNING
Qty: 60 TABLET | Refills: 5 | Status: SHIPPED | OUTPATIENT
Start: 2022-12-28 | End: 2024-01-05

## 2022-12-28 RX ORDER — DEXTROAMPHETAMINE SACCHARATE, AMPHETAMINE ASPARTATE, DEXTROAMPHETAMINE SULFATE AND AMPHETAMINE SULFATE 3.75; 3.75; 3.75; 3.75 MG/1; MG/1; MG/1; MG/1
15 TABLET ORAL 2 TIMES DAILY
Qty: 60 TABLET | Refills: 0 | Status: SHIPPED | OUTPATIENT
Start: 2023-03-10 | End: 2023-04-25

## 2022-12-28 RX ORDER — DEXTROAMPHETAMINE SACCHARATE, AMPHETAMINE ASPARTATE, DEXTROAMPHETAMINE SULFATE AND AMPHETAMINE SULFATE 3.75; 3.75; 3.75; 3.75 MG/1; MG/1; MG/1; MG/1
15 TABLET ORAL 2 TIMES DAILY
Qty: 60 TABLET | Refills: 0 | Status: SHIPPED | OUTPATIENT
Start: 2023-02-10 | End: 2023-05-25

## 2022-12-28 RX ORDER — TOPIRAMATE 50 MG/1
50 TABLET, FILM COATED ORAL DAILY
Qty: 90 TABLET | Refills: 3 | Status: SHIPPED | OUTPATIENT
Start: 2022-12-28 | End: 2024-01-05

## 2022-12-28 RX ORDER — BUPROPION HYDROCHLORIDE 300 MG/1
300 TABLET ORAL EVERY MORNING
Qty: 60 TABLET | Refills: 5 | Status: SHIPPED | OUTPATIENT
Start: 2022-12-28 | End: 2024-01-05

## 2022-12-28 RX ORDER — ARIPIPRAZOLE 15 MG/1
15 TABLET ORAL DAILY
Qty: 90 TABLET | Refills: 1 | Status: SHIPPED | OUTPATIENT
Start: 2022-12-28 | End: 2024-01-05

## 2022-12-28 RX ORDER — ALBUTEROL SULFATE 90 UG/1
2 AEROSOL, METERED RESPIRATORY (INHALATION) EVERY 6 HOURS PRN
Qty: 54 G | OUTPATIENT
Start: 2022-12-28

## 2022-12-28 RX ORDER — ATORVASTATIN CALCIUM 40 MG/1
40 TABLET, FILM COATED ORAL DAILY
Qty: 90 TABLET | Refills: 3 | Status: SHIPPED | OUTPATIENT
Start: 2022-12-28 | End: 2024-01-05

## 2022-12-28 RX ORDER — DEXTROAMPHETAMINE SACCHARATE, AMPHETAMINE ASPARTATE, DEXTROAMPHETAMINE SULFATE AND AMPHETAMINE SULFATE 3.75; 3.75; 3.75; 3.75 MG/1; MG/1; MG/1; MG/1
15 TABLET ORAL 2 TIMES DAILY
Qty: 60 TABLET | Refills: 0 | Status: SHIPPED | OUTPATIENT
Start: 2023-01-11 | End: 2023-06-23

## 2022-12-28 RX ORDER — AMLODIPINE BESYLATE 10 MG/1
10 TABLET ORAL DAILY
Qty: 90 TABLET | Refills: 3 | Status: SHIPPED | OUTPATIENT
Start: 2022-12-28 | End: 2023-11-01

## 2022-12-28 RX ORDER — ALBUTEROL SULFATE 90 UG/1
2 AEROSOL, METERED RESPIRATORY (INHALATION) EVERY 6 HOURS PRN
Qty: 18 G | Refills: 5 | Status: SHIPPED | OUTPATIENT
Start: 2022-12-28 | End: 2024-01-05

## 2022-12-28 NOTE — NURSING NOTE
"Luigi Moore is a 62 year old male patient that presents today in clinic for the following:    Chief Complaint   Patient presents with     RECHECK     Follow up.     The patient's allergies and medications were reviewed as noted. A set of vitals were recorded as noted without incident: BP (!) 151/82 (BP Location: Right arm, Patient Position: Sitting, Cuff Size: Adult Regular)   Pulse 89   Temp 98.2  F (36.8  C) (Oral)   Ht 1.93 m (6' 4\")   Wt 107.5 kg (236 lb 14.4 oz)   SpO2 96%   BMI 28.84 kg/m  . The patient does not have any other questions for the provider.    Kya Uriostegui, EMT at 1:34 PM on 12/28/2022.  Primary care clinic: [phone]  "

## 2022-12-28 NOTE — PROGRESS NOTES
Assessment & Plan     Essential hypertension  He didn't take his medications this AM, but home BP readings reportedly WNL, 120s/70s. Will refill at current dose. Recheck CMP, CBC.  - losartan-hydrochlorothiazide (HYZAAR) 50-12.5 MG tablet; Take 1 tablet by mouth daily  - amLODIPine (NORVASC) 10 MG tablet; Take 1 tablet (10 mg) by mouth daily  - Comprehensive metabolic panel (BMP + Alb, Alk Phos, ALT, AST, Total. Bili, TP); Future  - CBC with platelets; Future    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)  Has seen improvement in glucose control since starting Trulicity, though awaiting on new refill. Continue regular follow-up with Endocrinology.    Dyslipidemia  Hasn't been taking atorvastatin regularly; refill provided. He will return for fasting labs.  - atorvastatin (LIPITOR) 40 MG tablet; Take 1 tablet (40 mg) by mouth daily  - Lipid panel reflex to direct LDL Fasting; Future    Attention deficit hyperactivity disorder (ADHD), combined type  Feels current dose is managing symptoms well, refill provided.  - amphetamine-dextroamphetamine (ADDERALL) 15 MG tablet; Take 1 tablet (15 mg) by mouth 2 times daily  - amphetamine-dextroamphetamine (ADDERALL) 15 MG tablet; Take 1 tablet (15 mg) by mouth 2 times daily  - amphetamine-dextroamphetamine (ADDERALL) 15 MG tablet; Take 1 tablet (15 mg) by mouth 2 times daily    Mild episode of recurrent major depressive disorder (H)  Feels his mood is doing well on current regimen, will continue without change at this time. Continue working on good self-care.  - ARIPiprazole (ABILIFY) 15 MG tablet; Take 1 tablet (15 mg) by mouth daily  - buPROPion (WELLBUTRIN XL) 150 MG 24 hr tablet; Take 1 tablet (150 mg) by mouth every morning Take with 300 mg tablet (total daily dose 450 mg)  - buPROPion (WELLBUTRIN XL) 300 MG 24 hr tablet; Take 1 tablet (300 mg) by mouth every morning Take with 150 mg tablet (total daily dose 450 mg)    Erectile dysfunction due to arterial  insufficiency  Encouraged follow-up with Urology regarding IPP placed in Feb 2022.  - Adult Urology  Referral; Future    Intractable migraine with aura with status migrainosus  Refills provided, feels working well to manage/prevent migraines.  - topiramate (TOPAMAX) 50 MG tablet; Take 1 tablet (50 mg) by mouth daily    Hypothyroidism, unspecified type  Repeat TSH with upcoming labs. TSH is mildly elevated at 6.99, will increase levothyroxine to 150 mcg daily and recheck TSH with reflex free T4 in 6-8 weeks.  - TSH with free T4 reflex; Future    Wheezing  Uses for exercise-induced bronchospasm, refill provided. Continue with regular exercise.  - albuterol (PROAIR HFA/PROVENTIL HFA/VENTOLIN HFA) 108 (90 Base) MCG/ACT inhaler; Inhale 2 puffs into the lungs every 6 hours as needed for shortness of breath or wheezing      32 minutes spent on the date of the encounter doing chart review, history and exam, documentation and further activities per the note       Return in about 1 year (around 12/28/2023). or sooner prn for any changes or concerns    GALILEA Escalante CNP  [facility] INTERNAL MEDICINE [facility]   Maurice is a 62 year old, presenting for the following health issues:  RECHECK (Follow up.)      HPI     Feeling well overall. A few different things to discuss today:    Inflatable penile implant (Titan IPP) inserted in February. Saw Dr. Cooper for surgical follow-up in March 2022. Reports the device is functioning and orgasms are good, but unhappy that he's lost >1/3 of size of his penis; is frustrated with reduced length and girth, impacts what he can do and this is disturbing. Otherwise no pain or other urinary symptoms.    No major illnesses over the last year, feels mood doing well.   Has been busy with [employer] working at [employer].     ADD--adderall has been transformative, made quality of life better.    HTN--missed meds this AM. Generally takes medication when gets  "home from work at 3 am but missed last night. Home cuff BP readings 120s/60-70s.     DM--Started Trulicity, didn't get covered with most recent refill. BGs have been much more improved, averaging 120 some days, no highs. Denies hypoglycemia. Due for routine eye exam.    Staying active playing tennis, biking, etc. Uses albuterol for exercise-induced asthma, but overall feels good with exercise without chest pain.    Review of Systems   Constitutional, HEENT, cardiovascular, pulmonary, gi and gu systems are negative, except as otherwise noted.      Objective    BP (!) 151/82 (BP Location: Right arm, Patient Position: Sitting, Cuff Size: Adult Regular)   Pulse 89   Temp 98.2  F (36.8  C) (Oral)   Ht 1.93 m (6' 4\")   Wt 107.5 kg (236 lb 14.4 oz)   SpO2 96%   BMI 28.84 kg/m    Body mass index is 28.84 kg/m .  Physical Exam   GENERAL: healthy, alert and no distress  EYES: Eyes grossly normal to inspection, and conjunctivae and sclerae normal  HENT: ear canals and TM's normal, nose and mouth without ulcers or lesions  NECK: no adenopathy, no asymmetry, masses, or scars and thyroid normal to palpation  RESP: lungs clear to auscultation - no rales, rhonchi or wheezes  CV: regular rate and rhythm, normal S1 S2, no S3 or S4, no murmur, click or rub, no peripheral edema and peripheral pulses strong  ABDOMEN: soft, nontender, no hepatosplenomegaly, no masses and bowel sounds normal  MS: no gross musculoskeletal defects noted, no edema  NEURO: Normal strength and tone, mentation intact and speech normal  PSYCH: mentation appears normal, affect normal/bright                    "

## 2022-12-30 ENCOUNTER — TELEPHONE (OUTPATIENT)
Dept: INTERNAL MEDICINE | Facility: CLINIC | Age: 62
End: 2022-12-30

## 2022-12-30 RX ORDER — LEVOTHYROXINE SODIUM 150 UG/1
137 TABLET ORAL DAILY
Qty: 90 TABLET | Refills: 0 | Status: SHIPPED | OUTPATIENT
Start: 2022-12-30 | End: 2023-12-13

## 2022-12-30 NOTE — TELEPHONE ENCOUNTER
RN routed lab results from Kathya to patient to clinical coordinators to assist with scheduling lab appt in 6-8 weeks to check thyroid levels. Per result note, will ensure pt has seen C[employer]oom[employer] message and if not, will call and educate pt to start atorvastatin. Lab for TSH is ordered.     JAIME MARSHALL RN on 12/30/2022 at 8:56 AM

## 2023-03-10 DIAGNOSIS — Z00.6 EXAMINATION OF PARTICIPANT OR CONTROL IN CLINICAL RESEARCH: Primary | ICD-10-CM

## 2023-03-25 ENCOUNTER — HEALTH MAINTENANCE LETTER (OUTPATIENT)
Age: 63
End: 2023-03-25

## 2023-04-24 ENCOUNTER — HOSPITAL ENCOUNTER (OUTPATIENT)
Dept: RESEARCH | Facility: CLINIC | Age: 63
Discharge: HOME | End: 2023-04-24
Attending: INTERNAL MEDICINE
Payer: COMMERCIAL

## 2023-04-24 ENCOUNTER — ALLIED HEALTH/NURSE VISIT (OUTPATIENT)
Dept: SURGERY | Facility: CLINIC | Age: 63
End: 2023-04-24
Payer: COMMERCIAL

## 2023-04-24 ENCOUNTER — TRANSFERRED RECORDS (OUTPATIENT)
Dept: HEALTH INFORMATION MANAGEMENT | Facility: CLINIC | Age: 63
End: 2023-04-24

## 2023-04-24 ENCOUNTER — MYC MEDICAL ADVICE (OUTPATIENT)
Dept: INTERNAL MEDICINE | Facility: CLINIC | Age: 63
End: 2023-04-24

## 2023-04-24 ENCOUNTER — MYC MEDICAL ADVICE (OUTPATIENT)
Dept: ENDOCRINOLOGY | Facility: CLINIC | Age: 63
End: 2023-04-24

## 2023-04-24 ENCOUNTER — LAB REQUISITION (OUTPATIENT)
Dept: LAB | Facility: CLINIC | Age: 63
End: 2023-04-24

## 2023-04-24 ENCOUNTER — HOSPITAL ENCOUNTER (OUTPATIENT)
Dept: CARDIOLOGY | Facility: CLINIC | Age: 63
Discharge: HOME | End: 2023-04-24
Attending: INTERNAL MEDICINE
Payer: COMMERCIAL

## 2023-04-24 DIAGNOSIS — Z00.6 EXAMINATION OF PARTICIPANT OR CONTROL IN CLINICAL RESEARCH: ICD-10-CM

## 2023-04-24 DIAGNOSIS — Z00.6 EXAMINATION OF PARTICIPANT OR CONTROL IN CLINICAL RESEARCH: Primary | ICD-10-CM

## 2023-04-24 DIAGNOSIS — E10.8 TYPE 1 DIABETES MELLITUS WITH COMPLICATIONS (H): Primary | ICD-10-CM

## 2023-04-24 DIAGNOSIS — F90.2 ATTENTION DEFICIT HYPERACTIVITY DISORDER (ADHD), COMBINED TYPE: ICD-10-CM

## 2023-04-24 LAB
T4 FREE SERPL-MCNC: 1.67 NG/DL (ref 0.9–1.7)
TSH SERPL DL<=0.005 MIU/L-ACNC: 5.78 UIU/ML (ref 0.3–4.2)

## 2023-04-24 PROCEDURE — 84439 ASSAY OF FREE THYROXINE: CPT | Performed by: INTERNAL MEDICINE

## 2023-04-24 PROCEDURE — 510N000017 HC CRU PATIENT CARE, PER 15 MIN

## 2023-04-24 PROCEDURE — 510N000016 HC RESEARCH MEALS, PER MEAL

## 2023-04-24 PROCEDURE — 94621 CARDIOPULM EXERCISE TESTING: CPT

## 2023-04-24 PROCEDURE — 510N000009 HC RESEARCH FACILITY, PER 15 MIN

## 2023-04-24 PROCEDURE — 300N000004 HC RESEARCH SPECIMEN PROCESSING, MODERATE

## 2023-04-24 PROCEDURE — 99207 PR NO CHARGE NURSE ONLY: CPT

## 2023-04-24 PROCEDURE — 84443 ASSAY THYROID STIM HORMONE: CPT | Performed by: INTERNAL MEDICINE

## 2023-04-24 NOTE — PROGRESS NOTES
EXAMINATION:    Liver FibroScan    DATE OF EXAM:  4/24/23      INDICATION:    Liver fibrosis assessment      A series of at least 10 Vibration Controlled Transient Elastography (VCTETM) measurements was performed by  placing the probe XL (M, XL) over the center of the liver parenchyma and mechanically inducing a 50 Hertz  shear wave.    Each resulting VCTETM measurement was analyzed to determine shear wave propagation speed and  calculate the equivalent liver stiffness.    All measurements were reviewed by the  and physician fortechnical accuracy. Data variability across the acquired measurements was quantified with IQR/Median Percentage.    FINDINGS:    The median liver stiffness was 320 kPa with IQR/Median percentage of 16 %.    The measure CAP ultrasound attenuation rate value was 5.6 dB/m.    This fibroscan was performed for the EDIC study and does not need to be sent for reading.

## 2023-04-25 RX ORDER — DEXTROAMPHETAMINE SACCHARATE, AMPHETAMINE ASPARTATE, DEXTROAMPHETAMINE SULFATE AND AMPHETAMINE SULFATE 3.75; 3.75; 3.75; 3.75 MG/1; MG/1; MG/1; MG/1
15 TABLET ORAL 2 TIMES DAILY
Qty: 60 TABLET | Refills: 0 | Status: SHIPPED | OUTPATIENT
Start: 2023-04-25 | End: 2023-07-25

## 2023-04-25 NOTE — TELEPHONE ENCOUNTER
Per  data, pt last had amphetamine-dextroamphetamine (ADDERALL) 15 MG tablet refilled for a 30 day supply on 3/13/23. Medication is due for refill on 4/12/23. Pt was last seen by provider on 12/28/22. Medication routed to provider for refill approval.     JAIME MARSHALL RN on 4/25/2023 at 9:04 AM

## 2023-04-25 NOTE — TELEPHONE ENCOUNTER
LVM offering follow up visits today or to call and schedule first available with provider. Rx pended to provider per pt request.   Ariana Claire RN on 4/25/2023 at 8:35 AM

## 2023-04-27 DIAGNOSIS — E10.8 TYPE 1 DIABETES MELLITUS WITH COMPLICATIONS (H): Primary | ICD-10-CM

## 2023-05-23 ENCOUNTER — TRANSFERRED RECORDS (OUTPATIENT)
Dept: HEALTH INFORMATION MANAGEMENT | Facility: CLINIC | Age: 63
End: 2023-05-23
Payer: COMMERCIAL

## 2023-05-24 ENCOUNTER — MYC MEDICAL ADVICE (OUTPATIENT)
Dept: INTERNAL MEDICINE | Facility: CLINIC | Age: 63
End: 2023-05-24
Payer: COMMERCIAL

## 2023-05-24 DIAGNOSIS — F90.2 ATTENTION DEFICIT HYPERACTIVITY DISORDER (ADHD), COMBINED TYPE: ICD-10-CM

## 2023-05-25 RX ORDER — DEXTROAMPHETAMINE SACCHARATE, AMPHETAMINE ASPARTATE, DEXTROAMPHETAMINE SULFATE AND AMPHETAMINE SULFATE 3.75; 3.75; 3.75; 3.75 MG/1; MG/1; MG/1; MG/1
15 TABLET ORAL 2 TIMES DAILY
Qty: 60 TABLET | Refills: 0 | Status: SHIPPED | OUTPATIENT
Start: 2023-05-25 | End: 2023-08-21

## 2023-05-25 NOTE — TELEPHONE ENCOUNTER
Per  data, pt last had amphetamine-dextroamphetamine (ADDERALL) 15 MG tablet refilled for a 30 day supply on 4/25/23. Medication is due for refill on 5/25/23. Pt was last seen by provider on 12/28/22. Medication routed to provider for refill approval.     JAIME MARSHALL RN on 5/25/2023 at 7:57 AM

## 2023-05-27 ENCOUNTER — HEALTH MAINTENANCE LETTER (OUTPATIENT)
Age: 63
End: 2023-05-27

## 2023-05-31 DIAGNOSIS — E10.8 TYPE 1 DIABETES MELLITUS WITH COMPLICATIONS (H): Primary | ICD-10-CM

## 2023-06-02 ENCOUNTER — TELEPHONE (OUTPATIENT)
Dept: ENDOCRINOLOGY | Facility: CLINIC | Age: 63
End: 2023-06-02
Payer: COMMERCIAL

## 2023-06-02 ENCOUNTER — TELEPHONE (OUTPATIENT)
Dept: ENDOCRINOLOGY | Facility: CLINIC | Age: 63
End: 2023-06-02

## 2023-06-02 NOTE — TELEPHONE ENCOUNTER
[facility] Call Center    Phone Message    May a detailed message be left on voicemail: yes     Reason for Call: Order(s): Other:   Reason for requested: Diabetes Education   Date needed: asap  Provider name: Arti Kaur    Patient requires orders placed for being a first time (over 5 years) patient for Diabeetes Edication.  Please notify when placed so patient can schedule for software update.      Action Taken: Other: endo    Travel Screening: Not Applicable

## 2023-06-02 NOTE — TELEPHONE ENCOUNTER
[facility] Call Center    Phone Message    May a detailed message be left on voicemail: yes     Reason for Call: Other: Per Nury, they received paperwork/forms back but because the total daily dose is missing on the rx it is rendered invalid and they are asking to add the total daily units, initial and please fax back to [employer].       Action Taken: Other: Endo     Travel Screening: Not Applicable

## 2023-06-18 DIAGNOSIS — E10.8 TYPE 1 DIABETES MELLITUS WITH COMPLICATIONS (H): Primary | ICD-10-CM

## 2023-06-19 ENCOUNTER — TELEPHONE (OUTPATIENT)
Dept: ENDOCRINOLOGY | Facility: CLINIC | Age: 63
End: 2023-06-19
Payer: COMMERCIAL

## 2023-06-19 NOTE — TELEPHONE ENCOUNTER
Communication Summary: LVM     Appointment type: Return Diabetes  Provider: Arti Kaur  Return date: See below  Speciality phone number: [phone]  Additional appointment(s) needed: N/A  Additional notes: Patient is scheduled on 8/22/2023 and will need to be rescheduled for this appointment.     Schuyler Scott on 6/19/2023 at 3:26 PM

## 2023-06-22 ENCOUNTER — MYC MEDICAL ADVICE (OUTPATIENT)
Dept: INTERNAL MEDICINE | Facility: CLINIC | Age: 63
End: 2023-06-22
Payer: COMMERCIAL

## 2023-06-22 DIAGNOSIS — F90.2 ATTENTION DEFICIT HYPERACTIVITY DISORDER (ADHD), COMBINED TYPE: ICD-10-CM

## 2023-06-23 RX ORDER — DEXTROAMPHETAMINE SACCHARATE, AMPHETAMINE ASPARTATE, DEXTROAMPHETAMINE SULFATE AND AMPHETAMINE SULFATE 3.75; 3.75; 3.75; 3.75 MG/1; MG/1; MG/1; MG/1
15 TABLET ORAL 2 TIMES DAILY
Qty: 60 TABLET | Refills: 0 | Status: SHIPPED | OUTPATIENT
Start: 2023-06-24 | End: 2023-09-19

## 2023-06-23 NOTE — TELEPHONE ENCOUNTER
Per  data, pt last had amphetamine-dextroamphetamine (ADDERALL) 15 MG tablet refilled for a 30 day supply on 5/25/23. Medication is due for refill on 6/24/23. Pt was last seen by provider on 12/28/22. Medication routed to provider for refill approval.     JAIME MARSHALL RN on 6/23/2023 at 9:36 AM

## 2023-07-13 DIAGNOSIS — E11.9 DM (DIABETES MELLITUS) (H): ICD-10-CM

## 2023-07-15 RX ORDER — BLOOD SUGAR DIAGNOSTIC
STRIP MISCELLANEOUS
Qty: 400 STRIP | Refills: 3 | Status: SHIPPED | OUTPATIENT
Start: 2023-07-15 | End: 2024-01-04 | Stop reason: ALTCHOICE

## 2023-07-15 NOTE — TELEPHONE ENCOUNTER
Test strips    11/15/2022  [facility] End[facility] Arti Shannon PA-C  Endocrinology, Diabetes, and Metabolism

## 2023-07-24 ENCOUNTER — MYC MEDICAL ADVICE (OUTPATIENT)
Dept: INTERNAL MEDICINE | Facility: CLINIC | Age: 63
End: 2023-07-24
Payer: COMMERCIAL

## 2023-07-24 DIAGNOSIS — F90.2 ATTENTION DEFICIT HYPERACTIVITY DISORDER (ADHD), COMBINED TYPE: ICD-10-CM

## 2023-07-25 RX ORDER — DEXTROAMPHETAMINE SACCHARATE, AMPHETAMINE ASPARTATE, DEXTROAMPHETAMINE SULFATE AND AMPHETAMINE SULFATE 3.75; 3.75; 3.75; 3.75 MG/1; MG/1; MG/1; MG/1
15 TABLET ORAL 2 TIMES DAILY
Qty: 60 TABLET | Refills: 0 | Status: SHIPPED | OUTPATIENT
Start: 2023-07-26 | End: 2024-01-05

## 2023-07-25 NOTE — TELEPHONE ENCOUNTER
Per  data, pt last had amphetamine-dextroamphetamine (ADDERALL) 15 MG tablet refilled for a 30 day supply on 6/26/23. Medication is due for refill on 7/26/23. Pt was last seen by provider on 12/28/22. Medication routed to provider for refill approval.     JIAME MARSHALL RN on 7/25/2023 at 9:17 AM

## 2023-08-21 ENCOUNTER — MYC MEDICAL ADVICE (OUTPATIENT)
Dept: INTERNAL MEDICINE | Facility: CLINIC | Age: 63
End: 2023-08-21
Payer: COMMERCIAL

## 2023-08-21 DIAGNOSIS — F90.2 ATTENTION DEFICIT HYPERACTIVITY DISORDER (ADHD), COMBINED TYPE: ICD-10-CM

## 2023-08-21 NOTE — TELEPHONE ENCOUNTER
Per  data, pt last had amphetamine-dextroamphetamine (ADDERALL) 15 MG tablet  refilled for a 30 day supply on 7/28/23. Medication is due for refill on 8/27/23. Pt was last seen by provider on 12/28/22. Medication routed to provider for refill approval.     JAIME MASRHALL RN on 8/21/2023 at 1:30 PM

## 2023-08-22 RX ORDER — DEXTROAMPHETAMINE SACCHARATE, AMPHETAMINE ASPARTATE, DEXTROAMPHETAMINE SULFATE AND AMPHETAMINE SULFATE 3.75; 3.75; 3.75; 3.75 MG/1; MG/1; MG/1; MG/1
15 TABLET ORAL 2 TIMES DAILY
Qty: 60 TABLET | Refills: 0 | Status: SHIPPED | OUTPATIENT
Start: 2023-08-27 | End: 2023-10-24

## 2023-09-18 ENCOUNTER — MYC MEDICAL ADVICE (OUTPATIENT)
Dept: INTERNAL MEDICINE | Facility: CLINIC | Age: 63
End: 2023-09-18

## 2023-09-18 ENCOUNTER — TELEPHONE (OUTPATIENT)
Dept: ENDOCRINOLOGY | Facility: CLINIC | Age: 63
End: 2023-09-18
Payer: COMMERCIAL

## 2023-09-18 ENCOUNTER — VIRTUAL VISIT (OUTPATIENT)
Dept: ENDOCRINOLOGY | Facility: CLINIC | Age: 63
End: 2023-09-18
Payer: COMMERCIAL

## 2023-09-18 DIAGNOSIS — E03.9 ACQUIRED HYPOTHYROIDISM: ICD-10-CM

## 2023-09-18 DIAGNOSIS — F90.2 ATTENTION DEFICIT HYPERACTIVITY DISORDER (ADHD), COMBINED TYPE: ICD-10-CM

## 2023-09-18 DIAGNOSIS — E10.8 TYPE 1 DIABETES MELLITUS WITH COMPLICATIONS (H): Primary | ICD-10-CM

## 2023-09-18 PROCEDURE — 99214 OFFICE O/P EST MOD 30 MIN: CPT | Mod: VID | Performed by: PHYSICIAN ASSISTANT

## 2023-09-18 RX ORDER — DULAGLUTIDE 3 MG/.5ML
3 INJECTION, SOLUTION SUBCUTANEOUS WEEKLY
Qty: 2 ML | Refills: 11 | Status: SHIPPED | OUTPATIENT
Start: 2023-09-18 | End: 2023-10-17

## 2023-09-18 RX ORDER — URINE ACETONE TEST STRIPS
STRIP MISCELLANEOUS
Qty: 50 STRIP | Refills: 11 | Status: SHIPPED | OUTPATIENT
Start: 2023-09-18

## 2023-09-18 ASSESSMENT — ENCOUNTER SYMPTOMS
FEVER: 1
POLYDIPSIA: 0
SPUTUM PRODUCTION: 1
HALLUCINATIONS: 0
SHORTNESS OF BREATH: 0
HEMOPTYSIS: 0
DECREASED APPETITE: 1
SMELL DISTURBANCE: 0
HOARSE VOICE: 1
SINUS CONGESTION: 1
COUGH DISTURBING SLEEP: 1
COUGH: 1
WHEEZING: 1
SNORES LOUDLY: 0
POLYPHAGIA: 0
POSTURAL DYSPNEA: 0
ALTERED TEMPERATURE REGULATION: 1
NIGHT SWEATS: 0
WEIGHT LOSS: 0
FATIGUE: 1
WEIGHT GAIN: 0
TROUBLE SWALLOWING: 0
SINUS PAIN: 0
INCREASED ENERGY: 1
NECK MASS: 0
TASTE DISTURBANCE: 0
CHILLS: 1
DYSPNEA ON EXERTION: 0
SORE THROAT: 1

## 2023-09-18 NOTE — LETTER
9/18/2023       RE: Luigi MEJIA Teresa  [address] [place]  [address]  [facility] [address]     Dear Colleague,    Thank you for referring your patient, Luigi Moore, to the [facility] [place] [facility]. Please see a copy of my visit note below.    Outcome for 09/18/23 12:31 PM: Data obtained via [employer] website  Doris Alaniz MA    HPI:   Maurice is a pleasant 61 yo man here for follow up of type 1 diabetes.  His type 1 diabetes is known to be complicated by neuropathy and retinopathy.  He also has hypothyroidism, depression, htn, TELLY, asthma and the problems listed below.  He usually sees Arti Kaur and is in the EDIC study with Dr. Donn Yuan.  He had to cancel his in person appt tomorrow with Arti as he has COVID.  His symptoms are mild, but he tested because his girlfriend had tested positive.      He last saw Arti in November, 2022, but has had a few appointments cancelled on him.  At their last appointment, he was expressing desire to lose weight over concerns of fatty liver.  Arti started him on Trulicity and he is now on 1.5 mg weekly.  This has been very helpful. His weight went from 235 to 220#.  Looking to lose another 20 pounds.  Appetite is less.  No side effects.  He continues playing/teaching tennis, goes biking, works at [employer]. Improving his diet.       Diabetes monitoring and complications:  CAD: No  Last eye exam results: retinopathy, Last exam 11/25/2019 Mild/mod NPDR OU with DM I no DME - Recc f/u in 1 y.   Microalbuminuria: no on 4/10/2018  Neuropathy: Yes mild diminished sensation on last monofilament exam  HTN: Yes on Hyzaar  On Statin: Yes on atorvastatin  On Aspirin: Yes  Depression: Yes  Erectile dysfunction: Yes.   Has surgery 2/2022    Recent glucose:                 Diabetes Control:   Lab Results   Component Value Date    A1C 7.5 12/21/2021    A1C 7.2 07/23/2021    A1C 7.6 04/10/2018    A1C  8.7 2016    A1C 8.1 2015    A1C 10.0 2013     Past Medical History:   Diagnosis Date    Acquired hypothyroidism 2016    Depressive disorder     Hidradenoma dx: 2015    right hand/2 surgeries    Hypertension     Numbness and tingling 2015    right hand, related to surgery for hidradenoma    TELLY (obstructive sleep apnea) 2007    New cpap machine 1 year ago. follows at [facility]    Type 1 diabetes (H) 1982    Uncomplicated asthma        Past Surgical History:   Procedure Laterality Date    EXCISE LESION HAND Right 2015    Procedure: EXCISE LESION HAND;  Surgeon: Jeovany Jefferson MD;  Location: [facility] OR    IMPLANT PROSTHESIS PENIS INFLATABLE N/A 2022    Procedure: INSERTION of INFLATABLE PENILE PROSTHESIS;  Surgeon: Johnathan Cooper MD;  Location: [facility] OR    ORTHOPEDIC SURGERY Left     achilles tendon     ORTHOPEDIC SURGERY Right     hand surgery    SOFT TISSUE SURGERY      lipoma removal, below rib cage on right side       Family History   Problem Relation Age of Onset    Other - See Comments Mother         pancreatitis    Substance Abuse Mother         alcohol    Mental Illness Mother     Depression Mother     Hypertension Mother     Obesity Mother     Heart Failure Mother     Asthma Father     Cancer Father          of leukemia, also had a h/o asthma    Diabetes Father     Other Cancer Father     Melanoma Father     Liver Cancer Brother 53    Cancer Brother         Intestinal cancer, spread to liver    Cancer Maternal Grandmother     Cancer Maternal Grandfather     Bone Cancer Paternal Grandmother     Bone Cancer Paternal Grandfather     Heart Failure Maternal Uncle     Heart Failure Maternal Uncle        Social History     Socioeconomic History    Marital status:      Spouse name: None    Number of children: None    Years of education: None    Highest education level: None   Tobacco Use    Smoking status: Never    Smokeless tobacco: Never   Vaping Use    Vaping Use:  "Never used   Substance and Sexual Activity    Alcohol use: No     Comment: History of alcohol abuse/sober since 2015; went to treatment     Drug use: No    Sexual activity: Yes     Partners: Female     Birth control/protection: Condom, Pill   Social History Narrative    Works at [employer], indoors. 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    No unusual exposures    Has 1 dog 3 cats    Lives in [facility] in a house    Hardwood floors    One monogamous female partner       Current Outpatient Medications   Medication    albuterol (PROAIR HFA/PROVENTIL HFA/VENTOLIN HFA) 108 (90 Base) MCG/ACT inhaler    amLODIPine (NORVASC) 10 MG tablet    amphetamine-dextroamphetamine (ADDERALL) 15 MG tablet    amphetamine-dextroamphetamine (ADDERALL) 15 MG tablet    amphetamine-dextroamphetamine (ADDERALL) 15 MG tablet    ARIPiprazole (ABILIFY) 15 MG tablet    aspirin 81 MG tablet    atorvastatin (LIPITOR) 40 MG tablet    blood glucose (ACCU-CHEK GUIDE) test strip    blood glucose (CONTOUR NEXT TEST) test strip    buPROPion (WELLBUTRIN XL) 150 MG 24 hr tablet    buPROPion (WELLBUTRIN XL) 300 MG 24 hr tablet    cetirizine (ZYRTEC) 10 MG tablet    DiphenhydrAMINE HCl (BENADRYL PO)    dulaglutide (TRULICITY) 0.75 MG/0.5ML pen    dulaglutide (TRULICITY) 1.5 MG/0.5ML pen    glucagon 1 MG kit    Insulin Infusion Pump (INSULIN PUMP JH6018) KIT    insulin lispro (HUMALOG) 100 UNIT/ML vial    insulin lispro (HUMALOG) 100 UNIT/ML vial    insulin syringe-needle U-100 (BD INSULIN SYRINGE) 29G X 1/2\" 1 ML miscellaneous    levothyroxine (SYNTHROID/LEVOTHROID) 150 MCG tablet    losartan-hydrochlorothiazide (HYZAAR) 50-12.5 MG tablet    NEW MED    ONE TOUCH TEST STRIPS test strip    ONETOUCH ULTRA test strip    order for DME    topiramate (TOPAMAX) 50 MG tablet    Urea 40 % CREA     No current facility-administered medications for this visit.     Facility-Administered Medications Ordered in Other Visits   Medication    lidocaine (PF) (XYLOCAINE) 1 % " injection 10 mL          Allergies   Allergen Reactions    Iodinated Contrast Media Anaphylaxis    Contrast Dye Hives    Diatrizoate Hives     Iodine dye; iodine externally or topically is OK       Physical Exam  There were no vitals taken for this visit.  GENERAL: healthy, alert and no distress  RESP: no audible wheeze, cough, or visible cyanosis.  No visible retractions or increased work of breathing.  Able to speak fully in complete sentences.  PSYCH: mentation appears normal, affect normal/bright, judgement and insight intact, normal speech and appearance well-groomed    RESULTS    Lab Results   Component Value Date    A1C 7.5 (H) 12/21/2021    A1C 7.2 (H) 07/23/2021    A1C 7.6 (H) 04/10/2018    A1C 8.7 (H) 12/07/2016    A1C 8.1 (H) 02/17/2015    A1C 10.0 (A) 01/17/2013       Hemoglobin A1C   Date Value Ref Range Status   12/21/2021 7.5 (H) 0.0 - 5.6 % Final     Comment:     Normal <5.7%   Prediabetes 5.7-6.4%    Diabetes 6.5% or higher     Note: Adopted from ADA consensus guidelines.   07/23/2021 7.2 (H) 0.0 - 5.6 % Final     Comment:     Normal <5.7%   Prediabetes 5.7-6.4%    Diabetes 6.5% or higher     Note: Adopted from ADA consensus guidelines.   04/10/2018 7.6 (H) 0 - 6.4 % Final     Comment:     Normal <5.7% Prediabetes 5.7-6.4%  Diabetes 6.5% or higher - adopted from ADA   consensus guidelines.     12/07/2016 8.7 (H) 4.3 - 6.0 % Final   02/17/2015 8.1 (H) 4.3 - 6.0 % Final   01/17/2013 10.0 (A) 4.3 - 6.0 % Final       TSH   Date Value Ref Range Status   04/24/2023 5.78 (H) 0.30 - 4.20 uIU/mL Final   12/28/2022 6.99 (H) 0.30 - 4.20 uIU/mL Final   05/21/2021 3.36 0.40 - 4.00 mU/L Final   12/06/2019 4.62 (H) 0.40 - 4.00 mU/L Final   10/05/2018 0.01 (L) 0.40 - 4.00 mU/L Final   04/10/2018 2.68 0.40 - 4.00 mU/L Final   04/14/2017 0.83 0.40 - 4.00 mU/L Final     T4 Free   Date Value Ref Range Status   12/06/2019 0.95 0.76 - 1.46 ng/dL Final   10/05/2018 2.35 (H) 0.76 - 1.46 ng/dL Final   12/07/2016 1.33 0.76 -  1.46 ng/dL Final   02/17/2015 1.04 0.76 - 1.46 ng/dL Final     Comment:     Effective 7/30/2014, the reference range for this assay has changed to reflect   new instrumentation/methodology.     02/25/2011 1.31 0.70 - 1.85 ng/dL Final     Free T4   Date Value Ref Range Status   04/24/2023 1.67 0.90 - 1.70 ng/dL Final   12/28/2022 1.31 0.90 - 1.70 ng/dL Final       Creatinine   Date Value Ref Range Status   12/28/2022 0.69 0.67 - 1.17 mg/dL Final   02/02/2022 0.74 0.66 - 1.25 mg/dL Final   06/21/2018 0.78 0.66 - 1.25 mg/dL Final   04/10/2018 0.86 0.66 - 1.25 mg/dL Final       Potassium   Date Value Ref Range Status   12/28/2022 4.1 3.4 - 5.3 mmol/L Final   02/02/2022 3.8 3.4 - 5.3 mmol/L Final   02/01/2022 4.7 3.4 - 5.3 mmol/L Final   12/06/2019 3.7 3.4 - 5.3 mmol/L Final   10/05/2018 4.2 3.4 - 5.3 mmol/L Final       No results found for: MICROALBUMIN    ALT   Date Value Ref Range Status   12/28/2022 10 10 - 50 U/L Final   07/23/2021 18 0 - 70 U/L Final   12/16/2016 27 0 - 70 U/L Final   12/07/2016 17 0 - 70 U/L Final       Recent Labs   Lab Test 12/28/22  1450 07/23/21  1657   CHOL 214* 194   HDL 55 52   * 124*   TRIG 121 92       No components found for: [id number]  No results found for: GADAB  No results found for: CPEPT    No results found for: VITDT, DTOT  No results found for: PTHI  Calcium   Date Value Ref Range Status   12/28/2022 9.4 8.8 - 10.2 mg/dL Final   02/02/2022 8.0 (L) 8.5 - 10.1 mg/dL Final   06/21/2018 8.6 8.5 - 10.1 mg/dL Final   04/10/2018 8.8 8.5 - 10.1 mg/dL Final     No results found for: CALCIUMIONIZ  Albumin   Date Value Ref Range Status   12/28/2022 4.1 3.5 - 5.2 g/dL Final   07/23/2021 3.9 3.4 - 5.0 g/dL Final   12/16/2016 3.7 3.4 - 5.0 g/dL Final         Assessment/Plan:     1.  Type 1 diabetes-  Doing very well on Medtronic 780g pump.  He would like to lose more weight due to fatty liver and would like to increase trulicity to 3 mg weekly.  Will try this for at least a month and  then plan to go to full dose of 4.5 mg weekly if tolerating.  I asked him to back off his carb ratio to 1/12g (was 1/10g), but may need to back off further.  We made the following plan today (instructions given to patient):      Take coverage for at least 30g carb before you eat.  With hybrid closed loop pumps, it is important to bolus earlier prior to meal (15-20 mins), as delayed bolus will lead to glucose rise and automated basal increases on top of carb boluses and can cause low blood sugars afterwards.    Back off carb ratio to 1/12g (was 1/10g)--we will likely need to back off further to 1/15g if you have lows.     Change active insulin time to 2.5 hours after 4-5 days if you are no longer having lows.      Increase trulicity to 3 mg weekly.      Emergency issues: Here are some concerns you should contact us about.  -Vomiting: more than twice.  Please check ketones.  If positive, go to ER. Monitor glucose hourly.   -High glucose (over 300 mg/dL twice in a row) with a pump:  Twice in doubt, take it out.  Change your pump site. Check ketones.  If ketones are negative, take an insulin correction by syringe/pen and recheck glucose in 1 hour.  If glucose is not coming down, please call the clinic. If ketones are moderate or large, drink lots of water, take an insulin correction 1.5 times your usual correction by syringe/pen, and recheck ketones in 1 hour.  If ketones are still present (or you are vomiting), go to the ER.  -Hypoglycemia (low glucose):   If glucose is less than 70 mg/dL, treat with 15g carb (4 glucose tablets), recheck glucose in 15 minutes.  If low again, repeat.   If glucose is less than 54 mg/dL, treat with 30g carb, recheck glucose in 15 minutes.  If low again, repeat.  Keep glucagon in your home in case of severe hypoglycemia and train someone how to use this.    Emergency kit (please ensure you always have these with you):   Glucose tablets  Glucagon  Insulin  Syringes/needles   Extra infusion  set (if on a pump)  Ketone strips    Backup insulin plan (in case of pump failure):   If your insulin pump fails, your body will not have any insulin available and your blood glucose levels can get dangerously high. This can result in diabetic ketoacidosis making you very sick (abdominal pain, confusion, vomiting, dehydration, positive urine ketones).    You have an active long acting basal insulin (Degludec: Tresiba / Detemir: Levemir / Glargine: Lantus / Toujeo / Semglee / Basaglar) prescription available. Please pick this up from your pharmacy in case your pump fails.  Basal insulin dose:_____17_______ units once per day.    Immediately after your pump fails and until you can  the long acting insulin, use short acting insulin (Aspart: Novolog/Fiasp / Lispro: Humalog / Glulisine:Apidra) injections (pen or vial and syringe) per your correction scale every 4 hours and continue to cover carbohydrates. You can stop this 4 hourly correction after you give yourself the basal insulin dose.    You should also administer short acting insulin subcutaneously to cover carbohydrates and per your correction scale prior to meals.     Contact us for help transitioning back to your pump when this is available.    Contact information:   If you have concerns, please send me a [facility] message or call the clinic at [phone].  For more urgent concerns, please call [phone] after hours/weekends and ask to speak with the endocrinologist on call.      Please let me know if you are having low blood sugars less than 70 or over 350 mg/dL.  Do not wait until your next appointment if this is happening.      2.  Risk factors-     Retinopathy:  Yes. Due for eye exam.  Will schedule.   Nephropathy:  BP has been high in the past. Will check for microalbuminuria.  Creatinine stable.   Neuropathy: No.    Feet: OK, no ulcers.   Lipids:  LDL above target. On atorvastatin.   Thyroid screening: TSH elevated in April- now taking  levothyroxine on empty stomach.  Will recheck.   Celiac screening: Does not appear to have been screened in the past.  Will check antibodies.     3.  F/U in 3 mos with Arti Kaur.      34 minutes spent on the date of the encounter doing chart review, review of test results, review of continuous glucose sensor, insulin pump data, interpretation of glucose data, patient visit and documentation, counseling/coordination of care, and discussion of follow up plan for worsening hyper and hypoglycemia.  The patient understood and is satisfied with today's visit.          Sasha Holcomb PA-C, MPAS   [facility]  Department of Medicine  Division of Endocrinology and Diabetes

## 2023-09-18 NOTE — NURSING NOTE
Is the patient currently in the state of [place]? YES    Visit mode:VIDEO    If the visit is dropped, the patient can be reconnected by: VIDEO VISIT: Text to cell phone:   Telephone Information:   Mobile [phone]       Will anyone else be joining the visit? NO  (If patient encounters technical issues they should call [phone])    How would you like to obtain your AVS? MyChart    Are changes needed to the allergy or medication list? Pt stated no changes to allergies and Pt stated no med changes    Reason for visit: RECHBOBBY Alaniz CMA., VVF

## 2023-09-18 NOTE — PATIENT INSTRUCTIONS
Take coverage for at least 30g carb before you eat.  With hybrid closed loop pumps, it is important to bolus earlier prior to meal (15-20 mins), as delayed bolus will lead to glucose rise and automated basal increases on top of carb boluses and can cause low blood sugars afterwards.    Back off carb ratio to 1/12g (was 1/10g)--we will likely need to back off further to 1/15g if you have lows.     Change active insulin time to 2.5 hours after 4-5 days if you are no longer having lows.      Increase trulicity to 3 mg weekly.      Schedule eye exam.     Check A1c, TSH. Lab [phone]    Emergency issues: Here are some concerns you should contact us about.  -Vomiting: more than twice.  Please check ketones.  If positive, go to ER. Monitor glucose hourly.   -High glucose (over 300 mg/dL twice in a row) with a pump:  Twice in doubt, take it out.  Change your pump site. Check ketones.  If ketones are negative, take an insulin correction by syringe/pen and recheck glucose in 1 hour.  If glucose is not coming down, please call the clinic. If ketones are moderate or large, drink lots of water, take an insulin correction 1.5 times your usual correction by syringe/pen, and recheck ketones in 1 hour.  If ketones are still present (or you are vomiting), go to the ER.  -Hypoglycemia (low glucose):   If glucose is less than 70 mg/dL, treat with 15g carb (4 glucose tablets), recheck glucose in 15 minutes.  If low again, repeat.   If glucose is less than 54 mg/dL, treat with 30g carb, recheck glucose in 15 minutes.  If low again, repeat.  Keep glucagon in your home in case of severe hypoglycemia and train someone how to use this.    Emergency kit (please ensure you always have these with you):   Glucose tablets  Glucagon  Insulin  Syringes/needles   Extra infusion set (if on a pump)  Ketone strips    Backup insulin plan (in case of pump failure):   If your insulin pump fails, your body will not have any insulin  available and your blood glucose levels can get dangerously high. This can result in diabetic ketoacidosis making you very sick (abdominal pain, confusion, vomiting, dehydration, positive urine ketones).    You have an active long acting basal insulin (Degludec: Tresiba / Detemir: Levemir / Glargine: Lantus / Toujeo / Semglee / Basaglar) prescription available. Please pick this up from your pharmacy in case your pump fails.  Basal insulin dose:_____17_______ units once per day.    Immediately after your pump fails and until you can  the long acting insulin, use short acting insulin (Aspart: Novolog/Fiasp / Lispro: Humalog / Glulisine:Apidra) injections (pen or vial and syringe) per your correction scale every 4 hours and continue to cover carbohydrates. You can stop this 4 hourly correction after you give yourself the basal insulin dose.    You should also administer short acting insulin subcutaneously to cover carbohydrates and per your correction scale prior to meals.     Contact us for help transitioning back to your pump when this is available.    Contact information:   If you have concerns, please send me a [place] message or call the clinic at [phone].  For more urgent concerns, please call [phone] after hours/weekends and ask to speak with the endocrinologist on call.      Please let me know if you are having low blood sugars less than 70 or over 350 mg/dL.  Do not wait until your next appointment if this is happening.

## 2023-09-18 NOTE — PROGRESS NOTES
Outcome for 09/18/23 12:31 PM: Data obtained via [employer] website  Doris Alaniz MA    HPI:   Maurice is a pleasant 61 yo man here for follow up of type 1 diabetes.  His type 1 diabetes is known to be complicated by neuropathy and retinopathy.  He also has hypothyroidism, depression, htn, TELLY, asthma and the problems listed below.  He usually sees Arti Kaur and is in the EDIC study with Dr. Donn Yuan.  He had to cancel his in person appt tomorrow with Arti as he has COVID.  His symptoms are mild, but he tested because his girlfriend had tested positive.      He last saw Arti in November, 2022, but has had a few appointments cancelled on him.  At their last appointment, he was expressing desire to lose weight over concerns of fatty liver.  Arti started him on Trulicity and he is now on 1.5 mg weekly.  This has been very helpful. His weight went from 235 to 220#.  Looking to lose another 20 pounds.  Appetite is less.  No side effects.  He continues playing/teaching tennis, goes biking, works at [employer]. Improving his diet.       Diabetes monitoring and complications:  CAD: No  Last eye exam results: retinopathy, Last exam 11/25/2019 Mild/mod NPDR OU with DM I no DME - Recc f/u in 1 y.   Microalbuminuria: no on 4/10/2018  Neuropathy: Yes mild diminished sensation on last monofilament exam  HTN: Yes on Hyzaar  On Statin: Yes on atorvastatin  On Aspirin: Yes  Depression: Yes  Erectile dysfunction: Yes.   Has surgery 2/2022    Recent glucose:                 Diabetes Control:   Lab Results   Component Value Date    A1C 7.5 12/21/2021    A1C 7.2 07/23/2021    A1C 7.6 04/10/2018    A1C 8.7 12/07/2016    A1C 8.1 02/17/2015    A1C 10.0 01/17/2013     Past Medical History:   Diagnosis Date    Acquired hypothyroidism 12/7/2016    Depressive disorder     Hidradenoma dx: Feb 2015    right hand/2 surgeries    Hypertension     Numbness and tingling 2015    right hand, related to surgery for hidradenoma    TELLY (obstructive  sleep apnea)     New cpap machine 1 year ago. follows at [facility] [place]    Type 1 diabetes (H) 1982    Uncomplicated asthma        Past Surgical History:   Procedure Laterality Date    EXCISE LESION HAND Right 2015    Procedure: EXCISE LESION HAND;  Surgeon: Jeovany Jefferson MD;  Location: [facility] OR    IMPLANT PROSTHESIS PENIS INFLATABLE N/A 2022    Procedure: INSERTION of INFLATABLE PENILE PROSTHESIS;  Surgeon: Johnathan Cooper MD;  Location: [facility] OR    ORTHOPEDIC SURGERY Left     achilles tendon     ORTHOPEDIC SURGERY Right     hand surgery    SOFT TISSUE SURGERY      lipoma removal, below rib cage on right side       Family History   Problem Relation Age of Onset    Other - See Comments Mother         pancreatitis    Substance Abuse Mother         alcohol    Mental Illness Mother     Depression Mother     Hypertension Mother     Obesity Mother     Heart Failure Mother     Asthma Father     Cancer Father          of leukemia, also had a h/o asthma    Diabetes Father     Other Cancer Father     Melanoma Father     Liver Cancer Brother 53    Cancer Brother         Intestinal cancer, spread to liver    Cancer Maternal Grandmother     Cancer Maternal Grandfather     Bone Cancer Paternal Grandmother     Bone Cancer Paternal Grandfather     Heart Failure Maternal Uncle     Heart Failure Maternal Uncle        Social History     Socioeconomic History    Marital status:      Spouse name: None    Number of children: None    Years of education: None    Highest education level: None   Tobacco Use    Smoking status: Never    Smokeless tobacco: Never   Vaping Use    Vaping Use: Never used   Substance and Sexual Activity    Alcohol use: No     Comment: History of alcohol abuse/sober since ; went to treatment     Drug use: No    Sexual activity: Yes     Partners: Female     Birth control/protection: Condom, Pill   Social History Narrative    Works at [employer], indoors. 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  "   No unusual exposures    Has 1 dog 3 cats    Lives in [facility], in a house    Hardwood floors    One monogamous female partner       Current Outpatient Medications   Medication    albuterol (PROAIR HFA/PROVENTIL HFA/VENTOLIN HFA) 108 (90 Base) MCG/ACT inhaler    amLODIPine (NORVASC) 10 MG tablet    amphetamine-dextroamphetamine (ADDERALL) 15 MG tablet    amphetamine-dextroamphetamine (ADDERALL) 15 MG tablet    amphetamine-dextroamphetamine (ADDERALL) 15 MG tablet    ARIPiprazole (ABILIFY) 15 MG tablet    aspirin 81 MG tablet    atorvastatin (LIPITOR) 40 MG tablet    blood glucose (ACCU-CHEK GUIDE) test strip    blood glucose (CONTOUR NEXT TEST) test strip    buPROPion (WELLBUTRIN XL) 150 MG 24 hr tablet    buPROPion (WELLBUTRIN XL) 300 MG 24 hr tablet    cetirizine (ZYRTEC) 10 MG tablet    DiphenhydrAMINE HCl (BENADRYL PO)    dulaglutide (TRULICITY) 0.75 MG/0.5ML pen    dulaglutide (TRULICITY) 1.5 MG/0.5ML pen    glucagon 1 MG kit    Insulin Infusion Pump (INSULIN PUMP RP2556) KIT    insulin lispro (HUMALOG) 100 UNIT/ML vial    insulin lispro (HUMALOG) 100 UNIT/ML vial    insulin syringe-needle U-100 (BD INSULIN SYRINGE) 29G X 1/2\" 1 ML miscellaneous    levothyroxine (SYNTHROID/LEVOTHROID) 150 MCG tablet    losartan-hydrochlorothiazide (HYZAAR) 50-12.5 MG tablet    NEW MED    ONE TOUCH TEST STRIPS test strip    ONETOUCH ULTRA test strip    order for DME    topiramate (TOPAMAX) 50 MG tablet    Urea 40 % CREA     No current facility-administered medications for this visit.     Facility-Administered Medications Ordered in Other Visits   Medication    lidocaine (PF) (XYLOCAINE) 1 % injection 10 mL          Allergies   Allergen Reactions    Iodinated Contrast Media Anaphylaxis    Contrast Dye Hives    Diatrizoate Hives     Iodine dye; iodine externally or topically is OK       Physical Exam  There were no vitals taken for this visit.  GENERAL: healthy, alert and no distress  RESP: no audible wheeze, cough, or visible " cyanosis.  No visible retractions or increased work of breathing.  Able to speak fully in complete sentences.  PSYCH: mentation appears normal, affect normal/bright, judgement and insight intact, normal speech and appearance well-groomed    RESULTS    Lab Results   Component Value Date    A1C 7.5 (H) 12/21/2021    A1C 7.2 (H) 07/23/2021    A1C 7.6 (H) 04/10/2018    A1C 8.7 (H) 12/07/2016    A1C 8.1 (H) 02/17/2015    A1C 10.0 (A) 01/17/2013       Hemoglobin A1C   Date Value Ref Range Status   12/21/2021 7.5 (H) 0.0 - 5.6 % Final     Comment:     Normal <5.7%   Prediabetes 5.7-6.4%    Diabetes 6.5% or higher     Note: Adopted from ADA consensus guidelines.   07/23/2021 7.2 (H) 0.0 - 5.6 % Final     Comment:     Normal <5.7%   Prediabetes 5.7-6.4%    Diabetes 6.5% or higher     Note: Adopted from ADA consensus guidelines.   04/10/2018 7.6 (H) 0 - 6.4 % Final     Comment:     Normal <5.7% Prediabetes 5.7-6.4%  Diabetes 6.5% or higher - adopted from ADA   consensus guidelines.     12/07/2016 8.7 (H) 4.3 - 6.0 % Final   02/17/2015 8.1 (H) 4.3 - 6.0 % Final   01/17/2013 10.0 (A) 4.3 - 6.0 % Final       TSH   Date Value Ref Range Status   04/24/2023 5.78 (H) 0.30 - 4.20 uIU/mL Final   12/28/2022 6.99 (H) 0.30 - 4.20 uIU/mL Final   05/21/2021 3.36 0.40 - 4.00 mU/L Final   12/06/2019 4.62 (H) 0.40 - 4.00 mU/L Final   10/05/2018 0.01 (L) 0.40 - 4.00 mU/L Final   04/10/2018 2.68 0.40 - 4.00 mU/L Final   04/14/2017 0.83 0.40 - 4.00 mU/L Final     T4 Free   Date Value Ref Range Status   12/06/2019 0.95 0.76 - 1.46 ng/dL Final   10/05/2018 2.35 (H) 0.76 - 1.46 ng/dL Final   12/07/2016 1.33 0.76 - 1.46 ng/dL Final   02/17/2015 1.04 0.76 - 1.46 ng/dL Final     Comment:     Effective 7/30/2014, the reference range for this assay has changed to reflect   new instrumentation/methodology.     02/25/2011 1.31 0.70 - 1.85 ng/dL Final     Free T4   Date Value Ref Range Status   04/24/2023 1.67 0.90 - 1.70 ng/dL Final   12/28/2022 1.31 0.90  - 1.70 ng/dL Final       Creatinine   Date Value Ref Range Status   12/28/2022 0.69 0.67 - 1.17 mg/dL Final   02/02/2022 0.74 0.66 - 1.25 mg/dL Final   06/21/2018 0.78 0.66 - 1.25 mg/dL Final   04/10/2018 0.86 0.66 - 1.25 mg/dL Final       Potassium   Date Value Ref Range Status   12/28/2022 4.1 3.4 - 5.3 mmol/L Final   02/02/2022 3.8 3.4 - 5.3 mmol/L Final   02/01/2022 4.7 3.4 - 5.3 mmol/L Final   12/06/2019 3.7 3.4 - 5.3 mmol/L Final   10/05/2018 4.2 3.4 - 5.3 mmol/L Final       No results found for: MICROALBUMIN    ALT   Date Value Ref Range Status   12/28/2022 10 10 - 50 U/L Final   07/23/2021 18 0 - 70 U/L Final   12/16/2016 27 0 - 70 U/L Final   12/07/2016 17 0 - 70 U/L Final       Recent Labs   Lab Test 12/28/22  1450 07/23/21  1657   CHOL 214* 194   HDL 55 52   * 124*   TRIG 121 92       No components found for: HVHXHEPT09  No results found for: GADAB  No results found for: CPEPT    No results found for: VITDT, DTOT  No results found for: PTHI  Calcium   Date Value Ref Range Status   12/28/2022 9.4 8.8 - 10.2 mg/dL Final   02/02/2022 8.0 (L) 8.5 - 10.1 mg/dL Final   06/21/2018 8.6 8.5 - 10.1 mg/dL Final   04/10/2018 8.8 8.5 - 10.1 mg/dL Final     No results found for: CALCIUMIONIZ  Albumin   Date Value Ref Range Status   12/28/2022 4.1 3.5 - 5.2 g/dL Final   07/23/2021 3.9 3.4 - 5.0 g/dL Final   12/16/2016 3.7 3.4 - 5.0 g/dL Final         Assessment/Plan:     1.  Type 1 diabetes-  Doing very well on Medtronic 780g pump.  He would like to lose more weight due to fatty liver and would like to increase trulicity to 3 mg weekly.  Will try this for at least a month and then plan to go to full dose of 4.5 mg weekly if tolerating.  I asked him to back off his carb ratio to 1/12g (was 1/10g), but may need to back off further.  We made the following plan today (instructions given to patient):      Take coverage for at least 30g carb before you eat.  With hybrid closed loop pumps, it is important to bolus  earlier prior to meal (15-20 mins), as delayed bolus will lead to glucose rise and automated basal increases on top of carb boluses and can cause low blood sugars afterwards.    Back off carb ratio to 1/12g (was 1/10g)--we will likely need to back off further to 1/15g if you have lows.     Change active insulin time to 2.5 hours after 4-5 days if you are no longer having lows.      Increase trulicity to 3 mg weekly.      Emergency issues: Here are some concerns you should contact us about.  -Vomiting: more than twice.  Please check ketones.  If positive, go to ER. Monitor glucose hourly.   -High glucose (over 300 mg/dL twice in a row) with a pump:  Twice in doubt, take it out.  Change your pump site. Check ketones.  If ketones are negative, take an insulin correction by syringe/pen and recheck glucose in 1 hour.  If glucose is not coming down, please call the clinic. If ketones are moderate or large, drink lots of water, take an insulin correction 1.5 times your usual correction by syringe/pen, and recheck ketones in 1 hour.  If ketones are still present (or you are vomiting), go to the ER.  -Hypoglycemia (low glucose):   If glucose is less than 70 mg/dL, treat with 15g carb (4 glucose tablets), recheck glucose in 15 minutes.  If low again, repeat.   If glucose is less than 54 mg/dL, treat with 30g carb, recheck glucose in 15 minutes.  If low again, repeat.  Keep glucagon in your home in case of severe hypoglycemia and train someone how to use this.    Emergency kit (please ensure you always have these with you):   Glucose tablets  Glucagon  Insulin  Syringes/needles   Extra infusion set (if on a pump)  Ketone strips    Backup insulin plan (in case of pump failure):   If your insulin pump fails, your body will not have any insulin available and your blood glucose levels can get dangerously high. This can result in diabetic ketoacidosis making you very sick (abdominal pain, confusion, vomiting, dehydration, positive  urine ketones).    You have an active long acting basal insulin (Degludec: Tresiba / Detemir: Levemir / Glargine: Lantus / Toujeo / Semglee / Basaglar) prescription available. Please pick this up from your pharmacy in case your pump fails.  Basal insulin dose:_____17_______ units once per day.    Immediately after your pump fails and until you can  the long acting insulin, use short acting insulin (Aspart: Novolog/Fiasp / Lispro: Humalog / Glulisine:Apidra) injections (pen or vial and syringe) per your correction scale every 4 hours and continue to cover carbohydrates. You can stop this 4 hourly correction after you give yourself the basal insulin dose.    You should also administer short acting insulin subcutaneously to cover carbohydrates and per your correction scale prior to meals.     Contact us for help transitioning back to your pump when this is available.    Contact information:   If you have concerns, please send me a [employer] message or call the clinic at [phone].  For more urgent concerns, please call [phone] after hours/weekends and ask to speak with the endocrinologist on call.      Please let me know if you are having low blood sugars less than 70 or over 350 mg/dL.  Do not wait until your next appointment if this is happening.      2.  Risk factors-     Retinopathy:  Yes. Due for eye exam.  Will schedule.   Nephropathy:  BP has been high in the past. Will check for microalbuminuria.  Creatinine stable.   Neuropathy: No.    Feet: OK, no ulcers.   Lipids:  LDL above target. On atorvastatin.   Thyroid screening: TSH elevated in April- now taking levothyroxine on empty stomach.  Will recheck.   Celiac screening: Does not appear to have been screened in the past.  Will check antibodies.     3.  F/U in 3 mos with Arti Kaur.      34 minutes spent on the date of the encounter doing chart review, review of test results, review of continuous glucose sensor, insulin pump data, interpretation  of glucose data, patient visit and documentation, counseling/coordination of care, and discussion of follow up plan for worsening hyper and hypoglycemia.  The patient understood and is satisfied with today's visit.          Sasha Holcomb PA-C, MPAS   [facility]  Department of Medicine  Division of Endocrinology and Diabetes

## 2023-09-18 NOTE — TELEPHONE ENCOUNTER
Writer EVELIA for pt to call back and schedule a visit for today with Sasha Holcomb in [facility].     Please schedule pt for a virtual return at 10am or 12:30pm if slot is open.     Juanita Roca

## 2023-09-19 RX ORDER — DEXTROAMPHETAMINE SACCHARATE, AMPHETAMINE ASPARTATE, DEXTROAMPHETAMINE SULFATE AND AMPHETAMINE SULFATE 3.75; 3.75; 3.75; 3.75 MG/1; MG/1; MG/1; MG/1
15 TABLET ORAL 2 TIMES DAILY
Qty: 60 TABLET | Refills: 0 | Status: SHIPPED | OUTPATIENT
Start: 2023-09-26 | End: 2023-10-24

## 2023-09-19 NOTE — TELEPHONE ENCOUNTER
Per  data, pt last had amphetamine-dextroamphetamine (ADDERALL) 15 MG tablet  refilled for a 30 day supply on 8/27/23. Medication is due for refill on 9/26/23. Pt was last seen by provider on 12/28/22. Medication routed to provider for refill approval.     AJIME MARSHALL RN on 9/19/2023 at 8:59 AM

## 2023-09-26 DIAGNOSIS — M72.0 DUPUYTREN CONTRACTURE: Primary | ICD-10-CM

## 2023-10-17 DIAGNOSIS — E11.9 WELL CONTROLLED DIABETES MELLITUS (H): Primary | ICD-10-CM

## 2023-10-17 RX ORDER — DULAGLUTIDE 4.5 MG/.5ML
4.5 INJECTION, SOLUTION SUBCUTANEOUS WEEKLY
Qty: 6 ML | Refills: 3 | Status: SHIPPED | OUTPATIENT
Start: 2023-10-17 | End: 2024-08-05

## 2023-11-01 ENCOUNTER — MYC MEDICAL ADVICE (OUTPATIENT)
Dept: INTERNAL MEDICINE | Facility: CLINIC | Age: 63
End: 2023-11-01
Payer: COMMERCIAL

## 2023-11-01 DIAGNOSIS — I10 ESSENTIAL HYPERTENSION: ICD-10-CM

## 2023-11-01 RX ORDER — AMLODIPINE BESYLATE 10 MG/1
10 TABLET ORAL DAILY
Qty: 90 TABLET | Refills: 0 | Status: SHIPPED | OUTPATIENT
Start: 2023-11-01 | End: 2024-01-05

## 2023-11-01 NOTE — TELEPHONE ENCOUNTER
Last Clinic Visit: 12/28/2022  [facility] [place]    Due to follow-up 12/2024  -90 day letty refill sent to the pharmacy - including instructions for patient to call the clinic and schedule an appointment.

## 2023-12-10 ENCOUNTER — MYC MEDICAL ADVICE (OUTPATIENT)
Dept: INTERNAL MEDICINE | Facility: CLINIC | Age: 63
End: 2023-12-10
Payer: COMMERCIAL

## 2023-12-13 DIAGNOSIS — E10.8 TYPE 1 DIABETES MELLITUS WITH COMPLICATIONS (H): ICD-10-CM

## 2023-12-14 DIAGNOSIS — E10.8 TYPE 1 DIABETES MELLITUS WITH COMPLICATIONS (H): Primary | ICD-10-CM

## 2023-12-14 RX ORDER — INSULIN LISPRO 100 [IU]/ML
INJECTION, SOLUTION INTRAVENOUS; SUBCUTANEOUS
Qty: 80 ML | Refills: 0 | Status: SHIPPED | OUTPATIENT
Start: 2023-12-14 | End: 2024-03-25

## 2023-12-14 NOTE — TELEPHONE ENCOUNTER
Short Acting Insulin Protocol Failed 12/14/2023 06:39 AM   Protocol Details  HgbA1C in past 3 or 6 months

## 2023-12-14 NOTE — TELEPHONE ENCOUNTER
insulin lispro (HUMALOG) 100 UNIT/ML vial         Last Written Prescription Date:  09/20/22  Last Fill Quantity: 80mL,   # refills: 0  Last Office Visit : 09/18/23  Future Office visit:  12/19/23    Routing refill request to provider for review/approval because:  Insulin - refilled per clinic

## 2023-12-18 NOTE — TELEPHONE ENCOUNTER
levothyroxine (SYNTHROID/LEVOTHROID) 150 MCG tablet 90 tablet 0 12/30/2022  Last Office Visit : 12/28/2022  [facility] Kathya Aviles APRN CNP     Future Office visit:  1/5/24    Routing refill request to provider for review/approval because:  Last TSH abnormal    TSH   Date Value Ref Range Status   04/24/2023 5.78 (H) 0.30 - 4.20 uIU/mL Final   05/21/2021 3.36 0.40 - 4.00 mU/L Final

## 2023-12-18 NOTE — PROGRESS NOTES
HPI:   Maurice is a pleasant 61 yo man here for follow up of type 1 diabetes.  He had had type 1 diabetes since age 22 and his diabetes is known to be complicated by neuropathy and retinopathy.  He also has hypothyroidism, depression, htn, TELLY, asthma and the problems listed below.  He is in the EDIC study with Dr. Donn Yuan.      Donn has not used the Medtronic 780 G pump with Guardian 3 sensor and smart guard.  He feels it is going well and is glad to see that with the system it is much much easier to stay in auto mode and much easier to keep his sensor linked to the pump.  He loves not having to calibrate this frequently.  There have been some challenges as he was a little bit less active as the tennis season ended and now is much more active and the peak season at work at [employer], but overall it seems to have adapted well.  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  He has been working a lot of hours and is going to take the month of January off and move into a new place which he is excited about.      He notes that when he does give bolus prior to meals it does work a lot better and blood sugar does not get as high.  He does continue to have snacks and beverages with him and and feels he still needs to eat is at work to keep him in range, but not as much as previously.  He does not always give a bolus for high if he is active at work as it tends to come down nonetheless.  He does not enter any fake carbohydrates when he is high.  In instances where a bolus is entered when blood sugar is high it is because he actually is eating.      He has not yet scheduled his eye exam he knows they were calling him but he did not get back to them.  He is hoping to get that done while he is off in January.    He is glad to see his weight coming down.  Ideally he thinks he  could and should lose another 20 pounds.  He notes that initially he had more control over his appetite but in the last few months he has noticed that he continues to crave things he would rather not be eating.  Overall though the quality of his diet has improved with starting Trulicity and he certainly wants continue this would really like to actually increase the dose again if possible.        Diabetes monitoring and complications:  CAD: No  Last eye exam results: retinopathy, Last exam 11/25/2019 Mild/mod NPDR OU with DM I no DME - Recc f/u in 1 y.   Microalbuminuria: no on 4/10/2018  Neuropathy: Yes mild diminished sensation on last monofilament exam  HTN: Yes on Hyzaar  On Statin: Yes on atorvastatin  On Aspirin: Yes  Depression: Yes  Erectile dysfunction: Yes.   Has surgery 2/2022    Diabetes medications:  Humalog via Medtronic pump, 38 to 55 units daily.  Trulicity 3 mg weekly.    Pump and CGMS data:    See details below.  Average blood sugars 146 and he is in range 77% of the time with 2% hypoglycemia, in the last 2 weeks though he did have 5% hypoglycemia in the 2 weeks prior.  He is currently using 44 units of insulin daily 53% which is bolus 27% which is auto correction.      Diabetes Control:   Lab Results   Component Value Date    A1C 7.5 12/21/2021    A1C 7.2 07/23/2021    A1C 7.6 04/10/2018    A1C 8.7 12/07/2016    A1C 8.1 02/17/2015    A1C 10.0 01/17/2013     Past Medical History:   Diagnosis Date    Acquired hypothyroidism 12/7/2016    Depressive disorder     Hidradenoma dx: Feb 2015    right hand/2 surgeries    Hypertension     Numbness and tingling 2015    right hand, related to surgery for hidradenoma    TELLY (obstructive sleep apnea) 2007    New cpap machine 1 year ago. follows at [facility]    Type 1 diabetes (H) 1982    Uncomplicated asthma        Past Surgical History:   Procedure Laterality Date    EXCISE LESION HAND Right 2/26/2015    Procedure: EXCISE LESION HAND;  Surgeon: Jeovany Jefferson  MD Antonio;  Location: [facility] OR    IMPLANT PROSTHESIS PENIS INFLATABLE N/A 2022    Procedure: INSERTION of INFLATABLE PENILE PROSTHESIS;  Surgeon: Johnathan Cooper MD;  Location: [facility] OR    ORTHOPEDIC SURGERY Left     achilles tendon     ORTHOPEDIC SURGERY Right     hand surgery    SOFT TISSUE SURGERY      lipoma removal, below rib cage on right side       Family History   Problem Relation Age of Onset    Other - See Comments Mother         pancreatitis    Substance Abuse Mother         alcohol    Mental Illness Mother     Depression Mother     Hypertension Mother     Obesity Mother     Heart Failure Mother     Asthma Father     Cancer Father          of leukemia, also had a h/o asthma    Diabetes Father     Other Cancer Father     Melanoma Father     Liver Cancer Brother 53    Cancer Brother         Intestinal cancer, spread to liver    Cancer Maternal Grandmother     Cancer Maternal Grandfather     Bone Cancer Paternal Grandmother     Bone Cancer Paternal Grandfather     Heart Failure Maternal Uncle     Heart Failure Maternal Uncle        Social History     Socioeconomic History    Marital status:      Spouse name: None    Number of children: None    Years of education: None    Highest education level: None   Tobacco Use    Smoking status: Never    Smokeless tobacco: Never   Vaping Use    Vaping Use: Never used   Substance and Sexual Activity    Alcohol use: No     Comment: History of alcohol abuse/sober since ; went to treatment     Drug use: No    Sexual activity: Yes     Partners: Female     Birth control/protection: Condom, Pill   Social History Narrative    Works at [employer], indoors. 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    No unusual exposures    Has 1 dog 3 cats    Lives in [facility] in a house    Hardwood floors    One monogamous female partner       Current Outpatient Medications   Medication    albuterol (PROAIR HFA/PROVENTIL HFA/VENTOLIN HFA) 108 (90 Base) MCG/ACT inhaler    amLODIPine  "(NORVASC) 10 MG tablet    amphetamine-dextroamphetamine (ADDERALL) 15 MG tablet    amphetamine-dextroamphetamine (ADDERALL) 15 MG tablet    amphetamine-dextroamphetamine (ADDERALL) 15 MG tablet    ARIPiprazole (ABILIFY) 15 MG tablet    aspirin 81 MG tablet    atorvastatin (LIPITOR) 40 MG tablet    blood glucose (ACCU-CHEK GUIDE) test strip    blood glucose (CONTOUR NEXT TEST) test strip    buPROPion (WELLBUTRIN XL) 150 MG 24 hr tablet    buPROPion (WELLBUTRIN XL) 300 MG 24 hr tablet    cetirizine (ZYRTEC) 10 MG tablet    DiphenhydrAMINE HCl (BENADRYL PO)    Dulaglutide (TRULICITY) 4.5 MG/0.5ML SOPN    Glucagon (BAQSIMI) 3 MG/DOSE nasal powder    insulin glargine (LANTUS PEN) 100 UNIT/ML pen    Insulin Infusion Pump (INSULIN PUMP CK5208) KIT    insulin lispro (HUMALOG VIAL) 100 UNIT/ML vial    insulin lispro (HUMALOG) 100 UNIT/ML vial    insulin lispro (HUMALOG) 100 UNIT/ML vial    insulin syringe-needle U-100 (BD INSULIN SYRINGE) 29G X 1/2\" 1 ML miscellaneous    KETOSTIX test strip    levothyroxine (SYNTHROID/LEVOTHROID) 150 MCG tablet    losartan-hydrochlorothiazide (HYZAAR) 50-12.5 MG tablet    NEW MED    ONE TOUCH TEST STRIPS test strip    order for DME    topiramate (TOPAMAX) 50 MG tablet    Urea 40 % CREA    glucagon 1 MG kit    ONETOUCH ULTRA test strip     No current facility-administered medications for this visit.     Facility-Administered Medications Ordered in Other Visits   Medication    lidocaine (PF) (XYLOCAINE) 1 % injection 10 mL          Allergies   Allergen Reactions    Iodinated Contrast Media Anaphylaxis    Contrast Dye Hives    Diatrizoate Hives     Iodine dye; iodine externally or topically is OK       Physical Exam  /71 (BP Location: Right arm, Patient Position: Sitting, Cuff Size: Adult Large)   Pulse 97   Wt 97.1 kg (214 lb)   SpO2 96%   BMI 26.05 kg/m      Wt Readings from Last 10 Encounters:   12/19/23 97.1 kg (214 lb)   12/28/22 107.5 kg (236 lb 14.4 oz)   11/15/22 106.2 kg (234 " lb 1.6 oz)   03/11/22 106.6 kg (235 lb)   02/18/22 104 kg (229 lb 4.8 oz)   02/01/22 108.3 kg (238 lb 12.1 oz)   01/14/22 108 kg (238 lb 3.2 oz)   12/21/21 108.4 kg (239 lb)   10/29/21 110.7 kg (244 lb)   10/29/21 108.9 kg (240 lb)         GENERAL: healthy, alert and no distress  RESP: no audible wheeze, cough, or visible cyanosis.  No visible retractions or increased work of breathing.  Able to speak fully in complete sentences.  PSYCH: mentation appears normal, affect normal/bright, judgement and insight intact, normal speech and appearance well-groomed  Feet: He has good pulses color and cap refill.  Sensation is decreased to the lateral toes bilaterally.  he does have decreased vibratory sensation distally.      RESULTS  Recent Labs   Lab Test 04/24/23  0912 12/28/22  1510 12/28/22  1450 11/15/22  1416 02/02/22  0538 01/14/22  1616 12/21/21  1551 12/21/21  1543 12/21/21  1426 07/23/21  1657   A1C  --   --   --   --   --   --   --  7.5*  --  7.2*   HEMOGLOBINA1  --   --   --  7.7*  --   --   --   --  7.9  --    TSH 5.78*  --  6.99*  --   --   --   --   --   --   --    T4 1.67  --  1.31  --   --   --   --   --   --   --    LDL  --   --  135*  --   --   --   --   --   --  124*   HDL  --   --  55  --   --   --   --   --   --  52   TRIG  --   --  121  --   --   --   --   --   --  92   CR  --   --  0.69  --  0.74   < >  --   --   --  0.96   MICROL  --  <12.0  --   --   --   --  <5  --   --   --     < > = values in this interval not displayed.     Computed FIB-4 Calculation unavailable. One or more values for this score either were not found within the given timeframe or did not fit some other criterion.      Assessment/Plan:     1.  Type 1 diabetes-he continues to do well on the Medtronic 780 G pump with smartguard.  He initially was having increased hypoglycemia with the busy season at his work but in the last 2 weeks his hypoglycemia has been minimal.  He has been doing a much better job of giving a bolus when he  eats and we discussed today working on giving the bolus 10 to 15 minutes before eating.      He has been doing well with Trulicity 3 mg weekly not having any adverse side effects and would like to further increase the dose we discussed the risks and benefits of this and will do a trial.  I will see him back in 3 months.      2.  Fatty liver disease: Again he is on Trulicity and has been losing weight.  We discussed updating a CMP today so we can calculate his fib 4 but he would do this either with either trial or his primary care provider who he is seeing next month.       2.  Risk factors-     Retinopathy:  Yes. Due for eye exam.  Will schedule.  Given the phone number to do so.  Nephropathy: He does have hypertension and is on Hyzaar as well as amlodipine with his blood pressure at goal today.  With his increased exercise since getting so seriously and tenderness we may be able to reduce these agents he will discuss this with his primary care provider next month.  Today we will check for microalbuminuria.  Creatinine and GFR have been stable he states that he will update these with his primary care provider or with eating study.  Neuropathy: He continues to take topiramate and denies any symptoms.  Feet: He does have some areas of decreased sensation but feet are in good condition.  Lipids:  LDL above target. On atorvastatin.   Thyroidism: His levothyroxine dose was increased last February we will update his TSH are today.  Celiac screening: Does not appear to have been screened in the past.  He denies any symptoms of abdominal bloating or pain following meals.  Antibodies have previously been ordered and he will update them the lab today.          30 minutes spent on the date of the encounter doing chart review, review of test results, review of continuous glucose sensor, insulin pump data, interpretation of glucose data, patient visit and documentation, counseling/coordination of care, and discussion of follow  up plan for worsening hyper and hypoglycemia.  The patient understood and is satisfied with today's visit.

## 2023-12-19 ENCOUNTER — OFFICE VISIT (OUTPATIENT)
Dept: ENDOCRINOLOGY | Facility: CLINIC | Age: 63
End: 2023-12-19
Payer: COMMERCIAL

## 2023-12-19 ENCOUNTER — TELEPHONE (OUTPATIENT)
Dept: OPHTHALMOLOGY | Facility: CLINIC | Age: 63
End: 2023-12-19

## 2023-12-19 ENCOUNTER — LAB (OUTPATIENT)
Dept: LAB | Facility: CLINIC | Age: 63
End: 2023-12-19
Payer: COMMERCIAL

## 2023-12-19 VITALS
OXYGEN SATURATION: 96 % | HEART RATE: 97 BPM | DIASTOLIC BLOOD PRESSURE: 71 MMHG | WEIGHT: 214 LBS | BODY MASS INDEX: 26.05 KG/M2 | SYSTOLIC BLOOD PRESSURE: 113 MMHG

## 2023-12-19 DIAGNOSIS — E10.8 TYPE 1 DIABETES MELLITUS WITH COMPLICATIONS (H): Primary | ICD-10-CM

## 2023-12-19 DIAGNOSIS — E11.9 WELL CONTROLLED DIABETES MELLITUS (H): ICD-10-CM

## 2023-12-19 DIAGNOSIS — H43.391 FLOATER, VITREOUS, RIGHT: ICD-10-CM

## 2023-12-19 DIAGNOSIS — E10.8 TYPE 1 DIABETES MELLITUS WITH COMPLICATIONS (H): ICD-10-CM

## 2023-12-19 LAB
CREAT UR-MCNC: 86.8 MG/DL
HBA1C MFR BLD: 6.9 %
MICROALBUMIN UR-MCNC: <12 MG/L
MICROALBUMIN/CREAT UR: NORMAL MG/G{CREAT}
TSH SERPL DL<=0.005 MIU/L-ACNC: 2.67 UIU/ML (ref 0.3–4.2)

## 2023-12-19 PROCEDURE — 36415 COLL VENOUS BLD VENIPUNCTURE: CPT | Performed by: PATHOLOGY

## 2023-12-19 PROCEDURE — 99214 OFFICE O/P EST MOD 30 MIN: CPT | Performed by: PHYSICIAN ASSISTANT

## 2023-12-19 PROCEDURE — 82570 ASSAY OF URINE CREATININE: CPT | Performed by: PHYSICIAN ASSISTANT

## 2023-12-19 PROCEDURE — 83036 HEMOGLOBIN GLYCOSYLATED A1C: CPT | Performed by: PATHOLOGY

## 2023-12-19 PROCEDURE — 99000 SPECIMEN HANDLING OFFICE-LAB: CPT | Performed by: PATHOLOGY

## 2023-12-19 PROCEDURE — 84443 ASSAY THYROID STIM HORMONE: CPT | Performed by: PATHOLOGY

## 2023-12-19 RX ORDER — LEVOTHYROXINE SODIUM 150 UG/1
137 TABLET ORAL DAILY
Qty: 90 TABLET | Refills: 0 | Status: SHIPPED | OUTPATIENT
Start: 2023-12-19 | End: 2024-01-05

## 2023-12-19 ASSESSMENT — PAIN SCALES - GENERAL: PAINLEVEL: NO PAIN (0)

## 2023-12-19 NOTE — TELEPHONE ENCOUNTER
[facility] Call Center    Phone Message    May a detailed message be left on voicemail: yes     Reason for Call: Appointment Intake    Referring Provider Name: Arti Kaur PA-C in [facility]  Diagnosis and/or Symptoms: 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) [E10.8]  Floater, vitreous, right [H43.391], Has had floater just off central vision. Stable about 2-3 weeks. Type 1 diabetes 42 y. Urgent: 3-5 Days.    Patient states he has a blood clot in the back of his eyes and need to be seen right away. Per protocol to send urgent TE for floater. Please call patient back at [phone] to schedule. Thank you.      Action Taken: Message routed to:  [facility] ([facility] Eye    Travel Screening: Not Applicable

## 2023-12-19 NOTE — NURSING NOTE
Chief Complaint   Patient presents with    Diabetes     Blood pressure 113/71, pulse 97, weight 97.1 kg (214 lb), SpO2 96%.  Shraddha Garrison

## 2023-12-19 NOTE — LETTER
12/19/2023       RE: Luigi Moore  [address]  [address]  [facility] [address]     Dear Colleague,    Thank you for referring your patient, Luigi Moore, to the [facility] ENDOCRINOLOGY [facility] [place] at [facility]. Please see a copy of my visit note below.        HPI:   Maurice is a pleasant 63 yo man here for follow up of type 1 diabetes.  He had had type 1 diabetes since age 22 and his diabetes is known to be complicated by neuropathy and retinopathy.  He also has hypothyroidism, depression, htn, TELLY, asthma and the problems listed below.  He is in the EDIC study with Dr. Donn Yuan.      Donn has not used the Medtronic 780 G pump with Guardian 3 sensor and smart guard.  He feels it is going well and is glad to see that with the system it is much much easier to stay in auto mode and much easier to keep his sensor linked to the pump.  He loves not having to calibrate this frequently.  There have been some challenges as he was a little bit less active as the tennis season ended and now is much more active and the peak season at work at [employer], but overall it seems to have adapted well.  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  He has been working a lot of hours and is going to take the month of January off and move into a new place which he is excited about.      He notes that when he does give bolus prior to meals it does work a lot better and blood sugar does not get as high.  He does continue to have snacks and beverages with him and and feels he still needs to eat is at work to keep him in range, but not as much as previously.  He does not always give a bolus for high if he is active at work as it tends to come down nonetheless.  He does not enter any fake carbohydrates when he is high.  In  instances where a bolus is entered when blood sugar is high it is because he actually is eating.      He has not yet scheduled his eye exam he knows they were calling him but he did not get back to them.  He is hoping to get that done while he is off in January.    He is glad to see his weight coming down.  Ideally he thinks he could and should lose another 20 pounds.  He notes that initially he had more control over his appetite but in the last few months he has noticed that he continues to crave things he would rather not be eating.  Overall though the quality of his diet has improved with starting Trulicity and he certainly wants continue this would really like to actually increase the dose again if possible.        Diabetes monitoring and complications:  CAD: No  Last eye exam results: retinopathy, Last exam 11/25/2019 Mild/mod NPDR OU with DM I no DME - Recc f/u in 1 y.   Microalbuminuria: no on 4/10/2018  Neuropathy: Yes mild diminished sensation on last monofilament exam  HTN: Yes on Hyzaar  On Statin: Yes on atorvastatin  On Aspirin: Yes  Depression: Yes  Erectile dysfunction: Yes.   Has surgery 2/2022    Diabetes medications:  Humalog via Medtronic pump, 38 to 55 units daily.  Trulicity 3 mg weekly.    Pump and CGMS data:    See details below.  Average blood sugars 146 and he is in range 77% of the time with 2% hypoglycemia, in the last 2 weeks though he did have 5% hypoglycemia in the 2 weeks prior.  He is currently using 44 units of insulin daily 53% which is bolus 27% which is auto correction.      Diabetes Control:   Lab Results   Component Value Date    A1C 7.5 12/21/2021    A1C 7.2 07/23/2021    A1C 7.6 04/10/2018    A1C 8.7 12/07/2016    A1C 8.1 02/17/2015    A1C 10.0 01/17/2013     Past Medical History:   Diagnosis Date    Acquired hypothyroidism 12/7/2016    Depressive disorder     Hidradenoma dx: Feb 2015    right hand/2 surgeries    Hypertension     Numbness and tingling 2015    right hand,  related to surgery for hidradenoma    TELLY (obstructive sleep apnea)     New cpap machine 1 year ago. follows at [facility] [place]    Type 1 diabetes (H) 1982    Uncomplicated asthma        Past Surgical History:   Procedure Laterality Date    EXCISE LESION HAND Right 2015    Procedure: EXCISE LESION HAND;  Surgeon: Jeovany Jefferson MD;  Location: [facility] OR    IMPLANT PROSTHESIS PENIS INFLATABLE N/A 2022    Procedure: INSERTION of INFLATABLE PENILE PROSTHESIS;  Surgeon: Johnathan Cooper MD;  Location: [facility] OR    ORTHOPEDIC SURGERY Left     achilles tendon     ORTHOPEDIC SURGERY Right     hand surgery    SOFT TISSUE SURGERY      lipoma removal, below rib cage on right side       Family History   Problem Relation Age of Onset    Other - See Comments Mother         pancreatitis    Substance Abuse Mother         alcohol    Mental Illness Mother     Depression Mother     Hypertension Mother     Obesity Mother     Heart Failure Mother     Asthma Father     Cancer Father          of leukemia, also had a h/o asthma    Diabetes Father     Other Cancer Father     Melanoma Father     Liver Cancer Brother 53    Cancer Brother         Intestinal cancer, spread to liver    Cancer Maternal Grandmother     Cancer Maternal Grandfather     Bone Cancer Paternal Grandmother     Bone Cancer Paternal Grandfather     Heart Failure Maternal Uncle     Heart Failure Maternal Uncle        Social History     Socioeconomic History    Marital status:      Spouse name: None    Number of children: None    Years of education: None    Highest education level: None   Tobacco Use    Smoking status: Never    Smokeless tobacco: Never   Vaping Use    Vaping Use: Never used   Substance and Sexual Activity    Alcohol use: No     Comment: History of alcohol abuse/sober since ; went to treatment     Drug use: No    Sexual activity: Yes     Partners: Female     Birth control/protection: Condom, Pill   Social History Narrative    Works at  "[employer], indoors. 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    No unusual exposures    Has 1 dog 3 cats    Lives in [facility], in a house    Hardwood floors    One monogamous female partner       Current Outpatient Medications   Medication    albuterol (PROAIR HFA/PROVENTIL HFA/VENTOLIN HFA) 108 (90 Base) MCG/ACT inhaler    amLODIPine (NORVASC) 10 MG tablet    amphetamine-dextroamphetamine (ADDERALL) 15 MG tablet    amphetamine-dextroamphetamine (ADDERALL) 15 MG tablet    amphetamine-dextroamphetamine (ADDERALL) 15 MG tablet    ARIPiprazole (ABILIFY) 15 MG tablet    aspirin 81 MG tablet    atorvastatin (LIPITOR) 40 MG tablet    blood glucose (ACCU-CHEK GUIDE) test strip    blood glucose (CONTOUR NEXT TEST) test strip    buPROPion (WELLBUTRIN XL) 150 MG 24 hr tablet    buPROPion (WELLBUTRIN XL) 300 MG 24 hr tablet    cetirizine (ZYRTEC) 10 MG tablet    DiphenhydrAMINE HCl (BENADRYL PO)    Dulaglutide (TRULICITY) 4.5 MG/0.5ML SOPN    Glucagon (BAQSIMI) 3 MG/DOSE nasal powder    insulin glargine (LANTUS PEN) 100 UNIT/ML pen    Insulin Infusion Pump (INSULIN PUMP NC5207) KIT    insulin lispro (HUMALOG VIAL) 100 UNIT/ML vial    insulin lispro (HUMALOG) 100 UNIT/ML vial    insulin lispro (HUMALOG) 100 UNIT/ML vial    insulin syringe-needle U-100 (BD INSULIN SYRINGE) 29G X 1/2\" 1 ML miscellaneous    KETOSTIX test strip    levothyroxine (SYNTHROID/LEVOTHROID) 150 MCG tablet    losartan-hydrochlorothiazide (HYZAAR) 50-12.5 MG tablet    NEW MED    ONE TOUCH TEST STRIPS test strip    order for DME    topiramate (TOPAMAX) 50 MG tablet    Urea 40 % CREA    glucagon 1 MG kit    ONETOUCH ULTRA test strip     No current facility-administered medications for this visit.     Facility-Administered Medications Ordered in Other Visits   Medication    lidocaine (PF) (XYLOCAINE) 1 % injection 10 mL          Allergies   Allergen Reactions    Iodinated Contrast Media Anaphylaxis    Contrast Dye Hives    Diatrizoate Hives     Iodine dye; iodine " externally or topically is OK       Physical Exam  /71 (BP Location: Right arm, Patient Position: Sitting, Cuff Size: Adult Large)   Pulse 97   Wt 97.1 kg (214 lb)   SpO2 96%   BMI 26.05 kg/m      Wt Readings from Last 10 Encounters:   12/19/23 97.1 kg (214 lb)   12/28/22 107.5 kg (236 lb 14.4 oz)   11/15/22 106.2 kg (234 lb 1.6 oz)   03/11/22 106.6 kg (235 lb)   02/18/22 104 kg (229 lb 4.8 oz)   02/01/22 108.3 kg (238 lb 12.1 oz)   01/14/22 108 kg (238 lb 3.2 oz)   12/21/21 108.4 kg (239 lb)   10/29/21 110.7 kg (244 lb)   10/29/21 108.9 kg (240 lb)         GENERAL: healthy, alert and no distress  RESP: no audible wheeze, cough, or visible cyanosis.  No visible retractions or increased work of breathing.  Able to speak fully in complete sentences.  PSYCH: mentation appears normal, affect normal/bright, judgement and insight intact, normal speech and appearance well-groomed  Feet: He has good pulses color and cap refill.  Sensation is decreased to the lateral toes bilaterally.  he does have decreased vibratory sensation distally.      RESULTS  Recent Labs   Lab Test 04/24/23  0912 12/28/22  1510 12/28/22  1450 11/15/22  1416 02/02/22  0538 01/14/22  1616 12/21/21  1551 12/21/21  1543 12/21/21  1426 07/23/21  1657   A1C  --   --   --   --   --   --   --  7.5*  --  7.2*   HEMOGLOBINA1  --   --   --  7.7*  --   --   --   --  7.9  --    TSH 5.78*  --  6.99*  --   --   --   --   --   --   --    T4 1.67  --  1.31  --   --   --   --   --   --   --    LDL  --   --  135*  --   --   --   --   --   --  124*   HDL  --   --  55  --   --   --   --   --   --  52   TRIG  --   --  121  --   --   --   --   --   --  92   CR  --   --  0.69  --  0.74   < >  --   --   --  0.96   MICROL  --  <12.0  --   --   --   --  <5  --   --   --     < > = values in this interval not displayed.     Computed FIB-4 Calculation unavailable. One or more values for this score either were not found within the given timeframe or did not fit some  other criterion.      Assessment/Plan:     1.  Type 1 diabetes-he continues to do well on the Medtronic 780 G pump with smartguard.  He initially was having increased hypoglycemia with the busy season at his work but in the last 2 weeks his hypoglycemia has been minimal.  He has been doing a much better job of giving a bolus when he eats and we discussed today working on giving the bolus 10 to 15 minutes before eating.      He has been doing well with Trulicity 3 mg weekly not having any adverse side effects and would like to further increase the dose we discussed the risks and benefits of this and will do a trial.  I will see him back in 3 months.      2.  Fatty liver disease: Again he is on Trulicity and has been losing weight.  We discussed updating a CMP today so we can calculate his fib 4 but he would do this either with either trial or his primary care provider who he is seeing next month.       2.  Risk factors-     Retinopathy:  Yes. Due for eye exam.  Will schedule.  Given the phone number to do so.  Nephropathy: He does have hypertension and is on Hyzaar as well as amlodipine with his blood pressure at goal today.  With his increased exercise since getting so seriously and tenderness we may be able to reduce these agents he will discuss this with his primary care provider next month.  Today we will check for microalbuminuria.  Creatinine and GFR have been stable he states that he will update these with his primary care provider or with eating study.  Neuropathy: He continues to take topiramate and denies any symptoms.  Feet: He does have some areas of decreased sensation but feet are in good condition.  Lipids:  LDL above target. On atorvastatin.   Thyroidism: His levothyroxine dose was increased last February we will update his TSH are today.  Celiac screening: Does not appear to have been screened in the past.  He denies any symptoms of abdominal bloating or pain following meals.  Antibodies have  previously been ordered and he will update them the lab today.          30 minutes spent on the date of the encounter doing chart review, review of test results, review of continuous glucose sensor, insulin pump data, interpretation of glucose data, patient visit and documentation, counseling/coordination of care, and discussion of follow up plan for worsening hyper and hypoglycemia.  The patient understood and is satisfied with today's visit.

## 2023-12-19 NOTE — PATIENT INSTRUCTIONS
Dear Maurice,  Please ophthalmology today:  Please be aware that coverage of these services is subject to the terms and limitations of your health insurance plan.  Call member services at your health plan with any benefit or coverage questions.   [facility] will call you to coordinate your care as prescribed by your provider. If you don't hear from a representative within 2 business days, please call [phone].       Please work on giving bolus 10 - 15 minutes before eating more consistently.      It is my privilege to be involved in the care of the above patient.     Arti Kaur PA-C, MPAS  [facility]  Diabetes, Endocrinology, and Metabolism  [phone] Appointments/Nurse Line  [phone] Fax  [phone] pager  On VOCER[facility] (inpatient)

## 2023-12-20 NOTE — TELEPHONE ENCOUNTER
Spoke to pt at 1230    Right eye floater-- singular and fly like for 3 weeks.    No quick flashing    Vision stable otherwise.    Pt apart of EDIC study in past and seen by Dr. Pack last in 2019.    No other eye appt per pt since last visit.    Scheduled with Dr. Link/comprehensive ophthalmologist for exam this Friday at 1230    Pt aware of date/time/location/duration/hospital based clinic.    Pt states this would not be an EDIC study appt--going thru insurance.    Augusto Wilhelm RN 12:38 PM 12/20/23

## 2023-12-20 NOTE — TELEPHONE ENCOUNTER
Home/mobile [phone]     Called twice 12- at 1228 and left message with direct number to review/assist in scheduling an eye exam.    Augusto Wilhelm RN 12:28 PM 12/20/23

## 2023-12-21 NOTE — TELEPHONE ENCOUNTER
DIAGNOSIS: Bilateral Hand  Dupuytren contracture [M72.0]  - Primary   APPOINTMENT DATE: 01/16/2024   NOTES STATUS DETAILS   OFFICE NOTE from referring provider Internal 09/18/2023 - Sasha Holcomb PA-C - [facility] Endo   OFFICE NOTE from other specialist Internal 05/22/2019 - Jeovany Jefferson MD - [facility] Ortho   OPERATIVE REPORT Internal 02/26/2015 - Wide Resection og RT Hand Wound Bed   MEDICATION LIST Internal    MRI PACS Internal:  01/20/2018, 05/27/2016, 02/04/2015 - RT Hand

## 2023-12-22 ENCOUNTER — OFFICE VISIT (OUTPATIENT)
Dept: OPHTHALMOLOGY | Facility: CLINIC | Age: 63
End: 2023-12-22
Attending: STUDENT IN AN ORGANIZED HEALTH CARE EDUCATION/TRAINING PROGRAM
Payer: COMMERCIAL

## 2023-12-22 DIAGNOSIS — H25.813 COMBINED FORM OF AGE-RELATED CATARACT, BOTH EYES: ICD-10-CM

## 2023-12-22 DIAGNOSIS — H43.811 PVD (POSTERIOR VITREOUS DETACHMENT), RIGHT EYE: ICD-10-CM

## 2023-12-22 DIAGNOSIS — H52.13 MYOPIA OF BOTH EYES WITH ASTIGMATISM AND PRESBYOPIA: ICD-10-CM

## 2023-12-22 DIAGNOSIS — H52.4 MYOPIA OF BOTH EYES WITH ASTIGMATISM AND PRESBYOPIA: ICD-10-CM

## 2023-12-22 DIAGNOSIS — H52.203 MYOPIA OF BOTH EYES WITH ASTIGMATISM AND PRESBYOPIA: ICD-10-CM

## 2023-12-22 DIAGNOSIS — E10.3293 MILD NONPROLIFERATIVE DIABETIC RETINOPATHY OF BOTH EYES WITHOUT MACULAR EDEMA ASSOCIATED WITH TYPE 1 DIABETES MELLITUS (H): Primary | ICD-10-CM

## 2023-12-22 PROCEDURE — 99214 OFFICE O/P EST MOD 30 MIN: CPT | Performed by: STUDENT IN AN ORGANIZED HEALTH CARE EDUCATION/TRAINING PROGRAM

## 2023-12-22 PROCEDURE — 92134 CPTRZ OPH DX IMG PST SGM RTA: CPT | Performed by: STUDENT IN AN ORGANIZED HEALTH CARE EDUCATION/TRAINING PROGRAM

## 2023-12-22 PROCEDURE — 92004 COMPRE OPH EXAM NEW PT 1/>: CPT | Performed by: STUDENT IN AN ORGANIZED HEALTH CARE EDUCATION/TRAINING PROGRAM

## 2023-12-22 PROCEDURE — 92015 DETERMINE REFRACTIVE STATE: CPT

## 2023-12-22 ASSESSMENT — CUP TO DISC RATIO
OS_RATIO: 0.3
OD_RATIO: 0.3

## 2023-12-22 ASSESSMENT — REFRACTION_WEARINGRX
OD_ADD: +2.25
OS_AXIS: 178
OD_AXIS: 155
OS_CYLINDER: +0.75
OS_SPHERE: -0.75
OS_ADD: +2.25
OD_CYLINDER: +1.50
SPECS_TYPE: PAL
OD_SPHERE: -1.50

## 2023-12-22 ASSESSMENT — VISUAL ACUITY
OS_CC+: -1
OS_CC: 20/20
OD_CC+: +2
OD_CC: 20/25
METHOD: SNELLEN - LINEAR
CORRECTION_TYPE: GLASSES

## 2023-12-22 ASSESSMENT — CONF VISUAL FIELD
OS_SUPERIOR_NASAL_RESTRICTION: 0
OD_NORMAL: 1
OD_SUPERIOR_TEMPORAL_RESTRICTION: 0
OS_SUPERIOR_TEMPORAL_RESTRICTION: 0
OD_INFERIOR_TEMPORAL_RESTRICTION: 0
OD_SUPERIOR_NASAL_RESTRICTION: 0
OD_INFERIOR_NASAL_RESTRICTION: 0
OS_NORMAL: 1
METHOD: COUNTING FINGERS
OS_INFERIOR_NASAL_RESTRICTION: 0
OS_INFERIOR_TEMPORAL_RESTRICTION: 0

## 2023-12-22 ASSESSMENT — EXTERNAL EXAM - RIGHT EYE: OD_EXAM: NORMAL

## 2023-12-22 ASSESSMENT — REFRACTION_MANIFEST
OS_AXIS: 180
OD_AXIS: 178
OS_SPHERE: -1.00
OD_CYLINDER: +1.25
OD_ADD: +2.25
OS_CYLINDER: +1.00
OS_ADD: +2.25
OD_SPHERE: -1.75

## 2023-12-22 ASSESSMENT — SLIT LAMP EXAM - LIDS
COMMENTS: NORMAL
COMMENTS: NORMAL

## 2023-12-22 ASSESSMENT — TONOMETRY
OS_IOP_MMHG: 15
OD_IOP_MMHG: 17
IOP_METHOD: TONOPEN

## 2023-12-22 ASSESSMENT — EXTERNAL EXAM - LEFT EYE: OS_EXAM: NORMAL

## 2023-12-22 NOTE — LETTER
12/22/2023       RE: Luigi Moore  [address]  [address]  [facility] [address]     Dear Colleague,    Thank you for referring your patient, Luigi Moore, to the [facility] EYE [facility] [place] at [facility]. Please see a copy of my visit note below.    HPI       Diabetic Eye Exam Follow Up     Additional comments: Mild nonproliferative diabetic retinopathy of both eyes without macular edema associated with type 1 diabetes mellitus     Refer              Comments    Patient has noticed changes in vsion and one floater right eye- ongoing for 3 weeks now. Patient was told he has Blood clot back in the right eye by Arti Kaur PA-C. Patient denies eye pain or flashes of light.     Lab Results       Component                Value               Date      Patient Reported   A1C:  6.9           12/19/2023       A1C                      7.5                 12/21/2021                 A1C                      7.2                 07/23/2021                 A1C                      7.6                 04/10/2018                 A1C                      8.7                 12/07/2016                 A1C                      8.1                 02/17/2015                 A1C                      10.0                01/17/2013        Juli Barclay 12:18 PM December 22, 2023                     Last edited by Juli Mendoza on 12/22/2023 12:18 PM.          Review of systems for the eyes was negative other than the pertinent positives/negatives listed in the HPI.    Ocular Meds: none    Ocular Hx: mild NPDR OU; FML OD 2007    FOHx: no family history of glaucoma or blindness    PMHx:   Past Medical History:   Diagnosis Date     Acquired hypothyroidism 12/07/2016     Depressive disorder      Hidradenoma dx: Feb 2015    right hand/2 surgeries     Hyperlipidemia 12/07/2006     Hypertension      Numbness and tingling 2015    right hand, related to  surgery for hidradenoma     TELLY (obstructive sleep apnea) 2007    New cpap machine 1 year ago. follows at [facility]     Type 1 diabetes (H) 1982     Uncomplicated asthma        Assessment & Plan      Luigi Moore is a 63 year old male with the following diagnoses:    T1DM with mild/moderate NPDR, without DME OU  - history of FML OD ~2007  - strict BP/BG control  - patient understands importance  of good blood glucose control in order to reduce the risk of developing progression of diabetic retinopathy  - yearly DFE    Posterior Vitreous Detachment OD  - new floater few weeks ago; no new flashes, no change in floaters, no curtain down visual field  - no holes, tears, or detachment on exam; ramirez's negative; scleral depressed 360  - counseled RD/return precautions  - follow up in 4-6 weeks or sooner if signs/symptoms of concern present    Combined form of age-related cataract OU  - not bothering patient at this time and good vision  - observe    Myopia of both eyes with astigmatism and presbyopia  - refraction reviewed and dispensed today    Counseled return/RD precautions  Letter to referring provider    Patient disposition:   Return for Follow Up, 4-6 weeks, VT Dilate OD only, or sooner changes.    Attending Physician Attestation:  Complete documentation of historical and exam elements from today's encounter can be found in the full encounter summary report (not reduplicated in this progress note).  I personally obtained the chief complaint(s) and history of present illness.  I confirmed and edited as necessary the review of systems, past medical/surgical history, family history, social history, and examination findings as documented by others; and I examined the patient myself.  I personally reviewed the relevant tests, images, and reports as documented above.  I formulated and edited as necessary the assessment and plan and discussed the findings and management plan with the patient and family. . - Molly Link,  MD        Again, thank you for allowing me to participate in the care of your patient.      Sincerely,    Molly Link MD

## 2023-12-22 NOTE — PROGRESS NOTES
HPI       Diabetic Eye Exam Follow Up     Additional comments: Mild nonproliferative diabetic retinopathy of both eyes without macular edema associated with type 1 diabetes mellitus     Refer              Comments    Patient has noticed changes in vsion and one floater right eye- ongoing for 3 weeks now. Patient was told he has Blood clot back in the right eye by Arti Kaur PA-C. Patient denies eye pain or flashes of light.     Lab Results       Component                Value               Date      Patient Reported   A1C:  6.9           12/19/2023       A1C                      7.5                 12/21/2021                 A1C                      7.2                 07/23/2021                 A1C                      7.6                 04/10/2018                 A1C                      8.7                 12/07/2016                 A1C                      8.1                 02/17/2015                 A1C                      10.0                01/17/2013        Juli Barclay 12:18 PM December 22, 2023                     Last edited by Juli Mendoza on 12/22/2023 12:18 PM.          Review of systems for the eyes was negative other than the pertinent positives/negatives listed in the HPI.    Ocular Meds: none    Ocular Hx: mild NPDR OU; FML OD 2007    FOHx: no family history of glaucoma or blindness    PMHx:   Past Medical History:   Diagnosis Date    Acquired hypothyroidism 12/07/2016    Depressive disorder     Hidradenoma dx: Feb 2015    right hand/2 surgeries    Hyperlipidemia 12/07/2006    Hypertension     Numbness and tingling 2015    right hand, related to surgery for hidradenoma    TELLY (obstructive sleep apnea) 2007    New cpap machine 1 year ago. [place] [facility] [place]    Type 1 diabetes (H) 1982    Uncomplicated asthma        Assessment & Plan      Luigi Moore is a 63 year old male with the following diagnoses:    T1DM with mild/moderate NPDR, without DME OU  - history of FML  OD ~2007  - strict BP/BG control  - patient understands importance  of good blood glucose control in order to reduce the risk of developing progression of diabetic retinopathy  - yearly DFE    Posterior Vitreous Detachment OD  - new floater few weeks ago; no new flashes, no change in floaters, no curtain down visual field  - no holes, tears, or detachment on exam; ramirez's negative; scleral depressed 360  - counseled RD/return precautions  - follow up in 4-6 weeks or sooner if signs/symptoms of concern present    Combined form of age-related cataract OU  - not bothering patient at this time and good vision  - observe    Myopia of both eyes with astigmatism and presbyopia  - refraction reviewed and dispensed today    Counseled return/RD precautions  Letter to referring provider    Patient disposition:   Return for Follow Up, 4-6 weeks, VT Dilate OD only, or sooner changes.    Attending Physician Attestation:  Complete documentation of historical and exam elements from today's encounter can be found in the full encounter summary report (not reduplicated in this progress note).  I personally obtained the chief complaint(s) and history of present illness.  I confirmed and edited as necessary the review of systems, past medical/surgical history, family history, social history, and examination findings as documented by others; and I examined the patient myself.  I personally reviewed the relevant tests, images, and reports as documented above.  I formulated and edited as necessary the assessment and plan and discussed the findings and management plan with the patient and family. . - Molly Link MD

## 2023-12-22 NOTE — NURSING NOTE
Chief Complaints and History of Present Illnesses   Patient presents with    Diabetic Eye Exam Follow Up     Mild nonproliferative diabetic retinopathy of both eyes without macular edema associated with type 1 diabetes mellitus     Refer      Chief Complaint(s) and History of Present Illness(es)       Diabetic Eye Exam Follow Up              Comments: Mild nonproliferative diabetic retinopathy of both eyes without macular edema associated with type 1 diabetes mellitus     Refer               Comments    Patient has noticed changes in vsion and one floater right eye- ongoing for 3 weeks now. Patient was told he has Blood clot back in the right eye by Arti Kaur PA-C. Patient denies eye pain or flashes of light.     Lab Results       Component                Value               Date      Patient Reported   A1C:  6.9           12/19/2023       A1C                      7.5                 12/21/2021                 A1C                      7.2                 07/23/2021                 A1C                      7.6                 04/10/2018                 A1C                      8.7                 12/07/2016                 A1C                      8.1                 02/17/2015                 A1C                      10.0                01/17/2013        Juli Barclay 12:18 PM December 22, 2023

## 2024-01-04 DIAGNOSIS — Z00.6 EXAMINATION OF PARTICIPANT OR CONTROL IN CLINICAL RESEARCH: Primary | ICD-10-CM

## 2024-01-05 ENCOUNTER — OFFICE VISIT (OUTPATIENT)
Dept: INTERNAL MEDICINE | Facility: CLINIC | Age: 64
End: 2024-01-05
Payer: COMMERCIAL

## 2024-01-05 ENCOUNTER — LAB (OUTPATIENT)
Dept: LAB | Facility: CLINIC | Age: 64
End: 2024-01-05
Payer: COMMERCIAL

## 2024-01-05 VITALS
DIASTOLIC BLOOD PRESSURE: 70 MMHG | BODY MASS INDEX: 26.06 KG/M2 | WEIGHT: 209.6 LBS | OXYGEN SATURATION: 98 % | HEART RATE: 72 BPM | SYSTOLIC BLOOD PRESSURE: 122 MMHG | HEIGHT: 75 IN

## 2024-01-05 DIAGNOSIS — F90.2 ATTENTION DEFICIT HYPERACTIVITY DISORDER (ADHD), COMBINED TYPE: ICD-10-CM

## 2024-01-05 DIAGNOSIS — I10 ESSENTIAL HYPERTENSION: ICD-10-CM

## 2024-01-05 DIAGNOSIS — Z11.4 SCREENING FOR HIV (HUMAN IMMUNODEFICIENCY VIRUS): ICD-10-CM

## 2024-01-05 DIAGNOSIS — F33.0 MILD EPISODE OF RECURRENT MAJOR DEPRESSIVE DISORDER (H): ICD-10-CM

## 2024-01-05 DIAGNOSIS — E10.39 TYPE 1 DIABETES MELLITUS WITH OTHER OPHTHALMIC COMPLICATION (H): ICD-10-CM

## 2024-01-05 DIAGNOSIS — E78.5 DYSLIPIDEMIA: ICD-10-CM

## 2024-01-05 DIAGNOSIS — R06.2 WHEEZING: ICD-10-CM

## 2024-01-05 DIAGNOSIS — Z12.11 SCREEN FOR COLON CANCER: ICD-10-CM

## 2024-01-05 DIAGNOSIS — Z00.00 ROUTINE HISTORY AND PHYSICAL EXAMINATION OF ADULT: ICD-10-CM

## 2024-01-05 DIAGNOSIS — G43.111 INTRACTABLE MIGRAINE WITH AURA WITH STATUS MIGRAINOSUS: ICD-10-CM

## 2024-01-05 DIAGNOSIS — Z00.6 EXAMINATION OF PARTICIPANT OR CONTROL IN CLINICAL RESEARCH: ICD-10-CM

## 2024-01-05 DIAGNOSIS — Z00.00 ROUTINE HISTORY AND PHYSICAL EXAMINATION OF ADULT: Primary | ICD-10-CM

## 2024-01-05 DIAGNOSIS — E10.8 TYPE 1 DIABETES MELLITUS WITH COMPLICATIONS (H): ICD-10-CM

## 2024-01-05 LAB
ALBUMIN SERPL BCG-MCNC: 4.4 G/DL (ref 3.5–5.2)
ALP SERPL-CCNC: 101 U/L (ref 40–150)
ALT SERPL W P-5'-P-CCNC: 15 U/L (ref 0–70)
ANION GAP SERPL CALCULATED.3IONS-SCNC: 12 MMOL/L (ref 7–15)
AST SERPL W P-5'-P-CCNC: 24 U/L (ref 0–45)
BILIRUB SERPL-MCNC: 0.7 MG/DL
BUN SERPL-MCNC: 7.4 MG/DL (ref 8–23)
CALCIUM SERPL-MCNC: 9.7 MG/DL (ref 8.8–10.2)
CHLORIDE SERPL-SCNC: 102 MMOL/L (ref 98–107)
CHOLEST SERPL-MCNC: 144 MG/DL
CREAT SERPL-MCNC: 1.01 MG/DL (ref 0.67–1.17)
DEPRECATED HCO3 PLAS-SCNC: 24 MMOL/L (ref 22–29)
EGFRCR SERPLBLD CKD-EPI 2021: 84 ML/MIN/1.73M2
FASTING STATUS PATIENT QL REPORTED: NO
GLUCOSE SERPL-MCNC: 88 MG/DL (ref 70–99)
HDLC SERPL-MCNC: 53 MG/DL
HIV 1+2 AB+HIV1 P24 AG SERPL QL IA: NONREACTIVE
LDLC SERPL CALC-MCNC: 76 MG/DL
NONHDLC SERPL-MCNC: 91 MG/DL
POTASSIUM SERPL-SCNC: 3.9 MMOL/L (ref 3.4–5.3)
PROT SERPL-MCNC: 8 G/DL (ref 6.4–8.3)
SODIUM SERPL-SCNC: 138 MMOL/L (ref 135–145)
TRIGL SERPL-MCNC: 75 MG/DL

## 2024-01-05 PROCEDURE — 99000 SPECIMEN HANDLING OFFICE-LAB: CPT | Performed by: PATHOLOGY

## 2024-01-05 PROCEDURE — 80053 COMPREHEN METABOLIC PANEL: CPT | Performed by: PATHOLOGY

## 2024-01-05 PROCEDURE — 99207 PR FOOT EXAM NO CHARGE: CPT | Performed by: NURSE PRACTITIONER

## 2024-01-05 PROCEDURE — 80061 LIPID PANEL: CPT | Performed by: PATHOLOGY

## 2024-01-05 PROCEDURE — 99396 PREV VISIT EST AGE 40-64: CPT | Performed by: NURSE PRACTITIONER

## 2024-01-05 PROCEDURE — 87389 HIV-1 AG W/HIV-1&-2 AB AG IA: CPT | Performed by: NURSE PRACTITIONER

## 2024-01-05 PROCEDURE — 36415 COLL VENOUS BLD VENIPUNCTURE: CPT | Performed by: PATHOLOGY

## 2024-01-05 RX ORDER — TOPIRAMATE 50 MG/1
50 TABLET, FILM COATED ORAL DAILY
Qty: 90 TABLET | Refills: 3 | Status: SHIPPED | OUTPATIENT
Start: 2024-01-05

## 2024-01-05 RX ORDER — AMLODIPINE BESYLATE 10 MG/1
10 TABLET ORAL DAILY
Qty: 90 TABLET | Refills: 0 | Status: SHIPPED | OUTPATIENT
Start: 2024-01-05 | End: 2024-01-05

## 2024-01-05 RX ORDER — DEXTROAMPHETAMINE SACCHARATE, AMPHETAMINE ASPARTATE, DEXTROAMPHETAMINE SULFATE AND AMPHETAMINE SULFATE 3.75; 3.75; 3.75; 3.75 MG/1; MG/1; MG/1; MG/1
15 TABLET ORAL 2 TIMES DAILY
Qty: 60 TABLET | Refills: 0 | Status: SHIPPED | OUTPATIENT
Start: 2024-02-10 | End: 2024-05-03

## 2024-01-05 RX ORDER — BUPROPION HYDROCHLORIDE 300 MG/1
300 TABLET ORAL EVERY MORNING
Qty: 60 TABLET | Refills: 5 | Status: SHIPPED | OUTPATIENT
Start: 2024-01-05

## 2024-01-05 RX ORDER — ARIPIPRAZOLE 15 MG/1
15 TABLET ORAL DAILY
Qty: 90 TABLET | Refills: 1 | Status: SHIPPED | OUTPATIENT
Start: 2024-01-05 | End: 2024-08-14

## 2024-01-05 RX ORDER — ALBUTEROL SULFATE 90 UG/1
2 AEROSOL, METERED RESPIRATORY (INHALATION) EVERY 6 HOURS PRN
Qty: 18 G | Refills: 5 | Status: SHIPPED | OUTPATIENT
Start: 2024-01-05

## 2024-01-05 RX ORDER — BUPROPION HYDROCHLORIDE 150 MG/1
150 TABLET ORAL EVERY MORNING
Qty: 60 TABLET | Refills: 5 | Status: SHIPPED | OUTPATIENT
Start: 2024-01-05

## 2024-01-05 RX ORDER — DEXTROAMPHETAMINE SACCHARATE, AMPHETAMINE ASPARTATE, DEXTROAMPHETAMINE SULFATE AND AMPHETAMINE SULFATE 3.75; 3.75; 3.75; 3.75 MG/1; MG/1; MG/1; MG/1
15 TABLET ORAL 2 TIMES DAILY
Qty: 60 TABLET | Refills: 0 | Status: SHIPPED | OUTPATIENT
Start: 2024-03-11 | End: 2024-05-02

## 2024-01-05 RX ORDER — AMLODIPINE BESYLATE 10 MG/1
10 TABLET ORAL DAILY
Qty: 90 TABLET | Refills: 3 | Status: SHIPPED | OUTPATIENT
Start: 2024-01-05

## 2024-01-05 RX ORDER — ATORVASTATIN CALCIUM 40 MG/1
40 TABLET, FILM COATED ORAL DAILY
Qty: 90 TABLET | Refills: 3 | Status: SHIPPED | OUTPATIENT
Start: 2024-01-05

## 2024-01-05 RX ORDER — LEVOTHYROXINE SODIUM 150 UG/1
137 TABLET ORAL DAILY
Qty: 90 TABLET | Refills: 0 | Status: SHIPPED | OUTPATIENT
Start: 2024-01-05 | End: 2024-01-05

## 2024-01-05 RX ORDER — LOSARTAN POTASSIUM AND HYDROCHLOROTHIAZIDE 12.5; 5 MG/1; MG/1
1 TABLET ORAL DAILY
Qty: 90 TABLET | Refills: 3 | Status: SHIPPED | OUTPATIENT
Start: 2024-01-05

## 2024-01-05 RX ORDER — LEVOTHYROXINE SODIUM 150 UG/1
137 TABLET ORAL DAILY
Qty: 90 TABLET | Refills: 3 | Status: SHIPPED | OUTPATIENT
Start: 2024-01-05

## 2024-01-05 RX ORDER — DEXTROAMPHETAMINE SACCHARATE, AMPHETAMINE ASPARTATE, DEXTROAMPHETAMINE SULFATE AND AMPHETAMINE SULFATE 3.75; 3.75; 3.75; 3.75 MG/1; MG/1; MG/1; MG/1
15 TABLET ORAL 2 TIMES DAILY
Qty: 60 TABLET | Refills: 0 | Status: SHIPPED | OUTPATIENT
Start: 2024-01-10 | End: 2024-05-03

## 2024-01-05 ASSESSMENT — ANXIETY QUESTIONNAIRES
6. BECOMING EASILY ANNOYED OR IRRITABLE: NOT AT ALL
2. NOT BEING ABLE TO STOP OR CONTROL WORRYING: NOT AT ALL
GAD7 TOTAL SCORE: 0
7. FEELING AFRAID AS IF SOMETHING AWFUL MIGHT HAPPEN: NOT AT ALL
3. WORRYING TOO MUCH ABOUT DIFFERENT THINGS: NOT AT ALL
1. FEELING NERVOUS, ANXIOUS, OR ON EDGE: NOT AT ALL
5. BEING SO RESTLESS THAT IT IS HARD TO SIT STILL: NOT AT ALL
GAD7 TOTAL SCORE: 0

## 2024-01-05 ASSESSMENT — PATIENT HEALTH QUESTIONNAIRE - PHQ9
5. POOR APPETITE OR OVEREATING: NOT AT ALL
SUM OF ALL RESPONSES TO PHQ QUESTIONS 1-9: 3

## 2024-01-05 NOTE — PROGRESS NOTES
Maurice is a 63 year old that presents in clinic today for the following:     Chief Complaint   Patient presents with    Physical           1/5/2024    12:46 PM   Additional Questions   Roomed by KTR       Screenings from encounters over the past 10 days    No data recorded       Kya Uriostegui, EMT at 12:50 PM on 1/5/2024

## 2024-01-05 NOTE — CONSULTS
Outpatient IR Biopsy Referral    Patient is a 62 y/o male with a PMH of depression, HDL, HTN, TELLY, DM 1, Asthma. IR has been asked to biopsy random kidney biopsy for [facility] clinical trial with Dr Mckeon or Dr Gallo.        Case and imaging was reviewed with Dr. Gallo from IR and US guided random biopsy of a kidney is approved. This must be scheduled with Dr. Mckeon or Dr Gallo.     ASA should be held for 5 days prior to scheduled procedure.       Procedure order placed referring team to enter sample orders.     Primary team Dr. Peterson made aware of IR recommendations via epic messaging.    Neena Euceda DNP, APRN  Interventional Radiology   IR on-call pager: [phone]

## 2024-01-05 NOTE — PROGRESS NOTES
SUBJECTIVE:  Luigi Moore is a 63 year old male with pmh of   Past Medical History:   Diagnosis Date    Acquired hypothyroidism 2016    Cancer (H) 2015    Depressive disorder     Hidradenoma dx: 2015    right hand/2 surgeries    Hyperlipidemia 2006    Hypertension     Numbness and tingling 2015    right hand, related to surgery for hidradenoma    TELLY (obstructive sleep apnea) 2007    New cpap machine 1 year ago. follows at [facility]    Type 1 diabetes (H) 1982    Uncomplicated asthma      Who comes in for preventive examination today.     Exercising with tennis 3-4 d/week, 12-15 hr/week.  Certified , teaching as well.     Lost 31 lb, feeling better physically. Pleased BPs improved and HR is normal.     Participating in a study with Dr. Yuan.   Met with Endocrinology recently, A1c 6.9.     Medications and allergies were reviewed by me today.     Family History   Problem Relation Age of Onset    Other - See Comments Mother         pancreatitis    Hypertension Mother     Heart Failure Mother     Macular Degeneration Father     Asthma Father     Cancer Father          of leukemia, also had a h/o asthma    Diabetes Father     Other Cancer Father     Melanoma Father     Cancer Maternal Grandmother     Cancer Maternal Grandfather     Bone Cancer Paternal Grandmother     Other Cancer Paternal Grandmother     Bone Cancer Paternal Grandfather     Other Cancer Paternal Grandfather     Liver Cancer Brother 53    Cancer Brother         Intestinal cancer, spread to liver    Heart Failure Maternal Uncle     Heart Failure Maternal Uncle     Depression Brother     Mental Illness Brother     Substance Abuse Brother     Glaucoma No family hx of     Obesity No family hx of        Social History     Tobacco Use    Smoking status: Never    Smokeless tobacco: Never   Vaping Use    Vaping Use: Never used   Substance Use Topics    Alcohol use: No     Comment: History of alcohol abuse/sober since ;  "went to treatment     Drug use: No       Review Of Systems  Constitutional: no fevers, chills, night sweats or unintentional weight change   Eyes: no vision change, diplopia or red eyes   Ears, Nose, Mouth, Throat: no tinnitus or hearing change, no epistaxis or nasal discharge, no oral lesions, throat clear   Cardiovascular: no chest pain, palpitations, or pain with walking, no orthopnea or PND   Respiratory: no dyspnea, cough, shortness of breath or wheezing   GI: no nausea, vomiting, diarrhea or constipation, no abdominal pain   : no change in urine, no dysuria or hematuria, no sexual dysfunction   Musculoskeletal: no joint or muscle pain or swelling   Integumentary: no concerning lesions or moles   Neuro: no loss of strength or sensation, no numbness or tingling, no tremor, no dizziness, no headache   Endo: no polyuria or polydipsia, no temperature intolerance   Psych: no depression or anxiety, no sleep problems      OBJECTIVE:  [ need 2-7 for level 3-4 and 8-12 for level 5 ]  /70 (BP Location: Right arm, Patient Position: Sitting, Cuff Size: Adult Regular)   Pulse 72   Ht 1.892 m (6' 2.5\")   Wt 95.1 kg (209 lb 9.6 oz)   SpO2 98%   BMI 26.55 kg/m      GENERAL APPEARANCE: healthy, alert, and no distress  EYES: Eyes grossly normal to inspection and conjunctivae and sclerae normal  HENT: ear canals and TM's normal and nose and mouth without ulcers or lesions  NECK: no adenopathy, no asymmetry, masses, or scars, and thyroid normal to palpation  RESP: lungs clear to auscultation - no rales, rhonchi or wheezes  CV: regular rates and rhythm, normal S1 S2, no S3 or S4, and no murmur, click or rub  LYMPHATICS: no cervical adenopathy  GI: soft, nontender, without hepatosplenomegaly or masses  MS: extremities normal- no gross deformities noted  SKIN: no suspicious lesions or rashes  NEURO: Normal strength and tone, mentation intact, and speech normal  PSYCH: mentation appears normal and affect " normal/bright  DIABETIC FOOT ASSESSEMNT:  All toe nails thickened and yellow, mild redness and skin peeling seen between toes, but no wounds or lesions. Pedal pulses +2, normal monofilament exam.      ASSESSMENT/PLAN:  Pt is a 63 year old male here for preventive examination    1. Routine history and physical examination of adult  Encouraged pt to continue working on healthy lifestyle with regular physical activity, nutritious diet with good portion control, focusing on lean proteins and vegetable/fruit intake, stress management and safety.  - Comprehensive metabolic panel (BMP + Alb, Alk Phos, ALT, AST, Total. Bili, TP); Future    2. Screening for HIV (human immunodeficiency virus)  One-time screening, low risk patient.  - HIV Screening; Future    3. Screen for colon cancer  Due for follow-up colonoscopy, does have a history of polyps.  - Colonoscopy Screening  Referral; Future    4. Essential hypertension  BP is well-controlled on current regimen, refills provided for Hyzaar and Norvasc.  - losartan-hydrochlorothiazide (HYZAAR) 50-12.5 MG tablet; Take 1 tablet by mouth daily  Dispense: 90 tablet; Refill: 3  - amLODIPine (NORVASC) 10 MG tablet; Take 1 tablet (10 mg) by mouth daily  Dispense: 90 tablet; Refill: 3    5. 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  Diabetes control has improved, he continues working with endocrinology, and is also participating in a study with Dr. Yuan.  - Lipid panel reflex to direct LDL Non-fasting; Future  - FOOT EXAM    6. Mild episode of recurrent major depressive disorder (H24)  Feels his mood is doing well on current regimen.  Will continue with Abilify 15 mg and Wellbutrin  mg daily.  - ARIPiprazole (ABILIFY) 15 MG tablet; Take 1 tablet (15 mg) by mouth daily  Dispense: 90 tablet; Refill: 1  - buPROPion (WELLBUTRIN XL) 150 MG 24 hr tablet; Take 1 tablet (150 mg) by mouth every morning Take with 300 mg tablet (total  daily dose 450 mg)  Dispense: 60 tablet; Refill: 5  - buPROPion (WELLBUTRIN XL) 300 MG 24 hr tablet; Take 1 tablet (300 mg) by mouth every morning Take with 150 mg tablet (total daily dose 450 mg)  Dispense: 60 tablet; Refill: 5    7. Dyslipidemia  Continue with atorvastatin 40 mg to reduce ASCVD risk.  - atorvastatin (LIPITOR) 40 MG tablet; Take 1 tablet (40 mg) by mouth daily  Dispense: 90 tablet; Refill: 3    8. 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)  TSH was WNL in December, refills provided for levothyroxine.  - levothyroxine (SYNTHROID/LEVOTHROID) 150 MCG tablet; Take 1 tablet (150 mcg) by mouth daily  Dispense: 90 tablet; Refill: 3    9. Intractable migraine with aura with status migrainosus  Doing well with topiramate for migraine prophylaxis.  Refill provided at current dose.  - topiramate (TOPAMAX) 50 MG tablet; Take 1 tablet (50 mg) by mouth daily  Dispense: 90 tablet; Refill: 3    10. Attention deficit hyperactivity disorder (ADHD), combined type  Feels Adderall 15 mg twice daily is working well to manage ADHD symptoms.  Refill provided.  - amphetamine-dextroamphetamine (ADDERALL) 15 MG tablet; Take 1 tablet (15 mg) by mouth 2 times daily  Dispense: 60 tablet; Refill: 0  - amphetamine-dextroamphetamine (ADDERALL) 15 MG tablet; Take 1 tablet (15 mg) by mouth 2 times daily  Dispense: 60 tablet; Refill: 0  - amphetamine-dextroamphetamine (ADDERALL) 15 MG tablet; Take 1 tablet (15 mg) by mouth 2 times daily  Dispense: 60 tablet; Refill: 0    11. Wheezing  Uses albuterol intermittently for wheezing, refill provided.  - albuterol (PROAIR HFA/PROVENTIL HFA/VENTOLIN HFA) 108 (90 Base) MCG/ACT inhaler; Inhale 2 puffs into the lungs every 6 hours as needed for shortness of breath or wheezing  Dispense: 18 g; Refill: 5    FOLLOW UP: Return in about 6 months (around 7/5/2024) for using a video visit.    Colorectal screening   Ordered  Osteoporosis screening not indicated.    Immunizations reviewed and  updated in EPIC  Labs ordered as above       GALILEA Escalante CNP   patient/parent

## 2024-01-07 ENCOUNTER — HEALTH MAINTENANCE LETTER (OUTPATIENT)
Age: 64
End: 2024-01-07

## 2024-01-09 ENCOUNTER — TELEPHONE (OUTPATIENT)
Dept: INTERNAL MEDICINE | Facility: CLINIC | Age: 64
End: 2024-01-09
Payer: COMMERCIAL

## 2024-01-16 ENCOUNTER — PRE VISIT (OUTPATIENT)
Dept: ORTHOPEDICS | Facility: CLINIC | Age: 64
End: 2024-01-16

## 2024-01-17 ENCOUNTER — HOSPITAL ENCOUNTER (OUTPATIENT)
Facility: CLINIC | Age: 64
End: 2024-01-17
Admitting: RADIOLOGY
Payer: COMMERCIAL

## 2024-01-17 DIAGNOSIS — Z00.6 EXAMINATION OF PARTICIPANT OR CONTROL IN CLINICAL RESEARCH: Primary | ICD-10-CM

## 2024-02-13 ENCOUNTER — TELEPHONE (OUTPATIENT)
Dept: GASTROENTEROLOGY | Facility: CLINIC | Age: 64
End: 2024-02-13
Payer: COMMERCIAL

## 2024-02-13 DIAGNOSIS — Z12.11 SPECIAL SCREENING FOR MALIGNANT NEOPLASMS, COLON: Primary | ICD-10-CM

## 2024-02-13 NOTE — TELEPHONE ENCOUNTER
"Endoscopy Scheduling Screen    Have you had a positive Covid test in the last 14 days?  No    Are you active on MyChart?   Yes    What insurance is in the chart?  Other:  bcbs    Ordering/Referring Provider: RAIN BARRETO    (If ordering provider performs procedure, schedule with ordering provider unless otherwise instructed. )    BMI: Estimated body mass index is 26.55 kg/m  as calculated from the following:    Height as of 1/5/24: 1.892 m (6' 2.5\").    Weight as of 1/5/24: 95.1 kg (209 lb 9.6 oz).     Sedation Ordered  moderate sedation.   If patient BMI > 50 do not schedule in [facility].    If patient BMI > 45 do not schedule at [facility].    Are you taking methadone or Suboxone?  No    Have you had difficulties, pain, or discomfort during past endoscopy procedures?  No    Are you taking any prescription medications for pain 3 or more times per week?   NO - No RN review required.    Do you have a history of malignant hyperthermia or adverse reaction to anesthesia?  No    (Females) Are you currently pregnant?   No     Have you been diagnosed or told you have pulmonary hypertension?   No    Do you have an LVAD?  No    Have you been told you have moderate to severe sleep apnea?  Yes (RN Review required for scheduling unless scheduling in Hospital.)    Have you been told you have COPD, asthma, or any other lung disease?  No    Do you have any heart conditions?  No     Have you ever had an organ transplant?   No    Have you ever had or are you awaiting a heart or lung transplant?   No    Have you had a stroke or transient ischemic attack (TIA aka \"mini stroke\" in the last 6 months?   No    Have you been diagnosed with or been told you have cirrhosis of the liver?   No    Are you currently on dialysis?   No    Do you need assistance transferring?   No    BMI: Estimated body mass index is 26.55 kg/m  as calculated from the following:    Height as of 1/5/24: 1.892 m (6' 2.5\").    Weight as of 1/5/24: 95.1 kg (209 lb 9.6 " oz).     Is patients BMI > 40 and scheduling location [facility]?  No    Do you take an injectable medication for weight loss or diabetes (excluding insulin)?  Yes, hold time can be up to 7 days. Please check with you prescribing provider for recommendation.     Do you take the medication Naltrexone?  No    Do you take blood thinners?  No       Prep   Are you currently on dialysis or do you have chronic kidney disease?  No    Do you have a diagnosis of diabetes?  Yes (Golytely Prep)    Do you have a diagnosis of cystic fibrosis (CF)?  No    On a regular basis do you go 3 -5 days between bowel movements?  No    BMI > 40?  No    Preferred Pharmacy:        [id number] [place], [place] - [address]  [address] [address]  Phone: [phone] Fax: [phone]      Final Scheduling Details   Colonoscopy prep sent?  N/A    Procedure scheduled  Colonoscopy    Surgeon:  doretha     Date of procedure:  5/17     Pre-OP / PAC:   No - Not required for this site.    Location  [facility] - Per order.    Sedation   Moderate Sedation - Per order.      Patient Reminders:   You will receive a call from a Nurse to review instructions and health history.  This assessment must be completed prior to your procedure.  Failure to complete the Nurse assessment may result in the procedure being cancelled.      On the day of your procedure, please designate an adult(s) who can drive you home stay with you for the next 24 hours. The medicines used in the exam will make you sleepy. You will not be able to drive.      You cannot take public transportation, ride share services, or non-medical taxi service without a responsible caregiver.  Medical transport services are allowed with the requirement that a responsible caregiver will receive you at your destination.  We require that drivers and caregivers are confirmed prior to your procedure.

## 2024-03-14 ENCOUNTER — TELEPHONE (OUTPATIENT)
Dept: ORTHOPEDICS | Facility: CLINIC | Age: 64
End: 2024-03-14
Payer: COMMERCIAL

## 2024-03-22 DIAGNOSIS — E10.8 TYPE 1 DIABETES MELLITUS WITH COMPLICATIONS (H): ICD-10-CM

## 2024-03-24 NOTE — TELEPHONE ENCOUNTER
DIAGNOSIS: Bilateral Hand  Dupuytren contracture [M72.0]  - Primary   APPOINTMENT DATE: 04/09/2024   NOTES STATUS DETAILS   OFFICE NOTE from referring provider Internal 09/18/2023 - Sasha Holcomb PA-C - [facility] Endo   OFFICE NOTE from other specialist Internal 05/22/2019 - Jeovany Jefferson MD - [facility] Ortho   OPERATIVE REPORT Internal 02/26/2015 - Wide Resection og RT Hand Wound Bed   MEDICATION LIST Internal    MRI PACS Internal:  01/20/2018, 05/27/2016, 02/04/2015 - RT Hand

## 2024-03-25 RX ORDER — INSULIN LISPRO 100 [IU]/ML
INJECTION, SOLUTION INTRAVENOUS; SUBCUTANEOUS
Qty: 80 ML | Refills: 1 | Status: SHIPPED | OUTPATIENT
Start: 2024-03-25 | End: 2024-09-10

## 2024-03-25 NOTE — TELEPHONE ENCOUNTER
HUMALOG 100 UNIT/ML VIAL       Last Written Prescription Date:  12-14-23  Last Fill Quantity: 80 ml ,   # refills: 0  Last Office Visit  12-19-23  Future Office visit: 7-2-24    Routing refill request to provider for review/approval because:  Insulin - refilled per clinic

## 2024-04-09 ENCOUNTER — PRE VISIT (OUTPATIENT)
Dept: ORTHOPEDICS | Facility: CLINIC | Age: 64
End: 2024-04-09

## 2024-04-18 ENCOUNTER — MEDICAL CORRESPONDENCE (OUTPATIENT)
Dept: HEALTH INFORMATION MANAGEMENT | Facility: CLINIC | Age: 64
End: 2024-04-18
Payer: COMMERCIAL

## 2024-04-19 RX ORDER — BISACODYL 5 MG/1
TABLET, DELAYED RELEASE ORAL
Qty: 4 TABLET | Refills: 0 | Status: SHIPPED | OUTPATIENT
Start: 2024-04-19 | End: 2024-09-10

## 2024-04-29 ENCOUNTER — TELEPHONE (OUTPATIENT)
Dept: ORTHOPEDICS | Facility: CLINIC | Age: 64
End: 2024-04-29
Payer: COMMERCIAL

## 2024-05-02 ENCOUNTER — MYC MEDICAL ADVICE (OUTPATIENT)
Dept: INTERNAL MEDICINE | Facility: CLINIC | Age: 64
End: 2024-05-02
Payer: COMMERCIAL

## 2024-05-02 DIAGNOSIS — F90.2 ATTENTION DEFICIT HYPERACTIVITY DISORDER (ADHD), COMBINED TYPE: ICD-10-CM

## 2024-05-03 RX ORDER — DEXTROAMPHETAMINE SACCHARATE, AMPHETAMINE ASPARTATE, DEXTROAMPHETAMINE SULFATE AND AMPHETAMINE SULFATE 3.75; 3.75; 3.75; 3.75 MG/1; MG/1; MG/1; MG/1
15 TABLET ORAL 2 TIMES DAILY
Qty: 60 TABLET | Refills: 0 | Status: SHIPPED | OUTPATIENT
Start: 2024-05-03 | End: 2024-08-06

## 2024-05-03 RX ORDER — DEXTROAMPHETAMINE SACCHARATE, AMPHETAMINE ASPARTATE, DEXTROAMPHETAMINE SULFATE AND AMPHETAMINE SULFATE 3.75; 3.75; 3.75; 3.75 MG/1; MG/1; MG/1; MG/1
15 TABLET ORAL 2 TIMES DAILY
Qty: 60 TABLET | Refills: 0 | Status: SHIPPED | OUTPATIENT
Start: 2024-06-02 | End: 2024-08-14

## 2024-05-03 RX ORDER — DEXTROAMPHETAMINE SACCHARATE, AMPHETAMINE ASPARTATE, DEXTROAMPHETAMINE SULFATE AND AMPHETAMINE SULFATE 3.75; 3.75; 3.75; 3.75 MG/1; MG/1; MG/1; MG/1
15 TABLET ORAL 2 TIMES DAILY
Qty: 60 TABLET | Refills: 0 | Status: SHIPPED | OUTPATIENT
Start: 2024-07-02 | End: 2024-08-14

## 2024-05-07 ENCOUNTER — TELEPHONE (OUTPATIENT)
Dept: GASTROENTEROLOGY | Facility: CLINIC | Age: 64
End: 2024-05-07
Payer: COMMERCIAL

## 2024-05-07 NOTE — TELEPHONE ENCOUNTER
Attempted to contact patient in order to complete pre assessment questions.     No answer. Left message to return call to [phone] option 4    Callback required communication sent via [employer].    Freda Estrada RN  Endoscopy Procedure Pre Assessment RN

## 2024-05-07 NOTE — TELEPHONE ENCOUNTER
Pre visit planning completed.      Procedure details:    Patient scheduled for Colonoscopy  on 5/17/2024.     Arrival time: 1200. Procedure time 1300    Facility location: [facility]; [address] [place] [address], [address], [place] [address]. Check in location: Main entrance at registration desk.    Sedation type: Conscious sedation     Pre op exam needed? N/A    Indication for procedure: Screen for colon cancer [Z12.11]       Chart review:     Electronic implanted devices? Yes, Insulin Infusion Pump    Recent diagnosis of diverticulitis within the last 6 weeks? No    Diabetic? Yes. Diabetic medication HOLDING recommendations: Insulin: Yes. Consult with managing provider       Medication review:    Anticoagulants? No    NSAIDS? No NSAID medications per patient's medication list.  RN will verify with pre-assessment call.    Other medication HOLDING recommendations:  N/A      Prep for procedure:     Bowel prep recommendation: Standard Golytely. Bowel prep prescription sent to [facility] [id number] [place], [place] - [address] [place] [address]  Due to: diabetes.     Prep instructions sent via [facility] RN  Endoscopy Procedure Pre Assessment RN  [phone] option 4

## 2024-05-08 NOTE — TELEPHONE ENCOUNTER
Pre assessment completed for upcoming procedure.      Procedure details:    Patient scheduled for Colonoscopy  on 5.17.24.     Arrival time: 1200. Procedure time 1300    Facility location: [facility]; [address], [address], [place] [address]. Check in location: Main entrance at registration desk.    Sedation type: Conscious sedation     Pre op exam needed? N/A    Indication for procedure: screening    Designated  policy reviewed. Instructed to have someone stay 6 hours post procedure.       Chart review:     Electronic implanted devices? No    Recent diagnosis of diverticulitis within the last 6 weeks?  No    Diabetic? Yes. Diabetic medication HOLDING recommendations: Diabetic injectables: Yes- Trulicity (Dulaglutide).  Weekly dosing of medication.  Hold 7 days before procedure.  Follow up with managing provider.   Insulin: Yes. Consult with managing provider       Medication review:    Anticoagulants? No    NSAIDS? No    Other medication HOLDING recommendations:  N/A      Prep for procedure:     Bowel prep recommendation: Standard Golytely. Bowel prep prescription sent to [facility] [id number] [place], [place] - [address]  Due to: diabetes.     Prep instructions sent via [employer]     Reviewed procedure prep instructions.     Patient verbalized understanding and had no questions or concerns at this time.        Aminata Dowell RN  Endoscopy Procedure Pre Assessment RN  [phone] option 4

## 2024-05-14 ENCOUNTER — OFFICE VISIT (OUTPATIENT)
Dept: ORTHOPEDICS | Facility: CLINIC | Age: 64
End: 2024-05-14
Attending: PHYSICIAN ASSISTANT
Payer: COMMERCIAL

## 2024-05-14 DIAGNOSIS — M72.0 DUPUYTREN CONTRACTURE: ICD-10-CM

## 2024-05-14 PROCEDURE — 99204 OFFICE O/P NEW MOD 45 MIN: CPT | Performed by: STUDENT IN AN ORGANIZED HEALTH CARE EDUCATION/TRAINING PROGRAM

## 2024-05-14 NOTE — NURSING NOTE
Reason For Visit:   Chief Complaint   Patient presents with    Consult     Consult for Dupuytren's contracture of both hands       Primary MD: Kathya Lang  Ref. MD: Zhang    Age: 63 year old    ?  No      There were no vitals taken for this visit.      Pain Assessment  Patient Currently in Pain: No (denies pain, onset years ago, left worse)    Hand Dominance Evaluation  Hand Dominance: Right          QuickDASH Assessment      5/14/2024     8:00 AM   QuickDASH Main   1. Open a tight or new jar Moderate difficulty   2. Do heavy household chores (e.g., wash walls, floors) No difficulty   3. Carry a shopping bag or briefcase No difficulty   4. Wash your back No difficulty   5. Use a knife to cut food Mild difficulty   6. Recreational activities in which you take some force or impact through your arm, shoulder or hand (e.g., golf, hammering, tennis, etc.) No difficulty   7. During the past week, to what extent has your arm, shoulder or hand problem interfered with your normal social activities with family, friends, neighbours or groups Not at all   8. During the past week, were you limited in your work or other regular daily activities as a result of your arm, shoulder or hand problem Not limited at all   9. Arm, shoulder or hand pain Moderate   10.Tingling (pins and needles) in your arm,shoulder or hand None   11. During the past week, how much difficulty have you had sleeping because of the pain in your arm, shoulder or hand No difficulty   Quickdash Ability Score 11.36          Current Outpatient Medications   Medication Sig Dispense Refill    albuterol (PROAIR HFA/PROVENTIL HFA/VENTOLIN HFA) 108 (90 Base) MCG/ACT inhaler Inhale 2 puffs into the lungs every 6 hours as needed for shortness of breath or wheezing 18 g 5    amLODIPine (NORVASC) 10 MG tablet Take 1 tablet (10 mg) by mouth daily 90 tablet 3    [START ON 7/2/2024] amphetamine-dextroamphetamine (ADDERALL) 15 MG tablet Take 1 tablet (15  mg) by mouth 2 times daily 60 tablet 0    [START ON 6/2/2024] amphetamine-dextroamphetamine (ADDERALL) 15 MG tablet Take 1 tablet (15 mg) by mouth 2 times daily 60 tablet 0    amphetamine-dextroamphetamine (ADDERALL) 15 MG tablet Take 1 tablet (15 mg) by mouth 2 times daily 60 tablet 0    ARIPiprazole (ABILIFY) 15 MG tablet Take 1 tablet (15 mg) by mouth daily 90 tablet 1    aspirin 81 MG tablet Take 1 tablet (81 mg) by mouth daily 100 tablet 3    atorvastatin (LIPITOR) 40 MG tablet Take 1 tablet (40 mg) by mouth daily 90 tablet 3    bisacodyl (DULCOLAX) 5 MG EC tablet Take 2 tablets at 3 pm the day before your procedure. If your procedure is before 11 am, take 2 additional tablets at 11 pm. If your procedure is after 11 am, take 2 additional tablets at 6 am. For additional instructions refer to your colonoscopy prep instructions. 4 tablet 0    blood glucose (ACCU-CHEK GUIDE) test strip Use to test blood sugar 3 times daily or as directed. 100 strip 11    blood glucose (CONTOUR NEXT TEST) test strip For diabetes, tests 4 times daily 400 strip 3    buPROPion (WELLBUTRIN XL) 150 MG 24 hr tablet Take 1 tablet (150 mg) by mouth every morning Take with 300 mg tablet (total daily dose 450 mg) 60 tablet 5    buPROPion (WELLBUTRIN XL) 300 MG 24 hr tablet Take 1 tablet (300 mg) by mouth every morning Take with 150 mg tablet (total daily dose 450 mg) 60 tablet 5    cetirizine (ZYRTEC) 10 MG tablet Take 10 mg by mouth      DiphenhydrAMINE HCl (BENADRYL PO)       Dulaglutide (TRULICITY) 4.5 MG/0.5ML SOPN Inject 4.5 mg Subcutaneous once a week 6 mL 3    Glucagon (BAQSIMI) 3 MG/DOSE nasal powder Spray 1 spray (3 mg) in nostril as needed (severe hypoglycemia) in the event of unconscious hypoglycemia or hypoglycemic seizure. May repeat dose if no response after 15 minutes. 1 each 1    glucagon 1 MG kit Inject 1 mg into the muscle once for 1 dose 1 each 0    insulin glargine (LANTUS PEN) 100 UNIT/ML pen Inject 17 Units Subcutaneous  "At Bedtime In case of pump failure. 15 mL 3    Insulin Infusion Pump (INSULIN PUMP CY0599) KIT       insulin lispro (HUMALOG) 100 UNIT/ML vial VIA PUMP APPROX 80 UNITS DAILY. Lab draw needed. Call [address] () to schedule. 80 mL 1    insulin lispro (HUMALOG) 100 UNIT/ML vial USE AS DIRECTED IN PUMP APPROXIMATELY 80 UNITS PER DAY Lab draw and follow up visit needed. Call  to schedule. 80 mL 0    insulin lispro (HUMALOG) 100 UNIT/ML vial USE AS DIRECTED IN PUMP APPROXIMATELY 80 UNITS PER DAY. Clinic visit and lab draw needed. Call  to schedule. 10 mL 0    insulin syringe-needle U-100 (BD INSULIN SYRINGE) 29G X 1/2\" 1 ML miscellaneous Use as directed 20 each 1    KETOSTIX test strip Use as directed in case of high glucose, vomiting or illness. 50 strip 11    levothyroxine (SYNTHROID/LEVOTHROID) 150 MCG tablet Take 1 tablet (150 mcg) by mouth daily 90 tablet 3    losartan-hydrochlorothiazide (HYZAAR) 50-12.5 MG tablet Take 1 tablet by mouth daily 90 tablet 3    NEW MED Trimix #4    Sig:  Inject 0.6 mL intracavernosal as directed.    Max use once daily and up to 3x/week.  Trimix intracavernosal injection, per mL:  PGE1: 40 mcg  Papaverine: 27.6mg  Phentolamine: 1 mg 10 mL 11    ONE TOUCH TEST STRIPS test strip Use to test blood sugar 4 times daily or as directed. 400 strip 3    order for DME Equipment being ordered: CPAP machine 1 each 0    polyethylene glycol (GOLYTELY) 236 g suspension The night before the exam at 6 pm drink an 8-ounce glass every 15 minutes until the jug is half empty. If you arrive before 11 AM: Drink the other half of the Golytely jug at 11 PM night before procedure. If you arrive after 11 AM: Drink the other half of the Golytely jug at 6 AM day of procedure. For additional instructions refer to your colonoscopy prep instructions. 4000 mL 0    topiramate (TOPAMAX) 50 MG tablet Take 1 tablet (50 mg) by mouth daily 90 tablet 3    Urea 40 % CREA Externally apply " topically 2 times daily To surface of toenails 198 g 5       Allergies   Allergen Reactions    Iodinated Contrast Media Anaphylaxis    Contrast Dye Hives    Diatrizoate Hives     Iodine dye; iodine externally or topically is OK       Adri Angel, ATC

## 2024-05-14 NOTE — LETTER
5/14/2024         RE: Luigi Moore  [address]  [address]  [facility] [address]        Dear Colleague,    Thank you for referring your patient, Luigi Moore, to the [facility] [place]. Please see a copy of my visit note below.    Ortho Hand    HPI: 63-year-old right-hand-dominant non-smoker  presenting with bilateral hand Dupuytren's contracture.  This has been ongoing for few years, and worsened over the last 6 months.  It affects both hands.  It is beginning to affect his daily hand function.  He states that for example it is even difficult to use chopsticks.    ROS: Negative, see HPI  PMH: T1DM on insulin pump, hyperlipidemia, hypertension  PSH: No surgeries to the hands, wrists and/or elbows  Medications: No blood thinners  Allergies: None  SH: Nonsmoker, denies any tobacco or nicotine use  FH: No bleeding or clotting issues, or problems with anesthesia    Examination:  Nonlabored breathing  Not distressed  Can make full bilateral composite active fist  Bilateral predominantly small finger metacarpophalangeal joint Dupuytren's contractures with associated skin tethering, slightly worse on the left    A/P: 63-year-old male right-hand-dominant non-smoker  presenting with bilateral hand Dupuytren's flexion contractures    -Discussed options for management.  The options for treatment include observation, percutaneous needle aponeurotomy, collagenase injection, or open surgical fasciectomy.  Patient would like to have surgery, but would like to schedule for sometime in the winter timeframe.   -Discussed the risks of surgery, including but not limited to: infection, bleeding, pain, poor scarring, wound healing issues, injury to nerves or tendons, neuroma formation, complex regional pain syndrome, anesthesia related complication.  Despite these risks, patient consents to and would like to proceed with surgery.  -MAC with block  -Case request to  be placed  -A total of 45 minutes was devoted to review of chart, direct face-to-face patient counseling and documentation during this encounter, exclusive of any procedure performed.    Miguel Marcus MD, PhD

## 2024-05-15 ENCOUNTER — TELEPHONE (OUTPATIENT)
Dept: ORTHOPEDICS | Facility: CLINIC | Age: 64
End: 2024-05-15
Payer: COMMERCIAL

## 2024-05-15 NOTE — TELEPHONE ENCOUNTER
Need OT at 1 week    Procedure: Left palm and small finger open Dupuytren's fasciectomy, possible ring finger Dupuytren's fasciectomy  Facility: [facility]  Length: 75 minutes  Anesthesia: Regional, MAC  Post-op appointments needed: 1 week post op with surgeon and OT to follow, 6 weeks with surgeon only.  Surgery packet/instructions given to patient?  Yes     Nima Rockwell, RNCC

## 2024-05-16 RX ORDER — NALOXONE HYDROCHLORIDE 0.4 MG/ML
0.4 INJECTION, SOLUTION INTRAMUSCULAR; INTRAVENOUS; SUBCUTANEOUS
Status: CANCELLED | OUTPATIENT
Start: 2024-05-16

## 2024-05-16 RX ORDER — ONDANSETRON 4 MG/1
4 TABLET, ORALLY DISINTEGRATING ORAL EVERY 6 HOURS PRN
Status: CANCELLED | OUTPATIENT
Start: 2024-05-16

## 2024-05-16 RX ORDER — NALOXONE HYDROCHLORIDE 0.4 MG/ML
0.2 INJECTION, SOLUTION INTRAMUSCULAR; INTRAVENOUS; SUBCUTANEOUS
Status: CANCELLED | OUTPATIENT
Start: 2024-05-16

## 2024-05-16 RX ORDER — PROCHLORPERAZINE MALEATE 10 MG
10 TABLET ORAL EVERY 6 HOURS PRN
Status: CANCELLED | OUTPATIENT
Start: 2024-05-16

## 2024-05-16 RX ORDER — ONDANSETRON 2 MG/ML
4 INJECTION INTRAMUSCULAR; INTRAVENOUS
Status: CANCELLED | OUTPATIENT
Start: 2024-05-16

## 2024-05-16 RX ORDER — LIDOCAINE 40 MG/G
CREAM TOPICAL
Status: CANCELLED | OUTPATIENT
Start: 2024-05-16

## 2024-05-16 RX ORDER — CEFAZOLIN SODIUM 2 G/50ML
2 SOLUTION INTRAVENOUS SEE ADMIN INSTRUCTIONS
OUTPATIENT
Start: 2024-05-16

## 2024-05-16 RX ORDER — ONDANSETRON 2 MG/ML
4 INJECTION INTRAMUSCULAR; INTRAVENOUS EVERY 6 HOURS PRN
Status: CANCELLED | OUTPATIENT
Start: 2024-05-16

## 2024-05-16 RX ORDER — FLUMAZENIL 0.1 MG/ML
0.2 INJECTION, SOLUTION INTRAVENOUS
Status: CANCELLED | OUTPATIENT
Start: 2024-05-16 | End: 2024-05-17

## 2024-05-16 RX ORDER — CEFAZOLIN SODIUM 2 G/50ML
2 SOLUTION INTRAVENOUS
OUTPATIENT
Start: 2024-05-16

## 2024-05-16 NOTE — PROGRESS NOTES
Ortho Hand    HPI: 63-year-old right-hand-dominant non-smoker  presenting with bilateral hand Dupuytren's contracture.  This has been ongoing for few years, and worsened over the last 6 months.  It affects both hands.  It is beginning to affect his daily hand function.  He states that for example it is even difficult to use chopsticks.    ROS: Negative, see HPI  PMH: T1DM on insulin pump, hyperlipidemia, hypertension  PSH: No surgeries to the hands, wrists and/or elbows  Medications: No blood thinners  Allergies: None  SH: Nonsmoker, denies any tobacco or nicotine use  FH: No bleeding or clotting issues, or problems with anesthesia    Examination:  Nonlabored breathing  Not distressed  Can make full bilateral composite active fist  Bilateral predominantly small finger metacarpophalangeal joint Dupuytren's contractures with associated skin tethering, slightly worse on the left    A/P: 63-year-old male right-hand-dominant non-smoker  presenting with bilateral hand Dupuytren's flexion contractures    -Discussed options for management.  The options for treatment include observation, percutaneous needle aponeurotomy, collagenase injection, or open surgical fasciectomy.  Patient would like to have surgery, but would like to schedule for sometime in the winter timeframe.   -Discussed the risks of surgery, including but not limited to: infection, bleeding, pain, poor scarring, wound healing issues, injury to nerves or tendons, neuroma formation, complex regional pain syndrome, anesthesia related complication.  Despite these risks, patient consents to and would like to proceed with surgery.  -MAC with block  -Case request to be placed  -A total of 45 minutes was devoted to review of chart, direct face-to-face patient counseling and documentation during this encounter, exclusive of any procedure performed.    Miguel Marcus MD, PhD

## 2024-05-17 ENCOUNTER — HOSPITAL ENCOUNTER (OUTPATIENT)
Facility: CLINIC | Age: 64
Discharge: HOME | End: 2024-05-17
Attending: INTERNAL MEDICINE | Admitting: INTERNAL MEDICINE
Payer: COMMERCIAL

## 2024-05-17 VITALS
OXYGEN SATURATION: 100 % | SYSTOLIC BLOOD PRESSURE: 126 MMHG | HEART RATE: 79 BPM | RESPIRATION RATE: 15 BRPM | DIASTOLIC BLOOD PRESSURE: 71 MMHG

## 2024-05-17 LAB
COLONOSCOPY: NORMAL
GLUCOSE BLDC GLUCOMTR-MCNC: 178 MG/DL (ref 70–99)

## 2024-05-17 PROCEDURE — G0500 MOD SEDAT ENDO SERVICE >5YRS: HCPCS | Performed by: INTERNAL MEDICINE

## 2024-05-17 PROCEDURE — 82962 GLUCOSE BLOOD TEST: CPT

## 2024-05-17 PROCEDURE — 45378 DIAGNOSTIC COLONOSCOPY: CPT | Performed by: INTERNAL MEDICINE

## 2024-05-17 PROCEDURE — 99153 MOD SED SAME PHYS/QHP EA: CPT | Performed by: INTERNAL MEDICINE

## 2024-05-17 PROCEDURE — G0121 COLON CA SCRN NOT HI RSK IND: HCPCS | Performed by: INTERNAL MEDICINE

## 2024-05-17 PROCEDURE — 250N000011 HC RX IP 250 OP 636: Performed by: INTERNAL MEDICINE

## 2024-05-17 RX ORDER — FENTANYL CITRATE 50 UG/ML
INJECTION, SOLUTION INTRAMUSCULAR; INTRAVENOUS PRN
Status: DISCONTINUED | OUTPATIENT
Start: 2024-05-17 | End: 2024-05-17 | Stop reason: HOSPADM

## 2024-05-17 ASSESSMENT — ACTIVITIES OF DAILY LIVING (ADL)
ADLS_ACUITY_SCORE: 36
ADLS_ACUITY_SCORE: 38
ADLS_ACUITY_SCORE: 38

## 2024-05-17 NOTE — H&P
ENDOSCOPY PRE-SEDATION H&P FOR OUTPATIENT PROCEDURES    Luigi Moore  [id number]  1960    Procedure: colonoscopy    Pre-procedure diagnosis: hx polyps    Past medical history:   Past Medical History:   Diagnosis Date    Acquired hypothyroidism 12/07/2016    Cancer (H) 2015    Depressive disorder     Hidradenoma dx: Feb 2015    right hand/2 surgeries    Hyperlipidemia 12/07/2006    Hypertension     Numbness and tingling 2015    right hand, related to surgery for hidradenoma    TELLY (obstructive sleep apnea) 2007    New cpap machine 1 year ago. [place] [facility]    Type 1 diabetes (H) 1982    Uncomplicated asthma        Past surgical history:   Past Surgical History:   Procedure Laterality Date    BIOPSY  2016    EXCISE LESION HAND Right 02/26/2015    Procedure: EXCISE LESION HAND;  Surgeon: Jeovany Jefferson MD;  Location: [facility] OR    IMPLANT PROSTHESIS PENIS INFLATABLE N/A 02/01/2022    Procedure: INSERTION of INFLATABLE PENILE PROSTHESIS;  Surgeon: Johnathan Cooper MD;  Location: [facility] OR    ORTHOPEDIC SURGERY Left     achilles tendon     ORTHOPEDIC SURGERY Right     hand surgery    SOFT TISSUE SURGERY      lipoma removal, below rib cage on right side       No current facility-administered medications for this encounter.     Facility-Administered Medications Ordered in Other Encounters   Medication Dose Route Frequency Provider Last Rate Last Admin    lidocaine (PF) (XYLOCAINE) 1 % injection 10 mL  10 mL Intradermal Once Kathya Lang APRN CNP           Allergies   Allergen Reactions    Iodinated Contrast Media Anaphylaxis    Contrast Dye Hives    Diatrizoate Hives     Iodine dye; iodine externally or topically is OK       History of Anesthesia/Sedation Problems: no    PHYSICAL EXAMINATION:  Constitutional: aaox3, cooperative, pleasant  Vitals reviewed: /83   Resp 16   SpO2 98%   Wt:   Wt Readings from Last 2 Encounters:   01/05/24 95.1 kg (209 lb 9.6 oz)   12/19/23 97.1 kg (214 lb)       Eyes: Sclera anicteric/injected  Ears/nose/mouth/throat: Normal oropharynx without ulcers or exudate, mucus membranes moist, hearing intact  Neck: supple, thyroid normal size  CV: No edema  Respiratory: Unlabored breathing  Lymph: No submandibular, supraclavicular or inguinal lymphadenopathy  Abd: Nondistended, no masses, nontender  Skin: warm, perfused, no jaundice  Psych: Normal affect  MSK: normal movement on limited exam.    ASA Score: See Provation note    Assessment/Plan:     The patient is an appropriate candidate to receive sedation.    Informed consent was discussed with the patient/family, including the risks, benefits, potential complications and any alternative options associated with sedation.    Patient assessment completed just prior to sedation and while under constant observation by the provider. Condition determined to be adequate for proceeding with sedation.    The specific risks for the procedure were discussed with the patient at the time of informed consent and include but are not limited to perforation which could require surgery, missing significant neoplasm or lesion, hemorrhage and adverse sedative complication.      Jose Luis Gonzales MD

## 2024-05-28 NOTE — TELEPHONE ENCOUNTER
Left message regarding scheduling surgery/procedure with Dr. Marcus. Writer left call back number on the patients voicemail.      Naheed Mercer on 5/28/2024 at 9:44 AM   P: [phone]

## 2024-06-03 ENCOUNTER — MYC MEDICAL ADVICE (OUTPATIENT)
Dept: INTERNAL MEDICINE | Facility: CLINIC | Age: 64
End: 2024-06-03
Payer: COMMERCIAL

## 2024-06-03 NOTE — TELEPHONE ENCOUNTER
Called and LVM for patient to schedule surgery with Dr. Marcus. Provided direct call back number [phone].      Moni Mckeon on 6/3/2024 at 2:01 PM

## 2024-06-05 ENCOUNTER — TELEPHONE (OUTPATIENT)
Dept: ENDOCRINOLOGY | Facility: CLINIC | Age: 64
End: 2024-06-05

## 2024-06-05 NOTE — TELEPHONE ENCOUNTER
Retail Pharmacy Prior Authorization Team   Phone: [phone]    PRIOR AUTHORIZATION DENIED    Medication: TRULICITY 4.5 MG/0.5ML SC SOPN  Insurance Company: [employer] ([facility]) - Phone [phone] Fax [phone]  Denial Date: 6/5/2024  Denial Reason(s): MUST HAVE TYPE 2 DIABETES (PER CHART NOTES PATIENT HAS TYPE 1) AND PROVIDE DOCUMENTATION CONFIRMING DX      Appeal Information: IF THE PROVIDER WOULD LIKE TO APPEAL THIS DECISION PLEASE PROVIDE THE PA TEAM WITH A LETTER OF MEDICAL NECESSITY      Patient Notified: NO

## 2024-06-07 ENCOUNTER — HOSPITAL ENCOUNTER (OUTPATIENT)
Facility: AMBULATORY SURGERY CENTER | Age: 64
End: 2024-06-07
Attending: STUDENT IN AN ORGANIZED HEALTH CARE EDUCATION/TRAINING PROGRAM
Payer: COMMERCIAL

## 2024-06-07 DIAGNOSIS — M72.0 DUPUYTREN CONTRACTURE: Primary | ICD-10-CM

## 2024-06-20 NOTE — TELEPHONE ENCOUNTER
Follow Up: I see this is still open. Just wanted to follow up and see if we wanted to Appeal, or you would like me to try some test claims for similar medications in this class. Please let em know.

## 2024-07-12 ENCOUNTER — TELEPHONE (OUTPATIENT)
Dept: CARDIOLOGY | Facility: CLINIC | Age: 64
End: 2024-07-12
Payer: COMMERCIAL

## 2024-07-12 NOTE — TELEPHONE ENCOUNTER
Patient confirmed scheduled appointment:  Date: 7/31   Time: 1:30   Visit type: new mtm   Provider: Todd Do   Location: virtual   Testing/imaging:   Additional notes:   Ariana Claire, RN  Clinic [id number] minutes ago (2:34 PM)     DM  Please schedule visit with Pharmacy Doctor. Thank you       Lauren Colón on 7/12/2024 at 3:10 PM

## 2024-07-30 NOTE — PROGRESS NOTES
"Medication Therapy Management (MTM) Encounter    ASSESSMENT:                            Medication Adherence/Access: See below for considerations    Type 1 Diabetes/Hx BMI > 27:   A1C below goal of < 7%, patient referred to me to help resolve GLP-1 access issue. Historically controlled on Trulicity 4.5 mg weekly, but insurance coverage stopped due to patient not having a diagnosis of type 2 diabetes. A weight-management GLP-1 agonist may be covered, so will complete steps for documentation for approval today and follow patient to titrate once approved.     Hypertension:   Consistently below JNC8 goal of < 140/90. Continue current therapy    Hyperlipidemia:   Last LDL < 100, on high intensity statin. Continue current therapy    Hypothyroidism:   Last TSH WNL    PLAN:                            Wegovy appeal letter in place. Will follow to titrate once approved.    To Whom it May Concern:    I am writing to formally request a prior authorization of coverage for my patient,  Luigi Moore, for treatment using Wegovy 0.25 mg #2 mL for 28 day supply and all subsequent doses.    The patient will be using for FDA approved indication (if patient has a BMI greater than 30 or > 27 with weight-related comorbidities).     Please see below for rationale for coverage for Wegovy:    Current BMI is > 27 with diagnosis of weight-related comorbidity of obstructive sleep apnea (TELLY) and Wegovy would be used for comprehensive weight management  Estimated body mass index is 27.1 kg/m  as calculated from the following:    Height as of 1/5/24: (6' 2.5\").    Weight as of 7/31/24: (214 lb ).    Wegovy would be used as an adjunct to a reduced-calorie diet and increased physical activity    Patient has been and is currently participating in a weight loss program addressing diet and exercise for at least six months prior to initiation to therapy. Patient has not been able to achieve his weight loss goals despite 4-10 hours of moderate-high " intensity physical activity per week (cycling and playing tennis)     Patient follows the [facility] and has had more than two clinic appointments discussing weight loss and lifestyle changes with a provider, diabetes care and , or registered dietician in the last six months    Other formulary options are not appropriate for this patient.  Given history of  panic disorder, Contrave--which contains antidepressant bupropion--can exacerbate anxiety so it is not appropriate for this patient.  Qsymia and phentermine monotherapy are also not appropriate options given the patient has a history of panic disorder, which is a contraindication for phentermine. He has tried topiramate in the past and not been successful achieving weight loss goals. Saxenda is a once daily injection, which would be burdensome to this patient given requirement of daily administration vs. the once weekly convenience of Wegovy.      Please approve this patient for Wegovy to help this patient to achieve their weight loss goals.    I would request this submission be evaluated on an individual basis by an endocrinologist or weight  who is current in the practice and standards of care. I firmly believe that this therapy is clinically appropriate and that Luigi Moore would benefit from improved clinical outcomes and quality of life if allowed the opportunity to receive this treatment.  I urge you to follow the aforementioned evidence presented to keep the best interest of our patient in mind.        Endocrine Team & Next Follow-Up:  12/17/2024 with Arti Kaur PA-C  When approved for Wegovy with Todd    SUBJECTIVE/OBJECTIVE:                          Maurice Moore is a 63 year old male seen for an initial visit. He was referred to me from Arti Kaur PA-C.      Reason for visit: GLP-1 Access Issue.    Allergies/ADRs: Reviewed in chart  Past Medical  "History: Reviewed in chart  Social History     Tobacco Use    Smoking status: Never    Smokeless tobacco: Never   Vaping Use    Vaping status: Never Used   Substance Use Topics    Alcohol use: No     Comment: History of alcohol abuse/sober since 2015; went to treatment     Drug use: No        Medication Adherence/Access: no issues reported other than GLP-1 access issue today    Type 1 Diabetes /BMI > 27  Trulicity 4.5mg/0.5mL inject once weekly **has not had for past few months due to insurance denial (no diagnosis of T2D)   Insulin glargine (Lantus) take 17 units at bedtime in case of pump failure  Insulin infusion pump   Humalog use as directed in Medtronic pump approximately 80 units daily     Has not changed pump settings since stopping Trulicity, but may consider this if Wegovy is not approved. He reports higher PPG and increased daily insulin requirement since stopping Trulicity.               Afinion Hemoglobin A1c POCT   Date Value Ref Range Status   12/19/2023 6.9 (H) <=5.7 % Final     Comment:     Normal <5.7%   Prediabetes 5.7-6.4%     Diabetes 6.5% or higher       Note: Adopted from ADA consensus guidelines.     Estimated body mass index is 26.55 kg/m  as calculated from the following:    Height as of 1/5/24: 1.892 m (6' 2.5\").    Weight as of 1/5/24: 95.1 kg (209 lb 9.6 oz).           Hypertension  Losartan-hydrochlorothiazide 50-12.5 mg tablets take once daily   Amlodipine 10 mg tablet take once daily   BP Readings from Last 6 Encounters:   05/17/24 126/71   01/05/24 122/70   12/19/23 113/71   12/28/22 (!) 151/82   11/15/22 130/74   03/11/22 (!) 162/92       Hyperlipidemia   Atorvastatin 40 mg once daily  Patient reports no significant myalgias or other side effects.  Recent Labs   Lab Test 01/05/24  1334 12/28/22  1450   CHOL 144 214*   HDL 53 55   LDL 76 135*   TRIG 75 121           Hypothyroidism   Levothyroxine 150 mcg once daily.   Patient is having the following symptoms: none.  TSH   Date Value " Ref Range Status   12/19/2023 2.67 0.30 - 4.20 uIU/mL Final   05/21/2021 3.36 0.40 - 4.00 mU/L Final             Today's Vitals: There were no vitals taken for this visit.  ----------------      I spent 30 minutes with this patient today. Arti Kaur PA-C was provided the recommendations above via routed note and is the authorizing prescriber for this visit through the pharmacist collaborative practice agreement. A copy of the visit note was provided to the patient's provider(s).    The patient was given to the patient a summary of these recommendations.     Todd Do, PharmD, [facility]  Endocrine & Diabetes [facility] Pharmacist  [address] [place] [id number]    Telemedicine Visit Details  Type of service:  Video Conference via AskU  Start Time:  2:30PM  End Time:  3:00PM     Medication Therapy Recommendations  Type 1 diabetes mellitus (H)    Current Medication: Semaglutide-Weight Management (WEGOVY) 0.25 MG/0.5ML pen   Rationale: Cannot afford medication product - Cost - Adherence   Recommendation: Referral to Service    Status: Resolved Med Access Issue

## 2024-07-31 ENCOUNTER — TELEPHONE (OUTPATIENT)
Dept: ENDOCRINOLOGY | Facility: CLINIC | Age: 64
End: 2024-07-31
Payer: COMMERCIAL

## 2024-07-31 ENCOUNTER — VIRTUAL VISIT (OUTPATIENT)
Dept: CARDIOLOGY | Facility: CLINIC | Age: 64
End: 2024-07-31
Payer: COMMERCIAL

## 2024-07-31 DIAGNOSIS — E03.9 ACQUIRED HYPOTHYROIDISM: ICD-10-CM

## 2024-07-31 DIAGNOSIS — E66.9 OBESITY WITH SERIOUS COMORBIDITY, UNSPECIFIED CLASSIFICATION, UNSPECIFIED OBESITY TYPE: ICD-10-CM

## 2024-07-31 DIAGNOSIS — E10.8 TYPE 1 DIABETES MELLITUS WITH COMPLICATIONS (H): Primary | ICD-10-CM

## 2024-07-31 DIAGNOSIS — E78.5 DYSLIPIDEMIA: ICD-10-CM

## 2024-07-31 DIAGNOSIS — I10 ESSENTIAL HYPERTENSION: ICD-10-CM

## 2024-07-31 NOTE — Clinical Note
"7/31/2024      RE: Luigi Moore  [address]  [address]  [facility] [address]       Dear Colleague,    Thank you for the opportunity to participate in the care of your patient, Luigi Moore, at the [facility] [place] at [facility]. Please see a copy of my visit note below.    Medication Therapy Management (MTM) Encounter    ASSESSMENT:                            Medication Adherence/Access: See below for considerations    ***:   ***    PLAN:                            ***    To Whom it May Concern:    I am writing to formally request a prior authorization of coverage for my patient,  Lugii Moore, for treatment using Wegovy 0.25 mg #2 mL for 28 day supply and all subsequent doses.    The patient will be using for FDA approved indication (if patient has a BMI greater than 30 or > 27 with weight-related comorbidities).     Please see below for rationale for coverage for Wegovy:    Current BMI is *** and Wegovy would be used for comprehensive weight management  Estimated body mass index is 26.55 kg/m  as calculated from the following:    Height as of 1/5/24: 1.892 m (6' 2.5\").    Weight as of 1/5/24: 95.1 kg (209 lb 9.6 oz).    Wt Readings from Last 3 Encounters:   01/05/24 95.1 kg (209 lb 9.6 oz)   12/19/23 97.1 kg (214 lb)   12/28/22 107.5 kg (236 lb 14.4 oz)     Wegovy would be used as an adjunct to a reduced-calorie diet and increased physical activity    Patient has been and is currently participating in a weight loss program addressing diet and exercise for at least six months prior to initiation to therapy. Patient cycles and plays tennis, > 220 lb. 4-10 hours of tennis per week      Patient follows the [facility] and has had more than two clinic appointments discussing weight loss and lifestyle changes with a provider, diabetes care and , or registered dietician in " the last six months    Other formulary options are not appropriate for this patient.  Given history of anxiety***, Contrave--which contains antidepressant bupropion--can exacerbate anxiety so it is not appropriate for this patient***.  Qsymia and phentermine monotherapy are also not appropriate options given the patient has a diagnosis of ***. Saxenda is a once daily injection, which would be burdensome to this patient given requirement of daily administration vs. the once weekly convenience of Wegovy. Tried topiramate in the past     Please approve this patient for Wegovy to help this patient to achieve their weight loss goals.    I would request this submission be evaluated on an individual basis by an endocrinologist or weight  who is current in the practice and standards of care. I firmly believe that this therapy is clinically appropriate and that Luigi Moore would benefit from improved clinical outcomes and quality of life if allowed the opportunity to receive this treatment.  I urge you to follow the aforementioned evidence presented to keep the best interest of our patient in mind.        Endocrine Team & Next Follow-Up:  ***    SUBJECTIVE/OBJECTIVE:                          Maurice Moore is a 63 year old male seen for an initial visit. He was referred to me from Arti Kaur PA-C.      Reason for visit: GLP-1 Access Issue.    Allergies/ADRs: Reviewed in chart  Past Medical History: Reviewed in chart  Social History     Tobacco Use    Smoking status: Never    Smokeless tobacco: Never   Vaping Use    Vaping status: Never Used   Substance Use Topics    Alcohol use: No     Comment: History of alcohol abuse/sober since 2015; went to treatment     Drug use: No        Medication Adherence/Access: {[facility]824114}    Diabetes  Trulicity 4.5mg/0.5mL inject once weekly *** last filled 3/5/24 for 84 ds  Insulin glargine (Lantus) take 17 units at bedtime in case of pump failure*** last  "filled 23 for 16 ds   Insulin infusion pump   Humalog use as directed in pump approximately 80 units daily     Out past two months     Next Wednesday     Fat deposits in Liver (side effect from T1D)    Test strips use to test blood sugar 3-4 times daily *** (which test strip brand currently using?)   Baqsimi 3mg/dose nasal powder instill 1 spral in nostril as needed in event of severe hypoglycemia. Repeat if no response in 15 minutes.   Aspirin 81mg tablets daily  {sideeffects:146636}  Current diabetes symptoms: {Diabetes Symptoms:948827}  Blood sugar monitoring: {[facility] self monitorin}  Diet/Exercise: ***    Afinion Hemoglobin A1c POCT   Date Value Ref Range Status   2023 6.9 (H) <=5.7 % Final     Comment:     Normal <5.7%   Prediabetes 5.7-6.4%     Diabetes 6.5% or higher       Note: Adopted from ADA consensus guidelines.     Estimated body mass index is 26.55 kg/m  as calculated from the following:    Height as of 24: 1.892 m (6' 2.5\").    Weight as of 24: 95.1 kg (209 lb 9.6 oz).         Eye exam is up to date  Foot exam is up to date    Hypertension  Losartan-hydrochlorothiazide 50-12.5 mg tablets take once daily   Amlodipine 10 mg tablet take once daily   BP Readings from Last 6 Encounters:   24 126/71   24 122/70   23 113/71   22 (!) 151/82   11/15/22 130/74   22 (!) 162/92       Hyperlipidemia  Atorvastatin 40 mg once daily  Patient reports {mtmlipidsideeffect:488407}  Recent Labs   Lab Test 24  1334 22  1450   CHOL 144 214*   HDL 53 55   LDL 76 135*   TRIG 75 121        Asthma  Albuterol 108 (90 base) mcg/act inhale 2 puffs into lungs every 6 hours as needed    ***  {Steroid inhaler -- Delete if patient does not use steroid:721472}   Patient reports {:522924}.      Triggers include: {ASTHMA TRIGGERS:804914}.  Patient reports the following symptoms: {SYMPTOMS:671903}.     Mental Health  Amphetamine-dextroamphetamine (Adderall) 15 mg tablets " take twice daily   Aripiprazole 15 mg tablets take once daily*** Still taking?   Bupropion 150 mg + 300 mg take 1 tablet of 150 mg with 300 mg every morning ( mg)*** Still taking? Last filled 5/8/24 for 30ds  {depheadermtm:875161}  {DEPRESSION MEDS [facility]:905143}.   Patient reports { :152767}.  {mtmdepassess:655353}     Hypothyroidism  Levothyroxine 150 mcg once daily.   Patient is having the following symptoms: {hypo/hyperthyroid:776623}.  TSH   Date Value Ref Range Status   12/19/2023 2.67 0.30 - 4.20 uIU/mL Final   05/21/2021 3.36 0.40 - 4.00 mU/L Final           Migraine   Topiramate 50 mg tablet take once daily    Constipation?   Bisacodyl 5 mg EC tablet  Polyethylene glycol 236 g suspension     Allergic Rhinitis?   Cetirizine 10 mg tablet once daily   Diphenhydramine *** still taking? What strength? Frequency?     ***Urea 40% cream *** still taking?      ***:   ***    Today's Vitals: There were no vitals taken for this visit.  ----------------  {TOREY?:144385}    I spent {[facility] total time 3:895695} with this patient today. *** was provided the recommendations above via routed note and is the authorizing prescriber for this visit through the pharmacist collaborative practice agreement. A copy of the visit note was provided to the patient's provider(s).    The patient was given to the patient a summary of these recommendations.     Todd Do, PharmD, [facility]  Endocrine & Diabetes [facility] Pharmacist  [address] [place] [address]    Telemedicine Visit Details  Type of service:  Video Conference via [employer]  Start Time: {video/phone visit start time:152948}  End Time: {video/phone visit end time:152948}     Medication Therapy Recommendations  No medication therapy recommendations to display            Please do not hesitate to contact me if you have any questions/concerns.     Sincerely,     Todd Do [facility]

## 2024-08-04 ENCOUNTER — HEALTH MAINTENANCE LETTER (OUTPATIENT)
Age: 64
End: 2024-08-04

## 2024-08-06 NOTE — TELEPHONE ENCOUNTER
Prior Authorization Approval    Medication: WEGOVY 0.25 MG/0.5ML SC SOAJ  Authorization Effective Date: 8/5/2024  Authorization Expiration Date: 3/5/2025  Approved Dose/Quantity:    Reference #: Key: [id number]   Insurance Company: [employer] ([facility]) - Phone [phone] Fax [phone]  Expected CoPay: $    CoPay Card Available: No    Financial Assistance Needed:    Which Pharm[facility] is filling the [id number] [place] [address]  Pharmacy Notified: Y  Patient Notified: Y

## 2024-08-06 NOTE — TELEPHONE ENCOUNTER
PA Initiation  Key: [id number]    Medication: WEGOVY 0.25 MG/0.5ML SC SOAJ  Insurance Company: [employer] ([facility]) - Phone [phone] Fax [phone]  Ph[facility]illing [id number] - [place], [place] - [address] [facility]  Filling Pharmacy Phone: [phone]  Filling Pharmacy Fax: [phone]  Start Date: 8/5/2024

## 2024-08-07 ENCOUNTER — MEDICAL CORRESPONDENCE (OUTPATIENT)
Dept: ENDOCRINOLOGY | Facility: CLINIC | Age: 64
End: 2024-08-07
Payer: COMMERCIAL

## 2024-08-13 ASSESSMENT — PATIENT HEALTH QUESTIONNAIRE - PHQ9
10. IF YOU CHECKED OFF ANY PROBLEMS, HOW DIFFICULT HAVE THESE PROBLEMS MADE IT FOR YOU TO DO YOUR WORK, TAKE CARE OF THINGS AT HOME, OR GET ALONG WITH OTHER PEOPLE: SOMEWHAT DIFFICULT
SUM OF ALL RESPONSES TO PHQ QUESTIONS 1-9: 7
SUM OF ALL RESPONSES TO PHQ QUESTIONS 1-9: 7

## 2024-08-14 ENCOUNTER — LAB (OUTPATIENT)
Dept: LAB | Facility: CLINIC | Age: 64
End: 2024-08-14
Payer: COMMERCIAL

## 2024-08-14 ENCOUNTER — OFFICE VISIT (OUTPATIENT)
Dept: INTERNAL MEDICINE | Facility: CLINIC | Age: 64
End: 2024-08-14
Payer: COMMERCIAL

## 2024-08-14 VITALS
OXYGEN SATURATION: 97 % | DIASTOLIC BLOOD PRESSURE: 77 MMHG | SYSTOLIC BLOOD PRESSURE: 133 MMHG | BODY MASS INDEX: 25.49 KG/M2 | HEART RATE: 88 BPM | WEIGHT: 201.2 LBS

## 2024-08-14 DIAGNOSIS — E10.8 TYPE 1 DIABETES MELLITUS WITH COMPLICATIONS (H): ICD-10-CM

## 2024-08-14 DIAGNOSIS — E10.3599 TYPE 1 DIABETES MELLITUS WITH PROLIFERATIVE RETINOPATHY, MACULAR EDEMA PRESENCE UNSPECIFIED, UNSPECIFIED LATERALITY, UNSPECIFIED PROLIFERATIVE RETINOPATHY TYPE (H): Primary | ICD-10-CM

## 2024-08-14 DIAGNOSIS — E10.9 TYPE 1 DIABETES MELLITUS (H): Primary | ICD-10-CM

## 2024-08-14 DIAGNOSIS — F33.0 MILD EPISODE OF RECURRENT MAJOR DEPRESSIVE DISORDER (H): ICD-10-CM

## 2024-08-14 DIAGNOSIS — F90.2 ATTENTION DEFICIT HYPERACTIVITY DISORDER (ADHD), COMBINED TYPE: ICD-10-CM

## 2024-08-14 LAB
ANION GAP SERPL CALCULATED.3IONS-SCNC: 9 MMOL/L (ref 7–15)
BUN SERPL-MCNC: 14.9 MG/DL (ref 8–23)
CALCIUM SERPL-MCNC: 9.5 MG/DL (ref 8.8–10.4)
CHLORIDE SERPL-SCNC: 99 MMOL/L (ref 98–107)
CREAT SERPL-MCNC: 0.88 MG/DL (ref 0.67–1.17)
EGFRCR SERPLBLD CKD-EPI 2021: >90 ML/MIN/1.73M2
GLUCOSE SERPL-MCNC: 374 MG/DL (ref 70–99)
HBA1C MFR BLD: 9.2 %
HCO3 SERPL-SCNC: 26 MMOL/L (ref 22–29)
HGB BLD-MCNC: 14.3 G/DL (ref 13.3–17.7)
HOLD SPECIMEN: NORMAL
HOLD SPECIMEN: NORMAL
POTASSIUM SERPL-SCNC: 4.1 MMOL/L (ref 3.4–5.3)
SODIUM SERPL-SCNC: 134 MMOL/L (ref 135–145)

## 2024-08-14 PROCEDURE — 36415 COLL VENOUS BLD VENIPUNCTURE: CPT | Performed by: PATHOLOGY

## 2024-08-14 PROCEDURE — 99214 OFFICE O/P EST MOD 30 MIN: CPT | Performed by: NURSE PRACTITIONER

## 2024-08-14 PROCEDURE — 85018 HEMOGLOBIN: CPT | Performed by: PATHOLOGY

## 2024-08-14 PROCEDURE — 99000 SPECIMEN HANDLING OFFICE-LAB: CPT | Performed by: PATHOLOGY

## 2024-08-14 PROCEDURE — 80048 BASIC METABOLIC PNL TOTAL CA: CPT | Performed by: PATHOLOGY

## 2024-08-14 PROCEDURE — 83036 HEMOGLOBIN GLYCOSYLATED A1C: CPT | Performed by: NURSE PRACTITIONER

## 2024-08-14 RX ORDER — ARIPIPRAZOLE 15 MG/1
15 TABLET ORAL DAILY
Qty: 90 TABLET | Refills: 1 | Status: SHIPPED | OUTPATIENT
Start: 2024-08-14

## 2024-08-14 RX ORDER — DEXTROAMPHETAMINE SACCHARATE, AMPHETAMINE ASPARTATE, DEXTROAMPHETAMINE SULFATE AND AMPHETAMINE SULFATE 3.75; 3.75; 3.75; 3.75 MG/1; MG/1; MG/1; MG/1
15 TABLET ORAL 2 TIMES DAILY
Qty: 60 TABLET | Refills: 0 | Status: CANCELLED | OUTPATIENT
Start: 2024-09-05

## 2024-08-14 RX ORDER — DEXTROAMPHETAMINE SACCHARATE, AMPHETAMINE ASPARTATE, DEXTROAMPHETAMINE SULFATE AND AMPHETAMINE SULFATE 3.75; 3.75; 3.75; 3.75 MG/1; MG/1; MG/1; MG/1
15 TABLET ORAL 2 TIMES DAILY
Qty: 60 TABLET | Refills: 0 | Status: CANCELLED | OUTPATIENT
Start: 2024-10-05

## 2024-08-14 NOTE — PROGRESS NOTES
Assessment & Plan     Type 1 diabetes mellitus with proliferative retinopathy, macular edema presence unspecified, unspecified laterality, unspecified proliferative retinopathy type (H)  Repeat A1c today.  He recognizes that his control has not been as good lately, particularly since he was off Trulicity for 2 months due to lack of insurance coverage for this.  He just started semaglutide last week.  Refill for glucagon provided, denies any recent severe hypoglycemia episodes.  Recheck BMP and hemoglobin today.  - HEMOGLOBIN A1C; Future  - Glucagon (BAQSIMI) 3 MG/DOSE nasal powder; Spray 1 spray (3 mg) in nostril as needed (severe hypoglycemia) in the event of unconscious hypoglycemia or hypoglycemic seizure. May repeat dose if no response after 15 minutes.  - Basic metabolic panel  (Ca, Cl, CO2, Creat, Gluc, K, Na, BUN); Future  - Hemoglobin; Future    Attention deficit hyperactivity disorder (ADHD), combined type  Future refills for Adderall provided.  This has been working well to help manage his ADHD.  - amphetamine-dextroamphetamine (ADDERALL) 15 MG tablet; Take 1 tablet (15 mg) by mouth 2 times daily  - amphetamine-dextroamphetamine (ADDERALL) 15 MG tablet; Take 1 tablet (15 mg) by mouth 2 times daily    Mild episode of recurrent major depressive disorder (H24)  Endorses worsening mood over the last 6 months, unknown triggers.  He declines referral for therapy at this point, as he has worked with a therapist in the past and feels he is comfortable with taking steps to work on his self-care.  We will continue with the Abilify 15 mg and add back in the fluoxetine at 20 mg daily.  We can titrate up on the fluoxetine as needed going forward.  - ARIPiprazole (ABILIFY) 15 MG tablet; Take 1 tablet (15 mg) by mouth daily  - FLUoxetine (PROZAC) 20 MG capsule; Take 1 capsule (20 mg) by mouth daily          Return in about 4 weeks (around 9/11/2024) for using a video visit.    Gaby Richards is a 63 year old,  "presenting for the following health issues:  Follow Up      8/14/2024     4:24 PM   Additional Questions   Roomed by KTR     History of Present Illness       Reason for visit:  Yearly checknup    He eats 2-3 servings of fruits and vegetables daily.He consumes 0 sweetened beverage(s) daily.He exercises with enough effort to increase his heart rate 60 or more minutes per day.  He exercises with enough effort to increase his heart rate 6 days per week. He is missing 1 dose(s) of medications per week.       Teaching tennis 5d/week, ages 8-high school/advanced.    Feeling well overall.    No major concerns.     Mood--\"so-so\". Up and down.  Wonders about adjusting medications, depression worsening over past 4-6 months.  Cycles. Continues on Abilify 15 mg daily, Wellbutrin  mg.  No SI.  Previously on Fluoxetine 20-60 mg (2651-4117), tolerated well at the time.       Started Wegovy last week.  Off trulicity x2 months due to insurance coverage.     Glucose control worsened after stopping trulicity.  Not as motivated with his mood not being as well controlled lately.   Missing meds about 1x per week.      Migraines controlled with Topamax.        Review of Systems  Constitutional, HEENT, cardiovascular, pulmonary, gi and gu systems are negative, except as otherwise noted.      Objective    /77 (BP Location: Right arm, Patient Position: Sitting, Cuff Size: Adult Large)   Pulse 88   Wt 91.3 kg (201 lb 3.2 oz)   SpO2 97%   BMI 25.49 kg/m    Body mass index is 25.49 kg/m .  Physical Exam   GENERAL: alert and no distress  EYES: Eyes grossly normal to inspection, PERRL and conjunctivae and sclerae normal  HENT: ear canals and TM's normal, nose and mouth without ulcers or lesions  NECK: no adenopathy, no asymmetry, masses, or scars  RESP: lungs clear to auscultation - no rales, rhonchi or wheezes  CV: regular rate and rhythm, normal S1 S2, no S3 or S4, no murmur, click or rub, no peripheral edema  MS: no gross " musculoskeletal defects noted, no edema  NEURO: Normal strength and tone, mentation intact and speech normal  PSYCH: mentation appears normal, affect normal/bright            Signed Electronically by: GALILEA Escalante CNP

## 2024-08-15 RX ORDER — DEXTROAMPHETAMINE SACCHARATE, AMPHETAMINE ASPARTATE, DEXTROAMPHETAMINE SULFATE AND AMPHETAMINE SULFATE 3.75; 3.75; 3.75; 3.75 MG/1; MG/1; MG/1; MG/1
15 TABLET ORAL 2 TIMES DAILY
Qty: 60 TABLET | Refills: 0 | Status: SHIPPED | OUTPATIENT
Start: 2024-10-05

## 2024-08-15 RX ORDER — DEXTROAMPHETAMINE SACCHARATE, AMPHETAMINE ASPARTATE, DEXTROAMPHETAMINE SULFATE AND AMPHETAMINE SULFATE 3.75; 3.75; 3.75; 3.75 MG/1; MG/1; MG/1; MG/1
15 TABLET ORAL 2 TIMES DAILY
Qty: 60 TABLET | Refills: 0 | Status: SHIPPED | OUTPATIENT
Start: 2024-09-05

## 2024-08-27 ENCOUNTER — VIRTUAL VISIT (OUTPATIENT)
Dept: CARDIOLOGY | Facility: CLINIC | Age: 64
End: 2024-08-27
Attending: PHYSICIAN ASSISTANT
Payer: COMMERCIAL

## 2024-08-27 DIAGNOSIS — E10.8 TYPE 1 DIABETES MELLITUS WITH COMPLICATIONS (H): Primary | ICD-10-CM

## 2024-08-27 DIAGNOSIS — E66.9 OBESITY WITH SERIOUS COMORBIDITY, UNSPECIFIED CLASSIFICATION, UNSPECIFIED OBESITY TYPE: ICD-10-CM

## 2024-08-27 NOTE — Clinical Note
8/27/2024      RE: Luigi Moore  [address]  [address]  [place] [address]       Dear Colleague,    Thank you for the opportunity to participate in the care of your patient, Luigi Moore, at the [facility] [place] at [facility]. Please see a copy of my visit note below.    Medication Therapy Management (MTM) Encounter    ASSESSMENT:                            Medication Adherence/Access: See below for considerations    Type 1 Diabetes/Hx BMI > 27:   A1C below goal of < 7%, patient referred to me to help resolve GLP-1 access issue. Historically controlled on Trulicity 4.5 mg weekly, but insurance coverage stopped due to patient not having a diagnosis of type 2 diabetes. A weight-management GLP-1 agonist may be covered, so will complete steps for documentation for approval today and follow patient to titrate once approved.     PLAN:                            ***      Endocrine Team & Next Follow-Up:  12/17/2024 with Arti Kaur PA-C  *** with Todd    SUBJECTIVE/OBJECTIVE:                          Maurice Moore is a 63 year old male seen for a follow-up visit. He was referred to me from Arti Kaur PA-C.      Reason for visit: GLP-1 Access Issue.    Allergies/ADRs: Reviewed in chart  Past Medical History: Reviewed in chart  Social History     Tobacco Use    Smoking status: Never    Smokeless tobacco: Never   Vaping Use    Vaping status: Never Used   Substance Use Topics    Alcohol use: No     Comment: History of alcohol abuse/sober since 2015; went to treatment     Drug use: No        Medication Adherence/Access: no issues reported other than GLP-1 access issue today    Type 1 Diabetes/BMI > 27  Trulicity 4.5mg/0.5mL inject once weekly **has not had for past few months due to insurance denial (no diagnosis of T2D)   Insulin glargine (Lantus) take 17 units at bedtime in case of pump failure  Insulin infusion pump   Humalog use as directed in  "Medtronic pump approximately 80 units daily     Has not changed pump settings since stopping Trulicity, but may consider this if Wegovy is not approved. He reports higher PPG and increased daily insulin requirement since stopping Trulicity.               Afinion Hemoglobin A1c POCT   Date Value Ref Range Status   12/19/2023 6.9 (H) <=5.7 % Final     Comment:     Normal <5.7%   Prediabetes 5.7-6.4%     Diabetes 6.5% or higher       Note: Adopted from ADA consensus guidelines.     Estimated body mass index is 25.49 kg/m  as calculated from the following:    Height as of 1/5/24: 1.892 m (6' 2.5\").    Weight as of 8/14/24: 91.3 kg (201 lb 3.2 oz).         Today's Vitals: There were no vitals taken for this visit.  ----------------      I spent 30 minutes with this patient today. Arti Kaur PA-C was provided the recommendations above via routed note and is the authorizing prescriber for this visit through the pharmacist collaborative practice agreement. A copy of the visit note was provided to the patient's provider(s).    The patient was given to the patient a summary of these recommendations.     Todd Do, PharmD, [facility]  Endocrine & Diabetes [facility] Pharmacist  [address] [place] [address]    Telemedicine Visit Details  Type of service:  Video Conference via [employer]  Start Time:  2:30PM  End Time:  3:00PM     Medication Therapy Recommendations  No medication therapy recommendations to display              Please do not hesitate to contact me if you have any questions/concerns.     Sincerely,     Todd Do [facility]  "

## 2024-08-27 NOTE — PROGRESS NOTES
Medication Therapy Management (MTM) Encounter    ASSESSMENT:                            Medication Adherence/Access: See below for considerations    Type 1 Diabetes/Hx BMI > 27:   A1C below goal of < 7%, patient referred to me to help resolve GLP-1 access issue. Historically controlled on Trulicity 4.5 mg weekly, but insurance coverage stopped due to patient not having a diagnosis of type 2 diabetes. We were able to obtain approval for Wegovy and patient is tolerating well, so would benefit from dose increase today. Historically did not adjust pump settings when working up the dose of Trulicity, so will have patient reach out if there are any issues with lows or if pump settings need to be adjusted.     PLAN:                            INCREASE Wegovy to 0.5 mg weekly    Please let me know if you have any lows < 70 or if you feel pump settings need to be adjusted.      Endocrine Team & Next Follow-Up:  12/17/2024 with Arti Kaur PA-C  4 weeks over [facility] with Todd    SUBJECTIVE/OBJECTIVE:                          Maurice Moore is a 63 year old male seen for a follow-up visit. He was referred to me from Arti Kaur PA-C.      Reason for visit: GLP-1 Access Issue.    Allergies/ADRs: Reviewed in chart  Past Medical History: Reviewed in chart  Social History     Tobacco Use    Smoking status: Never    Smokeless tobacco: Never   Vaping Use    Vaping status: Never Used   Substance Use Topics    Alcohol use: No     Comment: History of alcohol abuse/sober since 2015; went to treatment     Drug use: No        Medication Adherence/Access: no issues reported other than GLP-1 access issue today    Type 1 Diabetes /BMI > 27  Wegovy 0.25 mg weekly (started earlier this month, tolerating well)  Insulin glargine (Lantus) take 17 units at bedtime in case of pump failure  Insulin infusion pump   Humalog use as directed in Medtronic pump approximately 80 units daily     Data below is from last visit -- issues with connecting to  "[facility] today. Per patient, sugars are improved and there are no issues with hypoglycemia.               Afinion Hemoglobin A1c POCT   Date Value Ref Range Status   12/19/2023 6.9 (H) <=5.7 % Final     Comment:     Normal <5.7%   Prediabetes 5.7-6.4%     Diabetes 6.5% or higher       Note: Adopted from ADA consensus guidelines.     Estimated body mass index is 25.49 kg/m  as calculated from the following:    Height as of 1/5/24: 1.892 m (6' 2.5\").    Weight as of 8/14/24: 91.3 kg (201 lb 3.2 oz).         Today's Vitals: There were no vitals taken for this visit.  ----------------      I spent 30 minutes with this patient today. Arti Kaur PA-C was provided the recommendations above via routed note and is the authorizing prescriber for this visit through the pharmacist collaborative practice agreement. A copy of the visit note was provided to the patient's provider(s).    The patient was given to the patient a summary of these recommendations.     Todd Do, PharmD, [facility]  Endocrine & Diabetes [facility] Pharmacist  [address] [place] [id number]    Telemedicine Visit Details  Type of service:  Video Conference via [employer]  Start Time:  2:30PM  End Time:  3:00PM     Medication Therapy Recommendations  Type 1 diabetes mellitus (H)    Current Medication: Semaglutide-Weight Management (WEGOVY) 0.25 MG/0.5ML pen (Discontinued)   Rationale: Dose too low - Dosage too low - Effectiveness   Recommendation: Increase Dose   Status: Accepted per CPA                   "

## 2024-08-28 ENCOUNTER — TELEPHONE (OUTPATIENT)
Dept: OPHTHALMOLOGY | Facility: CLINIC | Age: 64
End: 2024-08-28
Payer: COMMERCIAL

## 2024-08-28 NOTE — TELEPHONE ENCOUNTER
Called and spoke to Cindy Richards to be seen on 10/14 with Dr. Welch or Dr. Craft     Scheduled with Dr. Craft at 11 am and a Tech Visit at 1030 am     Funmi Gimenez Communication Facilitator on 8/28/2024 at 4:52 PM     No

## 2024-09-05 DIAGNOSIS — N52.9 VASCULOGENIC ERECTILE DYSFUNCTION, UNSPECIFIED VASCULOGENIC ERECTILE DYSFUNCTION TYPE: ICD-10-CM

## 2024-09-10 ENCOUNTER — OFFICE VISIT (OUTPATIENT)
Dept: ENDOCRINOLOGY | Facility: CLINIC | Age: 64
End: 2024-09-10
Payer: COMMERCIAL

## 2024-09-10 VITALS
OXYGEN SATURATION: 100 % | BODY MASS INDEX: 25.97 KG/M2 | WEIGHT: 205 LBS | DIASTOLIC BLOOD PRESSURE: 79 MMHG | SYSTOLIC BLOOD PRESSURE: 143 MMHG | HEART RATE: 80 BPM

## 2024-09-10 DIAGNOSIS — E10.8 TYPE 1 DIABETES MELLITUS WITH COMPLICATIONS (H): ICD-10-CM

## 2024-09-10 PROCEDURE — 99215 OFFICE O/P EST HI 40 MIN: CPT | Performed by: PHYSICIAN ASSISTANT

## 2024-09-10 RX ORDER — INSULIN LISPRO 100 [IU]/ML
INJECTION, SOLUTION INTRAVENOUS; SUBCUTANEOUS
Qty: 80 ML | Refills: 3 | Status: SHIPPED | OUTPATIENT
Start: 2024-09-10

## 2024-09-10 ASSESSMENT — PAIN SCALES - GENERAL: PAINLEVEL: NO PAIN (0)

## 2024-09-10 NOTE — PROGRESS NOTES
HPI  Luigi Moore is a 63 year old male with type 1 diabetes mellitus.  Clinic visit with me today.  Pt last seen by Arti Kaur PA-C in Dec 2023.  Pt dx with type 1 DM at age 22.  He has been using an insulin pump since around 1987.  Pt's diabetes is complicated by PDR.  No known diabetic nephropathy.  Pt has no hx of diabetic peripheral neuropathy.  Hx of ED s/p surgery.  Pt also has hx of hypothyroidism, hyperlipidemia, HTN, TELLY, insomnia, exploding head syndrome, hidradenoma and Dupuytren contractures.  For his diabetes, using Medtronic 780G insulin pump.  He has no Guardian4 sensors and has been doing glucose fingersticks.  Current basal insulin rate 0.85 units/h x 24 hours.  I/C ratio is 1:15.  CF 40.  Most recent A1C was high at 9.2 % on 8/14/2024.  He tells me he was struggling with his depression which affected his diabetes care.  Previous A1C was 6.9 % in December 2023.  Pt also taking Wegovy 0.5 mg subcutaneous once a week.  I have limited blood sugar data today.  He states he only enters his blood sugar when it is high.  Denies frequent hypoglycemia.  On ROS today, chronic blurred vision.  Denies hx of CVA,n/v, SOB at rest, cough or fever.  No chest pain.  Denies abd pain, diarrhea, blood in stool or melena.  Denies dysuria or hematuria.  No peripheral neuropathy sx. No active foot ulcers.     Diabetes Care  Retinopathy: yes; hx of PDR.  History of laser treatment right eye x1. Pt seen by ophthalmology annually.  Nephropathy:urine microalbuminuria negative in December 2023.  Patient taking Hyzaar.  Neuropathy: None.  Foot Exam: No foot ulcers.  Taking aspirin: Yes.  Lipids: LDL 76 in January 2024.  Patient taking Lipitor.  Insulin: Medtronic 7 80G insulin pump.  DM meds: Wegovy.  Testing: Currently using glucose meter.  Will order Guardian4 sensors for his Medtronic 7 80G insulin pump.  Hypoglycemia tx: has Baqsimi.  Backup insulin: Patient has basal insulin for backup in case insulin pump fails.                    ROS  Please see under HPI.      Allergies  Allergies   Allergen Reactions    Iodinated Contrast Media Anaphylaxis    Contrast Dye Hives    Diatrizoate Hives     Iodine dye; iodine externally or topically is OK       Medications  Current Outpatient Medications   Medication Sig Dispense Refill    albuterol (PROAIR HFA/PROVENTIL HFA/VENTOLIN HFA) 108 (90 Base) MCG/ACT inhaler Inhale 2 puffs into the lungs every 6 hours as needed for shortness of breath or wheezing 18 g 5    amLODIPine (NORVASC) 10 MG tablet Take 1 tablet (10 mg) by mouth daily 90 tablet 3    [START ON 10/5/2024] amphetamine-dextroamphetamine (ADDERALL) 15 MG tablet Take 1 tablet (15 mg) by mouth 2 times daily 60 tablet 0    amphetamine-dextroamphetamine (ADDERALL) 15 MG tablet Take 1 tablet (15 mg) by mouth 2 times daily 60 tablet 0    amphetamine-dextroamphetamine (ADDERALL) 15 MG tablet Take 1 tablet (15 mg) by mouth 2 times daily 60 tablet 0    ARIPiprazole (ABILIFY) 15 MG tablet Take 1 tablet (15 mg) by mouth daily 90 tablet 1    aspirin 81 MG tablet Take 1 tablet (81 mg) by mouth daily 100 tablet 3    atorvastatin (LIPITOR) 40 MG tablet Take 1 tablet (40 mg) by mouth daily 90 tablet 3    bisacodyl (DULCOLAX) 5 MG EC tablet Take 2 tablets at 3 pm the day before your procedure. If your procedure is before 11 am, take 2 additional tablets at 11 pm. If your procedure is after 11 am, take 2 additional tablets at 6 am. For additional instructions refer to your colonoscopy prep instructions. 4 tablet 0    blood glucose (ACCU-CHEK GUIDE) test strip Use to test blood sugar 3 times daily or as directed. 100 strip 11    blood glucose (CONTOUR NEXT TEST) test strip For diabetes, tests 4 times daily 400 strip 3    buPROPion (WELLBUTRIN XL) 150 MG 24 hr tablet Take 1 tablet (150 mg) by mouth every morning Take with 300 mg tablet (total daily dose 450 mg) 60 tablet 5    buPROPion (WELLBUTRIN XL) 300 MG 24 hr tablet Take 1 tablet (300 mg) by mouth  "every morning Take with 150 mg tablet (total daily dose 450 mg) 60 tablet 5    cetirizine (ZYRTEC) 10 MG tablet Take 10 mg by mouth      DiphenhydrAMINE HCl (BENADRYL PO)       FLUoxetine (PROZAC) 20 MG capsule Take 1 capsule (20 mg) by mouth daily 30 capsule 3    Glucagon (BAQSIMI) 3 MG/DOSE nasal powder Spray 1 spray (3 mg) in nostril as needed (severe hypoglycemia) in the event of unconscious hypoglycemia or hypoglycemic seizure. May repeat dose if no response after 15 minutes. 1 each 1    insulin glargine (LANTUS PEN) 100 UNIT/ML pen Inject 17 Units Subcutaneous At Bedtime In case of pump failure. 15 mL 3    Insulin Infusion Pump (INSULIN PUMP GF3133) KIT       insulin lispro (HUMALOG) 100 UNIT/ML vial VIA PUMP APPROX 80 UNITS DAILY. Lab draw needed. Call [address] [place] [address] () to schedule. 80 mL 1    insulin lispro (HUMALOG) 100 UNIT/ML vial USE AS DIRECTED IN PUMP APPROXIMATELY 80 UNITS PER DAY Lab draw and follow up visit needed. Call  to schedule. 80 mL 0    insulin lispro (HUMALOG) 100 UNIT/ML vial USE AS DIRECTED IN PUMP APPROXIMATELY 80 UNITS PER DAY. Clinic visit and lab draw needed. Call  to schedule. 10 mL 0    insulin syringe-needle U-100 (BD INSULIN SYRINGE) 29G X 1/2\" 1 ML miscellaneous Use as directed 20 each 1    KETOSTIX test strip Use as directed in case of high glucose, vomiting or illness. 50 strip 11    levothyroxine (SYNTHROID/LEVOTHROID) 150 MCG tablet Take 1 tablet (150 mcg) by mouth daily 90 tablet 3    losartan-hydrochlorothiazide (HYZAAR) 50-12.5 MG tablet Take 1 tablet by mouth daily 90 tablet 3    NEW MED Trimix #4    Sig:  Inject 0.6 mL intracavernosal as directed.    Max use once daily and up to 3x/week.  Trimix intracavernosal injection, per mL:  PGE1: 40 mcg  Papaverine: 27.6mg  Phentolamine: 1 mg 10 mL 11    ONE TOUCH TEST STRIPS test strip Use to test blood sugar 4 times daily or as directed. 400 strip 3    order for DME Equipment being ordered: CPAP " machine 1 each 0    Semaglutide-Weight Management (WEGOVY) 0.5 MG/0.5ML pen Inject 0.5 mg subcutaneously once a week. 2 mL 3    topiramate (TOPAMAX) 50 MG tablet Take 1 tablet (50 mg) by mouth daily 90 tablet 3    Urea 40 % CREA Externally apply topically 2 times daily To surface of toenails 198 g 5    glucagon 1 MG kit Inject 1 mg into the muscle once for 1 dose 1 each 0       Family History  family history includes Asthma in his father; Bone Cancer in his paternal grandfather and paternal grandmother; Cancer in his brother, father, maternal grandfather, and maternal grandmother; Depression in his brother; Diabetes in his father; Heart Failure in his maternal uncle, maternal uncle, and mother; Hypertension in his mother; Liver Cancer (age of onset: 53) in his brother; Macular Degeneration in his father; Melanoma in his father; Mental Illness in his brother; Other - See Comments in his mother; Other Cancer in his father, paternal grandfather, and paternal grandmother; Substance Abuse in his brother.    Father dx type 1 DM at age 78.    Social History   reports that he has never smoked. He has never used smokeless tobacco. He reports that he does not drink alcohol and does not use drugs.     Smoke: none  ETOH: none   Martial status: . No children.  Occupation: working at [employer] and .  Also bike races.      Past Medical History  Past Medical History:   Diagnosis Date    Acquired hypothyroidism 12/07/2016    Cancer (H) 2015    Depressive disorder     Hidradenoma dx: Feb 2015    right hand/2 surgeries    Hyperlipidemia 12/07/2006    Hypertension     Numbness and tingling 2015    right hand, related to surgery for hidradenoma    TELLY (obstructive sleep apnea) 2007    New cpap machine 1 year ago. [place] [facility]    Type 1 diabetes (H) 1982    Uncomplicated asthma        Past Surgical History:   Procedure Laterality Date    BIOPSY  2016    COLONOSCOPY N/A 5/17/2024    Procedure: Colonoscopy;   Surgeon: Jose Luis Gonzales MD;  Location: [facility]    EXCISE LESION HAND Right 02/26/2015    Procedure: EXCISE LESION HAND;  Surgeon: Jeovany Jefferson MD;  Location: [facility]    IMPLANT PROSTHESIS PENIS INFLATABLE N/A 02/01/2022    Procedure: INSERTION of INFLATABLE PENILE PROSTHESIS;  Surgeon: Johnathan Cooper MD;  Location: [facility] OR    ORTHOPEDIC SURGERY Left     achilles tendon     ORTHOPEDIC SURGERY Right     hand surgery    SOFT TISSUE SURGERY      lipoma removal, below rib cage on right side       Physical Exam  BP (!) 143/79   Pulse 80   Wt 93 kg (205 lb)   SpO2 100%   BMI 25.97 kg/m    Body mass index is 25.97 kg/m .        RESULTS  Creatinine   Date Value Ref Range Status   08/14/2024 0.88 0.67 - 1.17 mg/dL Final   06/21/2018 0.78 0.66 - 1.25 mg/dL Final     GFR Estimate   Date Value Ref Range Status   08/14/2024 >90 >60 mL/min/1.73m2 Final     Comment:     eGFR calculated using 2021 CKD-EPI equation.   06/21/2018 >90 >60 mL/min/1.7m2 Final     Comment:     Non  GFR Calc     Hemoglobin A1C   Date Value Ref Range Status   08/14/2024 9.2 (H) <5.7 % Final     Comment:     Normal <5.7%   Prediabetes 5.7-6.4%    Diabetes 6.5% or higher     Note: Adopted from ADA consensus guidelines.   04/10/2018 7.6 (H) 0 - 6.4 % Final     Comment:     Normal <5.7% Prediabetes 5.7-6.4%  Diabetes 6.5% or higher - adopted from ADA   consensus guidelines.       Potassium   Date Value Ref Range Status   08/14/2024 4.1 3.4 - 5.3 mmol/L Final   02/02/2022 3.8 3.4 - 5.3 mmol/L Final   12/06/2019 3.7 3.4 - 5.3 mmol/L Final     ALT   Date Value Ref Range Status   01/05/2024 15 0 - 70 U/L Final     Comment:     Reference intervals for this test were updated on 6/12/2023 to more accurately reflect our healthy population. There may be differences in the flagging of prior results with similar values performed with this method. Interpretation of those prior results can be made in the context of the updated  reference intervals.     12/16/2016 27 0 - 70 U/L Final     AST   Date Value Ref Range Status   01/05/2024 24 0 - 45 U/L Final     Comment:     Reference intervals for this test were updated on 6/12/2023 to more accurately reflect our healthy population. There may be differences in the flagging of prior results with similar values performed with this method. Interpretation of those prior results can be made in the context of the updated reference intervals.   12/16/2016 25 0 - 45 U/L Final     TSH   Date Value Ref Range Status   12/19/2023 2.67 0.30 - 4.20 uIU/mL Final   05/21/2021 3.36 0.40 - 4.00 mU/L Final     T4 Free   Date Value Ref Range Status   12/06/2019 0.95 0.76 - 1.46 ng/dL Final     Free T4   Date Value Ref Range Status   04/24/2023 1.67 0.90 - 1.70 ng/dL Final       Cholesterol   Date Value Ref Range Status   01/05/2024 144 <200 mg/dL Final   12/28/2022 214 (H) <200 mg/dL Final   04/10/2018 173 <200 mg/dL Final   07/17/2012 194 0 - 200 mg/dL Final     Comment:     LDL Cholesterol is the primary guide to therapy.   The NCEP recommends further evaluation of: patients with cholesterol greater   than 200 mg/dL if additional risk factors are present, cholesterol greater   than   240 mg/dL, triglycerides greater than 150 mg/dL, or HDL less than 40 mg/dL.     HDL Cholesterol   Date Value Ref Range Status   04/10/2018 70 >39 mg/dL Final   07/17/2012 56 40 - 110 mg/dL Final     Direct Measure HDL   Date Value Ref Range Status   01/05/2024 53 >=40 mg/dL Final   12/28/2022 55 >=40 mg/dL Final     LDL Cholesterol Calculated   Date Value Ref Range Status   01/05/2024 76 <=100 mg/dL Final   12/28/2022 135 (H) <=100 mg/dL Final   04/10/2018 89 <100 mg/dL Final     Comment:     Desirable:       <100 mg/dl   08/05/2014 78 0 - 129 mg/dL Final     Comment:     LDL Cholesterol is the primary guide to therapy: LDL-cholesterol goal in high   risk patients is <100 mg/dL and in very high risk patients is <70 mg/dL.       LDL  Cholesterol Direct   Date Value Ref Range Status   10/05/2018 66 <100 mg/dL Final     Comment:     Desirable:       <100 mg/dl   12/07/2016 140 (H) <100 mg/dL Final     Comment:     Above desirable:  100-129 mg/dl   Borderline High:  130-159 mg/dL   High:             160-189 mg/dL   Very high:       >189 mg/dl       Triglycerides   Date Value Ref Range Status   01/05/2024 75 <150 mg/dL Final   12/28/2022 121 <150 mg/dL Final   04/10/2018 68 <150 mg/dL Final   07/17/2012 104 0 - 150 mg/dL Final     Cholesterol/HDL Ratio   Date Value Ref Range Status   07/17/2012 3.5 0.0 - 5.0 Final         ASSESSMENT/PLAN:      TYPE 1 DIABETES MELLITUS: 63-year-old male with history of type 1 diabetes mellitus diagnosed at age 22 complicated by peripheral proliferative diabetic retinopathy and ED.  Urine microalbuminuria has been negative and patient denies any symptoms of diabetic peripheral neuropathy.  Maurice needs [employer] sensors for his Medtronic 780G insulin pump and infusion sets.  I have requested paperwork for the Guardian4  sensors and supplies to be submitted today.  No change in insulin pump settings today.  Will continue current Wegovy dose.  Patient has follow-up with [facility] in a few weeks.  Urine microalbuminuria negative in December 2023.  Patient is taking Hyzaar daily.  Most recent creatinine 0.88 with GFR > 90 mL/min in August 2024.  No symptoms of peripheral neuropathy.  RETINOPATHY: History of proliferative diabetic retinopathy.  No recent visual changes.  Patient seen by ophthalmology on an annual basis.  HTN: /79 today.  Patient to check BP at home.  Continue current meds.  LIPIDS: LDL 76 in January 2024.  Patient taking Lipitor.  HYPOTHYROIDISM: TSH normal in December 2023.  Continue levothyroxine 150 mcg daily.  MENTAL HEALTH: Improved per patient.  He is looking for a new psychiatrist.  TELLY: Using CPAP.  HEALTH MAINTENANCE: Reminded patient to see his primary care provider for health  maintenance.  FOLLOW UP: With Arti Kaur PA-C in Dec 2024.  I have requested paperwork to be submitted for Guardian4 sensors, 7 day infusion sets and other pump supplies today.    Time spent reviewing chart, labs and Medtronic 780g insulin pump download data today= 5 minutes.  Time for clinic visit today= 30 minutes.  Time for documentation today= 10 minutes.    Total time for visit today= 45 minutes.    Tami Cancino PA-C

## 2024-09-10 NOTE — LETTER
9/10/2024       RE: Luigi Moore  [address]  [address]  [facility] [address]     Dear Colleague,    Thank you for referring your patient, Luigi Moore, to the [facility] ENDOCRINOLOGY [facility]INIC [place] at [facility]. Please see a copy of my visit note below.    Outcome for 09/06/24 2:34 PM: Vidaao message sent  KRYSTINA Sparks  Luigi Moore is a 63 year old male with type 1 diabetes mellitus.  Clinic visit with me today.  Pt last seen by Arti Kaur PA-C in Dec 2023.  Pt dx with type 1 DM at age 22.  He has been using an insulin pump since around 1987.  Pt's diabetes is complicated by PDR.  No known diabetic nephropathy.  Pt has no hx of diabetic peripheral neuropathy.  Hx of ED s/p surgery.  Pt also has hx of hypothyroidism, hyperlipidemia, HTN, TELLY, insomnia, exploding head syndrome, hidradenoma and Dupuytren contractures.  For his diabetes, using Medtronic 780G insulin pump.  He has no Guardian4 sensors and has been doing glucose fingersticks.  Current basal insulin rate 0.85 units/h x 24 hours.  I/C ratio is 1:15.  CF 40.  Most recent A1C was high at 9.2 % on 8/14/2024.  He tells me he was struggling with his depression which affected his diabetes care.  Previous A1C was 6.9 % in December 2023.  Pt also taking Wegovy 0.5 mg subcutaneous once a week.  I have limited blood sugar data today.  He states he only enters his blood sugar when it is high.  Denies frequent hypoglycemia.  On ROS today, chronic blurred vision.  Denies hx of CVA,n/v, SOB at rest, cough or fever.  No chest pain.  Denies abd pain, diarrhea, blood in stool or melena.  Denies dysuria or hematuria.  No peripheral neuropathy sx. No active foot ulcers.     Diabetes Care  Retinopathy: yes; hx of PDR.  History of laser treatment right eye x1. Pt seen by ophthalmology annually.  Nephropathy:urine microalbuminuria negative in December 2023.  Patient taking  Hyzaar.  Neuropathy: None.  Foot Exam: No foot ulcers.  Taking aspirin: Yes.  Lipids: LDL 76 in January 2024.  Patient taking Lipitor.  Insulin: Medtronic 7 80G insulin pump.  DM meds: Wegovy.  Testing: Currently using glucose meter.  Will order Guardian4 sensors for his Medtronic 7 80G insulin pump.  Hypoglycemia tx: has Baqsimi.  Backup insulin: Patient has basal insulin for backup in case insulin pump fails.                   ROS  Please see under HPI.      Allergies  Allergies   Allergen Reactions     Iodinated Contrast Media Anaphylaxis     Contrast Dye Hives     Diatrizoate Hives     Iodine dye; iodine externally or topically is OK       Medications  Current Outpatient Medications   Medication Sig Dispense Refill     albuterol (PROAIR HFA/PROVENTIL HFA/VENTOLIN HFA) 108 (90 Base) MCG/ACT inhaler Inhale 2 puffs into the lungs every 6 hours as needed for shortness of breath or wheezing 18 g 5     amLODIPine (NORVASC) 10 MG tablet Take 1 tablet (10 mg) by mouth daily 90 tablet 3     [START ON 10/5/2024] amphetamine-dextroamphetamine (ADDERALL) 15 MG tablet Take 1 tablet (15 mg) by mouth 2 times daily 60 tablet 0     amphetamine-dextroamphetamine (ADDERALL) 15 MG tablet Take 1 tablet (15 mg) by mouth 2 times daily 60 tablet 0     amphetamine-dextroamphetamine (ADDERALL) 15 MG tablet Take 1 tablet (15 mg) by mouth 2 times daily 60 tablet 0     ARIPiprazole (ABILIFY) 15 MG tablet Take 1 tablet (15 mg) by mouth daily 90 tablet 1     aspirin 81 MG tablet Take 1 tablet (81 mg) by mouth daily 100 tablet 3     atorvastatin (LIPITOR) 40 MG tablet Take 1 tablet (40 mg) by mouth daily 90 tablet 3     bisacodyl (DULCOLAX) 5 MG EC tablet Take 2 tablets at 3 pm the day before your procedure. If your procedure is before 11 am, take 2 additional tablets at 11 pm. If your procedure is after 11 am, take 2 additional tablets at 6 am. For additional instructions refer to your colonoscopy prep instructions. 4 tablet 0     blood  "glucose (ACCU-CHEK GUIDE) test strip Use to test blood sugar 3 times daily or as directed. 100 strip 11     blood glucose (CONTOUR NEXT TEST) test strip For diabetes, tests 4 times daily 400 strip 3     buPROPion (WELLBUTRIN XL) 150 MG 24 hr tablet Take 1 tablet (150 mg) by mouth every morning Take with 300 mg tablet (total daily dose 450 mg) 60 tablet 5     buPROPion (WELLBUTRIN XL) 300 MG 24 hr tablet Take 1 tablet (300 mg) by mouth every morning Take with 150 mg tablet (total daily dose 450 mg) 60 tablet 5     cetirizine (ZYRTEC) 10 MG tablet Take 10 mg by mouth       DiphenhydrAMINE HCl (BENADRYL PO)        FLUoxetine (PROZAC) 20 MG capsule Take 1 capsule (20 mg) by mouth daily 30 capsule 3     Glucagon (BAQSIMI) 3 MG/DOSE nasal powder Spray 1 spray (3 mg) in nostril as needed (severe hypoglycemia) in the event of unconscious hypoglycemia or hypoglycemic seizure. May repeat dose if no response after 15 minutes. 1 each 1     insulin glargine (LANTUS PEN) 100 UNIT/ML pen Inject 17 Units Subcutaneous At Bedtime In case of pump failure. 15 mL 3     Insulin Infusion Pump (INSULIN PUMP LB2087) KIT        insulin lispro (HUMALOG) 100 UNIT/ML vial VIA PUMP APPROX 80 UNITS DAILY. Lab draw needed. Call [address] [place] [address] () to schedule. 80 mL 1     insulin lispro (HUMALOG) 100 UNIT/ML vial USE AS DIRECTED IN PUMP APPROXIMATELY 80 UNITS PER DAY Lab draw and follow up visit needed. Call  to schedule. 80 mL 0     insulin lispro (HUMALOG) 100 UNIT/ML vial USE AS DIRECTED IN PUMP APPROXIMATELY 80 UNITS PER DAY. Clinic visit and lab draw needed. Call  to schedule. 10 mL 0     insulin syringe-needle U-100 (BD INSULIN SYRINGE) 29G X 1/2\" 1 ML miscellaneous Use as directed 20 each 1     KETOSTIX test strip Use as directed in case of high glucose, vomiting or illness. 50 strip 11     levothyroxine (SYNTHROID/LEVOTHROID) 150 MCG tablet Take 1 tablet (150 mcg) by mouth daily 90 tablet 3     " losartan-hydrochlorothiazide (HYZAAR) 50-12.5 MG tablet Take 1 tablet by mouth daily 90 tablet 3     NEW MED Trimix #4    Sig:  Inject 0.6 mL intracavernosal as directed.    Max use once daily and up to 3x/week.  Trimix intracavernosal injection, per mL:  PGE1: 40 mcg  Papaverine: 27.6mg  Phentolamine: 1 mg 10 mL 11     ONE TOUCH TEST STRIPS test strip Use to test blood sugar 4 times daily or as directed. 400 strip 3     order for DME Equipment being ordered: CPAP machine 1 each 0     Semaglutide-Weight Management (WEGOVY) 0.5 MG/0.5ML pen Inject 0.5 mg subcutaneously once a week. 2 mL 3     topiramate (TOPAMAX) 50 MG tablet Take 1 tablet (50 mg) by mouth daily 90 tablet 3     Urea 40 % CREA Externally apply topically 2 times daily To surface of toenails 198 g 5     glucagon 1 MG kit Inject 1 mg into the muscle once for 1 dose 1 each 0       Family History  family history includes Asthma in his father; Bone Cancer in his paternal grandfather and paternal grandmother; Cancer in his brother, father, maternal grandfather, and maternal grandmother; Depression in his brother; Diabetes in his father; Heart Failure in his maternal uncle, maternal uncle, and mother; Hypertension in his mother; Liver Cancer (age of onset: 53) in his brother; Macular Degeneration in his father; Melanoma in his father; Mental Illness in his brother; Other - See Comments in his mother; Other Cancer in his father, paternal grandfather, and paternal grandmother; Substance Abuse in his brother.    Father dx type 1 DM at age 78.    Social History   reports that he has never smoked. He has never used smokeless tobacco. He reports that he does not drink alcohol and does not use drugs.     Smoke: none  ETOH: none   Martial status: . No children.  Occupation: working at [employer] and .  Also bike races.      Past Medical History  Past Medical History:   Diagnosis Date     Acquired hypothyroidism 12/07/2016     Cancer (H) 2015      Depressive disorder      Hidradenoma dx: Feb 2015    right hand/2 surgeries     Hyperlipidemia 12/07/2006     Hypertension      Numbness and tingling 2015    right hand, related to surgery for hidradenoma     TELLY (obstructive sleep apnea) 2007    New cpap machine 1 year ago. follows at [facility]     Type 1 diabetes (H) 1982     Uncomplicated asthma        Past Surgical History:   Procedure Laterality Date     BIOPSY  2016     COLONOSCOPY N/A 5/17/2024    Procedure: Colonoscopy;  Surgeon: Jose Luis Gonzales MD;  Location: [facility]     EXCISE LESION HAND Right 02/26/2015    Procedure: EXCISE LESION HAND;  Surgeon: Jeovany Jefferson MD;  Location: [facility] OR     IMPLANT PROSTHESIS PENIS INFLATABLE N/A 02/01/2022    Procedure: INSERTION of INFLATABLE PENILE PROSTHESIS;  Surgeon: Johnathan Cooper MD;  Location: [facility] OR     ORTHOPEDIC SURGERY Left     achilles tendon      ORTHOPEDIC SURGERY Right     hand surgery     SOFT TISSUE SURGERY      lipoma removal, below rib cage on right side       Physical Exam  BP (!) 143/79   Pulse 80   Wt 93 kg (205 lb)   SpO2 100%   BMI 25.97 kg/m    Body mass index is 25.97 kg/m .        RESULTS  Creatinine   Date Value Ref Range Status   08/14/2024 0.88 0.67 - 1.17 mg/dL Final   06/21/2018 0.78 0.66 - 1.25 mg/dL Final     GFR Estimate   Date Value Ref Range Status   08/14/2024 >90 >60 mL/min/1.73m2 Final     Comment:     eGFR calculated using 2021 CKD-EPI equation.   06/21/2018 >90 >60 mL/min/1.7m2 Final     Comment:     Non  GFR Calc     Hemoglobin A1C   Date Value Ref Range Status   08/14/2024 9.2 (H) <5.7 % Final     Comment:     Normal <5.7%   Prediabetes 5.7-6.4%    Diabetes 6.5% or higher     Note: Adopted from ADA consensus guidelines.   04/10/2018 7.6 (H) 0 - 6.4 % Final     Comment:     Normal <5.7% Prediabetes 5.7-6.4%  Diabetes 6.5% or higher - adopted from ADA   consensus guidelines.       Potassium   Date Value Ref Range Status   08/14/2024 4.1 3.4 -  5.3 mmol/L Final   02/02/2022 3.8 3.4 - 5.3 mmol/L Final   12/06/2019 3.7 3.4 - 5.3 mmol/L Final     ALT   Date Value Ref Range Status   01/05/2024 15 0 - 70 U/L Final     Comment:     Reference intervals for this test were updated on 6/12/2023 to more accurately reflect our healthy population. There may be differences in the flagging of prior results with similar values performed with this method. Interpretation of those prior results can be made in the context of the updated reference intervals.     12/16/2016 27 0 - 70 U/L Final     AST   Date Value Ref Range Status   01/05/2024 24 0 - 45 U/L Final     Comment:     Reference intervals for this test were updated on 6/12/2023 to more accurately reflect our healthy population. There may be differences in the flagging of prior results with similar values performed with this method. Interpretation of those prior results can be made in the context of the updated reference intervals.   12/16/2016 25 0 - 45 U/L Final     TSH   Date Value Ref Range Status   12/19/2023 2.67 0.30 - 4.20 uIU/mL Final   05/21/2021 3.36 0.40 - 4.00 mU/L Final     T4 Free   Date Value Ref Range Status   12/06/2019 0.95 0.76 - 1.46 ng/dL Final     Free T4   Date Value Ref Range Status   04/24/2023 1.67 0.90 - 1.70 ng/dL Final       Cholesterol   Date Value Ref Range Status   01/05/2024 144 <200 mg/dL Final   12/28/2022 214 (H) <200 mg/dL Final   04/10/2018 173 <200 mg/dL Final   07/17/2012 194 0 - 200 mg/dL Final     Comment:     LDL Cholesterol is the primary guide to therapy.   The NCEP recommends further evaluation of: patients with cholesterol greater   than 200 mg/dL if additional risk factors are present, cholesterol greater   than   240 mg/dL, triglycerides greater than 150 mg/dL, or HDL less than 40 mg/dL.     HDL Cholesterol   Date Value Ref Range Status   04/10/2018 70 >39 mg/dL Final   07/17/2012 56 40 - 110 mg/dL Final     Direct Measure HDL   Date Value Ref Range Status   01/05/2024  53 >=40 mg/dL Final   12/28/2022 55 >=40 mg/dL Final     LDL Cholesterol Calculated   Date Value Ref Range Status   01/05/2024 76 <=100 mg/dL Final   12/28/2022 135 (H) <=100 mg/dL Final   04/10/2018 89 <100 mg/dL Final     Comment:     Desirable:       <100 mg/dl   08/05/2014 78 0 - 129 mg/dL Final     Comment:     LDL Cholesterol is the primary guide to therapy: LDL-cholesterol goal in high   risk patients is <100 mg/dL and in very high risk patients is <70 mg/dL.       LDL Cholesterol Direct   Date Value Ref Range Status   10/05/2018 66 <100 mg/dL Final     Comment:     Desirable:       <100 mg/dl   12/07/2016 140 (H) <100 mg/dL Final     Comment:     Above desirable:  100-129 mg/dl   Borderline High:  130-159 mg/dL   High:             160-189 mg/dL   Very high:       >189 mg/dl       Triglycerides   Date Value Ref Range Status   01/05/2024 75 <150 mg/dL Final   12/28/2022 121 <150 mg/dL Final   04/10/2018 68 <150 mg/dL Final   07/17/2012 104 0 - 150 mg/dL Final     Cholesterol/HDL Ratio   Date Value Ref Range Status   07/17/2012 3.5 0.0 - 5.0 Final         ASSESSMENT/PLAN:      TYPE 1 DIABETES MELLITUS: 63-year-old male with history of type 1 diabetes mellitus diagnosed at age 22 complicated by peripheral proliferative diabetic retinopathy and ED.  Urine microalbuminuria has been negative and patient denies any symptoms of diabetic peripheral neuropathy.  Maurice needs Guardian4 sensors for his Medtronic 780G insulin pump and infusion sets.  I have requested paperwork for the Guardian4  sensors and supplies to be submitted today.  No change in insulin pump settings today.  Will continue current Wegovy dose.  Patient has follow-up with Todd Do [facility] in a few weeks.  Urine microalbuminuria negative in December 2023.  Patient is taking Hyzaar daily.  Most recent creatinine 0.88 with GFR > 90 mL/min in August 2024.  No symptoms of peripheral neuropathy.  RETINOPATHY: History of proliferative diabetic retinopathy.  No  recent visual changes.  Patient seen by ophthalmology on an annual basis.  HTN: /79 today.  Patient to check BP at home.  Continue current meds.  LIPIDS: LDL 76 in January 2024.  Patient taking Lipitor.  HYPOTHYROIDISM: TSH normal in December 2023.  Continue levothyroxine 150 mcg daily.  MENTAL HEALTH: Improved per patient.  He is looking for a new psychiatrist.  TELLY: Using CPAP.  HEALTH MAINTENANCE: Reminded patient to see his primary care provider for health maintenance.  FOLLOW UP: With Arti Kaur PA-C in Dec 2024.  I have requested paperwork to be submitted for Guardian4 sensors, 7 day infusion sets and other pump supplies today.    Time spent reviewing chart, labs and Medtronic 780g insulin pump download data today= 5 minutes.  Time for clinic visit today= 30 minutes.  Time for documentation today= 10 minutes.    Total time for visit today= 45 minutes.    Tami Cancino PA-C        Again, thank you for allowing me to participate in the care of your patient.      Sincerely,    Tami Cancino PA-C

## 2024-09-10 NOTE — NURSING NOTE
Chief Complaint   Patient presents with    Diabetes     Blood pressure (!) 143/79, pulse 80, weight 93 kg (205 lb), SpO2 100%.    Shraddha Garrison

## 2024-09-11 ENCOUNTER — TELEPHONE (OUTPATIENT)
Dept: ENDOCRINOLOGY | Facility: CLINIC | Age: 64
End: 2024-09-11

## 2024-09-11 ENCOUNTER — VIRTUAL VISIT (OUTPATIENT)
Dept: INTERNAL MEDICINE | Facility: CLINIC | Age: 64
End: 2024-09-11
Payer: COMMERCIAL

## 2024-09-11 DIAGNOSIS — F90.2 ATTENTION DEFICIT HYPERACTIVITY DISORDER (ADHD), COMBINED TYPE: ICD-10-CM

## 2024-09-11 DIAGNOSIS — F33.0 MILD EPISODE OF RECURRENT MAJOR DEPRESSIVE DISORDER (H): Primary | ICD-10-CM

## 2024-09-11 PROCEDURE — 99213 OFFICE O/P EST LOW 20 MIN: CPT | Mod: 95 | Performed by: NURSE PRACTITIONER

## 2024-09-11 RX ORDER — DEXTROAMPHETAMINE SACCHARATE, AMPHETAMINE ASPARTATE, DEXTROAMPHETAMINE SULFATE AND AMPHETAMINE SULFATE 3.75; 3.75; 3.75; 3.75 MG/1; MG/1; MG/1; MG/1
15 TABLET ORAL 2 TIMES DAILY
Qty: 60 TABLET | Refills: 0 | Status: SHIPPED | OUTPATIENT
Start: 2024-11-04

## 2024-09-11 NOTE — TELEPHONE ENCOUNTER
My chart sent asking whhere he gets the sensor and pump supplies from. If [employer] he needs to have them fax a form to us. None of his needs are on the medication list .Paty Coto RN on 9/11/2024 at 9:42 AM

## 2024-09-11 NOTE — TELEPHONE ENCOUNTER
----- Message from Tami Cancino sent at 9/10/2024  5:24 PM CDT -----  Agustin Longo:  Could you please be sure pt's Guardian4 sensor, 7 day infusion sets, and all insulin pump supplies are ordered today for Maurice Moore.  I am working on Wed 9/11, but then gone the rest of the week and he needs the above items soon.  Thanks for all your help.  Nidia

## 2024-09-11 NOTE — PATIENT INSTRUCTIONS
Thank you for visiting the Primary Care Center today at the [facility]! The following is some information about our clinic:     Primary Care Center Frequently-Asked Questions    (1) How do I schedule appointments at the [facility]?     Primary Care--to schedule or make changes to an existing appointment, please call our primary care line at [phone].    Labs--to schedule a lab appointment at the [facility] you can use [employer] or call [phone]. If you have a [place] location that is closer to home, you can reach out to that location for scheduling options.     Imaging--if you need to schedule a CT, X-ray, MRI, ultrasound, or other imaging study you can call [phone] to schedule at the [facility] or any other [facility] imaging location.     Referrals--if a referral to another specialty was ordered you can expect a phone call from their scheduling team. If you have not heard from them in a week, please call us or send us a [employer] message to check the status or get a scheduling number. Please note that this only applies to internal [facility] referrals. If the referral is external you would need to contact their office for scheduling.     (2) I have a question about my visit, who do I contact?     You can call us at the primary care line at [phone] to ask questions about your visit. You can also send a secure message through [employer], which is reviewed by clinic staff. Please note that [employer] messages have a twenty-four to forty-eight business hour turnaround time and should not be used for urgent concerns.    (3) How will I get the results of my tests?    If you are signed up for [facility] all tests will be released to you within twenty-four hours of resulting. Please allow three to five days for your doctor to review your results and place a note interpreting the results. If you do not have [employer] you will receive your  results through mail seven to ten business days following the return of the tests. Please note that if there should be any urgent or concerning results that your doctor or their registered nurse will reach out to you the same day as the tests come back. If you have follow up questions about your results or would like to discuss the results in detail please schedule a follow up with your provider either in person or virtually.     (4) How do I get refills of my prescriptions?     You should always first contact your pharmacy for refills of your medications. If submitting a refill request on [employer], please be sure to submit the request only once--repeat requests can cause delays in refill. If you are requesting a NEW medication or a medication related to new symptoms you will need to schedule an appointment with a provider prior to approval. Please note: Routine medication refills have up to one to three business day turnaround whereas controlled substances refills have up to five to seven business day turnaround.    (5) I have new symptoms, what do I do?     If you are having an immediate medical emergency, you should dial 911 for assistance.   For anything urgent that needs to be seen within a few hours to one day you should visit a local urgent care for assistance.  For non-urgent symptoms that need to be seen within a few days to a week you can schedule with an available provider in primary care by going to [employer] or calling [phone].   If you are not sure how serious your symptoms are or you would like to receive medical advice you can always call [phone] to speak with a triage nurse.

## 2024-09-11 NOTE — PROGRESS NOTES
Maurice is a 63 year old who is being evaluated via a billable video visit.    How would you like to obtain your AVS? MyChart  If the video visit is dropped, the invitation should be resent by: Text to cell phone: [phone]  Will anyone else be joining your video visit? No      Are refills needed on medications prescribed by this physician? NO    Assessment & Plan     Mild episode of recurrent major depressive disorder (H24)  Overall reports mood has been improving since starting Fluoxetine 20mg 1 month ago; he has been tolerating this well.  Encouraged to continue working on self-care and stress management as he has been doing.  If he feels that mood is worsening or if inadequate response in the next 2-3 months, will increase Fluoxetine to 40 mg.  He agrees with the plan.    Attention deficit hyperactivity disorder (ADHD), combined type  Doing well on current regimen, future refill provided.    - amphetamine-dextroamphetamine (ADDERALL) 15 MG tablet; Take 1 tablet (15 mg) by mouth 2 times daily.      Return in about 6 months (around 3/11/2025).    Subjective   Maurice is a 63 year old, presenting for the following health issues:  RECHECK    History of Present Illness       Reason for visit:  Check up He is missing 1 dose(s) of medications per week.  He is not taking prescribed medications regularly due to remembering to take.     Follow-up.  Feeling better since starting Fluoxetine 20 mg, notes that medication seems to helping.    Tolerating well, denies SEs.  Doing better with self-care. Keeping busy with tennis, which is positive.    Saw Nidia Cancino yesterday in Endocrinology, needing insurance coverage on supplies.        Review of Systems  Constitutional, HEENT, cardiovascular, pulmonary, gi and gu systems are negative, except as otherwise noted.      Objective             Physical Exam   GENERAL: alert and no distress  EYES: Eyes grossly normal to inspection.  No discharge or erythema, or obvious  scleral/conjunctival abnormalities.  RESP: No audible wheeze, cough, or visible cyanosis.    SKIN: Visible skin clear. No significant rash, abnormal pigmentation or lesions.  NEURO: Cranial nerves grossly intact.  Mentation and speech appropriate for age.  PSYCH: Appropriate affect, tone, and pace of words          Video-Visit Details    Type of service:  Video Visit   Start time: 12:50 PM  End time: 12:55 PM    Originating Location (pt. Location): Home    Distant Location (provider location):  On-site  Platform used for Video Visit: Ant  Signed Electronically by: GALILEA Escalante CNP

## 2024-09-12 ENCOUNTER — MYC MEDICAL ADVICE (OUTPATIENT)
Dept: INTERNAL MEDICINE | Facility: CLINIC | Age: 64
End: 2024-09-12
Payer: COMMERCIAL

## 2024-09-12 ENCOUNTER — TELEPHONE (OUTPATIENT)
Dept: INTERNAL MEDICINE | Facility: CLINIC | Age: 64
End: 2024-09-12
Payer: COMMERCIAL

## 2024-09-12 NOTE — TELEPHONE ENCOUNTER
Left Voicemail (1st Attempt) and Sent Mychart (1st Attempt) for the patient to call back and schedule the following:    Appointment type: [facility] PHYSICAL, [facility] RETURN or video  Provider: PCP  Return date: 3/11/2025  Specialty phone number: [phone]

## 2024-09-19 ENCOUNTER — MYC MEDICAL ADVICE (OUTPATIENT)
Dept: ENDOCRINOLOGY | Facility: CLINIC | Age: 64
End: 2024-09-19
Payer: COMMERCIAL

## 2024-09-19 DIAGNOSIS — E10.8 TYPE 1 DIABETES MELLITUS WITH COMPLICATIONS (H): Primary | ICD-10-CM

## 2024-09-20 DIAGNOSIS — E10.8 TYPE 1 DIABETES MELLITUS WITH COMPLICATIONS (H): ICD-10-CM

## 2024-09-20 RX ORDER — INFUSION SET FOR INSULIN PUMP
INFUSION SETS-PARAPHERNALIA MISCELLANEOUS
Qty: 3 BOX | Refills: 4 | Status: SHIPPED | OUTPATIENT
Start: 2024-09-20 | End: 2024-09-20

## 2024-09-20 RX ORDER — BLOOD-GLUCOSE TRANSMITTER
1 EACH MISCELLANEOUS SEE ADMIN INSTRUCTIONS
Qty: 1 EACH | Refills: 3 | Status: SHIPPED | OUTPATIENT
Start: 2024-09-20 | End: 2024-09-20

## 2024-09-20 RX ORDER — BLOOD-GLUCOSE SENSOR
1 EACH MISCELLANEOUS SEE ADMIN INSTRUCTIONS
Qty: 3 EACH | Refills: 3 | Status: CANCELLED | OUTPATIENT
Start: 2024-09-20

## 2024-09-20 RX ORDER — INSULIN PUMP SYRINGE, 3 ML
1 EACH MISCELLANEOUS SEE ADMIN INSTRUCTIONS
Qty: 2 EACH | Refills: 3 | Status: CANCELLED | OUTPATIENT
Start: 2024-09-20

## 2024-09-20 RX ORDER — BLOOD-GLUCOSE SENSOR
1 EACH MISCELLANEOUS SEE ADMIN INSTRUCTIONS
Qty: 3 EACH | Refills: 3 | Status: SHIPPED | OUTPATIENT
Start: 2024-09-20 | End: 2024-09-20

## 2024-09-20 RX ORDER — INFUSION SET FOR INSULIN PUMP
INFUSION SETS-PARAPHERNALIA MISCELLANEOUS
Qty: 3 BOX | Refills: 4 | Status: SHIPPED | OUTPATIENT
Start: 2024-09-20

## 2024-09-20 RX ORDER — INSULIN PUMP SYRINGE, 3 ML
1 EACH MISCELLANEOUS SEE ADMIN INSTRUCTIONS
Qty: 2 EACH | Refills: 3 | Status: SHIPPED | OUTPATIENT
Start: 2024-09-20

## 2024-09-20 RX ORDER — INSULIN PUMP SYRINGE, 3 ML
1 EACH MISCELLANEOUS SEE ADMIN INSTRUCTIONS
Qty: 2 EACH | Refills: 3 | Status: SHIPPED | OUTPATIENT
Start: 2024-09-20 | End: 2024-09-20

## 2024-09-20 RX ORDER — BLOOD-GLUCOSE TRANSMITTER
1 EACH MISCELLANEOUS SEE ADMIN INSTRUCTIONS
Status: CANCELLED | OUTPATIENT
Start: 2024-09-20

## 2024-09-20 RX ORDER — INFUSION SET FOR INSULIN PUMP
INFUSION SETS-PARAPHERNALIA MISCELLANEOUS
Qty: 3 BOX | Refills: 4 | Status: CANCELLED | OUTPATIENT
Start: 2024-09-20

## 2024-09-20 RX ORDER — BLOOD-GLUCOSE TRANSMITTER
1 EACH MISCELLANEOUS SEE ADMIN INSTRUCTIONS
Qty: 1 EACH | Refills: 3 | Status: SHIPPED | OUTPATIENT
Start: 2024-09-20 | End: 2024-09-24

## 2024-09-20 RX ORDER — BLOOD-GLUCOSE TRANSMITTER
1 EACH MISCELLANEOUS SEE ADMIN INSTRUCTIONS
Qty: 1 EACH | Refills: 3 | Status: CANCELLED | OUTPATIENT
Start: 2024-09-20

## 2024-09-20 RX ORDER — BLOOD-GLUCOSE SENSOR
1 EACH MISCELLANEOUS SEE ADMIN INSTRUCTIONS
Qty: 3 EACH | Refills: 3 | Status: SHIPPED | OUTPATIENT
Start: 2024-09-20 | End: 2024-09-24

## 2024-09-24 DIAGNOSIS — E10.8 TYPE 1 DIABETES MELLITUS WITH COMPLICATIONS (H): Primary | ICD-10-CM

## 2024-09-24 RX ORDER — INFUSION SET FOR INSULIN PUMP
1 INFUSION SETS-PARAPHERNALIA MISCELLANEOUS SEE ADMIN INSTRUCTIONS
Qty: 40 EACH | Refills: 3 | Status: SHIPPED | OUTPATIENT
Start: 2024-09-24

## 2024-09-24 RX ORDER — BLOOD-GLUCOSE TRANSMITTER
1 EACH MISCELLANEOUS SEE ADMIN INSTRUCTIONS
Qty: 1 EACH | Refills: 3 | Status: SHIPPED | OUTPATIENT
Start: 2024-09-24

## 2024-09-24 RX ORDER — BLOOD-GLUCOSE SENSOR
1 EACH MISCELLANEOUS SEE ADMIN INSTRUCTIONS
Qty: 12 EACH | Refills: 4 | Status: SHIPPED | OUTPATIENT
Start: 2024-09-24

## 2024-09-24 RX ORDER — INSULIN PUMP SYRINGE, 3 ML
1 EACH MISCELLANEOUS SEE ADMIN INSTRUCTIONS
Qty: 40 EACH | Refills: 3 | Status: SHIPPED | OUTPATIENT
Start: 2024-09-24

## 2024-09-24 NOTE — TELEPHONE ENCOUNTER
Transmitter and Sensor orders in place.   Pump Supplies orders placed.   Pharm Team/ notified.   Ariana Claire RN on 9/24/2024 at 9:02 AM           Met equals [facility]. Thanks for nothing autocorrect!       Maurice Moore  [facility] (supporting You)18 hours ago (2:45 PM)       The reservoirs are Medtronic Extended Reservoirs, 3.0ml ref number [id number].  The infusion sets are Medtronic Extended Infusion sets, 9mm, ref numbers [id number], [id number], and 442AJ.   The sensors are Medtronic Guardian 4 and the transmitter is Medtronic Guardian 4     The [facility] is awaiting the prescriptions for those 4 items. Thank you in advance for your help.

## 2024-09-25 ENCOUNTER — MYC MEDICAL ADVICE (OUTPATIENT)
Dept: INTERNAL MEDICINE | Facility: CLINIC | Age: 64
End: 2024-09-25
Payer: COMMERCIAL

## 2024-09-25 DIAGNOSIS — E10.8 TYPE 1 DIABETES MELLITUS WITH COMPLICATIONS (H): ICD-10-CM

## 2024-09-25 DIAGNOSIS — L81.9 CHANGING PIGMENTED SKIN LESION: ICD-10-CM

## 2024-09-25 DIAGNOSIS — Z12.83 SKIN EXAM, SCREENING FOR CANCER: Primary | ICD-10-CM

## 2024-09-25 DIAGNOSIS — Z80.8 FAMILY HISTORY OF MELANOMA: ICD-10-CM

## 2024-09-25 RX ORDER — INSULIN GLARGINE 100 [IU]/ML
INJECTION, SOLUTION SUBCUTANEOUS
Qty: 15 ML | Refills: 1 | Status: SHIPPED | OUTPATIENT
Start: 2024-09-25

## 2024-09-25 NOTE — TELEPHONE ENCOUNTER
LANTUS SOLOSTAR 100 UNIT/ML soln     Last Written Prescription Date:  9/10/24  Last Fill Quantity: 20 ml ,   # refills: 2  Last Office Visit : 9/10/24  Future Office visit:  12/17/24    Routing refill request to provider for review/approval because:  Insulin and insulin pump supplies - refilled per Endocrine clinic.    90 day request although med is only prescribed in case of pump failure

## 2024-09-27 DIAGNOSIS — E10.8 TYPE 1 DIABETES MELLITUS WITH COMPLICATIONS (H): ICD-10-CM

## 2024-09-27 RX ORDER — BLOOD SUGAR DIAGNOSTIC
STRIP MISCELLANEOUS
Qty: 100 STRIP | Refills: 11 | Status: SHIPPED | OUTPATIENT
Start: 2024-09-27

## 2024-09-27 NOTE — TELEPHONE ENCOUNTER
New skin lesion developed ~1 year ago, with recent rapid color changes; FMH of melanoma--request priority Derm referral.    GALILEA Escalante CNP

## 2024-09-30 DIAGNOSIS — E10.8 TYPE 1 DIABETES MELLITUS WITH COMPLICATIONS (H): ICD-10-CM

## 2024-09-30 RX ORDER — INSULIN GLARGINE 100 [IU]/ML
INJECTION, SOLUTION SUBCUTANEOUS
Refills: 1 | OUTPATIENT
Start: 2024-09-30

## 2024-09-30 NOTE — TELEPHONE ENCOUNTER
LANTUS SOLOSTAR 100 UNIT/ML       Last Written Prescription Date:  9-25-24  Last Fill Quantity: 15 ml,   # refills: 1  Last Office Visit : 9-10-24  Future Office visit:  12-17-24    Routing refill request to provider for review/approval because:  Insulin and insulin pump supplies - refilled per Endocrine clinic.   Asking for 90 day supply

## 2024-09-30 NOTE — TELEPHONE ENCOUNTER
Pt set to receive order for pump supplies and guardian 4 sensor 9/30/24 from [facility].

## 2024-10-01 ENCOUNTER — TELEPHONE (OUTPATIENT)
Dept: DERMATOLOGY | Facility: CLINIC | Age: 64
End: 2024-10-01
Payer: COMMERCIAL

## 2024-10-01 NOTE — TELEPHONE ENCOUNTER
This encounter is being sent to inform the clinic that this patient has a referral from [facility] for the diagnoses of Z12.83 (ICD-10-CM) - Skin exam, screening for cancer  L81.9 (ICD-10-CM) - Changing pigmented skin lesion  Z80.8 (ICD-10-CM) - Family history of melanoma and has requested that this patient be seen within 1-2 weeks  Based on the availability of our provider(s), we are unable to accommodate this request.    Were all sites offered this patient?  Yes    Does scheduling algorithm request to schedule next available?  Patient has been scheduled for the first available opening with Farzana Hutchinson on 5/16/2025.  We have informed the patient that the clinic will review their referral and reach out if a sooner appointment is medically necessary.

## 2024-10-02 NOTE — TELEPHONE ENCOUNTER
Spoke with patient and scheduled a sooner appointment time. Informed patient that the appointment in May has been cancelled    Chuck TURNER CMA

## 2024-10-07 ENCOUNTER — TELEPHONE (OUTPATIENT)
Dept: ENDOCRINOLOGY | Facility: CLINIC | Age: 64
End: 2024-10-07
Payer: COMMERCIAL

## 2024-10-07 DIAGNOSIS — E03.9 ACQUIRED HYPOTHYROIDISM: Primary | ICD-10-CM

## 2024-10-07 DIAGNOSIS — E10.8 TYPE 1 DIABETES MELLITUS WITH COMPLICATIONS (H): ICD-10-CM

## 2024-10-07 NOTE — TELEPHONE ENCOUNTER
[facility]  Fax:[phone]  Spec: [phone]  MO: [phone]

## 2024-10-08 ENCOUNTER — HOSPITAL ENCOUNTER (OUTPATIENT)
Dept: RESEARCH | Facility: CLINIC | Age: 64
Discharge: HOME | End: 2024-10-08
Attending: INTERNAL MEDICINE
Payer: COMMERCIAL

## 2024-10-08 ENCOUNTER — LAB REQUISITION (OUTPATIENT)
Dept: LAB | Facility: CLINIC | Age: 64
End: 2024-10-08

## 2024-10-08 LAB
ERYTHROCYTE [DISTWIDTH] IN BLOOD BY AUTOMATED COUNT: 13.2 % (ref 10–15)
HCT VFR BLD AUTO: 42 % (ref 40–53)
HGB BLD-MCNC: 14.7 G/DL (ref 13.3–17.7)
INR PPP: 1.13 (ref 0.85–1.15)
MCH RBC QN AUTO: 33.6 PG (ref 26.5–33)
MCHC RBC AUTO-ENTMCNC: 35 G/DL (ref 31.5–36.5)
MCV RBC AUTO: 96 FL (ref 78–100)
PLATELET # BLD AUTO: 262 10E3/UL (ref 150–450)
RBC # BLD AUTO: 4.37 10E6/UL (ref 4.4–5.9)
WBC # BLD AUTO: 5.9 10E3/UL (ref 4–11)

## 2024-10-08 PROCEDURE — 510N000017 HC CRU PATIENT CARE, PER 15 MIN

## 2024-10-08 PROCEDURE — 300N000003 HC RESEARCH SPECIMEN PROCESSING, SIMPLE

## 2024-10-08 PROCEDURE — 510N000009 HC RESEARCH FACILITY, PER 15 MIN

## 2024-10-08 PROCEDURE — 85018 HEMOGLOBIN: CPT | Performed by: INTERNAL MEDICINE

## 2024-10-08 PROCEDURE — 85610 PROTHROMBIN TIME: CPT | Performed by: INTERNAL MEDICINE

## 2024-10-08 NOTE — ADDENDUM NOTE
Encounter addended by: Chayo Muñiz on: 10/8/2024 4:35 PM   Actions taken: Charge Capture section accepted

## 2024-10-10 ENCOUNTER — MYC MEDICAL ADVICE (OUTPATIENT)
Dept: ENDOCRINOLOGY | Facility: CLINIC | Age: 64
End: 2024-10-10
Payer: COMMERCIAL

## 2024-10-10 DIAGNOSIS — Z00.6 EXAMINATION OF PARTICIPANT OR CONTROL IN CLINICAL RESEARCH: Primary | ICD-10-CM

## 2024-10-10 NOTE — TELEPHONE ENCOUNTER
Central Prior Authorization Team   Phone: [phone]    PA Initiation    Medication: Accu-Chek Guide Strips per pt request  Insurance Company: [employer] ([facility]) - Phone [phone] Fax [phone]  Pharmacy Filling the Rx: [facility] - [place], [place] - [address]  Filling Pharmacy Phone: [phone]  Filling Pharmacy Fax:    Start Date: 10/10/2024

## 2024-10-11 ENCOUNTER — HOSPITAL ENCOUNTER (OUTPATIENT)
Facility: CLINIC | Age: 64
Discharge: HOME | End: 2024-10-11
Attending: STUDENT IN AN ORGANIZED HEALTH CARE EDUCATION/TRAINING PROGRAM | Admitting: STUDENT IN AN ORGANIZED HEALTH CARE EDUCATION/TRAINING PROGRAM
Payer: COMMERCIAL

## 2024-10-11 ENCOUNTER — APPOINTMENT (OUTPATIENT)
Dept: INTERVENTIONAL RADIOLOGY/VASCULAR | Facility: CLINIC | Age: 64
End: 2024-10-11
Attending: INTERNAL MEDICINE
Payer: COMMERCIAL

## 2024-10-11 ENCOUNTER — APPOINTMENT (OUTPATIENT)
Dept: MEDSURG UNIT | Facility: CLINIC | Age: 64
End: 2024-10-11
Attending: STUDENT IN AN ORGANIZED HEALTH CARE EDUCATION/TRAINING PROGRAM
Payer: COMMERCIAL

## 2024-10-11 VITALS
RESPIRATION RATE: 18 BRPM | HEART RATE: 85 BPM | TEMPERATURE: 98.3 F | OXYGEN SATURATION: 98 % | SYSTOLIC BLOOD PRESSURE: 126 MMHG | DIASTOLIC BLOOD PRESSURE: 76 MMHG

## 2024-10-11 DIAGNOSIS — Z00.6 EXAMINATION OF PARTICIPANT OR CONTROL IN CLINICAL RESEARCH: ICD-10-CM

## 2024-10-11 LAB
ERYTHROCYTE [DISTWIDTH] IN BLOOD BY AUTOMATED COUNT: 13.5 % (ref 10–15)
GLUCOSE BLDC GLUCOMTR-MCNC: 106 MG/DL (ref 70–99)
GLUCOSE BLDC GLUCOMTR-MCNC: 144 MG/DL (ref 70–99)
HCT VFR BLD AUTO: 37.9 % (ref 40–53)
HGB BLD-MCNC: 13 G/DL (ref 13.3–17.7)
HGB BLD-MCNC: 13 G/DL (ref 13.3–17.7)
INR PPP: 1.03 (ref 0.85–1.15)
MCH RBC QN AUTO: 33.6 PG (ref 26.5–33)
MCHC RBC AUTO-ENTMCNC: 34.3 G/DL (ref 31.5–36.5)
MCV RBC AUTO: 98 FL (ref 78–100)
PLATELET # BLD AUTO: 234 10E3/UL (ref 150–450)
RBC # BLD AUTO: 3.87 10E6/UL (ref 4.4–5.9)
WBC # BLD AUTO: 6.1 10E3/UL (ref 4–11)

## 2024-10-11 PROCEDURE — 88305 TISSUE EXAM BY PATHOLOGIST: CPT | Mod: 26 | Performed by: PATHOLOGY

## 2024-10-11 PROCEDURE — 85027 COMPLETE CBC AUTOMATED: CPT | Performed by: PHYSICIAN ASSISTANT

## 2024-10-11 PROCEDURE — 85610 PROTHROMBIN TIME: CPT | Performed by: PHYSICIAN ASSISTANT

## 2024-10-11 PROCEDURE — 88346 IMFLUOR 1ST 1ANTB STAIN PX: CPT | Mod: 26 | Performed by: PATHOLOGY

## 2024-10-11 PROCEDURE — 36415 COLL VENOUS BLD VENIPUNCTURE: CPT | Performed by: INTERNAL MEDICINE

## 2024-10-11 PROCEDURE — 999N000142 HC STATISTIC PROCEDURE PREP ONLY

## 2024-10-11 PROCEDURE — 88313 SPECIAL STAINS GROUP 2: CPT | Mod: 26 | Performed by: PATHOLOGY

## 2024-10-11 PROCEDURE — 250N000009 HC RX 250

## 2024-10-11 PROCEDURE — 999N000134 HC STATISTIC PP CARE STAGE 3

## 2024-10-11 PROCEDURE — 36415 COLL VENOUS BLD VENIPUNCTURE: CPT | Performed by: PHYSICIAN ASSISTANT

## 2024-10-11 PROCEDURE — 82962 GLUCOSE BLOOD TEST: CPT

## 2024-10-11 PROCEDURE — 88346 IMFLUOR 1ST 1ANTB STAIN PX: CPT | Mod: TC | Performed by: STUDENT IN AN ORGANIZED HEALTH CARE EDUCATION/TRAINING PROGRAM

## 2024-10-11 PROCEDURE — 88348 ELECTRON MICROSCOPY DX: CPT | Mod: 26 | Performed by: PATHOLOGY

## 2024-10-11 PROCEDURE — 88305 TISSUE EXAM BY PATHOLOGIST: CPT | Mod: TC | Performed by: STUDENT IN AN ORGANIZED HEALTH CARE EDUCATION/TRAINING PROGRAM

## 2024-10-11 PROCEDURE — 999N000054 HC STATISTIC EKG NON-CHARGEABLE

## 2024-10-11 PROCEDURE — 50200 RENAL BIOPSY PERQ: CPT | Mod: RT | Performed by: STUDENT IN AN ORGANIZED HEALTH CARE EDUCATION/TRAINING PROGRAM

## 2024-10-11 PROCEDURE — 85018 HEMOGLOBIN: CPT | Performed by: INTERNAL MEDICINE

## 2024-10-11 PROCEDURE — 99152 MOD SED SAME PHYS/QHP 5/>YRS: CPT | Performed by: STUDENT IN AN ORGANIZED HEALTH CARE EDUCATION/TRAINING PROGRAM

## 2024-10-11 PROCEDURE — 76942 ECHO GUIDE FOR BIOPSY: CPT | Mod: 26 | Performed by: STUDENT IN AN ORGANIZED HEALTH CARE EDUCATION/TRAINING PROGRAM

## 2024-10-11 PROCEDURE — 88350 IMFLUOR EA ADDL 1ANTB STN PX: CPT | Mod: 26 | Performed by: PATHOLOGY

## 2024-10-11 PROCEDURE — 99152 MOD SED SAME PHYS/QHP 5/>YRS: CPT

## 2024-10-11 PROCEDURE — 250N000011 HC RX IP 250 OP 636

## 2024-10-11 RX ORDER — NALOXONE HYDROCHLORIDE 0.4 MG/ML
0.2 INJECTION, SOLUTION INTRAMUSCULAR; INTRAVENOUS; SUBCUTANEOUS
Status: DISCONTINUED | OUTPATIENT
Start: 2024-10-11 | End: 2024-10-11 | Stop reason: HOSPADM

## 2024-10-11 RX ORDER — LIDOCAINE 40 MG/G
CREAM TOPICAL
Status: DISCONTINUED | OUTPATIENT
Start: 2024-10-11 | End: 2024-10-11 | Stop reason: HOSPADM

## 2024-10-11 RX ORDER — NALOXONE HYDROCHLORIDE 0.4 MG/ML
0.4 INJECTION, SOLUTION INTRAMUSCULAR; INTRAVENOUS; SUBCUTANEOUS
Status: DISCONTINUED | OUTPATIENT
Start: 2024-10-11 | End: 2024-10-11 | Stop reason: HOSPADM

## 2024-10-11 RX ORDER — FENTANYL CITRATE 50 UG/ML
25-50 INJECTION, SOLUTION INTRAMUSCULAR; INTRAVENOUS EVERY 5 MIN PRN
Status: DISCONTINUED | OUTPATIENT
Start: 2024-10-11 | End: 2024-10-11 | Stop reason: HOSPADM

## 2024-10-11 RX ORDER — FLUMAZENIL 0.1 MG/ML
0.2 INJECTION, SOLUTION INTRAVENOUS
Status: DISCONTINUED | OUTPATIENT
Start: 2024-10-11 | End: 2024-10-11 | Stop reason: HOSPADM

## 2024-10-11 RX ADMIN — FENTANYL CITRATE 50 MCG: 50 INJECTION, SOLUTION INTRAMUSCULAR; INTRAVENOUS at 08:38

## 2024-10-11 RX ADMIN — MIDAZOLAM 1 MG: 1 INJECTION INTRAMUSCULAR; INTRAVENOUS at 08:38

## 2024-10-11 RX ADMIN — FENTANYL CITRATE 50 MCG: 50 INJECTION, SOLUTION INTRAMUSCULAR; INTRAVENOUS at 08:30

## 2024-10-11 RX ADMIN — MIDAZOLAM 1 MG: 1 INJECTION INTRAMUSCULAR; INTRAVENOUS at 08:30

## 2024-10-11 RX ADMIN — LIDOCAINE HYDROCHLORIDE 7 ML: 10 INJECTION, SOLUTION EPIDURAL; INFILTRATION; INTRACAUDAL; PERINEURAL at 08:37

## 2024-10-11 ASSESSMENT — ACTIVITIES OF DAILY LIVING (ADL)
ADLS_ACUITY_SCORE: 38

## 2024-10-11 NOTE — PROVIDER NOTIFICATION
Dr Mata notified pt's post procedure hgb 13.0 and that pt has urinated 500cc clear/louise urine. MD reports he will be by to see pt     Discharge instructions were reviewed with pt by SAIDA Cleary. Pt verbalizes understanding.

## 2024-10-11 NOTE — PROGRESS NOTES
[facility] Nephrology Note  October 11, 2024    Mr Moore was seen and examined this morning at 7:30 am.  He reported feeling well, without F, Chills, Cough, SOB, GI or  symptoms.  He confirmed being NPO this morning, and not taking Aspirin.  On Exam  123/79, pulse 69, O2Sat 100% on Rm air  Alert in NAD  Carotids 2+ bilat without bruits  Cor RRR, no r/g/m  Lungs clear  Abd S, NT  L Ext trace edema on L.  Neuro:  speech normal, moving all extremities normally.    Admission on 10/11/2024   Component Date Value Ref Range Status    INR 10/11/2024 1.03  0.85 - 1.15 Final    WBC Count 10/11/2024 6.1  4.0 - 11.0 10e3/uL Final    RBC Count 10/11/2024 3.87 (L)  4.40 - 5.90 10e6/uL Final    Hemoglobin 10/11/2024 13.0 (L)  13.3 - 17.7 g/dL Final    Hematocrit 10/11/2024 37.9 (L)  40.0 - 53.0 % Final    MCV 10/11/2024 98  78 - 100 fL Final    MCH 10/11/2024 33.6 (H)  26.5 - 33.0 pg Final    MCHC 10/11/2024 34.3  31.5 - 36.5 g/dL Final    RDW 10/11/2024 13.5  10.0 - 15.0 % Final    Platelet Count 10/11/2024 234  150 - 450 10e3/uL Final    Ventricular Rate 10/11/2024 72  BPM Incomplete    Atrial Rate 10/11/2024 72  BPM Incomplete    NJ Interval 10/11/2024 168  ms Incomplete    QRS Duration 10/11/2024 82  ms Incomplete    QT 10/11/2024 374  ms Incomplete    QTc 10/11/2024 409  ms Incomplete    P Axis 10/11/2024 52  degrees Incomplete    R AXIS 10/11/2024 16  degrees Incomplete    T Axis 10/11/2024 26  degrees Incomplete    Interpretation ECG 10/11/2024    Incomplete                    Value:Sinus rhythm with Premature supraventricular complexes  Otherwise normal ECG  When compared with ECG of 05-Aug-2014 08:14,  Premature supraventricular complexes are now Present      GLUCOSE BY METER POCT 10/11/2024 106 (H)  70 - 99 mg/dL Final    GLUCOSE BY METER POCT 10/11/2024 144 (H)  70 - 99 mg/dL Final   Lab Requisition on 10/08/2024   Component Date Value Ref Range Status    WBC Count 10/08/2024 5.9  4.0 - 11.0 10e3/uL Final    RBC Count  10/08/2024 4.37 (L)  4.40 - 5.90 10e6/uL Final    Hemoglobin 10/08/2024 14.7  13.3 - 17.7 g/dL Final    Hematocrit 10/08/2024 42.0  40.0 - 53.0 % Final    MCV 10/08/2024 96  78 - 100 fL Final    MCH 10/08/2024 33.6 (H)  26.5 - 33.0 pg Final    MCHC 10/08/2024 35.0  31.5 - 36.5 g/dL Final    RDW 10/08/2024 13.2  10.0 - 15.0 % Final    Platelet Count 10/08/2024 262  150 - 450 10e3/uL Final    INR 10/08/2024 1.13  0.85 - 1.15 Final     A/P.  The sequence of events were reviewed with Mr Moore, including the instructions for post biopsy activities.  No contraindication from nephrology to proceed with the biopsy.  Tenzin Mata MD  Division of Nephrology and Hypertension  October 11, 2024

## 2024-10-11 NOTE — PRE-PROCEDURE
GENERAL PRE-PROCEDURE:   Procedure:  Image guided kidney biopsy,  choice for laterality  Date/Time:  10/11/2024 7:19 AM    Verbal consent obtained?: Yes    Written consent obtained?: Yes    Risks and benefits: Risks, benefits and alternatives were discussed    Consent given by:  Patient  Patient states understanding of procedure being performed: Yes    Patient's understanding of procedure matches consent: Yes    Procedure consent matches procedure scheduled: Yes    Expected level of sedation:  Moderate  Appropriately NPO:  Yes  ASA Class:  2  Mallampati  :  Grade 2- soft palate, base of uvula, tonsillar pillars, and portion of posterior pharyngeal wall visible  Lungs:  Lungs clear with good breath sounds bilaterally  Heart:  Normal heart sounds and rate  History & Physical reviewed:  History and physical reviewed and updates made (see comment)  H&P Comments:  Brief H + P obtained.  Aside from the medical reasons prompting kidney biopsy today, no new medical problems in the last 1-2 months. No recent surgeries. Patient did start fluoxetine about 1 month ago though. Full 10 point ROS negative.    A/P  Patient is cleared to proceed with image guided renal biopsy today.  Statement of review:  I have reviewed the lab findings, diagnostic data, medications, and the plan for sedation

## 2024-10-11 NOTE — PROGRESS NOTES
Pt arrives to 2a, with significant other, for research kidney biopsy. H&P needs to be updated. Consent is signed. INR in process. Hx DM1, pt has continuous insulin pump. Nephrology/research team at bedside speaking with pt.

## 2024-10-11 NOTE — DISCHARGE INSTRUCTIONS
[facility]    Interventional Radiology  Patient Instructions Following a Kidney Biopsy    AFTER YOU GO HOME  If you were given sedation, for the first 24 hours: we recommend that an adult stay with you, DO NOT drive or operate machinery at home or at work, DO NOT smoke, DO NOT make any important or legal decisions.  DO NOT drink alcoholic beverages the day of your procedure  Drink plenty of fluids   Resume your regular diet, unless otherwise instructed by your Primary Physician  Relax and take it easy for 48 hours  DO NOT do any strenuous exercise or lifting (> 10 lbs) for at least 7 days following your procedure  Keep the dressing dry and in place for 24 hours. Replace with Band aid for 2 days.  Never leave a wet dressing in place.  Do not take a shower for at least 12 hours following your procedure. No tub bath, hot tub, or swimming for 5 days.  There should be minimum drainage from the biopsy site    CALL THE PHYSICIAN IF:  You start bleeding from the procedure site.  If you do start to bleed from that site, lie down flat and hold pressure on the site for a minimum of 10 minutes.  Your physician will tell you if you need to return to the hospital  You develop nausea or vomiting  You have excessive swelling, redness, or tenderness at the site  You have drainage that looks like it is infected.  You experience severe pain  You develop hives or a rash or unexplained itching  You develop shortness of breath  You develop a temperature of 101 degrees F or greater  You develop bloody clots or red urine after you are discharged      [facility] INTERVENTIONAL RADIOLOGY DEPARTMENT  Procedure Physician:  Dr. Gallo                                      Date of procedure: October 11, 2024  Telephone Numbers: [phone]      Monday-Friday 7:30 am to 4:00 pm  [phone] After 4:00 pm Monday-Friday, Weekends & Holidays.   Ask for the Interventional Radiologist on call.  Someone is on call 24 hrs/day  [facility] toll  free number: [phone] Monday-Friday 8:00 am to 4:30 pm  [facility] Emergency Dept: [phone]

## 2024-10-11 NOTE — PROGRESS NOTES
[facility] Nephrology Note  October 11, 2024  I saw Mr Moore at ~ 9:15am, post biopsy.  He reported feeling well.  No acute pain.  Resting comfortably in bed.  /76.  Tenzin Mata MD  Division of Nephrology and Hypertension  October 11, 2024

## 2024-10-11 NOTE — IR NOTE
Patient Name: Luigi Moore  Medical Record Number: [id number]  Today's Date: 10/11/2024    Procedure: Right native kidney biopsy  Proceduralist: Dr. Gallo  Pathology present: Research present    Procedure Start: 0834  Procedure end: 0851  Sedation medications administered:     Fentanyl 100 mcg  Versed 2 mg  Sedation time: 17 minutes (0834 -0851)     Report given to:  RN  : enrike    Other Notes: Pt arrived to IR room 6 from . Consent reviewed. Pt denies any questions or concerns regarding procedure. Pt positioned prone and monitored per protocol.     Pt tolerated procedure without any noted complications.     Pt transferred back to .

## 2024-10-11 NOTE — PROGRESS NOTES
Dr. Mata here to see pt prior to discharge. Pt's discharge instructions reviewed by Madiha RN, copy given to pt. Pt tolerated oral intake and ambulation. Voided clear louise urine. No pain. PIV dc'd. Pt will discharge home accompanied by friend.

## 2024-10-11 NOTE — PROGRESS NOTES
Pt returns to 2a s/p kidney biopsy. Right flank site CDI, soft, flat, non tender. Pt alert, tolerating PO, pt denies pain. Plan is for 4 hours bedrest.

## 2024-10-13 LAB
ATRIAL RATE - MUSE: 72 BPM
DIASTOLIC BLOOD PRESSURE - MUSE: NORMAL MMHG
INTERPRETATION ECG - MUSE: NORMAL
P AXIS - MUSE: 52 DEGREES
PR INTERVAL - MUSE: 168 MS
QRS DURATION - MUSE: 82 MS
QT - MUSE: 374 MS
QTC - MUSE: 409 MS
R AXIS - MUSE: 16 DEGREES
SYSTOLIC BLOOD PRESSURE - MUSE: NORMAL MMHG
T AXIS - MUSE: 26 DEGREES
VENTRICULAR RATE- MUSE: 72 BPM

## 2024-10-13 NOTE — PROGRESS NOTES
CC -   DM I and EDIC study    INTERVAL HISTORY - LV with me 2019 for EDIC study. He has noticed new floaters in both eyes and thinks his cataracts are progressing. 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       PMH -   Luigi Moore is a  63 year old patient with history of mild/mod NPDR and DM I in EDIC study  DM I since ~ 1981      PAST OCULAR SURGERY  FML OD ~ 2007      RETINAL IMAGING:  OCT macula 10/14/2024   Right eye normal foveal contour, focal outer atrophy, PVD  Left eye PHF detached, normal foveal contour, no DME    ASSESSMENT & PLAN  #. Mild/mod NPDR OU with DM I no DME   - focal scar OD from laser in 2007   - BP/BG control    #. Vitreous syneresis OU   - PVD OD ~ 4/2024 by history seen locally     - advised S/Sx RD 10/2024      # NS OU   - he is bothered by night driving which is most likely due to cataracts, which are mild but likely the cause of his symptoms   - referral to general for cataract evaluation.       General ophthalmology for cataract emilyal    Jose Luis Yip MD  Resident Physician, PGY-3  Department of Ophthalmology       ATTESTATION     Attending Attestation:     Complete documentation of historical and exam elements from today's encounter can be found in the full encounter summary report (not reduplicated in this progress note).  I personally obtained the chief complaint(s) and history of present illness.  I confirmed and edited as necessary the review of systems, past medical/surgical history, family history, social history, and examination findings as documented by others; and I examined the patient myself.  I personally reviewed the relevant tests, images, and reports as documented above.  I personally reviewed the ophthalmic test(s) associated with this encounter, agree with the interpretation(s) as documented by the resident/fellow, and have edited the corresponding report(s) as necessary.   I formulated and edited as necessary the assessment  and plan and discussed the findings and management plan with the patient and family    Belinda Pack MD, PhD  , Vitreoretinal Surgery  Department of Ophthalmology  [facility]

## 2024-10-14 ENCOUNTER — OFFICE VISIT (OUTPATIENT)
Dept: OPHTHALMOLOGY | Facility: CLINIC | Age: 64
End: 2024-10-14
Attending: OPHTHALMOLOGY
Payer: COMMERCIAL

## 2024-10-14 ENCOUNTER — LAB REQUISITION (OUTPATIENT)
Dept: LAB | Facility: CLINIC | Age: 64
End: 2024-10-14

## 2024-10-14 ENCOUNTER — HOSPITAL ENCOUNTER (OUTPATIENT)
Dept: RESEARCH | Facility: CLINIC | Age: 64
Discharge: HOME | End: 2024-10-14
Attending: INTERNAL MEDICINE
Payer: COMMERCIAL

## 2024-10-14 DIAGNOSIS — H52.13 MYOPIA OF BOTH EYES WITH ASTIGMATISM AND PRESBYOPIA: ICD-10-CM

## 2024-10-14 DIAGNOSIS — H52.4 MYOPIA OF BOTH EYES WITH ASTIGMATISM AND PRESBYOPIA: ICD-10-CM

## 2024-10-14 DIAGNOSIS — H25.813 COMBINED FORM OF AGE-RELATED CATARACT, BOTH EYES: ICD-10-CM

## 2024-10-14 DIAGNOSIS — E10.3293 MILD NONPROLIFERATIVE DIABETIC RETINOPATHY OF BOTH EYES WITHOUT MACULAR EDEMA ASSOCIATED WITH TYPE 1 DIABETES MELLITUS (H): Primary | ICD-10-CM

## 2024-10-14 DIAGNOSIS — H52.203 MYOPIA OF BOTH EYES WITH ASTIGMATISM AND PRESBYOPIA: ICD-10-CM

## 2024-10-14 LAB
LDLC SERPL DIRECT ASSAY-MCNC: 97 MG/DL
T4 FREE SERPL-MCNC: 1.48 NG/DL (ref 0.9–1.7)
TSH SERPL DL<=0.005 MIU/L-ACNC: 7.85 UIU/ML (ref 0.3–4.2)

## 2024-10-14 PROCEDURE — 92134 CPTRZ OPH DX IMG PST SGM RTA: CPT | Performed by: OPHTHALMOLOGY

## 2024-10-14 PROCEDURE — 300N000003 HC RESEARCH SPECIMEN PROCESSING, SIMPLE

## 2024-10-14 PROCEDURE — 83721 ASSAY OF BLOOD LIPOPROTEIN: CPT | Performed by: INTERNAL MEDICINE

## 2024-10-14 PROCEDURE — 84439 ASSAY OF FREE THYROXINE: CPT | Performed by: INTERNAL MEDICINE

## 2024-10-14 PROCEDURE — 84443 ASSAY THYROID STIM HORMONE: CPT | Performed by: INTERNAL MEDICINE

## 2024-10-14 PROCEDURE — 510N000016 HC RESEARCH MEALS, PER MEAL

## 2024-10-14 PROCEDURE — 510N000017 HC CRU PATIENT CARE, PER 15 MIN

## 2024-10-14 PROCEDURE — 510N000009 HC RESEARCH FACILITY, PER 15 MIN

## 2024-10-14 PROCEDURE — 99211 OFF/OP EST MAY X REQ PHY/QHP: CPT | Performed by: OPHTHALMOLOGY

## 2024-10-14 PROCEDURE — 99214 OFFICE O/P EST MOD 30 MIN: CPT | Mod: GC | Performed by: OPHTHALMOLOGY

## 2024-10-14 ASSESSMENT — REFRACTION_MANIFEST
OD_CYLINDER: +1.50
OS_CYLINDER: +1.25
OD_SPHERE: -1.25
OS_AXIS: 005
OD_AXIS: 161
OS_SPHERE: -1.00

## 2024-10-14 ASSESSMENT — CONF VISUAL FIELD
OD_INFERIOR_NASAL_RESTRICTION: 0
METHOD: COUNTING FINGERS
OD_NORMAL: 1
OS_SUPERIOR_TEMPORAL_RESTRICTION: 0
OD_INFERIOR_TEMPORAL_RESTRICTION: 0
OD_SUPERIOR_NASAL_RESTRICTION: 0
OS_INFERIOR_NASAL_RESTRICTION: 0
OS_INFERIOR_TEMPORAL_RESTRICTION: 0
OS_NORMAL: 1
OS_SUPERIOR_NASAL_RESTRICTION: 0
OD_SUPERIOR_TEMPORAL_RESTRICTION: 0

## 2024-10-14 ASSESSMENT — EXTERNAL EXAM - LEFT EYE: OS_EXAM: NORMAL

## 2024-10-14 ASSESSMENT — EXTERNAL EXAM - RIGHT EYE: OD_EXAM: NORMAL

## 2024-10-14 ASSESSMENT — VISUAL ACUITY
OD_CC+: -2
OS_CC: 20/15
CORRECTION_TYPE: GLASSES
METHOD: ETDRS
OS_CC+: -2
OD_CC: 20/20

## 2024-10-14 ASSESSMENT — REFRACTION_WEARINGRX
SPECS_TYPE: PAL
OS_AXIS: 167
OS_ADD: +1.75
OD_AXIS: 155
OD_ADD: +1.75
OS_CYLINDER: +1.00
OD_CYLINDER: +1.50
OD_SPHERE: -1.50
OS_SPHERE: -1.00

## 2024-10-14 ASSESSMENT — SLIT LAMP EXAM - LIDS
COMMENTS: NORMAL
COMMENTS: NORMAL

## 2024-10-14 ASSESSMENT — TONOMETRY
OS_IOP_MMHG: 17
IOP_METHOD: APPLANATION
OD_IOP_MMHG: 16

## 2024-10-14 ASSESSMENT — CUP TO DISC RATIO
OS_RATIO: 0.3
OD_RATIO: 0.3

## 2024-10-14 NOTE — ADDENDUM NOTE
Encounter addended by: Chayo Muñiz on: 10/14/2024 2:20 PM   Actions taken: Charge Capture section accepted

## 2024-10-15 LAB
PATH REPORT.COMMENTS IMP SPEC: NORMAL
PATH REPORT.FINAL DX SPEC: NORMAL
PATH REPORT.GROSS SPEC: NORMAL
PATH REPORT.MICROSCOPIC SPEC OTHER STN: NORMAL
PATH REPORT.RELEVANT HX SPEC: NORMAL
PHOTO IMAGE: NORMAL

## 2024-10-15 RX ORDER — LEVOTHYROXINE SODIUM 175 UG/1
175 TABLET ORAL DAILY
Qty: 90 TABLET | Refills: 1 | Status: SHIPPED | OUTPATIENT
Start: 2024-10-15

## 2024-10-16 NOTE — TELEPHONE ENCOUNTER
Prior Authorization Approval    Authorization Effective Date: 10/10/2024  Authorization Expiration Date: 10/10/2025  Medication: Accu-Chek Guide Strips per pt request-PA APPROVED   Approved Dose/Quantity:   Reference #:     Insurance Company: [employer] ([facility]) - Phone [phone] Fax [phone]  Expected CoPay:       CoPay Card Available:      Foundation Assistance Needed:    Which Pharmacy is filling the prescription (Not needed for infusion/clinic administered): [facility] - [place], [place] - [address]  Pharmacy Notified:  Yes  Patient Notified:  No

## 2024-10-21 ENCOUNTER — HOSPITAL ENCOUNTER (OUTPATIENT)
Dept: RESEARCH | Facility: CLINIC | Age: 64
Discharge: HOME | End: 2024-10-21
Attending: INTERNAL MEDICINE
Payer: COMMERCIAL

## 2024-10-21 PROCEDURE — 300N000004 HC RESEARCH SPECIMEN PROCESSING, MODERATE

## 2024-10-24 DIAGNOSIS — H25.813 COMBINED FORM OF AGE-RELATED CATARACT, BOTH EYES: Primary | ICD-10-CM

## 2024-10-25 ENCOUNTER — OFFICE VISIT (OUTPATIENT)
Dept: DERMATOLOGY | Facility: CLINIC | Age: 64
End: 2024-10-25
Payer: COMMERCIAL

## 2024-10-25 DIAGNOSIS — L21.9 SEBORRHEIC DERMATITIS: ICD-10-CM

## 2024-10-25 DIAGNOSIS — L81.9 CHANGING PIGMENTED SKIN LESION: ICD-10-CM

## 2024-10-25 DIAGNOSIS — D49.2 NEOPLASM OF UNSPECIFIED BEHAVIOR OF BONE, SOFT TISSUE, AND SKIN: Primary | ICD-10-CM

## 2024-10-25 DIAGNOSIS — Z80.8 FAMILY HISTORY OF MELANOMA: ICD-10-CM

## 2024-10-25 DIAGNOSIS — Z12.83 SKIN EXAM, SCREENING FOR CANCER: ICD-10-CM

## 2024-10-25 PROCEDURE — 88305 TISSUE EXAM BY PATHOLOGIST: CPT | Mod: 26 | Performed by: DERMATOLOGY

## 2024-10-25 PROCEDURE — 88305 TISSUE EXAM BY PATHOLOGIST: CPT | Mod: TC | Performed by: PHYSICIAN ASSISTANT

## 2024-10-25 PROCEDURE — 11102 TANGNTL BX SKIN SINGLE LES: CPT | Performed by: PHYSICIAN ASSISTANT

## 2024-10-25 PROCEDURE — 99204 OFFICE O/P NEW MOD 45 MIN: CPT | Mod: 25 | Performed by: PHYSICIAN ASSISTANT

## 2024-10-25 RX ORDER — HYDROCORTISONE 25 MG/G
CREAM TOPICAL 2 TIMES DAILY
Qty: 30 G | Refills: 4 | Status: SHIPPED | OUTPATIENT
Start: 2024-10-25 | End: 2024-11-01

## 2024-10-25 RX ORDER — KETOCONAZOLE 20 MG/G
CREAM TOPICAL 2 TIMES DAILY PRN
Qty: 60 G | Refills: 3 | Status: SHIPPED | OUTPATIENT
Start: 2024-10-25

## 2024-10-25 ASSESSMENT — PAIN SCALES - GENERAL: PAINLEVEL_OUTOF10: NO PAIN (0)

## 2024-10-25 NOTE — PATIENT INSTRUCTIONS
Patient Education        Proper skin care from [facility]:     -Eliminate harsh soaps as they strip the natural oils from the skin, often resulting in dry itchy skin ( i.e. Dial, Zest, [place] Spring)  -Use mild soaps such as Cetaphil or Dove Sensitive Skin in the shower. You do not need to use soap on arms, legs, and trunk every time you shower unless visibly soiled.   -Avoid hot or cold showers.  -After showering, lightly dry off and apply moisturizing within 2-3 minutes. This will help trap moisture in the skin.   -Aggressive use of a moisturizer at least 1-2 times a day to the entire body (including -Vanicream, Cetaphil, Aquaphor or Cerave) and moisturize hands after every washing.  -We recommend using moisturizers that come in a tub that needs to be scooped out, not a pump. This has more of an oil base. It will hold moisture in your skin much better than a water base moisturizer. The above recommended are non-pore clogging.        Wear a sunscreen with at least SPF 30 on your face, ears, neck and V of the chest daily. Wear sunscreen on other areas of the body if those areas are exposed to the sun throughout the day. Sunscreens can contain physical and/or chemical blockers. Physical blockers are less likely to clog pores, these include zinc oxide and titanium dioxide. Reapply every two hour and after swimming.      Sunscreen examples: https://www.ewg.org/sunscreen/     UV radiation  UVA radiation remains constant throughout the day and throughout the year. It is a longer wavelength than UVB and therefore penetrates deeper into the skin leading to immediate and delayed tanning, photoaging, and skin cancer. 70-80% of UVA and UVB radiation occurs between the hours of 10am-2pm.  UVB radiation  UVB radiation causes the most harmful effects and is more significant during the summer months. However, snow and ice can reflect UVB radiation leading to skin damage during the winter months as well. UVB radiation is  responsible for tanning, burning, inflammation, delayed erythema (pinkness), pigmentation (brown spots), and skin cancer.      I recommend self monthly full body exams and yearly full body exams with a dermatology provider. If you develop a new or changing lesion please follow up for examination. Most skin cancers are pink and scaly or pink and pearly. However, we do see blue/brown/black skin cancers.  Consider the ABCDEs of melanoma when giving yourself your monthly full body exam ( don't forget the groin, buttocks, feet, toes, etc). A-asymmetry, B-borders, C-color, D-diameter, E-elevation or evolving. If you see any of these changes please follow up in clinic. If you cannot see your back I recommend purchasing a hand held mirror to use with a larger wall mirror.       Checking for Skin Cancer  You can find cancer early by checking your skin each month. There are 3 kinds of skin cancer. They are melanoma, basal cell carcinoma, and squamous cell carcinoma. Doing monthly skin checks is the best way to find new marks or skin changes. Follow the instructions below for checking your skin.   The ABCDEs of checking moles for melanoma   Check your moles or growths for signs of melanoma using ABCDE:   Asymmetry: the sides of the mole or growth don t match  Border: the edges are ragged, notched, or blurred  Color: the color within the mole or growth varies  Diameter: the mole or growth is larger than 6 mm (size of a pencil eraser)  Evolving: the size, shape, or color of the mole or growth is changing (evolving is not shown in the images below)    Checking for other types of skin cancer  Basal cell carcinoma or squamous cell carcinoma have symptoms such as:      A spot or mole that looks different from all other marks on your skin  Changes in how an area feels, such as itching, tenderness, or pain  Changes in the skin's surface, such as oozing, bleeding, or scaliness  A sore that does not heal  New swelling or redness beyond  the border of a mole     Who s at risk?  Anyone can get skin cancer. But you are at greater risk if you have:   Fair skin, light-colored hair, or light-colored eyes  Many moles or abnormal moles on your skin  A history of sunburns from sunlight or tanning beds  A family history of skin cancer  A history of exposure to radiation or chemicals  A weakened immune system  If you have had skin cancer in the past, you are at risk for recurring skin cancer.   How to check your skin  Do your monthly skin checkups in front of a full-length mirror. Check all parts of your body, including your:   Head (ears, face, neck, and scalp)  Torso (front, back, and sides)  Arms (tops, undersides, upper, and lower armpits)  Hands (palms, backs, and fingers, including under the nails)  Buttocks and genitals  Legs (front, back, and sides)  Feet (tops, soles, toes, including under the nails, and between toes)  If you have a lot of moles, take digital photos of them each month. Make sure to take photos both up close and from a distance. These can help you see if any moles change over time.   Most skin changes are not cancer. But if you see any changes in your skin, call your doctor right away. Only he or she can diagnose a problem. If you have skin cancer, seeing your doctor can be the first step toward getting the treatment that could save your life.   [employer] last reviewed this educational content on 4/1/2019 2000-2020 [employer]. [address] [place] [address], [place] [address]. All rights reserved. This information is not intended as a substitute for professional medical care. Always follow your healthcare professional's instructions.        When should I call my doctor?  If you are worsening or not improving, please, contact us or seek urgent care as noted below.      Who should I call with questions (adults)?  [facility] (adult and pediatric): [phone]  [facility]  [place] (adult): [phone]  [facility] [place] ([place], [place], [place] and [place]) [phone]  For urgent needs outside of business hours call the [facility] at [phone] and ask for the dermatology resident on call to be paged  If this is a medical emergency and you are unable to reach an ER, Call 911        If you need a prescription refill, please contact your pharmacy. Refills are approved or denied by our Physicians during normal business hours, Monday through Fridays  Per office policy, refills will not be granted if you have not been seen within the past year (or sooner depending on your child's condition)

## 2024-10-25 NOTE — LETTER
10/25/2024       RE: Luigi Moore  [address]  [address]  [facility] [address]     Dear Colleague,    Thank you for referring your patient, Luigi Moore, to the [facility] [place] [facility]. Please see a copy of my visit note below.    [facility] Dermatology Note  Encounter Date: Oct 25, 2024  Office Visit     Reviewed patients past medical history and pertinent chart review prior to patients visit today.     Dermatology Problem List:  # NUB, right medial thigh, shave biopsy 10/25/2024   # Seborrheic dermatitis  - ketoconazole 2% cream, hydrocortisone 2.5% ointment    No personal history of skin cancer.  Family history advanced malignant melanoma, father.   ____________________________________________    Assessment & Plan:     # Seborrheic dermatitis, face  Discussed that this is a recurring condition for most people and will need some maintenance even after rash has resolved. Plan as follows:  - Start ketoconazole 2% cream twice daily.  - Start hydrocortisone 2.5% ointment twice daily for 7 days. Restart as needed for flares. We discussed potential side effects of topical steroids, including skin atrophy.           # Neoplasm of uncertain behavior:  right medial thigh  DDx includes ecchymosis vs dermatofibroma vs melanoma. Shave biopsy today.  - Patient requests biopsy to rule out melanoma.     Procedure Note: Biopsy by shave technique  The risks and benefits of the procedure were described to the patient. These include but are not limited to bleeding, infection, scar, incomplete removal, and non-diagnostic biopsy. Verbal informed consent was obtained. The above site(s) was cleansed with an alcohol pad and injected with 1% lidocaine with epinephrine. Once anesthesia was obtained, a biopsy(ies) was performed with Gilette blade. The tissue(s) was placed in a labeled container(s) with formalin and sent to  pathology. Hemostasis was achieved with aluminum chloride. Vaseline and a bandage were applied to the wound(s). The patient tolerated the procedure well and was given post biopsy care instructions.    # Multiple nevi, trunk and extremities  # Solar lentigines  - No concerning features on dermoscopy. We discussed the importance of self exams at home. ABCDE criteria and importance of photoprotection reviewed.     # Cherry angiomas  # Seborrheic keratoses  - We discussed the benign nature of the skin lesions. No treatment required. Continued observation recommended. Follow up with any concerns.      Follow-up:  Annual for follow up full body skin exam, as needed for new or changing lesions or new concerns    All risks, benefits and alternatives were discussed with patient.  Patient is in agreement and understands the assessment and plan.  All questions were answered.  Love Hou PA-C  [facility] Dermatology    ____________________________________________    CC: Derm Problem (FBSE- spot of concern R inner thigh)    HPI:  Mr. Luigi Moore is a(n) 63 year old male who presents today as a new patient for a full body skin cancer screening. The patient requests evaluation of a lesion on the right medial thigh. The lesion began three weeks ago and was initially black. It has changed color over time. No pain, itching, or bleeding at the site. He would like to undergo biopsy of the site. No other specific cutaneous concerns today. The patient reports trying to be diligent with photoprotection.      Physical Exam:  Vitals: There were no vitals taken for this visit.  SKIN: Total skin excluding the genitalia areas was performed. The exam included the scalp, face, neck, bilateral arms, chest, back, abdomen, bilateral legs, digits, mons pubis, buttocks, and nails.   - Ryder II.  - Greasy yellow scale with background erythema involving the ears, eyebrows, and beard. .   - The right medial thigh demonstrates a  violaceous macule.   - Multiple tan/brown macules and papules scattered throughout exam, consistent with benign nevi. No concerning features on dermoscopy.   - Scattered tan, homogenous macules scattered on sun exposed skin, consistent with solar lentigines.   - Scattered waxy, stuck on appearing papules and patches, consistent with seborrheic keratoses.    - Several 1-2 mm red dome shaped symmetric papules, consistent with cherry angiomas.     Medications:  Current Outpatient Medications   Medication Sig Dispense Refill     albuterol (PROAIR HFA/PROVENTIL HFA/VENTOLIN HFA) 108 (90 Base) MCG/ACT inhaler Inhale 2 puffs into the lungs every 6 hours as needed for shortness of breath or wheezing 18 g 5     amLODIPine (NORVASC) 10 MG tablet Take 1 tablet (10 mg) by mouth daily 90 tablet 3     [START ON 11/4/2024] amphetamine-dextroamphetamine (ADDERALL) 15 MG tablet Take 1 tablet (15 mg) by mouth 2 times daily. 60 tablet 0     amphetamine-dextroamphetamine (ADDERALL) 15 MG tablet Take 1 tablet (15 mg) by mouth 2 times daily 60 tablet 0     amphetamine-dextroamphetamine (ADDERALL) 15 MG tablet Take 1 tablet (15 mg) by mouth 2 times daily 60 tablet 0     ARIPiprazole (ABILIFY) 15 MG tablet Take 1 tablet (15 mg) by mouth daily 90 tablet 1     aspirin 81 MG tablet Take 1 tablet (81 mg) by mouth daily 100 tablet 3     atorvastatin (LIPITOR) 40 MG tablet Take 1 tablet (40 mg) by mouth daily 90 tablet 3     blood glucose (ACCU-CHEK GUIDE) test strip Use to test blood sugar 3 times daily or as directed. 100 strip 11     buPROPion (WELLBUTRIN XL) 150 MG 24 hr tablet Take 1 tablet (150 mg) by mouth every morning Take with 300 mg tablet (total daily dose 450 mg) 60 tablet 5     buPROPion (WELLBUTRIN XL) 300 MG 24 hr tablet Take 1 tablet (300 mg) by mouth every morning Take with 150 mg tablet (total daily dose 450 mg) 60 tablet 5     cetirizine (ZYRTEC) 10 MG tablet Take 10 mg by mouth       Continuous Glucose Sensor (GUARDIAN 4  "GLUCOSE SENSOR) MISC 1 each See Admin Instructions. Use per 's instructions. Change every 7 days. 12 each 4     Continuous Glucose Transmitter (GUARDIAN 4 TRANSMITTER) MISC 1 each See Admin Instructions. Use per 's instructions to monitor glucose. 1 each 3     DiphenhydrAMINE HCl (BENADRYL PO)        FLUoxetine (PROZAC) 20 MG capsule Take 1 capsule (20 mg) by mouth daily 30 capsule 3     Glucagon (BAQSIMI) 3 MG/DOSE nasal powder Spray 1 spray (3 mg) in nostril as needed (severe hypoglycemia) in the event of unconscious hypoglycemia or hypoglycemic seizure. May repeat dose if no response after 15 minutes. 1 each 1     infusion set (AGNES 32\" 9MM) misc pump supply Infusion set to be used with pump.  Change every 2-3 days as directed. 3 Box 4     insulin glargine (LANTUS SOLOSTAR) 100 UNIT/ML pen INJECT 20 UNITS Daily  SUBCUTANEOUS ONLY IF INSULIN PUMP FAILS. 15 mL 1     Insulin Infusion Pump (INSULIN PUMP FQ3620) KIT        Insulin Infusion Pump Supplies (EXTENDED INFUSION SET 23\"/9MM) MISC 1 each See Admin Instructions. Change every 2-3 days for use in insulin pump. 40 each 3     Insulin Infusion Pump Supplies (EXTENDED RESERVOIR 3ML) [facility] 1 each See Admin Instructions. Change every 2-3 days for use in insulin pump. 40 each 3     Insulin Infusion Pump Supplies (MINIMED PUMP RESERVOIR 3ML) MISC 1 each See Admin Instructions. 2 each 3     insulin lispro (HUMALOG) 100 UNIT/ML vial VIA PUMP APPROX 80 UNITS DAILY. 80 mL 3     insulin pen needle (32G X 6 MM) 32G X 6 MM miscellaneous Use 4 pen needles daily or as directed as back up. 100 each 3     insulin syringe-needle U-100 (BD INSULIN SYRINGE) 29G X 1/2\" 1 ML miscellaneous Use as directed 20 each 1     KETOSTIX test strip Use as directed in case of high glucose, vomiting or illness. 50 strip 11     levothyroxine (SYNTHROID/LEVOTHROID) 175 MCG tablet Take 1 tablet (175 mcg) by mouth daily. Please take daily on an empty stomach and wait 30 minutes " for any other medications food or drink.  Please recheck TSH level again in 12 weeks. 90 tablet 1     losartan-hydrochlorothiazide (HYZAAR) 50-12.5 MG tablet Take 1 tablet by mouth daily 90 tablet 3     order for DME Equipment being ordered: CPAP machine 1 each 0     Semaglutide-Weight Management (WEGOVY) 1 MG/0.5ML pen Inject 1 mg subcutaneously once a week. 2 mL 3     topiramate (TOPAMAX) 50 MG tablet Take 1 tablet (50 mg) by mouth daily 90 tablet 3     Urea 40 % CREA Externally apply topically 2 times daily To surface of toenails 198 g 5     glucagon 1 MG kit Inject 1 mg into the muscle once for 1 dose 1 each 0     No current facility-administered medications for this visit.     Facility-Administered Medications Ordered in Other Visits   Medication Dose Route Frequency Provider Last Rate Last Admin     lidocaine (PF) (XYLOCAINE) 1 % injection 10 mL  10 mL Intradermal Once Kathya Lang APRN CNP          Past Medical History:   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)     Acquired hypothyroidism     Dyslipidemia     Essential hypertension     Hyperlipidemia     Proliferative diabetic retinopathy (H)     Obstructive sleep apnea syndrome     Insomnia     Low back pain     Type 1 diabetes mellitus (H)     TELLY (obstructive sleep apnea)     Erectile dysfunction     Dupuytren contracture     Past Medical History:   Diagnosis Date     Acquired hypothyroidism 12/07/2016     Cancer (H) 2015     Depressive disorder      Hidradenoma dx: Feb 2015    right hand/2 surgeries     Hyperlipidemia 12/07/2006     Hypertension      Numbness and tingling 2015    right hand, related to surgery for hidradenoma     TELLY (obstructive sleep apnea) 2007    New cpap machine 1 year ago. follows at [facility]     Type 1 diabetes (H) 1982     Uncomplicated asthma        CC GALILEA Morataya CNP  [address] [place] [address] on close of this  encounter.    Lidocaine-epinephrine 1-1:702321 % injection   0.7mL once for one use, starting 10/25/2024 ending 10/25/2024,  2mL disp, R-0, injection  Injected by KRYSTINA Hartley      Again, thank you for allowing me to participate in the care of your patient.      Sincerely,    Love Hou PA-C

## 2024-10-25 NOTE — PROGRESS NOTES
Lidocaine-epinephrine 1-1:541082 % injection   0.7mL once for one use, starting 10/25/2024 ending 10/25/2024,  2mL disp, R-0, injection  Injected by KRYSTINA Hartley

## 2024-10-25 NOTE — PROGRESS NOTES
[facility] Dermatology Note  Encounter Date: Oct 25, 2024  Office Visit     Reviewed patients past medical history and pertinent chart review prior to patients visit today.     Dermatology Problem List:  # NUB, right medial thigh, shave biopsy 10/25/2024   # Seborrheic dermatitis  - ketoconazole 2% cream, hydrocortisone 2.5% ointment    No personal history of skin cancer.  Family history advanced malignant melanoma, father.   ____________________________________________    Assessment & Plan:     # Seborrheic dermatitis, face  Discussed that this is a recurring condition for most people and will need some maintenance even after rash has resolved. Plan as follows:  - Start ketoconazole 2% cream twice daily.  - Start hydrocortisone 2.5% ointment twice daily for 7 days. Restart as needed for flares. We discussed potential side effects of topical steroids, including skin atrophy.           # Neoplasm of uncertain behavior:  right medial thigh  DDx includes ecchymosis vs dermatofibroma vs melanoma. Shave biopsy today.  - Patient requests biopsy to rule out melanoma.     Procedure Note: Biopsy by shave technique  The risks and benefits of the procedure were described to the patient. These include but are not limited to bleeding, infection, scar, incomplete removal, and non-diagnostic biopsy. Verbal informed consent was obtained. The above site(s) was cleansed with an alcohol pad and injected with 1% lidocaine with epinephrine. Once anesthesia was obtained, a biopsy(ies) was performed with Gilette blade. The tissue(s) was placed in a labeled container(s) with formalin and sent to pathology. Hemostasis was achieved with aluminum chloride. Vaseline and a bandage were applied to the wound(s). The patient tolerated the procedure well and was given post biopsy care instructions.    # Multiple nevi, trunk and extremities  # Solar lentigines  - No concerning features on dermoscopy. We discussed the importance  of self exams at home. ABCDE criteria and importance of photoprotection reviewed.     # Cherry angiomas  # Seborrheic keratoses  - We discussed the benign nature of the skin lesions. No treatment required. Continued observation recommended. Follow up with any concerns.      Follow-up:  Annual for follow up full body skin exam, as needed for new or changing lesions or new concerns    All risks, benefits and alternatives were discussed with patient.  Patient is in agreement and understands the assessment and plan.  All questions were answered.  Love Hou PA-C  [facility] Dermatology    ____________________________________________    CC: Derm Problem (FBSE- spot of concern R inner thigh)    HPI:  Mr. Luigi Moore is a(n) 63 year old male who presents today as a new patient for a full body skin cancer screening. The patient requests evaluation of a lesion on the right medial thigh. The lesion began three weeks ago and was initially black. It has changed color over time. No pain, itching, or bleeding at the site. He would like to undergo biopsy of the site. No other specific cutaneous concerns today. The patient reports trying to be diligent with photoprotection.      Physical Exam:  Vitals: There were no vitals taken for this visit.  SKIN: Total skin excluding the genitalia areas was performed. The exam included the scalp, face, neck, bilateral arms, chest, back, abdomen, bilateral legs, digits, mons pubis, buttocks, and nails.   - Ryder II.  - Greasy yellow scale with background erythema involving the ears, eyebrows, and beard. .   - The right medial thigh demonstrates a violaceous macule.   - Multiple tan/brown macules and papules scattered throughout exam, consistent with benign nevi. No concerning features on dermoscopy.   - Scattered tan, homogenous macules scattered on sun exposed skin, consistent with solar lentigines.   - Scattered waxy, stuck on appearing papules and patches, consistent  with seborrheic keratoses.    - Several 1-2 mm red dome shaped symmetric papules, consistent with cherry angiomas.     Medications:  Current Outpatient Medications   Medication Sig Dispense Refill    albuterol (PROAIR HFA/PROVENTIL HFA/VENTOLIN HFA) 108 (90 Base) MCG/ACT inhaler Inhale 2 puffs into the lungs every 6 hours as needed for shortness of breath or wheezing 18 g 5    amLODIPine (NORVASC) 10 MG tablet Take 1 tablet (10 mg) by mouth daily 90 tablet 3    [START ON 11/4/2024] amphetamine-dextroamphetamine (ADDERALL) 15 MG tablet Take 1 tablet (15 mg) by mouth 2 times daily. 60 tablet 0    amphetamine-dextroamphetamine (ADDERALL) 15 MG tablet Take 1 tablet (15 mg) by mouth 2 times daily 60 tablet 0    amphetamine-dextroamphetamine (ADDERALL) 15 MG tablet Take 1 tablet (15 mg) by mouth 2 times daily 60 tablet 0    ARIPiprazole (ABILIFY) 15 MG tablet Take 1 tablet (15 mg) by mouth daily 90 tablet 1    aspirin 81 MG tablet Take 1 tablet (81 mg) by mouth daily 100 tablet 3    atorvastatin (LIPITOR) 40 MG tablet Take 1 tablet (40 mg) by mouth daily 90 tablet 3    blood glucose (ACCU-CHEK GUIDE) test strip Use to test blood sugar 3 times daily or as directed. 100 strip 11    buPROPion (WELLBUTRIN XL) 150 MG 24 hr tablet Take 1 tablet (150 mg) by mouth every morning Take with 300 mg tablet (total daily dose 450 mg) 60 tablet 5    buPROPion (WELLBUTRIN XL) 300 MG 24 hr tablet Take 1 tablet (300 mg) by mouth every morning Take with 150 mg tablet (total daily dose 450 mg) 60 tablet 5    cetirizine (ZYRTEC) 10 MG tablet Take 10 mg by mouth      Continuous Glucose Sensor (GUARDIAN 4 GLUCOSE SENSOR) MISC 1 each See Admin Instructions. Use per 's instructions. Change every 7 days. 12 each 4    Continuous Glucose Transmitter (GUARDIAN 4 TRANSMITTER) MISC 1 each See Admin Instructions. Use per 's instructions to monitor glucose. 1 each 3    DiphenhydrAMINE HCl (BENADRYL PO)       FLUoxetine (PROZAC) 20  "MG capsule Take 1 capsule (20 mg) by mouth daily 30 capsule 3    Glucagon (BAQSIMI) 3 MG/DOSE nasal powder Spray 1 spray (3 mg) in nostril as needed (severe hypoglycemia) in the event of unconscious hypoglycemia or hypoglycemic seizure. May repeat dose if no response after 15 minutes. 1 each 1    infusion set (AGNES 32\" 9MM) misc pump supply Infusion set to be used with pump.  Change every 2-3 days as directed. 3 Box 4    insulin glargine (LANTUS SOLOSTAR) 100 UNIT/ML pen INJECT 20 UNITS Daily  SUBCUTANEOUS ONLY IF INSULIN PUMP FAILS. 15 mL 1    Insulin Infusion Pump (INSULIN PUMP AA2033) KIT       Insulin Infusion Pump Supplies (EXTENDED INFUSION SET 23\"/9MM) MISC 1 each See Admin Instructions. Change every 2-3 days for use in insulin pump. 40 each 3    Insulin Infusion Pump Supplies (EXTENDED RESERVOIR 3ML) MISC 1 each See Admin Instructions. Change every 2-3 days for use in insulin pump. 40 each 3    Insulin Infusion Pump Supplies (MINIMED PUMP RESERVOIR 3ML) MISC 1 each See Admin Instructions. 2 each 3    insulin lispro (HUMALOG) 100 UNIT/ML vial VIA PUMP APPROX 80 UNITS DAILY. 80 mL 3    insulin pen needle (32G X 6 MM) 32G X 6 MM miscellaneous Use 4 pen needles daily or as directed as back up. 100 each 3    insulin syringe-needle U-100 (BD INSULIN SYRINGE) 29G X 1/2\" 1 ML miscellaneous Use as directed 20 each 1    KETOSTIX test strip Use as directed in case of high glucose, vomiting or illness. 50 strip 11    levothyroxine (SYNTHROID/LEVOTHROID) 175 MCG tablet Take 1 tablet (175 mcg) by mouth daily. Please take daily on an empty stomach and wait 30 minutes for any other medications food or drink.  Please recheck TSH level again in 12 weeks. 90 tablet 1    losartan-hydrochlorothiazide (HYZAAR) 50-12.5 MG tablet Take 1 tablet by mouth daily 90 tablet 3    order for DME Equipment being ordered: CPAP machine 1 each 0    Semaglutide-Weight Management (WEGOVY) 1 MG/0.5ML pen Inject 1 mg subcutaneously once a week. 2 mL " 3    topiramate (TOPAMAX) 50 MG tablet Take 1 tablet (50 mg) by mouth daily 90 tablet 3    Urea 40 % CREA Externally apply topically 2 times daily To surface of toenails 198 g 5    glucagon 1 MG kit Inject 1 mg into the muscle once for 1 dose 1 each 0     No current facility-administered medications for this visit.     Facility-Administered Medications Ordered in Other Visits   Medication Dose Route Frequency Provider Last Rate Last Admin    lidocaine (PF) (XYLOCAINE) 1 % injection 10 mL  10 mL Intradermal Once Kathya Lang APRN CNP          Past Medical History:   Patient Active Problem List   Diagnosis    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)    Acquired hypothyroidism    Dyslipidemia    Essential hypertension    Hyperlipidemia    Proliferative diabetic retinopathy (H)    Obstructive sleep apnea syndrome    Insomnia    Low back pain    Type 1 diabetes mellitus (H)    TELLY (obstructive sleep apnea)    Erectile dysfunction    Dupuytren contracture     Past Medical History:   Diagnosis Date    Acquired hypothyroidism 12/07/2016    Cancer (H) 2015    Depressive disorder     Hidradenoma dx: Feb 2015    right hand/2 surgeries    Hyperlipidemia 12/07/2006    Hypertension     Numbness and tingling 2015    right hand, related to surgery for hidradenoma    TELLY (obstructive sleep apnea) 2007    New cpap machine 1 year ago. follows at [facility]    Type 1 diabetes (H) 1982    Uncomplicated asthma        CC GALILEA Morataya CNP  [address] [place] [address] on close of this encounter.

## 2024-10-25 NOTE — NURSING NOTE
Dermatology Rooming Note    Luigi Moore's goals for this visit include:   Chief Complaint   Patient presents with    Derm Problem     FBSE- spot of concern R inner thigh     KRYSTINA Hartley

## 2024-10-29 LAB
PATH REPORT.COMMENTS IMP SPEC: NORMAL
PATH REPORT.FINAL DX SPEC: NORMAL
PATH REPORT.GROSS SPEC: NORMAL
PATH REPORT.MICROSCOPIC SPEC OTHER STN: NORMAL
PATH REPORT.RELEVANT HX SPEC: NORMAL

## 2024-11-07 ENCOUNTER — TELEPHONE (OUTPATIENT)
Dept: OPHTHALMOLOGY | Facility: CLINIC | Age: 64
End: 2024-11-07
Payer: COMMERCIAL

## 2024-11-21 NOTE — PROGRESS NOTES
HPI  Luigi Moore is a 63 year old male with type 1 diabetes mellitus.  Clinic visit with me today.    Pt dx with type 1 DM at age 22.  He has been using an insulin pump since around 1987.  Pt's diabetes is complicated by PDR.  No known diabetic nephropathy.  Pt has no hx of diabetic peripheral neuropathy.  Hx of ED s/p surgery.  Pt also has hx of hypothyroidism, hyperlipidemia, HTN, TELLY, insomnia, exploding head syndrome, hidradenoma and Dupuytren contractures.    Bp at home 120's or 110's/70.    A1C is 7.1% today.        For his diabetes, using Medtronic 780G insulin pump.  He has no Guardian4 sensors and has been doing glucose fingersticks.  Current basal insulin rate 0.85 units/h x 24 hours.  I/C ratio is 1:15.  CF 40.  Most recent A1C was high at 9.2 % on 8/14/2024.  He tells me he was struggling with his depression which affected his diabetes care.  Previous A1C was 6.9 % in December 2023.  Pt also taking Wegovy 0.5 mg subcutaneous once a week.  I have limited blood sugar data today.  He states he only enters his blood sugar when it is high.  Denies frequent hypoglycemia.  On ROS today, chronic blurred vision.  Denies hx of CVA,n/v, SOB at rest, cough or fever.  No chest pain.  Denies abd pain, diarrhea, blood in stool or melena.  Denies dysuria or hematuria.  No peripheral neuropathy sx. No active foot ulcers.     Diabetes Care  Retinopathy: yes; hx of PDR.  History of laser treatment right eye x1. Pt seen by ophthalmology annually.  Nephropathy:urine microalbuminuria negative in December 2023.  Patient taking Hyzaar.  Neuropathy: None.  Foot Exam: No foot ulcers.  Taking aspirin: Yes.  Lipids: LDL 76 in January 2024.  Patient taking Lipitor.  Insulin: Medtronic 7 80G insulin pump.  DM meds: Wegovy.  Testing: Currently using glucose meter.  Will order Guardian4 sensors for his Medtronic 7 80G insulin pump.  Hypoglycemia tx: has Baqsimi.  Backup insulin: Patient has basal insulin for backup in case insulin  pump fails.                   ROS  Please see under HPI.      Allergies  Allergies   Allergen Reactions    Iodinated Contrast Media Anaphylaxis    Contrast Dye Hives    Diatrizoate Hives     Iodine dye; iodine externally or topically is OK       Medications  Current Outpatient Medications   Medication Sig Dispense Refill    albuterol (PROAIR HFA/PROVENTIL HFA/VENTOLIN HFA) 108 (90 Base) MCG/ACT inhaler Inhale 2 puffs into the lungs every 6 hours as needed for shortness of breath or wheezing 18 g 5    amLODIPine (NORVASC) 10 MG tablet Take 1 tablet (10 mg) by mouth daily 90 tablet 3    amphetamine-dextroamphetamine (ADDERALL) 15 MG tablet Take 1 tablet (15 mg) by mouth 2 times daily. 60 tablet 0    amphetamine-dextroamphetamine (ADDERALL) 15 MG tablet Take 1 tablet (15 mg) by mouth 2 times daily 60 tablet 0    amphetamine-dextroamphetamine (ADDERALL) 15 MG tablet Take 1 tablet (15 mg) by mouth 2 times daily 60 tablet 0    ARIPiprazole (ABILIFY) 15 MG tablet Take 1 tablet (15 mg) by mouth daily 90 tablet 1    aspirin 81 MG tablet Take 1 tablet (81 mg) by mouth daily 100 tablet 3    atorvastatin (LIPITOR) 40 MG tablet Take 1 tablet (40 mg) by mouth daily 90 tablet 3    blood glucose (ACCU-CHEK GUIDE) test strip Use to test blood sugar 3 times daily or as directed. 100 strip 11    buPROPion (WELLBUTRIN XL) 150 MG 24 hr tablet Take 1 tablet (150 mg) by mouth every morning Take with 300 mg tablet (total daily dose 450 mg) 60 tablet 5    buPROPion (WELLBUTRIN XL) 300 MG 24 hr tablet Take 1 tablet (300 mg) by mouth every morning Take with 150 mg tablet (total daily dose 450 mg) 60 tablet 5    cetirizine (ZYRTEC) 10 MG tablet Take 10 mg by mouth      Continuous Glucose Sensor (GUARDIAN 4 GLUCOSE SENSOR) MISC 1 each See Admin Instructions. Use per 's instructions. Change every 7 days. 12 each 4    Continuous Glucose Transmitter (GUARDIAN 4 TRANSMITTER) MISC 1 each See Admin Instructions. Use per 's  "instructions to monitor glucose. 1 each 3    DiphenhydrAMINE HCl (BENADRYL PO)       FLUoxetine (PROZAC) 20 MG capsule Take 1 capsule (20 mg) by mouth daily 30 capsule 3    Glucagon (BAQSIMI) 3 MG/DOSE nasal powder Spray 1 spray (3 mg) in nostril as needed (severe hypoglycemia) in the event of unconscious hypoglycemia or hypoglycemic seizure. May repeat dose if no response after 15 minutes. 1 each 1    infusion set (AGNES 32\" 9MM) misc pump supply Infusion set to be used with pump.  Change every 2-3 days as directed. 3 Box 4    insulin glargine (LANTUS SOLOSTAR) 100 UNIT/ML pen INJECT 20 UNITS Daily  SUBCUTANEOUS ONLY IF INSULIN PUMP FAILS. 15 mL 1    Insulin Infusion Pump (INSULIN PUMP [id number]) KIT       Insulin Infusion Pump Supplies (EXTENDED INFUSION SET 23\"/9MM) MISC 1 each See Admin Instructions. Change every 2-3 days for use in insulin pump. 40 each 3    Insulin Infusion Pump Supplies (EXTENDED RESERVOIR 3ML) MISC 1 each See Admin Instructions. Change every 2-3 days for use in insulin pump. 40 each 3    Insulin Infusion Pump Supplies (MINIMED PUMP RESERVOIR 3ML) MISC 1 each See Admin Instructions. 2 each 3    insulin lispro (HUMALOG) 100 UNIT/ML vial VIA PUMP APPROX 80 UNITS DAILY. 80 mL 3    insulin pen needle (32G X 6 MM) 32G X 6 MM miscellaneous Use 4 pen needles daily or as directed as back up. 100 each 3    insulin syringe-needle U-100 (BD INSULIN SYRINGE) 29G X 1/2\" 1 ML miscellaneous Use as directed 20 each 1    ketoconazole (NIZORAL) 2 % external cream Apply topically 2 times daily as needed (redness and flaking). Apply to the face. 60 g 3    KETOSTIX test strip Use as directed in case of high glucose, vomiting or illness. 50 strip 11    levothyroxine (SYNTHROID/LEVOTHROID) 175 MCG tablet Take 1 tablet (175 mcg) by mouth daily. Please take daily on an empty stomach and wait 30 minutes for any other medications food or drink.  Please recheck TSH level again in 12 weeks. 90 tablet 1    " losartan-hydrochlorothiazide (HYZAAR) 50-12.5 MG tablet Take 1 tablet by mouth daily 90 tablet 3    order for DME Equipment being ordered: CPAP machine 1 each 0    Semaglutide-Weight Management (WEGOVY) 2.4 MG/0.75ML pen Inject 2.4 mg subcutaneously once a week. 9 mL 3    topiramate (TOPAMAX) 50 MG tablet Take 1 tablet (50 mg) by mouth daily 90 tablet 3    Urea 40 % CREA Externally apply topically 2 times daily To surface of toenails 198 g 5    glucagon 1 MG kit Inject 1 mg into the muscle once for 1 dose 1 each 0       Family History  family history includes Asthma in his father; Bone Cancer in his paternal grandfather and paternal grandmother; Cancer in his brother, father, maternal grandfather, and maternal grandmother; Depression in his brother; Diabetes in his father; Heart Failure in his maternal uncle, maternal uncle, and mother; Hypertension in his mother; Liver Cancer (age of onset: 53) in his brother; Macular Degeneration in his father; Melanoma in his father; Mental Illness in his brother; Other - See Comments in his mother; Other Cancer in his father, paternal grandfather, and paternal grandmother; Substance Abuse in his brother.    Father dx type 1 DM at age 78.    Social History   reports that he has never smoked. He has never used smokeless tobacco. He reports that he does not drink alcohol and does not use drugs.     Smoke: none  ETOH: none   Martial status: . No children.  Occupation: working at [employer] and .  Also bike races.      Past Medical History  Past Medical History:   Diagnosis Date    Acquired hypothyroidism 12/07/2016    Cancer (H) 2015    Depressive disorder     Hidradenoma dx: Feb 2015    right hand/2 surgeries    Hyperlipidemia 12/07/2006    Hypertension     Numbness and tingling 2015    right hand, related to surgery for hidradenoma    TELLY (obstructive sleep apnea) 2007    New cpap machine 1 year ago. [place] [facility]    Type 1 diabetes (H) 1982    Uncomplicated  asthma        Past Surgical History:   Procedure Laterality Date    BIOPSY  2016    COLONOSCOPY N/A 5/17/2024    Procedure: Colonoscopy;  Surgeon: Jose Luis Gonzales MD;  Location: [facility]    EXCISE LESION HAND Right 02/26/2015    Procedure: EXCISE LESION HAND;  Surgeon: Jeovany Jefferson MD;  Location: [facility]    IMPLANT PROSTHESIS PENIS INFLATABLE N/A 02/01/2022    Procedure: INSERTION of INFLATABLE PENILE PROSTHESIS;  Surgeon: Johnathan Cooper MD;  Location: [facility] OR    IR RENAL BIOPSY RIGHT  10/11/2024    ORTHOPEDIC SURGERY Left     achilles tendon     ORTHOPEDIC SURGERY Right     hand surgery    SOFT TISSUE SURGERY      lipoma removal, below rib cage on right side       Physical Exam  BP (!) 144/80   Pulse 96   Wt 91.2 kg (201 lb)   SpO2 100%   BMI 25.46 kg/m    Body mass index is 25.46 kg/m .        RESULTS  Recent Labs   Lab Test 10/14/24  0708 08/14/24  1726 08/14/24  1724 01/05/24  1334 12/19/23  1614 12/19/23  1608 12/19/23  1508 04/24/23  0912 12/28/22  1510 12/28/22  1450 11/15/22  1416 12/21/21  1543 12/21/21  1426 07/23/21  1657   A1C  --   --  9.2*  --   --   --  6.9*  --   --   --   --    < >  --   --    HEMOGLOBINA1  --   --   --   --   --   --   --   --   --   --  7.7*  --  7.9  --    TSH 7.85*  --   --   --   --  2.67  --  5.78*  --  6.99*  --   --   --    < >   T4 1.48  --   --   --   --   --   --  1.67  --  1.31  --   --   --    < >   LDL 97  --   --  76  --   --   --   --   --  135*  --   --   --   --    HDL  --   --   --  53  --   --   --   --   --  55  --   --   --   --    TRIG  --   --   --  75  --   --   --   --   --  121  --   --   --   --    CR  --  0.88  --  1.01  --   --   --   --   --  0.69  --    < >  --   --    MICROL  --   --   --   --  <12.0  --   --   --  <12.0  --   --    < >  --   --     < > = values in this interval not displayed.   .hemo  Lab Results   Component Value Date    A1C 9.2 08/14/2024    A1C 6.9 12/19/2023    A1C 7.5 12/21/2021    A1C 7.2 07/23/2021     A1C 7.6 04/10/2018    A1C 8.7 12/07/2016    A1C 8.1 02/17/2015    A1C 10.0 01/17/2013     Hemoglobin   Date Value Ref Range Status   10/11/2024 13.0 (L) 13.3 - 17.7 g/dL Final   06/21/2018 15.8 13.3 - 17.7 g/dL Final   ]      ASSESSMENT/PLAN:      TYPE 1 DIABETES MELLITUS: 63-year-old male with history of type 1 diabetes mellitus diagnosed at age 22 complicated by peripheral proliferative diabetic retinopathy and ED.  Urine microalbuminuria has been negative and patient denies any symptoms of diabetic peripheral neuropathy.  Maurice needs Guardian4 sensors for his Medtronic 780G insulin pump and infusion sets.  I have requested paperwork for the Guardian4  sensors and supplies to be submitted today.  No change in insulin pump settings today.  Will continue current Wegovy dose.  Patient has follow-up with [facility] in a few weeks.  Urine microalbuminuria negative in December 2023.  Patient is taking Hyzaar daily.  Most recent creatinine 0.88 with GFR > 90 mL/min in August 2024.  No symptoms of peripheral neuropathy.  RETINOPATHY: History of proliferative diabetic retinopathy.  No recent visual changes.  Patient seen by ophthalmology on an annual basis.  HTN: /79 today.  Patient to check BP at home.  Continue current meds.  LIPIDS: LDL 76 in January 2024.  Patient taking Lipitor.  HYPOTHYROIDISM: TSH normal in December 2023.  Continue levothyroxine 150 mcg daily.  MENTAL HEALTH: Improved per patient.  He is looking for a new psychiatrist.  TELLY: Using CPAP.  HEALTH MAINTENANCE: Reminded patient to see his primary care provider for health maintenance.  FOLLOW UP: With Arti Kaur PA-C in Dec 2024.  I have requested paperwork to be submitted for Guardian4 sensors, 7 day infusion sets and other pump supplies today.            11/21/24 1:26 PM  PATIENT LAB/IMAGING STATUS : MyChart sent to patient to complete standing/future orders   Patient is showing 4/5 MNCM met. A1c not in range   Outcome for 12/16/24  11:22 AM: Data obtained via [facility] website

## 2024-11-25 ENCOUNTER — TELEPHONE (OUTPATIENT)
Dept: OPHTHALMOLOGY | Facility: CLINIC | Age: 64
End: 2024-11-25
Payer: COMMERCIAL

## 2024-11-25 PROBLEM — H25.813 COMBINED FORM OF AGE-RELATED CATARACT, BOTH EYES: Status: ACTIVE | Noted: 2024-11-22

## 2024-11-25 NOTE — TELEPHONE ENCOUNTER
Called patient to schedule surgery with Nain Drew     Spoke with: Maurice     Date(s) of Surgery 1/21/25    Patient aware of approximate arrival time: Yes at pre op nursing will call      Location of surgery: [facility]     Pre-Op H&P: [facility] [place]         Informed patient that they need to call to schedule pre-op H&P within 30 days of surgery date: Yes      Post-Op Appt Dates:   1/23/25 9:45am  1/30/25 9:45am  2/20/25 9:45am       Discussed with patient pre-op RN will call 2-3 days prior to surgery with arrival time and instructions:  Yes       Standard Surgery Packet Sent: Yes 11/25/24  via Digital Guardian Message      Additional Information Sent in Packet:  \       Informed patient that they will need an adult  to bring patient home from surgery: Yes  : patient is not sure yet          Additional Comments:  patient stated that he doesn't have family or a significant other so he isnt sure who will stay with him after surgery .      All patients questions were answered and was instructed to review surgical packet and call back [phone] with any questions or concerns.       Pinky Yanez on 11/25/2024 at 2:43 PM

## 2024-11-25 NOTE — TELEPHONE ENCOUNTER
Left voicemail for patient regarding scheduling surgery with Dr. Nain Drew.  Provided contact number to discuss.  [phone]    Jillian Cooper, on 11/25/2024 at 11:07 AM

## 2024-12-05 NOTE — ADDENDUM NOTE
Addended by: CLAUDETTE KASPER on: 3/22/2017 04:27 PM     Modules accepted: Orders     PROGRESS NOTE       Subjective     Hayley is a 64 year old here for Hip Pain     Pt reports left hip pain x 6 years, but has recently worsened. She notes her right hip \"rubs and squeaks\" but does not hurt. Describes pain as achy/dull. The pain is not constant. Walking seem to it worse. Pain is a 3/10 rest and at it's worst a 10/10. Pain does not radiate. Pt tried tylenol with little relief. Pt denies injury to her hip. Pt denies past injuries. She reports numbness and tingling at her left thigh. Denies redness, swelling, bruising, numbness, or tingling. Pt favoring her left hip when she walks.    Review of Systems   Constitutional: Negative.    HENT: Negative.     Eyes: Negative.    Respiratory: Negative.  Negative for shortness of breath.    Cardiovascular: Negative.  Negative for chest pain and palpitations.   Gastrointestinal: Negative.    Endocrine: Negative.    Genitourinary: Negative.    Musculoskeletal: Negative.         Left hip pain   Skin: Negative.    Allergic/Immunologic: Negative.    Neurological: Negative.    Hematological: Negative.    Psychiatric/Behavioral: Negative.     All other systems reviewed and are negative.        SDOH Every Day Smoker       Objective   Vitals:    12/05/24 1419   BP: 128/82   Pulse: (!) 107   Resp: 16   Temp: 98 °F (36.7 °C)   TempSrc: Temporal   SpO2: 95%   Weight: 74.1 kg (163 lb 6.4 oz)   Height: 5' 1\" (1.549 m)   BMI (Calculated): 30.87     Physical Exam  Vitals and nursing note reviewed.   Constitutional:       General: She is not in acute distress.     Appearance: Normal appearance. She is not ill-appearing.   Cardiovascular:      Rate and Rhythm: Normal rate and regular rhythm.      Heart sounds: Normal heart sounds. No murmur heard.     No friction rub. No gallop.   Pulmonary:      Effort: Pulmonary effort is normal. No respiratory distress.      Breath sounds: Normal breath sounds. No wheezing, rhonchi or rales.   Musculoskeletal:      Comments: Left leg: +TTP over  SI joint. + SLR. No pain to IT band palpation.     Right leg: +TTP to IT band. Pain with internal rotation of right leg. Nontender over SI joint. Negative SLR raise.   Skin:     General: Skin is warm.   Neurological:      Mental Status: She is alert and oriented to person, place, and time.      Gait: Gait normal.   Psychiatric:         Mood and Affect: Mood normal.         Behavior: Behavior normal.            ASSESSMENT AND PLAN       1. Bilateral hip pain  -     XR HIP 2 VIEWS RIGHT; Future  -     XR HIP 2 VIEWS LEFT; Future  2. Iliotibial band syndrome of right side  -     tiZANidine (ZANAFLEX) 2 MG tablet; Take 1 tablet by mouth nightly for 14 days.  Plan: Tender to palpation of IT band and with internal ROM of right leg. Likely IT band strain. Recommended PT, patient denied due to anxieties with leaving the house. Recommend at home exercises. Take zanaflex as prescribed. Caution with this medication as it can make you drowsy. Do not use before driving or work.    3. Sciatica of left side  -     tiZANidine (ZANAFLEX) 2 MG tablet; Take 1 tablet by mouth nightly for 14 days.  -     methylPREDNISolone (MEDROL DOSEPAK) 4 MG tablet; Follow package directions  Plan:  Left hip pain with radiating pain down leg to lateral thigh is likely sciatica. Able to reproduce physical pain on palpation during physical exam. Positive SLR on left leg. Discussed nature of sciatica and the multiple modalities used to treat it. Patient cannot take NSAIDs. Discussed medrol Dosepak. Take as prescribed. Take zanaflex as prescribed. Discussed that zanaflex can make you drowsy so she is not to take this prior to work or driving.Discussed benefits of PT. Patient declines at this time. Answered all questions and concerns.    Return if symptoms worsen or fail to improve.     Instructions provided as documented in the after visit summary.The patient indicated understanding of the diagnosis and agreed with the plan of care.   The total time of  the patient visit including review of medical records, face to face time with patient and chart documentation was 40 minutes.     IRVIN Maria, PA-C  Family Medicine    [facility]  [address] [place] [address]  Office: [phone]

## 2024-12-17 ENCOUNTER — OFFICE VISIT (OUTPATIENT)
Dept: ENDOCRINOLOGY | Facility: CLINIC | Age: 64
End: 2024-12-17
Payer: COMMERCIAL

## 2024-12-17 ENCOUNTER — LAB (OUTPATIENT)
Dept: LAB | Facility: CLINIC | Age: 64
End: 2024-12-17
Payer: COMMERCIAL

## 2024-12-17 VITALS
BODY MASS INDEX: 25.46 KG/M2 | WEIGHT: 201 LBS | HEART RATE: 96 BPM | OXYGEN SATURATION: 100 % | DIASTOLIC BLOOD PRESSURE: 80 MMHG | SYSTOLIC BLOOD PRESSURE: 144 MMHG

## 2024-12-17 DIAGNOSIS — E10.8 TYPE 1 DIABETES MELLITUS WITH COMPLICATIONS (H): ICD-10-CM

## 2024-12-17 DIAGNOSIS — E03.9 ACQUIRED HYPOTHYROIDISM: Primary | ICD-10-CM

## 2024-12-17 DIAGNOSIS — D64.9 ANEMIA, UNSPECIFIED TYPE: ICD-10-CM

## 2024-12-17 DIAGNOSIS — E03.9 ACQUIRED HYPOTHYROIDISM: ICD-10-CM

## 2024-12-17 LAB
ERYTHROCYTE [DISTWIDTH] IN BLOOD BY AUTOMATED COUNT: 13.1 % (ref 10–15)
EST. AVERAGE GLUCOSE BLD GHB EST-MCNC: 157 MG/DL
HBA1C MFR BLD: 7.1 %
HCT VFR BLD AUTO: 41 % (ref 40–53)
HGB BLD-MCNC: 14.2 G/DL (ref 13.3–17.7)
MCH RBC QN AUTO: 32.9 PG (ref 26.5–33)
MCHC RBC AUTO-ENTMCNC: 34.6 G/DL (ref 31.5–36.5)
MCV RBC AUTO: 95 FL (ref 78–100)
PLATELET # BLD AUTO: 272 10E3/UL (ref 150–450)
RBC # BLD AUTO: 4.32 10E6/UL (ref 4.4–5.9)
T4 FREE SERPL-MCNC: 1.46 NG/DL (ref 0.9–1.7)
TSH SERPL DL<=0.005 MIU/L-ACNC: 6.18 UIU/ML (ref 0.3–4.2)
WBC # BLD AUTO: 6.3 10E3/UL (ref 4–11)

## 2024-12-17 PROCEDURE — 82043 UR ALBUMIN QUANTITATIVE: CPT | Performed by: PHYSICIAN ASSISTANT

## 2024-12-17 PROCEDURE — 36415 COLL VENOUS BLD VENIPUNCTURE: CPT | Performed by: PATHOLOGY

## 2024-12-17 PROCEDURE — 82570 ASSAY OF URINE CREATININE: CPT | Performed by: PHYSICIAN ASSISTANT

## 2024-12-17 PROCEDURE — 84443 ASSAY THYROID STIM HORMONE: CPT | Performed by: PATHOLOGY

## 2024-12-17 PROCEDURE — 99000 SPECIMEN HANDLING OFFICE-LAB: CPT | Performed by: PATHOLOGY

## 2024-12-17 PROCEDURE — 84439 ASSAY OF FREE THYROXINE: CPT | Performed by: PATHOLOGY

## 2024-12-17 PROCEDURE — 83036 HEMOGLOBIN GLYCOSYLATED A1C: CPT | Performed by: PATHOLOGY

## 2024-12-17 PROCEDURE — 85027 COMPLETE CBC AUTOMATED: CPT | Performed by: PATHOLOGY

## 2024-12-17 ASSESSMENT — PAIN SCALES - GENERAL: PAINLEVEL_OUTOF10: NO PAIN (0)

## 2024-12-17 NOTE — PATIENT INSTRUCTIONS
Dear Donn,  Please make sure shoes are not too small.   Try to sand and allow lesion of great toe to heal.    Congratulations on your custodial!    Stay active and eat well!    My best wishes,    Arti Kaur PA-C, MPAS  [facility]  Diabetes, Endocrinology, and Metabolism  [phone] Appointments/Nurse  [phone] Fax

## 2024-12-17 NOTE — LETTER
12/17/2024       RE: Luigi Moore  [address]  [address]  [facility] [address]     Dear Colleague,    Thank you for referring your patient, Luigi Moore, to the [facility] [place] at [facility]. Please see a copy of my visit note below.    HPI  Luigi Moore is a 63 year old male with type 1 diabetes mellitus.  Clinic visit with me today.    Pt dx with type 1 DM at age 22.  He has been using an insulin pump since around 1987.  Pt's diabetes is complicated by PDR.  No known diabetic nephropathy.  Pt has no hx of diabetic peripheral neuropathy.  Hx of ED s/p surgery.  Pt also has hx of hypothyroidism, hyperlipidemia, HTN, TELLY, insomnia, exploding head syndrome, hidradenoma and Dupuytren contractures.    Bp at home 120's or 110's/70.    A1C is 7.1% today.        For his diabetes, using Medtronic 780G insulin pump.  He has no Guardian4 sensors and has been doing glucose fingersticks.  Current basal insulin rate 0.85 units/h x 24 hours.  I/C ratio is 1:15.  CF 40.  Most recent A1C was improved at 7.85 in October.   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  Unfortunately things did not work out with Cathie who he was dating.  Joined [facility] research study and had biopsy which he felt went well.  Surprised was not sore at all and no pain afterwards.  Really not a big deal at all.  Did have low hemoglobin which he is worried about.  No weakness fatigue, SOB, fever chills, melena, hematochezia, abdominal pain, early satiety.    Denies n/v, SOB, cough.  No chest pain.  Denies abd pain, diarrhea, blood in stool or melena.  Denies dysuria or hematuria.  No peripheral neuropathy sx. No active foot ulcers.     Diabetes Care  Retinopathy: yes; hx of PDR.  History of laser treatment right eye x1. Pt seen  by ophthalmology annually.  Nephropathy:urine microalbuminuria negative in December 2023.  Patient taking Hyzaar.  Neuropathy: None.  Foot Exam: No foot ulcers.  Taking aspirin: Yes.  Lipids: LDL 76 in January 2024.  Patient taking Lipitor.  Insulin: Medtronic 7 80G insulin pump.  DM meds: Wegovy.  Testing: Currently using glucose meter.  Will order Guardian4 sensors for his Medtronic 7 80G insulin pump.  Hypoglycemia tx: has Baqsimi.  Backup insulin: Patient has basal insulin for backup in case insulin pump fails.    CGMS and pump data:  His average blood sugar during the limited amount of CGM data time that we have he has 179.  He has a high coefficient of variance at 34.4%  He is using an average daily dosage of 55 units of insulin 70% of which is bolus insulin.  He has been in smartcard just 5% of the time.      ROS  Please see under HPI.      Allergies  Allergies   Allergen Reactions     Iodinated Contrast Media Anaphylaxis     Contrast Dye Hives     Diatrizoate Hives     Iodine dye; iodine externally or topically is OK       Medications  Current Outpatient Medications   Medication Sig Dispense Refill     albuterol (PROAIR HFA/PROVENTIL HFA/VENTOLIN HFA) 108 (90 Base) MCG/ACT inhaler Inhale 2 puffs into the lungs every 6 hours as needed for shortness of breath or wheezing 18 g 5     amLODIPine (NORVASC) 10 MG tablet Take 1 tablet (10 mg) by mouth daily 90 tablet 3     amphetamine-dextroamphetamine (ADDERALL) 15 MG tablet Take 1 tablet (15 mg) by mouth 2 times daily. 60 tablet 0     amphetamine-dextroamphetamine (ADDERALL) 15 MG tablet Take 1 tablet (15 mg) by mouth 2 times daily 60 tablet 0     amphetamine-dextroamphetamine (ADDERALL) 15 MG tablet Take 1 tablet (15 mg) by mouth 2 times daily 60 tablet 0     ARIPiprazole (ABILIFY) 15 MG tablet Take 1 tablet (15 mg) by mouth daily 90 tablet 1     aspirin 81 MG tablet Take 1 tablet (81 mg) by mouth daily 100 tablet 3     atorvastatin (LIPITOR) 40 MG tablet Take 1  "tablet (40 mg) by mouth daily 90 tablet 3     blood glucose (ACCU-CHEK GUIDE) test strip Use to test blood sugar 3 times daily or as directed. 100 strip 11     buPROPion (WELLBUTRIN XL) 150 MG 24 hr tablet Take 1 tablet (150 mg) by mouth every morning Take with 300 mg tablet (total daily dose 450 mg) 60 tablet 5     buPROPion (WELLBUTRIN XL) 300 MG 24 hr tablet Take 1 tablet (300 mg) by mouth every morning Take with 150 mg tablet (total daily dose 450 mg) 60 tablet 5     cetirizine (ZYRTEC) 10 MG tablet Take 10 mg by mouth       Continuous Glucose Sensor (GUARDIAN 4 GLUCOSE SENSOR) MISC 1 each See Admin Instructions. Use per 's instructions. Change every 7 days. 12 each 4     Continuous Glucose Transmitter (GUARDIAN 4 TRANSMITTER) MISC 1 each See Admin Instructions. Use per 's instructions to monitor glucose. 1 each 3     DiphenhydrAMINE HCl (BENADRYL PO)        FLUoxetine (PROZAC) 20 MG capsule Take 1 capsule (20 mg) by mouth daily 30 capsule 3     Glucagon (BAQSIMI) 3 MG/DOSE nasal powder Spray 1 spray (3 mg) in nostril as needed (severe hypoglycemia) in the event of unconscious hypoglycemia or hypoglycemic seizure. May repeat dose if no response after 15 minutes. 1 each 1     infusion set (AGNES 32\" 9MM) misc pump supply Infusion set to be used with pump.  Change every 2-3 days as directed. 3 Box 4     insulin glargine (LANTUS SOLOSTAR) 100 UNIT/ML pen INJECT 20 UNITS Daily  SUBCUTANEOUS ONLY IF INSULIN PUMP FAILS. 15 mL 1     Insulin Infusion Pump (INSULIN PUMP [id number]) KIT        Insulin Infusion Pump Supplies (EXTENDED INFUSION SET 23\"/9MM) MISC 1 each See Admin Instructions. Change every 2-3 days for use in insulin pump. 40 each 3     Insulin Infusion Pump Supplies (EXTENDED RESERVOIR 3ML) MIS 1 each See Admin Instructions. Change every 2-3 days for use in insulin pump. 40 each 3     Insulin Infusion Pump Supplies (MINIMED PUMP RESERVOIR 3ML) MISC 1 each See Admin Instructions. 2 each 3 " "    insulin lispro (HUMALOG) 100 UNIT/ML vial VIA PUMP APPROX 80 UNITS DAILY. 80 mL 3     insulin pen needle (32G X 6 MM) 32G X 6 MM miscellaneous Use 4 pen needles daily or as directed as back up. 100 each 3     insulin syringe-needle U-100 (BD INSULIN SYRINGE) 29G X 1/2\" 1 ML miscellaneous Use as directed 20 each 1     ketoconazole (NIZORAL) 2 % external cream Apply topically 2 times daily as needed (redness and flaking). Apply to the face. 60 g 3     KETOSTIX test strip Use as directed in case of high glucose, vomiting or illness. 50 strip 11     levothyroxine (SYNTHROID/LEVOTHROID) 175 MCG tablet Take 1 tablet (175 mcg) by mouth daily. Please take daily on an empty stomach and wait 30 minutes for any other medications food or drink.  Please recheck TSH level again in 12 weeks. 90 tablet 1     losartan-hydrochlorothiazide (HYZAAR) 50-12.5 MG tablet Take 1 tablet by mouth daily 90 tablet 3     order for DME Equipment being ordered: CPAP machine 1 each 0     Semaglutide-Weight Management (WEGOVY) 2.4 MG/0.75ML pen Inject 2.4 mg subcutaneously once a week. 9 mL 3     topiramate (TOPAMAX) 50 MG tablet Take 1 tablet (50 mg) by mouth daily 90 tablet 3     Urea 40 % CREA Externally apply topically 2 times daily To surface of toenails 198 g 5     glucagon 1 MG kit Inject 1 mg into the muscle once for 1 dose 1 each 0       Family History  family history includes Asthma in his father; Bone Cancer in his paternal grandfather and paternal grandmother; Cancer in his brother, father, maternal grandfather, and maternal grandmother; Depression in his brother; Diabetes in his father; Heart Failure in his maternal uncle, maternal uncle, and mother; Hypertension in his mother; Liver Cancer (age of onset: 53) in his brother; Macular Degeneration in his father; Melanoma in his father; Mental Illness in his brother; Other - See Comments in his mother; Other Cancer in his father, paternal grandfather, and paternal grandmother; " Substance Abuse in his brother.    Father dx type 1 DM at age 78.    Social History   reports that he has never smoked. He has never used smokeless tobacco. He reports that he does not drink alcohol and does not use drugs.     Smoke: none  ETOH: none   Martial status: . No children.  Occupation: working at [employer] and .  Also bike races.      Past Medical History  Past Medical History:   Diagnosis Date     Acquired hypothyroidism 12/07/2016     Cancer (H) 2015     Depressive disorder      Hidradenoma dx: Feb 2015    right hand/2 surgeries     Hyperlipidemia 12/07/2006     Hypertension      Numbness and tingling 2015    right hand, related to surgery for hidradenoma     TELLY (obstructive sleep apnea) 2007    New cpap machine 1 year ago. follows at [facility] [place]     Type 1 diabetes (H) 1982     Uncomplicated asthma        Past Surgical History:   Procedure Laterality Date     BIOPSY  2016     COLONOSCOPY N/A 5/17/2024    Procedure: Colonoscopy;  Surgeon: Jose Luis Gonzales MD;  Location: [facility]     EXCISE LESION HAND Right 02/26/2015    Procedure: EXCISE LESION HAND;  Surgeon: Jeovany Jefferson MD;  Location: [facility] OR     IMPLANT PROSTHESIS PENIS INFLATABLE N/A 02/01/2022    Procedure: INSERTION of INFLATABLE PENILE PROSTHESIS;  Surgeon: Johnathan Cooper MD;  Location: [facility] OR     IR RENAL BIOPSY RIGHT  10/11/2024     ORTHOPEDIC SURGERY Left     achilles tendon      ORTHOPEDIC SURGERY Right     hand surgery     SOFT TISSUE SURGERY      lipoma removal, below rib cage on right side       Physical Exam  BP (!) 144/80   Pulse 96   Wt 91.2 kg (201 lb)   SpO2 100%   BMI 25.46 kg/m    Body mass index is 25.46 kg/m .    This is a pleasant male with bright affect who is engaged.  Thought form and content are fluid and coherent.  Mood is great affect is upbeat.  Skin has good color.  No swelling in the lower extremities.  He does have some callus his great toe.  Feet are warm with good cap refill  sensation intact to monofilament throughout.    RESULTS  Recent Labs   Lab Test 10/14/24  0708 08/14/24  1726 08/14/24  1724 01/05/24  1334 12/19/23  1614 12/19/23  1608 12/19/23  1508 04/24/23  0912 12/28/22  1510 12/28/22  1450 11/15/22  1416 12/21/21  1543 12/21/21  1426 07/23/21  1657   A1C  --   --  9.2*  --   --   --  6.9*  --   --   --   --    < >  --   --    HEMOGLOBINA1  --   --   --   --   --   --   --   --   --   --  7.7*  --  7.9  --    TSH 7.85*  --   --   --   --  2.67  --  5.78*  --  6.99*  --   --   --    < >   T4 1.48  --   --   --   --   --   --  1.67  --  1.31  --   --   --    < >   LDL 97  --   --  76  --   --   --   --   --  135*  --   --   --   --    HDL  --   --   --  53  --   --   --   --   --  55  --   --   --   --    TRIG  --   --   --  75  --   --   --   --   --  121  --   --   --   --    CR  --  0.88  --  1.01  --   --   --   --   --  0.69  --    < >  --   --    MICROL  --   --   --   --  <12.0  --   --   --  <12.0  --   --    < >  --   --     < > = values in this interval not displayed.   .hemo  Lab Results   Component Value Date    A1C 9.2 08/14/2024    A1C 6.9 12/19/2023    A1C 7.5 12/21/2021    A1C 7.2 07/23/2021    A1C 7.6 04/10/2018    A1C 8.7 12/07/2016    A1C 8.1 02/17/2015    A1C 10.0 01/17/2013     Hemoglobin   Date Value Ref Range Status   10/11/2024 13.0 (L) 13.3 - 17.7 g/dL Final   06/21/2018 15.8 13.3 - 17.7 g/dL Final   ]      ASSESSMENT/PLAN:      TYPE 1 DIABETES MELLITUS: 63-year-old male with history of type 1 diabetes mellitus diagnosed at age 22 complicated by peripheral proliferative diabetic retinopathy and ED. he is A1c and blood sugar have improved though they are a bit above goal.  Offered to have him see diabetes education here, but he states that he will contact the Medtronic  and be more attentive to his pump to try and stay in auto mode.  He also is planning on working out more and feels his blood sugar will improve with that as well.  He is encouraged  in he has a recent improve lifestyle with better diet and activity.     RETINOPATHY: History of proliferative diabetic retinopathy.  No recent visual changes.  Patient seen by ophthalmology on an annual basis.  HTN: Continue current meds.  LIPIDS: LDL 76 in January 2024.  Patient taking Lipitor.  HYPOTHYROIDISM: Levothyroxine was increased to 175 mcg will recheck his TSH after 8 weeks of therapy.   MENTAL HEALTH: Improved per patient.    TELLY: Using CPAP.  HEALTH MAINTENANCE: Reminded patient to see his primary care provider for health maintenance.  He does have callus on his great toe and I do think that shoes are too small and we discussed this.  He recently had some mild anemia noted during a research study we will recheck his CBC but urged to see primary care.   FOLLOW UP: In 6 months.  Urged to see the diabetes educator if his blood sugar average does not at or below 175 or if he is having frequent blood sugars less than 70.    It is my privilege to be involved in the care of the above patient.     Arti Kaur PA-C, [facility]  [facility]  Diabetes, Endocrinology, and Metabolism  [phone] Appointments/Nurse Line  [phone] Fax  On [facility]OCER[facility] (inpatient)    To Page:  Dial [phone]  Enter the messaging ID when prompted ([id number])  That will give the option to leave a callback number.        26 minutes spent on the date of the encounter doing chart review, history and exam, documentation, education and counseling, as well as communication and coordination of care, and further activities as noted above.   This time includes time spent reviewing CGM, reviewing and ordering lab tests, medications and supplies.                        11/21/24 1:26 PM  PATIENT LAB/IMAGING STATUS : MyChart sent to patient to complete standing/future orders   Patient is showing 4/5 MNCM met. A1c not in range   Outcome for 12/16/24 11:22 AM: Data obtained via [employer] website                      Again, thank you for  allowing me to participate in the care of your patient.      Sincerely,    Arti Kaur PA-C

## 2024-12-18 LAB
CREAT UR-MCNC: 62.6 MG/DL
MICROALBUMIN UR-MCNC: <12 MG/L
MICROALBUMIN/CREAT UR: NORMAL MG/G{CREAT}

## 2024-12-30 DIAGNOSIS — E78.5 DYSLIPIDEMIA: ICD-10-CM

## 2025-01-01 ASSESSMENT — PATIENT HEALTH QUESTIONNAIRE - PHQ9
SUM OF ALL RESPONSES TO PHQ QUESTIONS 1-9: 4
SUM OF ALL RESPONSES TO PHQ QUESTIONS 1-9: 4
10. IF YOU CHECKED OFF ANY PROBLEMS, HOW DIFFICULT HAVE THESE PROBLEMS MADE IT FOR YOU TO DO YOUR WORK, TAKE CARE OF THINGS AT HOME, OR GET ALONG WITH OTHER PEOPLE: NOT DIFFICULT AT ALL

## 2025-01-02 ENCOUNTER — LAB (OUTPATIENT)
Dept: LAB | Facility: CLINIC | Age: 65
End: 2025-01-02
Payer: COMMERCIAL

## 2025-01-02 ENCOUNTER — OFFICE VISIT (OUTPATIENT)
Dept: INTERNAL MEDICINE | Facility: CLINIC | Age: 65
End: 2025-01-02
Payer: COMMERCIAL

## 2025-01-02 VITALS
TEMPERATURE: 97.6 F | WEIGHT: 198.4 LBS | BODY MASS INDEX: 25.46 KG/M2 | HEIGHT: 74 IN | HEART RATE: 73 BPM | RESPIRATION RATE: 16 BRPM | DIASTOLIC BLOOD PRESSURE: 74 MMHG | OXYGEN SATURATION: 99 % | SYSTOLIC BLOOD PRESSURE: 128 MMHG

## 2025-01-02 DIAGNOSIS — E10.3559 TYPE 1 DIABETES MELLITUS WITH STABLE PROLIFERATIVE RETINOPATHY, UNSPECIFIED LATERALITY (H): ICD-10-CM

## 2025-01-02 DIAGNOSIS — E10.9 TYPE 1 DIABETES MELLITUS (H): ICD-10-CM

## 2025-01-02 DIAGNOSIS — E03.8 OTHER SPECIFIED HYPOTHYROIDISM: ICD-10-CM

## 2025-01-02 DIAGNOSIS — E10.3599 TYPE 1 DIABETES MELLITUS WITH PROLIFERATIVE RETINOPATHY, MACULAR EDEMA PRESENCE UNSPECIFIED, UNSPECIFIED LATERALITY, UNSPECIFIED PROLIFERATIVE RETINOPATHY TYPE (H): ICD-10-CM

## 2025-01-02 DIAGNOSIS — M72.0 DUPUYTREN CONTRACTURE: Primary | ICD-10-CM

## 2025-01-02 DIAGNOSIS — H25.813 COMBINED FORM OF AGE-RELATED CATARACT, BOTH EYES: ICD-10-CM

## 2025-01-02 DIAGNOSIS — E78.2 MIXED HYPERLIPIDEMIA: ICD-10-CM

## 2025-01-02 LAB
CHOLEST SERPL-MCNC: 146 MG/DL
EST. AVERAGE GLUCOSE BLD GHB EST-MCNC: 154 MG/DL
FASTING STATUS PATIENT QL REPORTED: NO
HBA1C MFR BLD: 7 %
HDLC SERPL-MCNC: 62 MG/DL
LDLC SERPL CALC-MCNC: 68 MG/DL
NONHDLC SERPL-MCNC: 84 MG/DL
TRIGL SERPL-MCNC: 79 MG/DL
TSH SERPL DL<=0.005 MIU/L-ACNC: 4.19 UIU/ML (ref 0.3–4.2)

## 2025-01-02 PROCEDURE — 80061 LIPID PANEL: CPT | Performed by: PATHOLOGY

## 2025-01-02 PROCEDURE — 36415 COLL VENOUS BLD VENIPUNCTURE: CPT | Performed by: PATHOLOGY

## 2025-01-02 PROCEDURE — 99000 SPECIMEN HANDLING OFFICE-LAB: CPT | Performed by: PATHOLOGY

## 2025-01-02 PROCEDURE — 83036 HEMOGLOBIN GLYCOSYLATED A1C: CPT | Performed by: NURSE PRACTITIONER

## 2025-01-02 PROCEDURE — 84443 ASSAY THYROID STIM HORMONE: CPT | Performed by: PATHOLOGY

## 2025-01-02 NOTE — PROGRESS NOTES
Preoperative Evaluation  [facility] [address]  [address] [facility] [address]  Phone: [phone]  Fax: [phone]  Primary Provider: GALILEA Escalante CNP  Pre-op Performing Provider: Brianda Harris MD  Jan 2, 2025       Surgical Information  Surgery/Procedure: 1/6- Left dupuytren s fasciectomy.  1/21- Right cataract intraocular lens implant   Surgery Location: [facility] OR  Surgeon: 1/6- Dr Miguel Marcus  1/21- Dr Richard Drew   Surgery Date and Time  1/6/25 at 7:15 AM  1/21/25 at 12:45pm  Where patient plans to recover: At home alone  Fax number for surgical facility: Note does not need to be faxed, will be available electronically in Epic.    Assessment & Plan     The proposed surgical procedure is considered LOW risk.    Risks and Recommendations  The patient has the following additional risks and recommendations for perioperative complications:  Diabetes:  - Patient is on insulin therapy; diabetic NPO guidelines provided and discussed.  Obstructive Sleep Apnea:   - Patient is on CPAP at baseline for management of TELLY    Additional Medication Instructions  Take all scheduled medications on the day of surgery EXCEPT for modifications listed below:   - Insulin pump: Continue basal rate of insulin up until the time of surgery. Do not bolus short-acting insulin on the morning of surgery.      Recommendation  Approval given to proceed with proposed procedure, without further diagnostic evaluation.    Gaby Richards is a 64 year old, presenting for the following:  Pre-Op Exam          1/2/2025     8:01 AM   Additional Questions   Roomed by [facility]     HPI related to upcoming procedure:    Long-standing history of Dupuytren contractures in bilateral hands. First noticed nodules 10 years ago; progressed to development of worsening bilateral contractures. In the setting of type 1 diabetes and work for [employer] (retired last week). Not painful but does cause  difficulty straightening hands.    Has had progressive development of bilateral cataracts; attributed to age rather than diabetes. Has caused difficulty driving at night due to associated halo effect.        12/21/2024   Pre-Op Questionnaire   Have you ever had a heart attack or stroke? No   Have you ever had surgery on your heart or blood vessels, such as a stent placement, a coronary artery bypass, or surgery on an artery in your head, neck, heart, or legs? No   Do you have chest pain with activity? No   Do you have a history of heart failure? No   Do you currently have a cold, bronchitis or symptoms of other infection? No   Do you have a cough, shortness of breath, or wheezing? No   Do you or anyone in your family have previous history of blood clots? No   Do you or does anyone in your family have a serious bleeding problem such as prolonged bleeding following surgeries or cuts? No   Have you ever had problems with anemia or been told to take iron pills? No   Have you had any abnormal blood loss such as black, tarry or bloody stools? No   Have you ever had a blood transfusion? No   Are you willing to have a blood transfusion if it is medically needed before, during, or after your surgery? Yes   Have you or any of your relatives ever had problems with anesthesia? No   Do you have sleep apnea, excessive snoring or daytime drowsiness? Yes   Do you have a CPAP machine? Yes   Do you have any artifical heart valves or other implanted medical devices like a pacemaker, defibrillator, or continuous glucose monitor? No   Do you have artificial joints? No   Are you allergic to latex? No     Health Care Directive  Patient does not have a Health Care Directive: Discussed advance care planning with patient; information given to patient to review.    Preoperative Review of    reviewed - no record of controlled substances prescribed.      Status of Chronic Conditions:  TYPE I DIABETES   Patient has a longstanding history  of Diabetes Type I . Patient is being treated with insulin pump and denies significant side effects. Control has been good. Most recent A1c 7.1% on 12/17/2024.  - Continue basal insulin via insulin pump; hold short-acting boluses via pump on the morning of surgeries.    HYPERLIPIDEMIA  Patient has a long history of significant hyperlipidemia requiring medication for treatment with recent good control. Patient reports no problems or side effects with the medication.  - Continue atorvastatin 40 mg daily  - Recheck lipid panel     HYPERTENSION  Has had good control of blood pressure on losartan-hydrochlorothiazide 50-12.5 mg and amlodipine 10 mg every day. Blood pressure in clinic in good range.   - Continue amlodipine 10 mg daily  - Continue losartan-hydrochlorothiazide 50-12.5 mg daily    HYPOTHYROIDISM   Patient has a longstanding history of chronic hypothyroidism. Patient has been doing well, noting no tremor, insomnia, hair loss or changes in skin texture. Last TSH 6.18 and T4 1.46 on 12/17/2024; since then has started taking levothyroxine in morning on empty stomach to improve GI absorption. Will recheck TSH level to confirm downtrend.   - Recheck TSH    OBSTRUCTIVE SLEEP APNEA  History of TELLY on CPAP; getting 6-8 hours of sleep a night with good symptom management.   - Continue CPAP    HISTORY OF ALCOHOL USE DISORDER, IN SUSTAINED REMISSION  In remission since 2015; no recent use of alcohol.    Patient Active Problem List    Diagnosis Date Noted    Combined form of age-related cataract, both eyes 11/22/2024     Priority: Medium    Dupuytren contracture 05/14/2024     Priority: Medium    Erectile dysfunction 02/01/2022     Priority: Medium    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) 12/07/2016     Priority: Medium    Acquired hypothyroidism 12/07/2016     Priority: Medium    Dyslipidemia 12/07/2016     Priority: Medium    Obstructive sleep apnea syndrome 02/04/2008     Priority: Medium    Insomnia  02/04/2008     Priority: Medium    TELLY (obstructive sleep apnea) 02/04/2008     Priority: Medium    Essential hypertension 12/18/2006     Priority: Medium    Hyperlipidemia 12/07/2006     Priority: Medium     Overview:   rx'd lipitor   Overview:   rx'd lipitor     rx'd lipitor   Overview:   Overview:   rx'd lipitor   Overview:   rx'd lipitor      Proliferative diabetic retinopathy (H) 12/07/2006     Priority: Medium     Overview:   treated R eye 2002, no further problems  Overview:   treated R eye 2002, no further problems    treated R eye 2002, no further problems  Overview:   Overview:   treated R eye 2002, no further problems  Overview:   treated R eye 2002, no further problems      Low back pain 12/07/2006     Priority: Medium     muscular pain      Type 1 diabetes mellitus (H) 12/07/2006     Priority: Medium     dx'd age 22  involved in study at [facility] for diabetes mgmt  Minimed pump humalog  had laser surgery R eye 2002        Past Medical History:   Diagnosis Date    Acquired hypothyroidism 12/07/2016    Cancer (H) 2015    Depressive disorder     Hidradenoma dx: Feb 2015    right hand/2 surgeries    Hyperlipidemia 12/07/2006    Hypertension     Numbness and tingling 2015    right hand, related to surgery for hidradenoma    TELLY (obstructive sleep apnea) 2007    New cpap machine 1 year ago. follows at [facility] [place] [facility]    Type 1 diabetes (H) 1982    Uncomplicated asthma      Past Surgical History:   Procedure Laterality Date    BIOPSY  2016    COLONOSCOPY N/A 5/17/2024    Procedure: Colonoscopy;  Surgeon: Jose Luis Gonzales MD;  Location:  GI    EXCISE LESION HAND Right 02/26/2015    Procedure: EXCISE LESION HAND;  Surgeon: Jeovany Jefferson MD;  Location: [facility] OR    IMPLANT PROSTHESIS PENIS INFLATABLE N/A 02/01/2022    Procedure: INSERTION of INFLATABLE PENILE PROSTHESIS;  Surgeon: Johnathan Cooper MD;  Location: [facility] OR    IR RENAL BIOPSY RIGHT  10/11/2024    ORTHOPEDIC SURGERY Left     achilles tendon      ORTHOPEDIC SURGERY Right     hand surgery    SOFT TISSUE SURGERY      lipoma removal, below rib cage on right side     Current Outpatient Medications   Medication Sig Dispense Refill    albuterol (PROAIR HFA/PROVENTIL HFA/VENTOLIN HFA) 108 (90 Base) MCG/ACT inhaler Inhale 2 puffs into the lungs every 6 hours as needed for shortness of breath or wheezing 18 g 5    amLODIPine (NORVASC) 10 MG tablet Take 1 tablet (10 mg) by mouth daily 90 tablet 3    amphetamine-dextroamphetamine (ADDERALL) 15 MG tablet Take 1 tablet (15 mg) by mouth 2 times daily. 60 tablet 0    amphetamine-dextroamphetamine (ADDERALL) 15 MG tablet Take 1 tablet (15 mg) by mouth 2 times daily 60 tablet 0    amphetamine-dextroamphetamine (ADDERALL) 15 MG tablet Take 1 tablet (15 mg) by mouth 2 times daily 60 tablet 0    ARIPiprazole (ABILIFY) 15 MG tablet Take 1 tablet (15 mg) by mouth daily 90 tablet 1    aspirin 81 MG tablet Take 1 tablet (81 mg) by mouth daily 100 tablet 3    atorvastatin (LIPITOR) 40 MG tablet Take 1 tablet (40 mg) by mouth daily 90 tablet 3    blood glucose (ACCU-CHEK GUIDE) test strip Use to test blood sugar 3 times daily or as directed. 100 strip 11    buPROPion (WELLBUTRIN XL) 150 MG 24 hr tablet Take 1 tablet (150 mg) by mouth every morning Take with 300 mg tablet (total daily dose 450 mg) 60 tablet 5    buPROPion (WELLBUTRIN XL) 300 MG 24 hr tablet Take 1 tablet (300 mg) by mouth every morning Take with 150 mg tablet (total daily dose 450 mg) 60 tablet 5    cetirizine (ZYRTEC) 10 MG tablet Take 10 mg by mouth      Continuous Glucose Sensor (GUARDIAN 4 GLUCOSE SENSOR) MISC 1 each See Admin Instructions. Use per 's instructions. Change every 7 days. 12 each 4    Continuous Glucose Transmitter (GUARDIAN 4 TRANSMITTER) MISC 1 each See Admin Instructions. Use per 's instructions to monitor glucose. 1 each 3    DiphenhydrAMINE HCl (BENADRYL PO)       FLUoxetine (PROZAC) 20 MG capsule TAKE 1 CAPSULE BY MOUTH  "EVERY DAY 30 capsule 0    Glucagon (BAQSIMI) 3 MG/DOSE nasal powder Spray 1 spray (3 mg) in nostril as needed (severe hypoglycemia) in the event of unconscious hypoglycemia or hypoglycemic seizure. May repeat dose if no response after 15 minutes. 1 each 1    infusion set (AGNES 32\" 9MM) misc pump supply Infusion set to be used with pump.  Change every 2-3 days as directed. 3 Box 4    insulin glargine (LANTUS SOLOSTAR) 100 UNIT/ML pen INJECT 20 UNITS Daily  SUBCUTANEOUS ONLY IF INSULIN PUMP FAILS. 15 mL 1    Insulin Infusion Pump (INSULIN PUMP [id number]) KIT       Insulin Infusion Pump Supplies (EXTENDED INFUSION SET 23\"/9MM) MISC 1 each See Admin Instructions. Change every 2-3 days for use in insulin pump. 40 each 3    Insulin Infusion Pump Supplies (EXTENDED RESERVOIR 3ML) MISC 1 each See Admin Instructions. Change every 2-3 days for use in insulin pump. 40 each 3    Insulin Infusion Pump Supplies (MINIMED PUMP RESERVOIR 3ML) MISC 1 each See Admin Instructions. 2 each 3    insulin lispro (HUMALOG) 100 UNIT/ML vial VIA PUMP APPROX 80 UNITS DAILY. 80 mL 3    insulin pen needle (32G X 6 MM) 32G X 6 MM miscellaneous Use 4 pen needles daily or as directed as back up. 100 each 3    insulin syringe-needle U-100 (BD INSULIN SYRINGE) 29G X 1/2\" 1 ML miscellaneous Use as directed 20 each 1    ketoconazole (NIZORAL) 2 % external cream Apply topically 2 times daily as needed (redness and flaking). Apply to the face. 60 g 3    KETOSTIX test strip Use as directed in case of high glucose, vomiting or illness. 50 strip 11    levothyroxine (SYNTHROID/LEVOTHROID) 175 MCG tablet Take 1 tablet (175 mcg) by mouth daily. Please take daily on an empty stomach and wait 30 minutes for any other medications food or drink.  Please recheck TSH level again in 12 weeks. 90 tablet 1    losartan-hydrochlorothiazide (HYZAAR) 50-12.5 MG tablet Take 1 tablet by mouth daily 90 tablet 3    order for DME Equipment being ordered: CPAP machine 1 each 0    " "Semaglutide-Weight Management (WEGOVY) 2.4 MG/0.75ML pen Inject 2.4 mg subcutaneously once a week. 9 mL 3    topiramate (TOPAMAX) 50 MG tablet Take 1 tablet (50 mg) by mouth daily 90 tablet 3    Urea 40 % CREA Externally apply topically 2 times daily To surface of toenails 198 g 5    glucagon 1 MG kit Inject 1 mg into the muscle once for 1 dose 1 each 0       Allergies   Allergen Reactions    Iodinated Contrast Media Anaphylaxis    Contrast Dye Hives    Diatrizoate Hives     Iodine dye; iodine externally or topically is OK        Social History     Tobacco Use    Smoking status: Never    Smokeless tobacco: Never   Substance Use Topics    Alcohol use: Never     Comment: History of alcohol abuse/sober since 2015; went to treatment      History   Drug Use Unknown         Objective    /74 (BP Location: Right arm, Patient Position: Sitting, Cuff Size: Adult Regular)   Pulse 73   Temp 97.6  F (36.4  C) (Oral)   Resp 16   Ht 1.892 m (6' 2.49\")   Wt 90 kg (198 lb 6.4 oz)   SpO2 99%   BMI 25.14 kg/m     Estimated body mass index is 25.14 kg/m  as calculated from the following:    Height as of this encounter: 1.892 m (6' 2.49\").    Weight as of this encounter: 90 kg (198 lb 6.4 oz).  Physical Exam  GENERAL: Sitting in chair, no acute distress  HEENT: Atraumatic, moist mucus membranes, PERRL  RESP: Unlabored breathing on room air, no wheezes  CARDIO: Regular rate and rhythm, extremities warm and well perfused, no peripheral edema  ABD: Non-distended, non-tender, bowel sounds active  NEURO: AAOx3, cranial nerves grossly intact, no focal deficits      Recent Labs   Lab Test 12/17/24  1456 12/17/24  1405 10/11/24  1251 10/11/24  0717 10/08/24  1141 08/14/24  1726 08/14/24  1726 08/14/24  1724 01/05/24  1334   HGB 14.2  --  13.0* 13.0* 14.7   < > 14.3  --   --      --   --  234 262   < >  --   --   --    INR  --   --   --  1.03 1.13  --   --   --   --    NA  --   --   --   --   --   --  134*  --  138 "   POTASSIUM  --   --   --   --   --   --  4.1  --  3.9   CR  --   --   --   --   --   --  0.88  --  1.01   A1C  --  7.1*  --   --   --   --   --  9.2*  --     < > = values in this interval not displayed.      Diagnostics  Labs (TSH, lipid panel) pending at this time.  Results will be reviewed when available.   No EKG required for low risk surgery (cataract, skin procedure, breast biopsy, etc).    Revised Cardiac Risk Index (RCRI)  The patient has the following serious cardiovascular risks for perioperative complications:   - No serious cardiac risks = 0 points     RCRI Interpretation: 0 points: Class I (very low risk - 0.4% complication rate)     Signed Electronically by: Brianda Harris MD  A copy of this evaluation report is provided to the requesting physician.        Answers submitted by the patient for this visit:  Patient Health Questionnaire (Submitted on 1/1/2025)  If you checked off any problems, how difficult have these problems made it for you to do your work, take care of things at home, or get along with other people?: Not difficult at all  PHQ9 TOTAL SCORE: 4

## 2025-01-03 ENCOUNTER — ANESTHESIA EVENT (OUTPATIENT)
Dept: SURGERY | Facility: AMBULATORY SURGERY CENTER | Age: 65
End: 2025-01-03
Payer: COMMERCIAL

## 2025-01-03 ENCOUNTER — ANCILLARY PROCEDURE (OUTPATIENT)
Dept: ULTRASOUND IMAGING | Facility: CLINIC | Age: 65
End: 2025-01-03
Attending: ANESTHESIOLOGY
Payer: COMMERCIAL

## 2025-01-04 RX ORDER — ATORVASTATIN CALCIUM 40 MG/1
40 TABLET, FILM COATED ORAL DAILY
Qty: 90 TABLET | Refills: 3 | Status: SHIPPED | OUTPATIENT
Start: 2025-01-04

## 2025-01-04 NOTE — TELEPHONE ENCOUNTER
atorvastatin (LIPITOR) 40 MG tablet   Disp-90 tablet, R-3,   Start: 01/05/2024 1/2/2025  [facility] Internal Medicine Brianda Grant MD  Internal Medicine    Antihyperlipidemic agents Passed

## 2025-01-06 ENCOUNTER — ANESTHESIA (OUTPATIENT)
Dept: SURGERY | Facility: AMBULATORY SURGERY CENTER | Age: 65
End: 2025-01-06
Payer: COMMERCIAL

## 2025-01-06 VITALS
SYSTOLIC BLOOD PRESSURE: 127 MMHG | TEMPERATURE: 97.3 F | RESPIRATION RATE: 16 BRPM | HEIGHT: 74 IN | OXYGEN SATURATION: 94 % | HEART RATE: 80 BPM | WEIGHT: 198 LBS | DIASTOLIC BLOOD PRESSURE: 63 MMHG | BODY MASS INDEX: 25.41 KG/M2

## 2025-01-06 LAB — GLUCOSE BLDC GLUCOMTR-MCNC: 217 MG/DL (ref 70–99)

## 2025-01-06 PROCEDURE — 82962 GLUCOSE BLOOD TEST: CPT | Performed by: PATHOLOGY

## 2025-01-06 PROCEDURE — 88304 TISSUE EXAM BY PATHOLOGIST: CPT | Mod: 26 | Performed by: PATHOLOGY

## 2025-01-06 PROCEDURE — 88304 TISSUE EXAM BY PATHOLOGIST: CPT | Mod: TC | Performed by: STUDENT IN AN ORGANIZED HEALTH CARE EDUCATION/TRAINING PROGRAM

## 2025-01-06 RX ORDER — OXYCODONE HYDROCHLORIDE 5 MG/1
10 TABLET ORAL
Status: DISCONTINUED | OUTPATIENT
Start: 2025-01-06 | End: 2025-01-07 | Stop reason: HOSPADM

## 2025-01-06 RX ORDER — OXYCODONE HYDROCHLORIDE 5 MG/1
5 TABLET ORAL
Status: COMPLETED | OUTPATIENT
Start: 2025-01-06 | End: 2025-01-06

## 2025-01-06 RX ORDER — NALOXONE HYDROCHLORIDE 0.4 MG/ML
0.1 INJECTION, SOLUTION INTRAMUSCULAR; INTRAVENOUS; SUBCUTANEOUS
Status: DISCONTINUED | OUTPATIENT
Start: 2025-01-06 | End: 2025-01-07 | Stop reason: HOSPADM

## 2025-01-06 RX ORDER — ONDANSETRON 4 MG/1
4 TABLET, ORALLY DISINTEGRATING ORAL EVERY 30 MIN PRN
Status: DISCONTINUED | OUTPATIENT
Start: 2025-01-06 | End: 2025-01-07 | Stop reason: HOSPADM

## 2025-01-06 RX ORDER — CEFAZOLIN SODIUM 2 G/50ML
2 SOLUTION INTRAVENOUS SEE ADMIN INSTRUCTIONS
Status: DISCONTINUED | OUTPATIENT
Start: 2025-01-06 | End: 2025-01-07 | Stop reason: HOSPADM

## 2025-01-06 RX ORDER — BUPIVACAINE HYDROCHLORIDE AND EPINEPHRINE 5; 5 MG/ML; UG/ML
INJECTION, SOLUTION PERINEURAL
Status: COMPLETED | OUTPATIENT
Start: 2025-01-06 | End: 2025-01-06

## 2025-01-06 RX ORDER — ACETAMINOPHEN 325 MG/1
975 TABLET ORAL ONCE
Status: COMPLETED | OUTPATIENT
Start: 2025-01-06 | End: 2025-01-06

## 2025-01-06 RX ORDER — FENTANYL CITRATE 50 UG/ML
25-50 INJECTION, SOLUTION INTRAMUSCULAR; INTRAVENOUS
Status: DISCONTINUED | OUTPATIENT
Start: 2025-01-06 | End: 2025-01-07 | Stop reason: HOSPADM

## 2025-01-06 RX ORDER — CEPHALEXIN 500 MG/1
500 CAPSULE ORAL 4 TIMES DAILY
Qty: 12 CAPSULE | Refills: 0 | Status: SHIPPED | OUTPATIENT
Start: 2025-01-06

## 2025-01-06 RX ORDER — PROPOFOL 10 MG/ML
INJECTION, EMULSION INTRAVENOUS CONTINUOUS PRN
Status: DISCONTINUED | OUTPATIENT
Start: 2025-01-06 | End: 2025-01-06

## 2025-01-06 RX ORDER — CEFAZOLIN SODIUM 2 G/50ML
2 SOLUTION INTRAVENOUS
Status: COMPLETED | OUTPATIENT
Start: 2025-01-06 | End: 2025-01-06

## 2025-01-06 RX ORDER — BACITRACIN ZINC 500 [USP'U]/G
OINTMENT TOPICAL DAILY PRN
Status: DISCONTINUED | OUTPATIENT
Start: 2025-01-06 | End: 2025-01-06 | Stop reason: HOSPADM

## 2025-01-06 RX ORDER — DEXAMETHASONE SODIUM PHOSPHATE 10 MG/ML
4 INJECTION, SOLUTION INTRAMUSCULAR; INTRAVENOUS
Status: DISCONTINUED | OUTPATIENT
Start: 2025-01-06 | End: 2025-01-07 | Stop reason: HOSPADM

## 2025-01-06 RX ORDER — NALOXONE HYDROCHLORIDE 0.4 MG/ML
0.4 INJECTION, SOLUTION INTRAMUSCULAR; INTRAVENOUS; SUBCUTANEOUS
Status: DISCONTINUED | OUTPATIENT
Start: 2025-01-06 | End: 2025-01-07 | Stop reason: HOSPADM

## 2025-01-06 RX ORDER — METHOCARBAMOL 500 MG/1
500 TABLET, FILM COATED ORAL 4 TIMES DAILY
Qty: 12 TABLET | Refills: 0 | Status: SHIPPED | OUTPATIENT
Start: 2025-01-06

## 2025-01-06 RX ORDER — SODIUM CHLORIDE, SODIUM LACTATE, POTASSIUM CHLORIDE, CALCIUM CHLORIDE 600; 310; 30; 20 MG/100ML; MG/100ML; MG/100ML; MG/100ML
INJECTION, SOLUTION INTRAVENOUS CONTINUOUS PRN
Status: DISCONTINUED | OUTPATIENT
Start: 2025-01-06 | End: 2025-01-06

## 2025-01-06 RX ORDER — OXYCODONE HYDROCHLORIDE 5 MG/1
5 TABLET ORAL EVERY 6 HOURS PRN
Qty: 12 TABLET | Refills: 0 | Status: SHIPPED | OUTPATIENT
Start: 2025-01-06 | End: 2025-01-09

## 2025-01-06 RX ORDER — NALOXONE HYDROCHLORIDE 0.4 MG/ML
0.2 INJECTION, SOLUTION INTRAMUSCULAR; INTRAVENOUS; SUBCUTANEOUS
Status: DISCONTINUED | OUTPATIENT
Start: 2025-01-06 | End: 2025-01-07 | Stop reason: HOSPADM

## 2025-01-06 RX ORDER — FENTANYL CITRATE 50 UG/ML
INJECTION, SOLUTION INTRAMUSCULAR; INTRAVENOUS PRN
Status: DISCONTINUED | OUTPATIENT
Start: 2025-01-06 | End: 2025-01-06

## 2025-01-06 RX ORDER — FENTANYL CITRATE 50 UG/ML
25 INJECTION, SOLUTION INTRAMUSCULAR; INTRAVENOUS
Status: DISCONTINUED | OUTPATIENT
Start: 2025-01-06 | End: 2025-01-07 | Stop reason: HOSPADM

## 2025-01-06 RX ORDER — SODIUM CHLORIDE, SODIUM LACTATE, POTASSIUM CHLORIDE, CALCIUM CHLORIDE 600; 310; 30; 20 MG/100ML; MG/100ML; MG/100ML; MG/100ML
INJECTION, SOLUTION INTRAVENOUS CONTINUOUS
Status: DISCONTINUED | OUTPATIENT
Start: 2025-01-06 | End: 2025-01-07 | Stop reason: HOSPADM

## 2025-01-06 RX ORDER — FLUMAZENIL 0.1 MG/ML
0.2 INJECTION, SOLUTION INTRAVENOUS
Status: DISCONTINUED | OUTPATIENT
Start: 2025-01-06 | End: 2025-01-07 | Stop reason: HOSPADM

## 2025-01-06 RX ORDER — ONDANSETRON 2 MG/ML
4 INJECTION INTRAMUSCULAR; INTRAVENOUS EVERY 30 MIN PRN
Status: DISCONTINUED | OUTPATIENT
Start: 2025-01-06 | End: 2025-01-07 | Stop reason: HOSPADM

## 2025-01-06 RX ORDER — LIDOCAINE HYDROCHLORIDE 20 MG/ML
INJECTION, SOLUTION INFILTRATION; PERINEURAL PRN
Status: DISCONTINUED | OUTPATIENT
Start: 2025-01-06 | End: 2025-01-06

## 2025-01-06 RX ORDER — ONDANSETRON 2 MG/ML
INJECTION INTRAMUSCULAR; INTRAVENOUS PRN
Status: DISCONTINUED | OUTPATIENT
Start: 2025-01-06 | End: 2025-01-06

## 2025-01-06 RX ORDER — LIDOCAINE 40 MG/G
CREAM TOPICAL
Status: DISCONTINUED | OUTPATIENT
Start: 2025-01-06 | End: 2025-01-07 | Stop reason: HOSPADM

## 2025-01-06 RX ORDER — ONDANSETRON 4 MG/1
4 TABLET, FILM COATED ORAL EVERY 6 HOURS PRN
Qty: 12 TABLET | Refills: 0 | Status: SHIPPED | OUTPATIENT
Start: 2025-01-06

## 2025-01-06 RX ADMIN — FENTANYL CITRATE 25 MCG: 50 INJECTION, SOLUTION INTRAMUSCULAR; INTRAVENOUS at 08:30

## 2025-01-06 RX ADMIN — FENTANYL CITRATE 50 MCG: 50 INJECTION, SOLUTION INTRAMUSCULAR; INTRAVENOUS at 06:58

## 2025-01-06 RX ADMIN — FENTANYL CITRATE 25 MCG: 50 INJECTION, SOLUTION INTRAMUSCULAR; INTRAVENOUS at 07:40

## 2025-01-06 RX ADMIN — CEFAZOLIN SODIUM 2 G: 2 SOLUTION INTRAVENOUS at 07:20

## 2025-01-06 RX ADMIN — ONDANSETRON 4 MG: 2 INJECTION INTRAMUSCULAR; INTRAVENOUS at 08:17

## 2025-01-06 RX ADMIN — FENTANYL CITRATE 25 MCG: 50 INJECTION, SOLUTION INTRAMUSCULAR; INTRAVENOUS at 08:16

## 2025-01-06 RX ADMIN — Medication 100 MCG: at 08:11

## 2025-01-06 RX ADMIN — SODIUM CHLORIDE, SODIUM LACTATE, POTASSIUM CHLORIDE, CALCIUM CHLORIDE: 600; 310; 30; 20 INJECTION, SOLUTION INTRAVENOUS at 06:39

## 2025-01-06 RX ADMIN — PROPOFOL 100 MCG/KG/MIN: 10 INJECTION, EMULSION INTRAVENOUS at 07:55

## 2025-01-06 RX ADMIN — Medication 100 MCG: at 08:17

## 2025-01-06 RX ADMIN — ACETAMINOPHEN 975 MG: 325 TABLET ORAL at 06:15

## 2025-01-06 RX ADMIN — OXYCODONE HYDROCHLORIDE 5 MG: 5 TABLET ORAL at 08:56

## 2025-01-06 RX ADMIN — PROPOFOL 200 MCG/KG/MIN: 10 INJECTION, EMULSION INTRAVENOUS at 07:23

## 2025-01-06 RX ADMIN — Medication 100 MCG: at 08:35

## 2025-01-06 RX ADMIN — BUPIVACAINE HYDROCHLORIDE AND EPINEPHRINE 10 ML: 5; 5 INJECTION, SOLUTION PERINEURAL at 06:55

## 2025-01-06 RX ADMIN — LIDOCAINE HYDROCHLORIDE 60 MG: 20 INJECTION, SOLUTION INFILTRATION; PERINEURAL at 07:23

## 2025-01-06 ASSESSMENT — LIFESTYLE VARIABLES: TOBACCO_USE: 0

## 2025-01-06 NOTE — OP NOTE
HAND SURGERY OPERATIVE REPORT     Date of Surgery: 1/6/2025  Surgical Service: Ortho Hand     Preoperative Diagnosis: Left small and ring finger Dupuytren's contracture     Postoperative Diagnosis: Same as preoperative diagnoses     Procedures Performed: Left palm, ring and small finger Dupuytren's open fasciectomy     Attending:  ANJU Marcus MD, PhD, FACS  Assistant: None     Complications: None apparent  Specimens: Left palm, ring and small finger Dupuytren's cord  Implants: None  Estimated blood loss: < 5 mL  Tourniquet time: 35 minutes  Wound classification: Clean  Anesthesia: Block with sedation     Indications for Procedure: 64 year-old right-handed non-smoker presenting with left palm, small and ring finger Dupuytren's cord causing metacarpophalangeal joint contractures. It is affecting his activities of daily function. After discussing the risks of surgery including but not limited to: infection, wound healing problems, injury to nerves and tendons, patient consents to and would like to proceed with left palm, ring and small finger Dupuytren's open fasciectomy.    Intraoperative findings: Radial and ulnar digital neurovascular bundles dissected gently, protected throughout. The left small and ring fingers were fully straightened following Dupuytren's cord resection. The left ring and small fingers were well-perfused at the end of the case when fully extended and splinted.      Description of Procedure: Patient was seen in the preoperative holding area.  Consent was verified.  The left palm, ring and small fingers were marked.  All additional questions were answered.  The risks of the surgery were reiterated, including (but not limited to): infection, bleeding, poor scarring including scar contracture, pain, injury to surrounding structures including nerves and tendons, recurrence of Dupuytren's contracture, need for additional revision surgery, neuroma formation, complex regional pain syndrome, stiffness.   Following discussion of all these risks, the patient again agreed to proceed with surgery. The left upper extremity was blocked. Patient was then transferred to the operating room and placed supine on the operating table.  All pressure points were padded.  Sequential compression devices were placed on bilateral lower extremities and verified to be operational.  IV antibiotic prophylaxis was given.  Preinduction timeout was performed.  Monitored anesthesia care was commenced.  The left upper extremity was prepped and draped in usual sterile fashion. At the start of the case, the Esmarch was used to exsanguinate the left upper extremity and the left arm tourniquet was inflated to 250 mm Hg. A [place] hand table was used to retracted the fingers. Next, a Frank-style skin incision was made directly over the marked out cord which extended from the mid-palm onto the left small finger and the base of the ring finger. Once through skin, the skin flaps over the palm were raised as thick as possible sharply directly over the thickened and diseased palmar fascia. The pretendinous cord was identified and its extent defined. The digital neurovascular bundles were exposed, gently dissected and retracted away from the cord in the palm. Next, the proximal extent of the cord was sharply divided and the cord was slowly resected from proximal to distal. Of note, there were thickened vertical fibers of Legueu and Juvara which were also divided. The common an digital neurovascular bundles were always protected. As the dissection proceeded distally, the pretendinous cord narrowed and spiral cords formed. These were  gently with tenotomy scissors from the neurovascular bundles on both the radial and ulnar sides of the small finger. The spiral cords were carefully and fully divided. The left small and ring fingers were then fully passively extended. The surgical wound was palpated for additional nodules or cords and there were  "none that were obvious. Next, the tourniquet was taken down. Hemostasis was obtained with gentle pressure. The left small and ring fingers and all the fingers were well-perfused. The surgical wound was irrigated with sterile saline. There was no bleeding in the wound. The skin was closed with interrupted simple and horizontal mattress 4-0 Nylon suture. The incision was dressed with Xeroform and bacitracin, 4 x 4\" gauze, Webril and a volar plaster splint was placed. Patient was then woken up with no issues and transferred to the post-anesthesia care unit with no events.      Postoperative plan: Clinic in 1 week for wound check, 2 weeks for suture removal.      Miguel Marcus MD, PhD, FACS    "

## 2025-01-06 NOTE — PROGRESS NOTES
Ortho Hand    No changes in history or exam. Medically optimized. HgA1C 7.    OR today for LEFT palm, ring and small finger Dupuytren's open fasciectomy.    Discussed the risks of surgery, including but not limited to: infection, bleeding, pain, poor scarring, wound healing issues, injury to nerves or tendons, neuroma formation, complex regional pain syndrome, anesthesia related complication.  Despite these risks, patient consents to and would like to proceed with LEFT palm, ring and small finger Dupuytren's open fasciectomy. All questions answered.     Miguel Marcus MD, PhD, FACS

## 2025-01-06 NOTE — ANESTHESIA PREPROCEDURE EVALUATION
Anesthesia Pre-Procedure Evaluation    Patient: Luigi Moore   MRN: [id number] : 1960        Procedure : Procedure(s):  Left palm and small finger open Dupuytren's fasciectomy, possible ring finger Dupuytren's fasciectomy          Past Medical History:   Diagnosis Date    Acquired hypothyroidism 2016    Cancer (H)     Depressive disorder     Hidradenoma dx: 2015    right hand/2 surgeries    Hyperlipidemia 2006    Hypertension     Numbness and tingling 2015    right hand, related to surgery for hidradenoma    TELLY (obstructive sleep apnea) 2007    New cpap machine 1 year ago. follows at [facility] [place]    Type 1 diabetes (H)     Uncomplicated asthma       Past Surgical History:   Procedure Laterality Date    BIOPSY      COLONOSCOPY N/A 2024    Procedure: Colonoscopy;  Surgeon: Jose Luis Gonzales MD;  Location: [facility]    EXCISE LESION HAND Right 2015    Procedure: EXCISE LESION HAND;  Surgeon: Jeovany Jefferson MD;  [facility]    IMPLANT PROSTHESIS PENIS INFLATABLE N/A 2022    Procedure: INSERTION of INFLATABLE PENILE PROSTHESIS;  Surgeon: Johnathan Cooper MD;  [facility]    IR RENAL BIOPSY RIGHT  10/11/2024    ORTHOPEDIC SURGERY Left     achilles tendon     ORTHOPEDIC SURGERY Right     hand surgery    SOFT TISSUE SURGERY      lipoma removal, below rib cage on right side      Allergies   Allergen Reactions    Iodinated Contrast Media Anaphylaxis    Contrast Dye Hives    Diatrizoate Hives     Iodine dye; iodine externally or topically is OK      Social History     Tobacco Use    Smoking status: Never    Smokeless tobacco: Never   Substance Use Topics    Alcohol use: Never     Comment: History of alcohol abuse/sober since ; went to treatment       Wt Readings from Last 1 Encounters:   25 89.8 kg (198 lb)        Anesthesia Evaluation   Pt has had prior anesthetic. Type: General.    No history of anesthetic complications       ROS/MED  HX  ENT/Pulmonary:     (+) sleep apnea, uses CPAP,                   Intermittent, asthma  Treatment: Inhaler prn,              (-) tobacco use   Neurologic:     (+)      migraines,                          Cardiovascular:     (+)  hypertension-range: 120-140s/60-80s/ -   -  - -                                      METS/Exercise Tolerance: >4 METS    Hematologic:  - neg hematologic  ROS  (-) history of blood clots, anemia and history of blood transfusion   Musculoskeletal: Comment: - Hx of Low back pain      GI/Hepatic:       Renal/Genitourinary:       Endo:     (+) type I DM,  Last HgA1c: 7, date: 1/2025, Using insulin, - using insulin pump. Normal glucose range: 207 ave over 14 days (mid 12/2021),  Diabetic complications: retinopathy. thyroid problem, hypothyroidism,           Psychiatric/Substance Use:     (+) psychiatric history depression alcohol abuse (sober since 2015)      Infectious Disease:       Malignancy:       Other:            Physical Exam    Airway        Mallampati: II   TM distance: > 3 FB   Neck ROM: full   Mouth opening: > 3 cm    Respiratory Devices and Support         Dental       (+) Minor Abnormalities - some fillings, tiny chips      Cardiovascular   cardiovascular exam normal          Pulmonary   pulmonary exam normal                OUTSIDE LABS:  CBC:   Lab Results   Component Value Date    WBC 6.3 12/17/2024    WBC 6.1 10/11/2024    HGB 14.2 12/17/2024    HGB 13.0 (L) 10/11/2024    HCT 41.0 12/17/2024    HCT 37.9 (L) 10/11/2024     12/17/2024     10/11/2024     BMP:   Lab Results   Component Value Date     (L) 08/14/2024     01/05/2024    POTASSIUM 4.1 08/14/2024    POTASSIUM 3.9 01/05/2024    CHLORIDE 99 08/14/2024    CHLORIDE 102 01/05/2024    CO2 26 08/14/2024    CO2 24 01/05/2024    BUN 14.9 08/14/2024    BUN 7.4 (L) 01/05/2024    CR 0.88 08/14/2024    CR 1.01 01/05/2024     (H) 01/06/2025     (H) 10/11/2024     COAGS:   Lab Results   Component  Value Date    INR 1.03 10/11/2024     POC:   Lab Results   Component Value Date     (H) 05/08/2017     HEPATIC:   Lab Results   Component Value Date    ALBUMIN 4.4 01/05/2024    PROTTOTAL 8.0 01/05/2024    ALT 15 01/05/2024    AST 24 01/05/2024    ALKPHOS 101 01/05/2024    BILITOTAL 0.7 01/05/2024     OTHER:   Lab Results   Component Value Date    A1C 7.0 (H) 01/02/2025    NEVA 9.5 08/14/2024    TSH 4.19 01/02/2025    T4 1.46 12/17/2024       Anesthesia Plan    ASA Status:  3    NPO Status:  NPO Appropriate    Anesthesia Type: MAC.     - Reason for MAC: immobility needed, straight local not clinically adequate              Consents    Anesthesia Plan(s) and associated risks, benefits, and realistic alternatives discussed. Questions answered and patient/representative(s) expressed understanding.     - Discussed:     - Discussed with:  Patient            Postoperative Care    Pain management: IV analgesics, Oral pain medications, Peripheral nerve block (Single Shot), Multi-modal analgesia.   PONV prophylaxis: Ondansetron (or other 5HT-3), Background Propofol Infusion     Comments:    Other Comments:   Discussed preoperative brachial plexus. Discussed risks of nerve block, including nerve injury, bleeding, infection, incomplete analgesia. Discussed anticipated areas of sensory and motor block, limb precautions, and fall precautions. Discussed alternative of not performing a nerve block. Ensured understanding, invited questions and all questions were answered. Patient wishes to proceed.             Deepak Varela MD    I have reviewed the pertinent notes and labs in the chart from the past 30 days and (re)examined the patient.  Any updates or changes from those notes are reflected in this note.             # Drug Induced Platelet Defect: home medication list includes an antiplatelet medication   # Hypertension: Noted on problem list           # Overweight: Estimated body mass index is 25.09 kg/m  as calculated  "from the following:    Height as of this encounter: 1.892 m (6' 2.49\").    Weight as of this encounter: 89.8 kg (198 lb).             "

## 2025-01-06 NOTE — PLAN OF CARE
Patient received left side Brachial Plexus nerve block  with Exparel.  Fentanyl 50mcg and Versed 1mg given. Tolerated procedure well.

## 2025-01-06 NOTE — ANESTHESIA PROCEDURE NOTES
Brachial plexus Procedure Note    Pre-Procedure   Staff -        Anesthesiologist:  Deepak Varela MD       Performed By: anesthesiologist       Location: pre-op       Procedure Start/Stop Times: 1/6/2025 6:55 AM and 1/6/2025 7:09 AM       Pre-Anesthestic Checklist: patient identified, IV checked, site marked, risks and benefits discussed, informed consent, monitors and equipment checked, pre-op evaluation, at physician/surgeon's request and post-op pain management  Timeout:       Correct Patient: Yes        Correct Procedure: Yes        Correct Site: Yes        Correct Position: Yes        Correct Laterality: Yes        Site Marked: Yes  Procedure Documentation  Procedure: Brachial plexus         Laterality: left       Patient Position: supine       Skin prep: Chloraprep (infra-clavicular approach).       Needle Type: short bevel       Needle Gauge: 17.        Needle Length (millimeters): 100        Ultrasound guided       1. Ultrasound was used to identify targeted nerve, plexus, vascular marker, or fascial plane and place a needle adjacent to it in real-time.       2. Ultrasound was used to visualize the spread of anesthetic in close proximity to the above referenced structure.       3. A permanent image is entered into the patient's record.       4. The visualized anatomic structures appeared normal.       5. There were no apparent abnormal pathologic findings.    Assessment/Narrative         The placement was negative for: blood aspirated, painful injection and site bleeding       Paresthesias: No.       Insertion/Infusion Method: Single Shot       Complications: none    Medication(s) Administered   Bupivacaine 0.5% w/ 1:200K Epi (Other) - Other   10 mL - 1/6/2025 6:55:00 AM  Bupivacaine liposome (Exparel) 1.3% LA inj susp (Infiltration) - Infiltration   10 mL - 1/6/2025 6:55:00 AM  Medication Administration Time: 1/6/2025 6:55 AM     Comments:  Risk benefit alternatives explained. Patient agrees to undergo  "a block.   Procedure done with no issues, no complications, good visualization under US, local anesthetic spread satisfactory. Patient tolerated the procedure well.        FOR [facility] ([facility]) ONLY:   Pain Team Contact information: please page the Pain Team Via [employer]. Search \"Pain\". During daytime hours, please page the attending first. At night please page the resident first.      "

## 2025-01-06 NOTE — ANESTHESIA CARE TRANSFER NOTE
Patient: Luigi Moore    Procedure: Procedure(s):  Left palm and small finger open Dupuytren's fasciectomy,  ring finger Dupuytren's fasciectomy       Diagnosis: Dupuytren contracture [M72.0]  Diagnosis Additional Information: No value filed.    Anesthesia Type:   MAC     Note:    Oropharynx: oropharynx clear of all foreign objects and spontaneously breathing  Level of Consciousness: awake  Oxygen Supplementation: room air    Independent Airway: airway patency satisfactory and stable  Dentition: dentition unchanged  Vital Signs Stable: post-procedure vital signs reviewed and stable  Report to RN Given: handoff report given  Patient transferred to: Phase II  Comments: Report to Phase II RN. Resps easy and reg.   Handoff Report: Identifed the Patient, Identified the Reponsible Provider, Reviewed the pertinent medical history, Discussed the surgical course, Reviewed Intra-OP anesthesia mangement and issues during anesthesia, Set expectations for post-procedure period and Allowed opportunity for questions and acknowledgement of understanding      Vitals:  Vitals Value Taken Time   /75 01/06/25 0846   Temp 36.4  C (97.5  F) 01/06/25 0846   Pulse 83 01/06/25 0846   Resp 14 01/06/25 0846   SpO2 95 % 01/06/25 0856   Vitals shown include unfiled device data.    Electronically Signed By: GALILEA Amaral CRNA  January 6, 2025  8:57 AM

## 2025-01-07 ENCOUNTER — TELEPHONE (OUTPATIENT)
Dept: ENDOCRINOLOGY | Facility: CLINIC | Age: 65
End: 2025-01-07
Payer: COMMERCIAL

## 2025-01-07 NOTE — TELEPHONE ENCOUNTER
Patient currently on Wegovy 2.4 mg weekly, but not covered by plan this year (no options for appeal due to plan exclusion).    Trying PA for Ozempic. Started case in CMM:         Todd Do, PharmD, [facility]  Endocrine [facility] Pharmacist  [address] [place] [phone]

## 2025-01-09 LAB
PATH REPORT.COMMENTS IMP SPEC: NORMAL
PATH REPORT.COMMENTS IMP SPEC: NORMAL
PATH REPORT.FINAL DX SPEC: NORMAL
PATH REPORT.GROSS SPEC: NORMAL
PATH REPORT.MICROSCOPIC SPEC OTHER STN: NORMAL
PATH REPORT.RELEVANT HX SPEC: NORMAL
PHOTO IMAGE: NORMAL

## 2025-01-14 ENCOUNTER — OFFICE VISIT (OUTPATIENT)
Dept: ORTHOPEDICS | Facility: CLINIC | Age: 65
End: 2025-01-14
Payer: COMMERCIAL

## 2025-01-14 ENCOUNTER — THERAPY VISIT (OUTPATIENT)
Dept: OCCUPATIONAL THERAPY | Facility: CLINIC | Age: 65
End: 2025-01-14
Payer: COMMERCIAL

## 2025-01-14 DIAGNOSIS — M79.645 PAIN OF FINGER OF LEFT HAND: Primary | ICD-10-CM

## 2025-01-14 DIAGNOSIS — M72.0 DUPUYTREN CONTRACTURE: Primary | ICD-10-CM

## 2025-01-14 PROCEDURE — 97165 OT EVAL LOW COMPLEX 30 MIN: CPT | Mod: GO | Performed by: OCCUPATIONAL THERAPIST

## 2025-01-14 PROCEDURE — 97760 ORTHOTIC MGMT&TRAING 1ST ENC: CPT | Mod: GO | Performed by: OCCUPATIONAL THERAPIST

## 2025-01-14 PROCEDURE — 97110 THERAPEUTIC EXERCISES: CPT | Mod: GO | Performed by: OCCUPATIONAL THERAPIST

## 2025-01-14 PROCEDURE — 99024 POSTOP FOLLOW-UP VISIT: CPT | Performed by: STUDENT IN AN ORGANIZED HEALTH CARE EDUCATION/TRAINING PROGRAM

## 2025-01-14 NOTE — NURSING NOTE
Reason For Visit:   Chief Complaint   Patient presents with    Surgical Followup     Post op Left palm and small finger open Dupuytren's fasciectomy,  ring finger Dupuytren's fasciectomy - Left  DOS: 1/6/25       Primary MD: Kathya Lang  Ref. MD: Lena    Age: 64 year old    ?  No      There were no vitals taken for this visit.      Pain Assessment  Patient Currently in Pain: No    Hand Dominance Evaluation  Hand Dominance: Right          QuickDASH Assessment      1/13/2025     6:00 PM   QuickDASH Main   1. Open a tight or new jar Unable to answer   2. Do heavy household chores (e.g., wash walls, floors) Unable to answer   3. Carry a shopping bag or briefcase Unable to answer   4. Wash your back Unable to answer   5. Use a knife to cut food Unable to answer   6. Recreational activities in which you take some force or impact through your arm, shoulder or hand (e.g., golf, hammering, tennis, etc.) Unable to answer   7. During the past week, to what extent has your arm, shoulder or hand problem interfered with your normal social activities with family, friends, neighbours or groups Not at all   8. During the past week, were you limited in your work or other regular daily activities as a result of your arm, shoulder or hand problem Very limited   9. Arm, shoulder or hand pain None   10.Tingling (pins and needles) in your arm,shoulder or hand None   11. During the past week, how much difficulty have you had sleeping because of the pain in your arm, shoulder or hand No difficulty   Quickdash Ability Score -6.82          Current Outpatient Medications   Medication Sig Dispense Refill    albuterol (PROAIR HFA/PROVENTIL HFA/VENTOLIN HFA) 108 (90 Base) MCG/ACT inhaler Inhale 2 puffs into the lungs every 6 hours as needed for shortness of breath or wheezing 18 g 5    amLODIPine (NORVASC) 10 MG tablet Take 1 tablet (10 mg) by mouth daily 90 tablet 3    amphetamine-dextroamphetamine (ADDERALL) 15 MG tablet  "Take 1 tablet (15 mg) by mouth 2 times daily. 60 tablet 0    amphetamine-dextroamphetamine (ADDERALL) 15 MG tablet Take 1 tablet (15 mg) by mouth 2 times daily 60 tablet 0    amphetamine-dextroamphetamine (ADDERALL) 15 MG tablet Take 1 tablet (15 mg) by mouth 2 times daily 60 tablet 0    ARIPiprazole (ABILIFY) 15 MG tablet Take 1 tablet (15 mg) by mouth daily 90 tablet 1    aspirin 81 MG tablet Take 1 tablet (81 mg) by mouth daily 100 tablet 3    atorvastatin (LIPITOR) 40 MG tablet TAKE 1 TABLET BY MOUTH EVERY DAY 90 tablet 3    blood glucose (ACCU-CHEK GUIDE) test strip Use to test blood sugar 3 times daily or as directed. 100 strip 11    buPROPion (WELLBUTRIN XL) 150 MG 24 hr tablet Take 1 tablet (150 mg) by mouth every morning Take with 300 mg tablet (total daily dose 450 mg) 60 tablet 5    buPROPion (WELLBUTRIN XL) 300 MG 24 hr tablet Take 1 tablet (300 mg) by mouth every morning Take with 150 mg tablet (total daily dose 450 mg) 60 tablet 5    cephALEXin (KEFLEX) 500 MG capsule Take 1 capsule (500 mg) by mouth 4 times daily. 12 capsule 0    cetirizine (ZYRTEC) 10 MG tablet Take 10 mg by mouth      Continuous Glucose Sensor (GUARDIAN 4 GLUCOSE SENSOR) MISC 1 each See Admin Instructions. Use per 's instructions. Change every 7 days. 12 each 4    Continuous Glucose Transmitter (GUARDIAN 4 TRANSMITTER) MISC 1 each See Admin Instructions. Use per 's instructions to monitor glucose. 1 each 3    DiphenhydrAMINE HCl (BENADRYL PO)       FLUoxetine (PROZAC) 20 MG capsule TAKE 1 CAPSULE BY MOUTH EVERY DAY 30 capsule 0    Glucagon (BAQSIMI) 3 MG/DOSE nasal powder Spray 1 spray (3 mg) in nostril as needed (severe hypoglycemia) in the event of unconscious hypoglycemia or hypoglycemic seizure. May repeat dose if no response after 15 minutes. 1 each 1    glucagon 1 MG kit Inject 1 mg into the muscle once for 1 dose 1 each 0    infusion set (AGNES 32\" 9MM) misc pump supply Infusion set to be used with pump. " " Change every 2-3 days as directed. 3 Box 4    insulin glargine (LANTUS SOLOSTAR) 100 UNIT/ML pen INJECT 20 UNITS Daily  SUBCUTANEOUS ONLY IF INSULIN PUMP FAILS. 15 mL 1    Insulin Infusion Pump (INSULIN PUMP [id number]) KIT       Insulin Infusion Pump Supplies (EXTENDED INFUSION SET 23\"/9MM) MISC 1 each See Admin Instructions. Change every 2-3 days for use in insulin pump. 40 each 3    Insulin Infusion Pump Supplies (EXTENDED RESERVOIR 3ML) MISC 1 each See Admin Instructions. Change every 2-3 days for use in insulin pump. 40 each 3    Insulin Infusion Pump Supplies (MINIMED PUMP RESERVOIR 3ML) MISC 1 each See Admin Instructions. 2 each 3    insulin lispro (HUMALOG) 100 UNIT/ML vial VIA PUMP APPROX 80 UNITS DAILY. 80 mL 3    insulin pen needle (32G X 6 MM) 32G X 6 MM miscellaneous Use 4 pen needles daily or as directed as back up. 100 each 3    insulin syringe-needle U-100 (BD INSULIN SYRINGE) 29G X 1/2\" 1 ML miscellaneous Use as directed 20 each 1    ketoconazole (NIZORAL) 2 % external cream Apply topically 2 times daily as needed (redness and flaking). Apply to the face. 60 g 3    KETOSTIX test strip Use as directed in case of high glucose, vomiting or illness. 50 strip 11    levothyroxine (SYNTHROID/LEVOTHROID) 175 MCG tablet Take 1 tablet (175 mcg) by mouth daily. Please take daily on an empty stomach and wait 30 minutes for any other medications food or drink.  Please recheck TSH level again in 12 weeks. 90 tablet 1    losartan-hydrochlorothiazide (HYZAAR) 50-12.5 MG tablet Take 1 tablet by mouth daily 90 tablet 3    methocarbamol (ROBAXIN) 500 MG tablet Take 1 tablet (500 mg) by mouth 4 times daily. 12 tablet 0    ondansetron (ZOFRAN) 4 MG tablet Take 1 tablet (4 mg) by mouth every 6 hours as needed for nausea. 12 tablet 0    order for DME Equipment being ordered: CPAP machine 1 each 0    Semaglutide, 2 MG/DOSE, (OZEMPIC, 2 MG/DOSE,) 8 MG/3ML pen Inject 2 mg subcutaneously every 7 days. 9 mL 3    topiramate " (TOPAMAX) 50 MG tablet Take 1 tablet (50 mg) by mouth daily 90 tablet 3    Urea 40 % CREA Externally apply topically 2 times daily To surface of toenails 198 g 5       Allergies   Allergen Reactions    Iodinated Contrast Media Anaphylaxis    Contrast Dye Hives    Diatrizoate Hives     Iodine dye; iodine externally or topically is OK       Ruba Robin, ATC

## 2025-01-14 NOTE — PROGRESS NOTES
OCCUPATIONAL THERAPY EVALUATION  Type of Visit: Evaluation              Subjective        Presenting condition or subjective complaint: dupuytren's contracture surgery  Date of onset: 01/06/25    Relevant medical history: Asthma; Cancer; Depression; Diabetes; High blood pressure   Past Medical History:   Diagnosis Date    Acquired hypothyroidism 12/07/2016    Cancer (H) 2015    Depressive disorder     Hidradenoma dx: Feb 2015    right hand/2 surgeries    Hyperlipidemia 12/07/2006    Hypertension     Numbness and tingling 2015    right hand, related to surgery for hidradenoma    TELLY (obstructive sleep apnea) 2007    New cpap machine 1 year ago. follows at [facility] [place]    Type 1 diabetes (H) 1982    Uncomplicated asthma      Dates & types of surgery: cancer 4/15/2015 achilles tendon reconstruction cant remember  Past Surgical History:   Procedure Laterality Date    BIOPSY  2016    COLONOSCOPY N/A 5/17/2024    Procedure: Colonoscopy;  Surgeon: Jose Luis Gonzales MD;  Location: [facility]    EXCISE LESION HAND Right 02/26/2015    Procedure: EXCISE LESION HAND;  Surgeon: Jeovany Jefferson MD;  [facility]    IMPLANT PROSTHESIS PENIS INFLATABLE N/A 02/01/2022    Procedure: INSERTION of INFLATABLE PENILE PROSTHESIS;  Surgeon: Johnathan Cooper MD;  [facility]    IR RENAL BIOPSY RIGHT  10/11/2024    ORTHOPEDIC SURGERY Left     achilles tendon     ORTHOPEDIC SURGERY Right     hand surgery    RELEASE DUPUYTRENS CONTRACTURE Left 1/6/2025    Procedure: Left palm and small finger open Dupuytren's fasciectomy,  ring finger Dupuytren's fasciectomy;  Surgeon: Miguel Marcus MD;  Location: [facility] OR    SOFT TISSUE SURGERY      lipoma removal, below rib cage on right side     Prior diagnostic imaging/testing results:       Prior therapy history for the same diagnosis, illness or injury: No      Prior Level of Function  Transfers: Independent  Ambulation: Independent  ADL: Independent  IADL:  IND    Living  Environment  Social support: Alone   Type of home: Apartment/condo   Stairs to enter the home: Yes       Ramp: No   Stairs inside the home: No       Help at home: None    Employment: Yes   Hobbies/Interests:      Patient goals for therapy: dont know yet,i havemt tried to do anything yet    Pain assessment: Pain denied     Objective   ADDITIONAL HISTORY:  Right hand dominant  Patient reports symptoms of stiffness/loss of motion and weakness/loss of strength  Transportation: drives  Currently taking a break from tennis; retired from [employer]    Functional Outcome Measure:   Upper Extremity Functional Index Score:  SCORE:   Column Totals: /80: (Patient-Rptd) 74   (A lower score indicates greater disability.)       PAIN:  Pt reported 0/10 pain.    EDEMA: Mild     SCAR/WOUND:  Noted slight moist opening in webspace of SF/RF orther wise clean dry intact with sutures in place see photo      SENSATION: WNL throughout all nerve distributions; per patient report     ROM:   Wrist ROM  RIGHT AROM LEFT AROM    Extension 65 64   Flexion 50 55   Radial Deviation (RD) 15 15   Ulnar Deviation (UD) 30 36     LUE doing composite flexion 4cm lacking      RESISTED TESTING:  Contraindicated      STRENGTH: Contraindicated         Assessment & Plan   CLINICAL IMPRESSIONS  Medical Diagnosis: L SF Dup Release    Treatment Diagnosis: L SF Pain    Impression/Assessment: Pt is a 64 year old male presenting to Occupational Therapy due to L SF Dup Release.  The following significant findings have been identified: Impaired ROM, Impaired strength, and Post-surgical precautions.  These identified deficits interfere with their ability to perform work tasks, recreational activities, and meal planning and preparation as compared to previous level of function.     Clinical Decision Making (Complexity):  Assessment of Occupational Performance: 1-3 Performance Deficits  Occupational Performance Limitations: meal preparation and cleanup,  work, and leisure activities  Clinical Decision Making (Complexity): Low complexity    PLAN OF CARE  Treatment Interventions:  Modalities:  US, Fluidotherapy, Paraffin, TENS, and E-Stim  Therapeutic Exercise:  AROM, AAROM, PROM, Tendon Gliding, Blocking, Reverse Blocking, Place and Hold, Contract Relax, Extensor Tracking, Isotonics, Isometrics, and Stabilization  Neuromuscular re-education:  Nerve Gliding, Coordination/Dexterity, Sensory re-education, Desensitization, Kinesthetic Training, Proprioceptive Training, Posture, Kinesiotaping, Isometrics, and Stabilization  Manual Techniques:  Coordination/Dexterity, Joint mobilization, Scar mobilization, Friction massage, Myofascial release, and Manual edema mobilization  Orthotic Fabrication:  Static, Static progressive, and Dynamic  Self Care:  Self Care Tasks, Ergonomic Considerations, and Work Tasks    Long Term Goals   OT Goal 1  Goal Identifier: GILBERT  Goal Description: Pt will achived 220 GILBERT for functional use in ADLs and IADLS of L SF  Rationale: In order to maximize safety and independence with performance of self-care activities  Goal Progress: Initial  Target Date: 03/14/25      Frequency of Treatment: 1x a week  Duration of Treatment: 8 weeks     Recommended Referrals to Other Professionals:   Education Assessment: Learner/Method: Patient;Demonstration;Pictures/Video;No Barriers to Learning     Risks and benefits of evaluation/treatment have been explained.   Patient/Family/caregiver agrees with Plan of Care.     Evaluation Time:    OT Eval, Low Complexity Minutes (63616): 15       Signing Clinician: RIVAS Virk

## 2025-01-14 NOTE — LETTER
1/14/2025      Luigi Moore  [address]  [address]  [place] [address]      Dear Colleague,    Thank you for referring your patient, Luigi Moore, to the [facility] [place]. Please see a copy of my visit note below.    Ortho Hand    Doing well.  No issues.  Had no pain.    On exam, left palm and small finger incisions are well-healing with no significant wound, or sign of infection.  Left ring and small finger radial and ulnar tip sensation is normal.  Can make a nearly full active composite left fist.  Can flatten the left palm on a tabletop surface.    Path: Superficial fibromatosis that is benign    A/P: 64-year-old male 1 week status post left palm and small finger open Dupuytren's fasciectomy    -OT today for Orthoplast splint fabrication, finger motion, and edema control therapy  -Return to clinic in 1 week for suture removal  -Patient should limit the strong  activities and lifting to 3-5 pounds    Miguel Marcus MD, PhD, FACS      Again, thank you for allowing me to participate in the care of your patient.        Sincerely,        Miguel Marcus MD    Electronically signed

## 2025-01-14 NOTE — PROGRESS NOTES
Ortho Hand    Doing well.  No issues.  Had no pain.    On exam, left palm and small finger incisions are well-healing with no significant wound, or sign of infection.  Left ring and small finger radial and ulnar tip sensation is normal.  Can make a nearly full active composite left fist.  Can flatten the left palm on a tabletop surface.    Path: Superficial fibromatosis that is benign    A/P: 64-year-old male 1 week status post left palm and small finger open Dupuytren's fasciectomy    -OT today for Orthoplast splint fabrication, finger motion, and edema control therapy  -Return to clinic in 1 week for suture removal  -Patient should limit the strong  activities and lifting to 3-5 pounds    Miguel Marcus MD, PhD, FACS

## 2025-01-20 ENCOUNTER — ANESTHESIA EVENT (OUTPATIENT)
Dept: SURGERY | Facility: AMBULATORY SURGERY CENTER | Age: 65
End: 2025-01-20
Payer: COMMERCIAL

## 2025-01-21 ENCOUNTER — ANESTHESIA (OUTPATIENT)
Dept: SURGERY | Facility: AMBULATORY SURGERY CENTER | Age: 65
End: 2025-01-21
Payer: COMMERCIAL

## 2025-01-21 ENCOUNTER — HOSPITAL ENCOUNTER (OUTPATIENT)
Facility: AMBULATORY SURGERY CENTER | Age: 65
Discharge: HOME | End: 2025-01-21
Payer: COMMERCIAL

## 2025-01-21 VITALS
HEART RATE: 82 BPM | RESPIRATION RATE: 16 BRPM | HEIGHT: 75 IN | WEIGHT: 198 LBS | TEMPERATURE: 97.7 F | BODY MASS INDEX: 24.62 KG/M2 | SYSTOLIC BLOOD PRESSURE: 118 MMHG | OXYGEN SATURATION: 98 % | DIASTOLIC BLOOD PRESSURE: 70 MMHG

## 2025-01-21 DIAGNOSIS — H25.813 COMBINED FORM OF AGE-RELATED CATARACT, BOTH EYES: Primary | ICD-10-CM

## 2025-01-21 DIAGNOSIS — E03.9 ACQUIRED HYPOTHYROIDISM: Primary | ICD-10-CM

## 2025-01-21 LAB — GLUCOSE BLDC GLUCOMTR-MCNC: 225 MG/DL (ref 70–99)

## 2025-01-21 PROCEDURE — 82962 GLUCOSE BLOOD TEST: CPT | Performed by: PATHOLOGY

## 2025-01-21 DEVICE — LENS CC60WF 19.5 CLAREON UV ASPHERIC BICONVEX IOL: Type: IMPLANTABLE DEVICE | Site: EYE | Status: FUNCTIONAL

## 2025-01-21 RX ORDER — ONDANSETRON 4 MG/1
4 TABLET, ORALLY DISINTEGRATING ORAL EVERY 30 MIN PRN
Status: DISCONTINUED | OUTPATIENT
Start: 2025-01-21 | End: 2025-01-22 | Stop reason: HOSPADM

## 2025-01-21 RX ORDER — FENTANYL CITRATE 50 UG/ML
25 INJECTION, SOLUTION INTRAMUSCULAR; INTRAVENOUS EVERY 5 MIN PRN
Status: DISCONTINUED | OUTPATIENT
Start: 2025-01-21 | End: 2025-01-22 | Stop reason: HOSPADM

## 2025-01-21 RX ORDER — OXYCODONE HYDROCHLORIDE 5 MG/1
10 TABLET ORAL
Status: DISCONTINUED | OUTPATIENT
Start: 2025-01-21 | End: 2025-01-22 | Stop reason: HOSPADM

## 2025-01-21 RX ORDER — SODIUM CHLORIDE, SODIUM LACTATE, POTASSIUM CHLORIDE, CALCIUM CHLORIDE 600; 310; 30; 20 MG/100ML; MG/100ML; MG/100ML; MG/100ML
INJECTION, SOLUTION INTRAVENOUS CONTINUOUS
Status: DISCONTINUED | OUTPATIENT
Start: 2025-01-21 | End: 2025-01-22 | Stop reason: HOSPADM

## 2025-01-21 RX ORDER — DEXAMETHASONE SODIUM PHOSPHATE 10 MG/ML
4 INJECTION, SOLUTION INTRAMUSCULAR; INTRAVENOUS
Status: DISCONTINUED | OUTPATIENT
Start: 2025-01-21 | End: 2025-01-22 | Stop reason: HOSPADM

## 2025-01-21 RX ORDER — FENTANYL CITRATE 50 UG/ML
INJECTION, SOLUTION INTRAMUSCULAR; INTRAVENOUS PRN
Status: DISCONTINUED | OUTPATIENT
Start: 2025-01-21 | End: 2025-01-21

## 2025-01-21 RX ORDER — LIDOCAINE HYDROCHLORIDE 10 MG/ML
INJECTION, SOLUTION EPIDURAL; INFILTRATION; INTRACAUDAL; PERINEURAL PRN
Status: DISCONTINUED | OUTPATIENT
Start: 2025-01-21 | End: 2025-01-21 | Stop reason: HOSPADM

## 2025-01-21 RX ORDER — HYDROMORPHONE HYDROCHLORIDE 1 MG/ML
0.4 INJECTION, SOLUTION INTRAMUSCULAR; INTRAVENOUS; SUBCUTANEOUS EVERY 5 MIN PRN
Status: DISCONTINUED | OUTPATIENT
Start: 2025-01-21 | End: 2025-01-22 | Stop reason: HOSPADM

## 2025-01-21 RX ORDER — ONDANSETRON 2 MG/ML
4 INJECTION INTRAMUSCULAR; INTRAVENOUS EVERY 30 MIN PRN
Status: DISCONTINUED | OUTPATIENT
Start: 2025-01-21 | End: 2025-01-22 | Stop reason: HOSPADM

## 2025-01-21 RX ORDER — HYDROMORPHONE HYDROCHLORIDE 1 MG/ML
0.2 INJECTION, SOLUTION INTRAMUSCULAR; INTRAVENOUS; SUBCUTANEOUS EVERY 5 MIN PRN
Status: DISCONTINUED | OUTPATIENT
Start: 2025-01-21 | End: 2025-01-22 | Stop reason: HOSPADM

## 2025-01-21 RX ORDER — ACETAMINOPHEN 325 MG/1
975 TABLET ORAL ONCE
Status: COMPLETED | OUTPATIENT
Start: 2025-01-21 | End: 2025-01-21

## 2025-01-21 RX ORDER — FENTANYL CITRATE 50 UG/ML
50 INJECTION, SOLUTION INTRAMUSCULAR; INTRAVENOUS EVERY 5 MIN PRN
Status: DISCONTINUED | OUTPATIENT
Start: 2025-01-21 | End: 2025-01-22 | Stop reason: HOSPADM

## 2025-01-21 RX ORDER — OXYCODONE HYDROCHLORIDE 5 MG/1
5 TABLET ORAL
Status: DISCONTINUED | OUTPATIENT
Start: 2025-01-21 | End: 2025-01-22 | Stop reason: HOSPADM

## 2025-01-21 RX ORDER — NALOXONE HYDROCHLORIDE 0.4 MG/ML
0.1 INJECTION, SOLUTION INTRAMUSCULAR; INTRAVENOUS; SUBCUTANEOUS
Status: DISCONTINUED | OUTPATIENT
Start: 2025-01-21 | End: 2025-01-22 | Stop reason: HOSPADM

## 2025-01-21 RX ORDER — MOXIFLOXACIN IN NACL,ISO-OS/PF 0.3MG/0.3
SYRINGE (ML) INTRAOCULAR PRN
Status: DISCONTINUED | OUTPATIENT
Start: 2025-01-21 | End: 2025-01-21 | Stop reason: HOSPADM

## 2025-01-21 RX ORDER — PROPARACAINE HYDROCHLORIDE 5 MG/ML
1 SOLUTION/ DROPS OPHTHALMIC ONCE
Status: COMPLETED | OUTPATIENT
Start: 2025-01-21 | End: 2025-01-21

## 2025-01-21 RX ORDER — TETRACAINE HYDROCHLORIDE 5 MG/ML
SOLUTION OPHTHALMIC PRN
Status: DISCONTINUED | OUTPATIENT
Start: 2025-01-21 | End: 2025-01-21 | Stop reason: HOSPADM

## 2025-01-21 RX ORDER — TRIAMCINOLONE ACETONIDE 40 MG/ML
INJECTION, SUSPENSION INTRA-ARTICULAR; INTRAMUSCULAR PRN
Status: DISCONTINUED | OUTPATIENT
Start: 2025-01-21 | End: 2025-01-21 | Stop reason: HOSPADM

## 2025-01-21 RX ORDER — LIDOCAINE 40 MG/G
CREAM TOPICAL
Status: DISCONTINUED | OUTPATIENT
Start: 2025-01-21 | End: 2025-01-22 | Stop reason: HOSPADM

## 2025-01-21 RX ORDER — CYCLOPENTOLAT/TROPIC/PHENYLEPH 1%-1%-2.5%
1 DROPS (EA) OPHTHALMIC (EYE)
Status: COMPLETED | OUTPATIENT
Start: 2025-01-21 | End: 2025-01-21

## 2025-01-21 RX ORDER — BALANCED SALT SOLUTION 6.4; .75; .48; .3; 3.9; 1.7 MG/ML; MG/ML; MG/ML; MG/ML; MG/ML; MG/ML
SOLUTION OPHTHALMIC PRN
Status: DISCONTINUED | OUTPATIENT
Start: 2025-01-21 | End: 2025-01-21 | Stop reason: HOSPADM

## 2025-01-21 RX ADMIN — Medication 1 DROP: at 10:48

## 2025-01-21 RX ADMIN — Medication 1 DROP: at 10:53

## 2025-01-21 RX ADMIN — FENTANYL CITRATE 50 MCG: 50 INJECTION, SOLUTION INTRAMUSCULAR; INTRAVENOUS at 13:15

## 2025-01-21 RX ADMIN — Medication 1 DROP: at 11:05

## 2025-01-21 RX ADMIN — PROPARACAINE HYDROCHLORIDE 1 DROP: 5 SOLUTION/ DROPS OPHTHALMIC at 10:48

## 2025-01-21 RX ADMIN — ACETAMINOPHEN 975 MG: 325 TABLET ORAL at 10:49

## 2025-01-21 ASSESSMENT — LIFESTYLE VARIABLES: TOBACCO_USE: 0

## 2025-01-21 NOTE — ANESTHESIA PREPROCEDURE EVALUATION
Anesthesia Pre-Procedure Evaluation    Patient: Luigi Moore   MRN: [id number] : 1960        Procedure : Procedure(s):  RIGHT EYE PHACOEMULSIFICATION, CATARACT, WITH INTRAOCULAR LENS IMPLANT          Past Medical History:   Diagnosis Date    Acquired hypothyroidism 2016    Cancer (H) 2015    Depressive disorder     Hidradenoma dx: 2015    right hand/2 surgeries    Hyperlipidemia 2006    Hypertension     Numbness and tingling     right hand, related to surgery for hidradenoma    TELLY (obstructive sleep apnea) 2007    New cpap machine 1 year ago. follows at [facility] [place]    Type 1 diabetes (H) 1982    Uncomplicated asthma       Past Surgical History:   Procedure Laterality Date    BIOPSY  2016    COLONOSCOPY N/A 2024    Procedure: Colonoscopy;  Surgeon: Jose Luis Gonzales MD;  Location: [facility]    EXCISE LESION HAND Right 2015    Procedure: EXCISE LESION HAND;  Surgeon: Jeovany Jefferson MD;  Location: [facility] OR    IMPLANT PROSTHESIS PENIS INFLATABLE N/A 2022    Procedure: INSERTION of INFLATABLE PENILE PROSTHESIS;  Surgeon: Johnathan Cooper MD;  Location: [facility] OR    IR RENAL BIOPSY RIGHT  10/11/2024    ORTHOPEDIC SURGERY Left     achilles tendon     ORTHOPEDIC SURGERY Right     hand surgery    RELEASE DUPUYTRENS CONTRACTURE Left 2025    Procedure: Left palm and small finger open Dupuytren's fasciectomy,  ring finger Dupuytren's fasciectomy;  Surgeon: Miguel Marcus MD;  Location: [facility] OR    SOFT TISSUE SURGERY      lipoma removal, below rib cage on right side      Allergies   Allergen Reactions    Iodinated Contrast Media Anaphylaxis    Contrast Dye Hives    Diatrizoate Hives     Iodine dye; iodine externally or topically is OK      Social History     Tobacco Use    Smoking status: Never    Smokeless tobacco: Never   Substance Use Topics    Alcohol use: Never     Comment: History of alcohol abuse/sober since ; went to treatment       Wt Readings from  Last 1 Encounters:   01/21/25 89.8 kg (198 lb)        Anesthesia Evaluation   Pt has had prior anesthetic. Type: General.    No history of anesthetic complications       ROS/MED HX  ENT/Pulmonary:     (+) sleep apnea, uses CPAP,                   Intermittent, asthma  Treatment: Inhaler prn,              (-) tobacco use   Neurologic:     (+)      migraines,                          Cardiovascular:     (+)  hypertension-range: 120-140s/60-80s/ -   -  - -                                      METS/Exercise Tolerance: >4 METS    Hematologic:  - neg hematologic  ROS  (-) history of blood clots, anemia and history of blood transfusion   Musculoskeletal: Comment: - Hx of Low back pain      GI/Hepatic:       Renal/Genitourinary:       Endo:     (+) type I DM,  Last HgA1c: 7, date: 1/2025, Using insulin, - using insulin pump. Normal glucose range: 207 ave over 14 days (mid 12/2021),  Diabetic complications: retinopathy. thyroid problem, hypothyroidism,           Psychiatric/Substance Use:     (+) psychiatric history depression alcohol abuse (sober since 2015)      Infectious Disease:       Malignancy:       Other:            Physical Exam    Airway        Mallampati: II       Respiratory Devices and Support         Dental       (+) Minor Abnormalities - some fillings, tiny chips      Cardiovascular          Rhythm and rate: regular     Pulmonary                   OUTSIDE LABS:  CBC:   Lab Results   Component Value Date    WBC 6.3 12/17/2024    WBC 6.1 10/11/2024    HGB 14.2 12/17/2024    HGB 13.0 (L) 10/11/2024    HCT 41.0 12/17/2024    HCT 37.9 (L) 10/11/2024     12/17/2024     10/11/2024     BMP:   Lab Results   Component Value Date     (L) 08/14/2024     01/05/2024    POTASSIUM 4.1 08/14/2024    POTASSIUM 3.9 01/05/2024    CHLORIDE 99 08/14/2024    CHLORIDE 102 01/05/2024    CO2 26 08/14/2024    CO2 24 01/05/2024    BUN 14.9 08/14/2024    BUN 7.4 (L) 01/05/2024    CR 0.88 08/14/2024    CR 1.01  "01/05/2024     (H) 01/06/2025     (H) 10/11/2024     COAGS:   Lab Results   Component Value Date    INR 1.03 10/11/2024     POC:   Lab Results   Component Value Date     (H) 05/08/2017     HEPATIC:   Lab Results   Component Value Date    ALBUMIN 4.4 01/05/2024    PROTTOTAL 8.0 01/05/2024    ALT 15 01/05/2024    AST 24 01/05/2024    ALKPHOS 101 01/05/2024    BILITOTAL 0.7 01/05/2024     OTHER:   Lab Results   Component Value Date    A1C 7.0 (H) 01/02/2025    NEVA 9.5 08/14/2024    TSH 4.19 01/02/2025    T4 1.46 12/17/2024       Anesthesia Plan    ASA Status:  3    NPO Status:  NPO Appropriate    Anesthesia Type: MAC.     - Reason for MAC: immobility needed              Consents    Anesthesia Plan(s) and associated risks, benefits, and realistic alternatives discussed. Questions answered and patient/representative(s) expressed understanding.     - Discussed:     - Discussed with:  Patient            Postoperative Care            Comments:               Fidel Graf MD    I have reviewed the pertinent notes and labs in the chart from the past 30 days and (re)examined the patient.  Any updates or changes from those notes are reflected in this note.             # Drug Induced Platelet Defect: home medication list includes an antiplatelet medication   # Hypertension: Noted on problem list          # DMII: A1C = 7.0 % (Ref range: <5.7 %) within past 6 months    # Overweight: Estimated body mass index is 25.08 kg/m  as calculated from the following:    Height as of this encounter: 1.892 m (6' 2.5\").    Weight as of this encounter: 89.8 kg (198 lb).             "

## 2025-01-21 NOTE — ANESTHESIA POSTPROCEDURE EVALUATION
Patient: Luigi MEJIA Senum    Procedure: Procedure(s):  RIGHT EYE PHACOEMULSIFICATION, CATARACT, WITH INTRAOCULAR LENS IMPLANT       Anesthesia Type:  MAC    Note:  Disposition: Outpatient   Postop Pain Control: Uneventful            Sign Out: Well controlled pain   PONV: No   Neuro/Psych: Uneventful            Sign Out: Acceptable/Baseline neuro status   Airway/Respiratory: Uneventful            Sign Out: Acceptable/Baseline resp. status   CV/Hemodynamics: Uneventful            Sign Out: Acceptable CV status; No obvious hypovolemia; No obvious fluid overload   Other NRE: NONE   DID A NON-ROUTINE EVENT OCCUR?            Last vitals:  Vitals Value Taken Time   /70 01/21/25 1358   Temp 36.5  C (97.7  F) 01/21/25 1358   Pulse 82 01/21/25 1358   Resp 16 01/21/25 1358   SpO2 99 % 01/21/25 1359   Vitals shown include unfiled device data.    Electronically Signed By: Fidel Graf MD  January 21, 2025  4:34 PM

## 2025-01-21 NOTE — ANESTHESIA CARE TRANSFER NOTE
Patient: Luigi MEJIA Senum    Procedure: Procedure(s):  RIGHT EYE PHACOEMULSIFICATION, CATARACT, WITH INTRAOCULAR LENS IMPLANT       Diagnosis: Combined form of age-related cataract, both eyes [H25.813]  Diagnosis Additional Information: No value filed.    Anesthesia Type:   MAC     Note:    Oropharynx: oropharynx clear of all foreign objects and spontaneously breathing  Level of Consciousness: awake  Oxygen Supplementation: room air    Independent Airway: airway patency satisfactory and stable  Dentition: dentition unchanged  Vital Signs Stable: post-procedure vital signs reviewed and stable  Report to RN Given: handoff report given  Patient transferred to: Phase II    Handoff Report: Identifed the Patient, Identified the Reponsible Provider, Reviewed the pertinent medical history, Discussed the surgical course, Reviewed Intra-OP anesthesia mangement and issues during anesthesia, Set expectations for post-procedure period and Allowed opportunity for questions and acknowledgement of understanding    See post op vitals  Vitals:  Vitals Value Taken Time   /80 01/21/25 1347   Temp     Pulse 79 01/21/25 1347   Resp     SpO2 98 % 01/21/25 1348   Vitals shown include unfiled device data.    Electronically Signed By: GALILEA Richardson CRNA  January 21, 2025  1:48 PM

## 2025-01-21 NOTE — DISCHARGE INSTRUCTIONS
[facility]  Home Care Following Anesthesia  For 24 hours after surgery:  Get plenty of rest.  A responsible adult must stay with you for at least 24 hours after you leave the surgery center.  Do not drive or use heavy equipment.  If you have weakness or tingling, don't drive or use heavy equipment until this feeling goes away.   Do not drink alcohol.   Avoid strenuous or risky activities.  Ask for help when climbing stairs.  You may feel lightheaded.  IF so, sit for a few minutes before standing.  Have someone help you get up.   If you have nausea (feel sick to your stomach): Drink only clear liquids such as apple juice, ginger ale, broth or 7-Up.  Rest may also help.  Be sure to drink enough fluids.  Move to a regular diet as you feel able.   You may have a slight fever.  Call the doctor if your fever is over 100 F (37.7 C) (taken under the tongue) or lasts longer than 24 hours.  You may have a dry mouth, a sore throat, muscle aches or trouble sleeping. These should go away after 24 hours.  Do not make important or legal decisions.   It is recommended to avoid smoking.               Tips for taking pain medications  To get the best pain relief possible, remember these points:  Take pain medications as directed, before pain becomes severe.  Pain medication can upset your stomach: taking it with food may help.  Constipation is a common side effect of pain medication. Drink plenty of  fluids.  Eat foods high in fiber. Take a stool softener if recommended by your doctor or pharmacist.  Do not drink alcohol, drive or operate machinery while taking pain medications.  Ask about other ways to control pain, such as with heat, ice or relaxation.    Tylenol/Acetaminophen Consumption    If you feel your pain relief is insufficient, you may take Tylenol/Acetaminophen in addition to your narcotic pain medication.   Be careful not to exceed 4,000 mg of Tylenol/Acetaminophen in a 24 hour  period from all sources.  If you are taking extra strength Tylenol/acetaminophen (500 mg), the maximum dose is 8 tablets in 24 hours.  If you are taking regular strength acetaminophen (325 mg), the maximum dose is 12 tablets in 24 hours.    Call a doctor for any of the following:  Signs of infection (fever, growing tenderness at the surgery site, a large amount of drainage or bleeding, severe pain, foul-smelling drainage, redness, swelling).  It has been over 8 to 10 hours since surgery and you are still not able to urinate (pass water).  Headache for over 24 hours.  Numbness, tingling or weakness the day after surgery (if you had spinal anesthesia).  Signs of Covid-19 infection (temperature over 100 degrees, shortness of breath, cough, loss of taste/smell, generalized body aches, persistent headache, chills, sore throat, nausea/vomiting/diarrhea)    Your doctor is:  Dr. Richard Drew, Ophthalmology: [phone]                    After hours and weekends call the hospital @ [phone] and ask for the resident on call for:  Ophthalmology  For emergency care, call the:  [place] [facility]:  [phone] (TTY for hearing impaired: [phone])                Post-Operative Instructions     FIRST 24 HOURS AFTER SURGERY:  You are allowed to Tylenol (acetaminophen) 650mg every four hours as needed for pain unless you have liver disease or are allergic to Tylenol..  Continue taking your regular medications and eye drops in the non-operative eye.  Do not remove the metal or plastic shield unless otherwise instructed by your surgeon.  Do not operate a car, motorcycle, or machinery for 24 hours after surgery.  Call ED immediately if you have SEVERE PAIN unrelieved with Tylenol. And  ask for the resident on call.     MEDICATION INSTRUCTIONS:  -You will not have treatment eye drops prescription as medications were injected into your eye.  -If you feel your eyes are dry and itchy, you can use regular lubricating  drops for one month after the surgery. These eye drops can be found over the counter Brand names example: Systane, Refresh. Please choose preservative free drops. To be used four times a day and as needed for 1 month  -Instilling the eye drops directly into the eye.    -If you had previously any glaucoma eye drops, You can remove the cover and instill the drops as prescribed before and after the procedure      GENERAL INSTRUCTIONS:  Hygiene of the operated eye  Wash your hands thoroughly before caring for the eye.  If the lids are sticky or itchy in the morning, debris, or matter can be gently wiped away with a cotton ball moistened with tap water. DO NOT press on the lids or eyeball.     FIVE MINUTES APART. If ointment is prescribed, it should be applied last.  If you have been taking medications in the non-operated eye, continue as they were prescribed.  If you take medications by mouth for a medical problem, continue as they were prescribed (unless otherwise instructed by physician)    EYE PROTECTION  From the time of surgery until the time of your post-operative day 1 appointment, wear the eye shield at all times. Then, wear it whenever sleeping, for 1 week.  Protective sunglasses may be worn as needed for your comfort, and are suggested for outdoor activities.    ACTIVITIES  Avoid vigorous exertion and heavy lifting for the first week after surgery  You may take a bath or shower, but avoid getting water directly into your eye for one week after surgery, usually by keeping your eyes closed during the bath.  You should discuss driving and traveling with your surgeon. You may ride in a car and fly in an airplane unless otherwise instructed.  Avoid swimming or using a hot tube/sauna/pool/lake for 2 weeks after the surgery.      WHAT TO EXPECT:  Mild irritation and discomfort are normal.    Call the doctor if you experience any of the following:  Severe eye pain  Nausea  Vomiting  Severe headache

## 2025-01-21 NOTE — OP NOTE
Operative  Report    Patient Name: Luigi Moore    MRN: [id number]    Date of Surgery: 1/21/2025    Surgeon: Richard Drew MD    Assistant surgeon: Dr Ahn     Preoperative Diagnosis: Visually significant cataract, right eye    Postoperative Diagnosis: Same    Procedure: Phacoemulsification with intraocular lens implant, right eye    Implant: AIM distance  Implant Name Type Inv. Item Serial No.  Lot No. LRB No. Used Action   LENS [id number] 19.5 [employer] UV ASPHERIC BICONVEX IOL - [id number] Lens/Eye Implant LENS [id number] 19.5 [employer] UV ASPHERIC BICONVEX IOL [id number] [facility]  Right 1 Implanted       Anesthesia Type: MAC    Estimated Blood Loss: Trace    Complications: None    INDICATIONS FOR PROCEDURE: Patient has a history of visually significant cataract affecting the patient's activity of daily living. After a discussion with the patient, the patient has elected to have the listed procedure(s) to maximize visual potential. All of the appropriate consent forms have been signed and all of the patient's questions have been answered.    DETAILS OF THE PROCEDURE: Patient was identified in the pre operative area and given pre operative eye drops. The patient was then brought into the operating room and intravenous sedation was begun after a time out was performed. The patient was prepped and draped in the usual sterile ophthalmic fashion.     A lid speculum was placed and the operating microscope was brought into position. A paracentesis was made and viscoelastic was injected into the anterior chamber. A clear corneal incision was made using a keratome blade. A cystotome needle and Utrata forceps were used to create an anterior continuous curvilinear capsulorhexis. Then BSS on a blunt tipped cannula was used for hydrodissection and hydrodelineation. Using the phacoemulsification unit, the nucleus was then removed from the eye. Using irrigation and  aspiration, the remaining cortical material was removed from the eye. The capsular bag was infused with viscoelastic material. The intraocular lens was then placed into the capsular bag and secured into position. The remaining viscoelastic material was then removed from the eye via irrigation and aspiration. Miochol was injected to contrict the pupil. BSS on a blunt tipped cannula was used to hydrate all of the wounds. At the end of the case, the wounds were noted to be watertight, the pupil was noted to be round, the lens appeared to be in good position, and the pressure was palpated to be physiologic. Intracameral moxifloxacin and A subconjunctival injection of Kenalog was administered.     Post operative ointment was applied and the eye was patched and shielded. Patient tolerated procedure and was brought to the recover area in good condition. Patient will follow in the eye clinic in one day.

## 2025-01-22 ENCOUNTER — OFFICE VISIT (OUTPATIENT)
Dept: OPHTHALMOLOGY | Facility: CLINIC | Age: 65
End: 2025-01-22
Payer: COMMERCIAL

## 2025-01-22 DIAGNOSIS — H25.813 COMBINED FORM OF AGE-RELATED CATARACT, BOTH EYES: Primary | ICD-10-CM

## 2025-01-22 PROCEDURE — 99213 OFFICE O/P EST LOW 20 MIN: CPT

## 2025-01-22 ASSESSMENT — CONF VISUAL FIELD
OS_INFERIOR_TEMPORAL_RESTRICTION: 0
OD_NORMAL: 1
OS_NORMAL: 1
OS_INFERIOR_NASAL_RESTRICTION: 0
OD_SUPERIOR_TEMPORAL_RESTRICTION: 0
OD_SUPERIOR_NASAL_RESTRICTION: 0
OD_INFERIOR_NASAL_RESTRICTION: 0
OS_SUPERIOR_NASAL_RESTRICTION: 0
OS_SUPERIOR_TEMPORAL_RESTRICTION: 0
OD_INFERIOR_TEMPORAL_RESTRICTION: 0

## 2025-01-22 ASSESSMENT — TONOMETRY
OD_IOP_MMHG: 20
IOP_METHOD: TONOPEN
OS_IOP_MMHG: 15

## 2025-01-22 ASSESSMENT — EXTERNAL EXAM - RIGHT EYE: OD_EXAM: NORMAL

## 2025-01-22 ASSESSMENT — SLIT LAMP EXAM - LIDS
COMMENTS: NORMAL
COMMENTS: NORMAL

## 2025-01-22 ASSESSMENT — VISUAL ACUITY
METHOD: SNELLEN - LINEAR
OS_SC+: -2
OS_SC: 20/25
OD_SC: 20/25
OD_SC+: +1
OS_PH_SC: 20/20
OD_PH_SC: 20/20

## 2025-01-22 ASSESSMENT — EXTERNAL EXAM - LEFT EYE: OS_EXAM: NORMAL

## 2025-01-22 NOTE — NURSING NOTE
"Chief Complaints and History of Present Illnesses   Patient presents with    Post Op (Ophthalmology) Right Eye     POD1 for right eye IOL     Chief Complaint(s) and History of Present Illness(es)       Post Op (Ophthalmology) Right Eye              Laterality: right eye    Associated symptoms: foreign body sensation.  Negative for eye pain, flashes and floaters    Pain scale: 0/10    Comments: POD1 for right eye IOL              Comments    Patient reports \"haze\" right eye but acuity seems to be better today.  No pain/flashes/floaters.   Ocular Meds: MILLIE right eye PRN   DM1.   this am.  Lab Results       Component                Value               Date                       A1C                      7.0                 01/02/2025                 A1C                      7.1                 12/17/2024                 A1C                      9.2                 08/14/2024                 A1C                      6.9                 12/19/2023                 A1C                      7.5                 12/21/2021                 A1C                      7.2                 07/23/2021                 A1C                      7.6                 04/10/2018                 A1C                      8.7                 12/07/2016                 A1C                      8.1                 02/17/2015                 A1C                      10.0                01/17/2013            Franchesca Sparrow OA 10:16 AM January 22, 2025                    "

## 2025-01-22 NOTE — PROGRESS NOTES
Po day 1:  S/p phaco +IOL OD   Operation went well.   Patient does not have pain  [facility] 20/25 OD  IOP: 20 by tonopen   Anterior segment exam:  - cornea clear, no leakage   - Ac 1+ cell  - PcIOL well centered   Follow-up in 1 week or po check.  No eye drops needed  Precautions given    Richard Drew MD      Ophthalmology Department  [facility]     Attending Physician Attestation:  Complete documentation of historical and exam elements from today's encounter can be found in the full encounter summary report (not reduplicated in this progress note).  I personally obtained the chief complaint(s) and history of present illness.  I confirmed and edited as necessary the review of systems, past medical/surgical history, family history, social history, and examination findings as documented by others; and I examined the patient myself.  I personally reviewed the relevant tests, images, and reports as documented above.  I formulated and edited as necessary the assessment and plan and discussed the findings and management plan with the patient and family. Richard Drew MD

## 2025-01-24 ENCOUNTER — OFFICE VISIT (OUTPATIENT)
Dept: ORTHOPEDICS | Facility: CLINIC | Age: 65
End: 2025-01-24
Payer: COMMERCIAL

## 2025-01-24 DIAGNOSIS — M72.0 DUPUYTREN CONTRACTURE: Primary | ICD-10-CM

## 2025-01-24 PROCEDURE — 99024 POSTOP FOLLOW-UP VISIT: CPT | Performed by: STUDENT IN AN ORGANIZED HEALTH CARE EDUCATION/TRAINING PROGRAM

## 2025-01-24 NOTE — NURSING NOTE
Reason For Visit:   Chief Complaint   Patient presents with    Surgical Followup     Follow-up Left palm and small finger open Dupuytren's fasciectomy,  ring finger Dupuytren's fasciectomy   DOS: 1/6/25       Primary MD: Kathya Lang  Ref. MD: Lena    Age: 64 year old    ?  No      There were no vitals taken for this visit.      Pain Assessment  Patient Currently in Pain: Denies    Hand Dominance Evaluation  Hand Dominance: Right          QuickDASH Assessment      1/13/2025     6:00 PM   QuickDASH Main   1. Open a tight or new jar Unable to answer   2. Do heavy household chores (e.g., wash walls, floors) Unable to answer   3. Carry a shopping bag or briefcase Unable to answer   4. Wash your back Unable to answer   5. Use a knife to cut food Unable to answer   6. Recreational activities in which you take some force or impact through your arm, shoulder or hand (e.g., golf, hammering, tennis, etc.) Unable to answer   7. During the past week, to what extent has your arm, shoulder or hand problem interfered with your normal social activities with family, friends, neighbours or groups Not at all   8. During the past week, were you limited in your work or other regular daily activities as a result of your arm, shoulder or hand problem Very limited   9. Arm, shoulder or hand pain None   10.Tingling (pins and needles) in your arm,shoulder or hand None   11. During the past week, how much difficulty have you had sleeping because of the pain in your arm, shoulder or hand No difficulty   Quickdash Ability Score -6.82          Current Outpatient Medications   Medication Sig Dispense Refill    albuterol (PROAIR HFA/PROVENTIL HFA/VENTOLIN HFA) 108 (90 Base) MCG/ACT inhaler Inhale 2 puffs into the lungs every 6 hours as needed for shortness of breath or wheezing 18 g 5    amLODIPine (NORVASC) 10 MG tablet Take 1 tablet (10 mg) by mouth daily 90 tablet 3    amphetamine-dextroamphetamine (ADDERALL) 15 MG tablet  "Take 1 tablet (15 mg) by mouth 2 times daily. 60 tablet 0    amphetamine-dextroamphetamine (ADDERALL) 15 MG tablet Take 1 tablet (15 mg) by mouth 2 times daily 60 tablet 0    amphetamine-dextroamphetamine (ADDERALL) 15 MG tablet Take 1 tablet (15 mg) by mouth 2 times daily 60 tablet 0    ARIPiprazole (ABILIFY) 15 MG tablet Take 1 tablet (15 mg) by mouth daily 90 tablet 1    aspirin 81 MG tablet Take 1 tablet (81 mg) by mouth daily 100 tablet 3    atorvastatin (LIPITOR) 40 MG tablet TAKE 1 TABLET BY MOUTH EVERY DAY 90 tablet 3    blood glucose (ACCU-CHEK GUIDE) test strip Use to test blood sugar 3 times daily or as directed. 100 strip 11    buPROPion (WELLBUTRIN XL) 150 MG 24 hr tablet Take 1 tablet (150 mg) by mouth every morning Take with 300 mg tablet (total daily dose 450 mg) 60 tablet 5    buPROPion (WELLBUTRIN XL) 300 MG 24 hr tablet Take 1 tablet (300 mg) by mouth every morning Take with 150 mg tablet (total daily dose 450 mg) 60 tablet 5    cephALEXin (KEFLEX) 500 MG capsule Take 1 capsule (500 mg) by mouth 4 times daily. 12 capsule 0    cetirizine (ZYRTEC) 10 MG tablet Take 10 mg by mouth      Continuous Glucose Sensor (GUARDIAN 4 GLUCOSE SENSOR) MISC 1 each See Admin Instructions. Use per 's instructions. Change every 7 days. 12 each 4    Continuous Glucose Transmitter (GUARDIAN 4 TRANSMITTER) MISC 1 each See Admin Instructions. Use per 's instructions to monitor glucose. 1 each 3    DiphenhydrAMINE HCl (BENADRYL PO)       FLUoxetine (PROZAC) 20 MG capsule TAKE 1 CAPSULE BY MOUTH EVERY DAY 30 capsule 0    Glucagon (BAQSIMI) 3 MG/DOSE nasal powder Spray 1 spray (3 mg) in nostril as needed (severe hypoglycemia) in the event of unconscious hypoglycemia or hypoglycemic seizure. May repeat dose if no response after 15 minutes. 1 each 1    glucagon 1 MG kit Inject 1 mg into the muscle once for 1 dose 1 each 0    infusion set (AGNES 32\" 9MM) misc pump supply Infusion set to be used with pump. " " Change every 2-3 days as directed. 3 Box 4    insulin glargine (LANTUS SOLOSTAR) 100 UNIT/ML pen INJECT 20 UNITS Daily  SUBCUTANEOUS ONLY IF INSULIN PUMP FAILS. 15 mL 1    Insulin Infusion Pump (INSULIN PUMP [id number]) KIT       Insulin Infusion Pump Supplies (EXTENDED INFUSION SET 23\"/9MM) MISC 1 each See Admin Instructions. Change every 2-3 days for use in insulin pump. 40 each 3    Insulin Infusion Pump Supplies (EXTENDED RESERVOIR 3ML) MISC 1 each See Admin Instructions. Change every 2-3 days for use in insulin pump. 40 each 3    Insulin Infusion Pump Supplies (MINIMED PUMP RESERVOIR 3ML) MISC 1 each See Admin Instructions. 2 each 3    insulin lispro (HUMALOG) 100 UNIT/ML vial VIA PUMP APPROX 80 UNITS DAILY. 80 mL 3    insulin pen needle (32G X 6 MM) 32G X 6 MM miscellaneous Use 4 pen needles daily or as directed as back up. 100 each 3    insulin syringe-needle U-100 (BD INSULIN SYRINGE) 29G X 1/2\" 1 ML miscellaneous Use as directed 20 each 1    ketoconazole (NIZORAL) 2 % external cream Apply topically 2 times daily as needed (redness and flaking). Apply to the face. 60 g 3    KETOSTIX test strip Use as directed in case of high glucose, vomiting or illness. 50 strip 11    levothyroxine (SYNTHROID/LEVOTHROID) 175 MCG tablet Take 1 tablet (175 mcg) by mouth daily. Please take daily on an empty stomach and wait 30 minutes for any other medications food or drink.  Please recheck TSH level again in 12 weeks. 90 tablet 1    losartan-hydrochlorothiazide (HYZAAR) 50-12.5 MG tablet Take 1 tablet by mouth daily 90 tablet 3    ondansetron (ZOFRAN) 4 MG tablet Take 1 tablet (4 mg) by mouth every 6 hours as needed for nausea. 12 tablet 0    order for DME Equipment being ordered: CPAP machine 1 each 0    Semaglutide, 2 MG/DOSE, (OZEMPIC, 2 MG/DOSE,) 8 MG/3ML pen Inject 2 mg subcutaneously every 7 days. 9 mL 3    topiramate (TOPAMAX) 50 MG tablet Take 1 tablet (50 mg) by mouth daily 90 tablet 3    Urea 40 % CREA Externally apply " topically 2 times daily To surface of toenails 198 g 5       Allergies   Allergen Reactions    Iodinated Contrast Media Anaphylaxis    Contrast Dye Hives    Diatrizoate Hives     Iodine dye; iodine externally or topically is OK       Angela Melton, ATC

## 2025-01-25 NOTE — PROGRESS NOTES
Ortho Hand    Doing well.  No issues.  Proceeding with ongoing therapy.    On exam, left palm, ring and small finger incisions are intact with no wounds or signs of infection.  Left ring and small finger radial and ulnar tip sensation is normal.  Can make a near full active left composite fist.    Path: Superficial palmar fibromatosis    A/P: 64-year-old male 2 weeks status post left palm ring and small finger Dupuytren's open fasciectomy    -Sutures removed today  -Steri-Strips placed  -Continue OT for edema control, finger motion and start of scar treatment at approximately 3 weeks postoperatively with gentle circular massage  -Continue limiting strong  activities to 10-15 pounds for an additional 3-4 weeks  -Return to clinic in 4 weeks for reevaluation    Miguel Marcus MD, PhD, FACS

## 2025-01-30 ENCOUNTER — OFFICE VISIT (OUTPATIENT)
Dept: OPHTHALMOLOGY | Facility: CLINIC | Age: 65
End: 2025-01-30
Payer: COMMERCIAL

## 2025-01-30 DIAGNOSIS — Z48.89 POSTOPERATIVE VISIT: Primary | ICD-10-CM

## 2025-01-30 PROCEDURE — 99211 OFF/OP EST MAY X REQ PHY/QHP: CPT

## 2025-01-30 RX ORDER — NEOMYCIN POLYMYXIN B SULFATES AND DEXAMETHASONE 3.5; 10000; 1 MG/ML; [USP'U]/ML; MG/ML
SUSPENSION/ DROPS OPHTHALMIC
Qty: 5 ML | Refills: 1 | Status: SHIPPED | OUTPATIENT
Start: 2025-01-30

## 2025-01-30 ASSESSMENT — CONF VISUAL FIELD
OD_INFERIOR_TEMPORAL_RESTRICTION: 0
OD_INFERIOR_NASAL_RESTRICTION: 0
OS_SUPERIOR_NASAL_RESTRICTION: 0
OD_SUPERIOR_NASAL_RESTRICTION: 0
METHOD: COUNTING FINGERS
OD_NORMAL: 1
OS_NORMAL: 1
OS_SUPERIOR_TEMPORAL_RESTRICTION: 0
OS_INFERIOR_TEMPORAL_RESTRICTION: 0
OS_INFERIOR_NASAL_RESTRICTION: 0
OD_SUPERIOR_TEMPORAL_RESTRICTION: 0

## 2025-01-30 ASSESSMENT — TONOMETRY
OS_IOP_MMHG: 17
IOP_METHOD: TONOPEN
OD_IOP_MMHG: 18

## 2025-01-30 ASSESSMENT — VISUAL ACUITY
OD_SC: 20/30
METHOD: SNELLEN - LINEAR
OS_SC: 20/20
OD_PH_SC: 20/25
OD_PH_SC+: -3

## 2025-01-30 ASSESSMENT — SLIT LAMP EXAM - LIDS
COMMENTS: NORMAL
COMMENTS: NORMAL

## 2025-01-30 ASSESSMENT — EXTERNAL EXAM - RIGHT EYE: OD_EXAM: NORMAL

## 2025-01-30 ASSESSMENT — EXTERNAL EXAM - LEFT EYE: OS_EXAM: NORMAL

## 2025-01-30 NOTE — PROGRESS NOTES
Po week 1:  S/p phaco +IOL OD     Injected conj, no pain, mild irritation   Will start maxitrol TID OD for 1 week then BID OD for 1 week   Follow-up in 2 weeks     Richard Drew MD      Ophthalmology Department  [facility]     Attending Physician Attestation:  Complete documentation of historical and exam elements from today's encounter can be found in the full encounter summary report (not reduplicated in this progress note).  I personally obtained the chief complaint(s) and history of present illness.  I confirmed and edited as necessary the review of systems, past medical/surgical history, family history, social history, and examination findings as documented by others; and I examined the patient myself.  I personally reviewed the relevant tests, images, and reports as documented above.  I formulated and edited as necessary the assessment and plan and discussed the findings and management plan with the patient and family. Richard Drew MD

## 2025-02-02 DIAGNOSIS — F33.0 MILD EPISODE OF RECURRENT MAJOR DEPRESSIVE DISORDER: ICD-10-CM

## 2025-02-06 NOTE — TELEPHONE ENCOUNTER
Last Written Prescription:  FLUoxetine (PROZAC) 20 MG capsule  20 mg, DAILY           Summary: TAKE 1 CAPSULE BY MOUTH EVERY DAY, Disp-30 capsule, R-0, E-Prescribe  Dose, Route, Frequency: 20 mg, Oral, DAILYStart: 12/29/2024Ord/Sold: 12/29/2024 (O)Ordered On: 12/29/2024Pharmacy: [id number] - [place], [place] - [address]ReportDx Associated: Taking: Long-term: Med Note:              Patient Sig: TAKE 1 CAPSULE BY MOUTH EVERY DAY  Ordering Department: [facility]  Authorized By: Kathya Lang APRN CNP  Dispense: 30 capsule  Refills: 0 ordered       ----------------------  Last Visit Date: 9/11/24  Future Visit Date: 3/12/25  ----------------------  Refilled per protocol    Karine WILKERSON RN  [facility]/Red Flag Triage & Med Refill Team

## 2025-02-09 DIAGNOSIS — E10.8 TYPE 1 DIABETES MELLITUS WITH COMPLICATIONS (H): ICD-10-CM

## 2025-02-13 ENCOUNTER — OFFICE VISIT (OUTPATIENT)
Dept: OPHTHALMOLOGY | Facility: CLINIC | Age: 65
End: 2025-02-13
Payer: COMMERCIAL

## 2025-02-13 DIAGNOSIS — E10.3293 MILD NONPROLIFERATIVE DIABETIC RETINOPATHY OF BOTH EYES WITHOUT MACULAR EDEMA ASSOCIATED WITH TYPE 1 DIABETES MELLITUS (H): Primary | ICD-10-CM

## 2025-02-13 DIAGNOSIS — H35.351 CYSTOID MACULAR EDEMA OF RIGHT EYE: ICD-10-CM

## 2025-02-13 PROCEDURE — 99213 OFFICE O/P EST LOW 20 MIN: CPT

## 2025-02-13 PROCEDURE — 92134 CPTRZ OPH DX IMG PST SGM RTA: CPT

## 2025-02-13 RX ORDER — LEVOTHYROXINE SODIUM 175 UG/1
175 TABLET ORAL DAILY
Qty: 90 TABLET | Refills: 3 | Status: SHIPPED | OUTPATIENT
Start: 2025-02-13

## 2025-02-13 RX ORDER — PREDNISOLONE ACETATE 10 MG/ML
1-2 SUSPENSION/ DROPS OPHTHALMIC 4 TIMES DAILY
Qty: 10 ML | Refills: 2 | Status: SHIPPED | OUTPATIENT
Start: 2025-02-13

## 2025-02-13 RX ORDER — KETOROLAC TROMETHAMINE 5 MG/ML
1 SOLUTION OPHTHALMIC 4 TIMES DAILY
Qty: 5 ML | Refills: 2 | Status: SHIPPED | OUTPATIENT
Start: 2025-02-13

## 2025-02-13 RX ORDER — LEVOTHYROXINE SODIUM 175 UG/1
TABLET ORAL
Qty: 90 TABLET | Refills: 1 | OUTPATIENT
Start: 2025-02-13

## 2025-02-13 ASSESSMENT — REFRACTION_WEARINGRX
OS_AXIS: 167
OS_SPHERE: -1.00
OD_AXIS: 155
OS_CYLINDER: +1.00
OS_ADD: +1.75
OD_SPHERE: -1.50
SPECS_TYPE: PAL
OD_ADD: +1.75
OD_CYLINDER: +1.50

## 2025-02-13 ASSESSMENT — CONF VISUAL FIELD
OD_INFERIOR_TEMPORAL_RESTRICTION: 0
OD_INFERIOR_NASAL_RESTRICTION: 0
METHOD: COUNTING FINGERS
OD_SUPERIOR_TEMPORAL_RESTRICTION: 0
OS_SUPERIOR_NASAL_RESTRICTION: 0
OD_SUPERIOR_NASAL_RESTRICTION: 0
OS_NORMAL: 1
OS_INFERIOR_TEMPORAL_RESTRICTION: 0
OD_NORMAL: 1
OS_INFERIOR_NASAL_RESTRICTION: 0
OS_SUPERIOR_TEMPORAL_RESTRICTION: 0

## 2025-02-13 ASSESSMENT — VISUAL ACUITY
OS_SC: 20/25
OD_SC: 20/50
OD_SC+: -1
METHOD: SNELLEN - LINEAR

## 2025-02-13 ASSESSMENT — EXTERNAL EXAM - LEFT EYE: OS_EXAM: NORMAL

## 2025-02-13 ASSESSMENT — REFRACTION_MANIFEST
OD_AXIS: 125
OD_CYLINDER: +0.75
OD_SPHERE: -0.50
OD_ADD: +2.50

## 2025-02-13 ASSESSMENT — EXTERNAL EXAM - RIGHT EYE: OD_EXAM: NORMAL

## 2025-02-13 ASSESSMENT — SLIT LAMP EXAM - LIDS
COMMENTS: NORMAL
COMMENTS: NORMAL

## 2025-02-13 ASSESSMENT — TONOMETRY
IOP_METHOD: TONOPEN
OS_IOP_MMHG: 19
OD_IOP_MMHG: 18

## 2025-02-13 NOTE — TELEPHONE ENCOUNTER
DUPLICATE  levothyroxine (SYNTHROID/LEVOTHROID) 175 MCG tablet   90 tablet 1 10/15/2024 -- No  Sig - Route: Take 1 tablet (175 mcg) by mouth daily. Please take daily on an empty stomach and wait 30 minutes for any other medications food or drink.  Please recheck TSH level again in 12 weeks. - Oral      FYI to provider A Vincent, TSH done 1-2-25 order/reviewed by PCP

## 2025-02-13 NOTE — PROGRESS NOTES
Po visit:  S/p phaco +IOL OD (1/21/25 Nain Drew)  Start predforte and ketorolac QID OD   Follow-up in 2 weeks     Richard Drew MD      Ophthalmology Department  [facility]     Attending Physician Attestation:  Complete documentation of historical and exam elements from today's encounter can be found in the full encounter summary report (not reduplicated in this progress note).  I personally obtained the chief complaint(s) and history of present illness.  I confirmed and edited as necessary the review of systems, past medical/surgical history, family history, social history, and examination findings as documented by others; and I examined the patient myself.  I personally reviewed the relevant tests, images, and reports as documented above.  I formulated and edited as necessary the assessment and plan and discussed the findings and management plan with the patient and family. Richard Drew MD

## 2025-02-13 NOTE — NURSING NOTE
Chief Complaints and History of Present Illnesses   Patient presents with    Post Op (Ophthalmology) Right Eye     POW#3 s/p CE/IOL RE 1/21/2025     Chief Complaint(s) and History of Present Illness(es)       Post Op (Ophthalmology) Right Eye              Comments: POW#3 s/p CE/IOL RE 1/21/2025              Comments    Pt states vision is the same as last week. No eye pain today. No new flashes. Occasional floaters in RE, no changes.  DM1 BS:129 currently per pt.  Lab Results       Component                Value               Date                       A1C                      7.0                 01/02/2025                 A1C                      7.1                 12/17/2024                 A1C                      9.2                 08/14/2024                 A1C                      6.9                 12/19/2023                 A1C                      7.5                 12/21/2021                 A1C                      7.2                 07/23/2021                 A1C                      7.6                 04/10/2018                 A1C                      8.7                 12/07/2016                 A1C                      8.1                 02/17/2015                 A1C                      10.0                01/17/2013              CANDACE See February 13, 2025 7:22 AM

## 2025-02-16 DIAGNOSIS — F33.0 MILD EPISODE OF RECURRENT MAJOR DEPRESSIVE DISORDER: ICD-10-CM

## 2025-02-18 ENCOUNTER — OFFICE VISIT (OUTPATIENT)
Dept: ORTHOPEDICS | Facility: CLINIC | Age: 65
End: 2025-02-18
Payer: COMMERCIAL

## 2025-02-18 DIAGNOSIS — M72.0 DUPUYTREN CONTRACTURE: Primary | ICD-10-CM

## 2025-02-18 PROCEDURE — 99212 OFFICE O/P EST SF 10 MIN: CPT | Mod: 24 | Performed by: STUDENT IN AN ORGANIZED HEALTH CARE EDUCATION/TRAINING PROGRAM

## 2025-02-18 NOTE — NURSING NOTE
Reason For Visit:   Chief Complaint   Patient presents with    Surgical Followup     Post op Left palm and small finger open Dupuytren's fasciectomy,  ring finger Dupuytren's fasciectomy - Left. DOS: 1/6/25       Primary MD: Kathya Lang  Ref. MD: Lena    Age: 64 year old    ?  No      There were no vitals taken for this visit.      Pain Assessment  Patient Currently in Pain: No    Hand Dominance Evaluation  Hand Dominance: Right          QuickDASH Assessment      1/13/2025     6:00 PM   QuickDASH Main   1. Open a tight or new jar Unable to answer   2. Do heavy household chores (e.g., wash walls, floors) Unable to answer   3. Carry a shopping bag or briefcase Unable to answer   4. Wash your back Unable to answer   5. Use a knife to cut food Unable to answer   6. Recreational activities in which you take some force or impact through your arm, shoulder or hand (e.g., golf, hammering, tennis, etc.) Unable to answer   7. During the past week, to what extent has your arm, shoulder or hand problem interfered with your normal social activities with family, friends, neighbours or groups Not at all   8. During the past week, were you limited in your work or other regular daily activities as a result of your arm, shoulder or hand problem Very limited   9. Arm, shoulder or hand pain None   10.Tingling (pins and needles) in your arm,shoulder or hand None   11. During the past week, how much difficulty have you had sleeping because of the pain in your arm, shoulder or hand No difficulty   Quickdash Ability Score -6.82          Current Outpatient Medications   Medication Sig Dispense Refill    albuterol (PROAIR HFA/PROVENTIL HFA/VENTOLIN HFA) 108 (90 Base) MCG/ACT inhaler Inhale 2 puffs into the lungs every 6 hours as needed for shortness of breath or wheezing 18 g 5    amLODIPine (NORVASC) 10 MG tablet Take 1 tablet (10 mg) by mouth daily 90 tablet 3    amphetamine-dextroamphetamine (ADDERALL) 15 MG tablet  "Take 1 tablet (15 mg) by mouth 2 times daily. 60 tablet 0    amphetamine-dextroamphetamine (ADDERALL) 15 MG tablet Take 1 tablet (15 mg) by mouth 2 times daily 60 tablet 0    amphetamine-dextroamphetamine (ADDERALL) 15 MG tablet Take 1 tablet (15 mg) by mouth 2 times daily 60 tablet 0    ARIPiprazole (ABILIFY) 15 MG tablet Take 1 tablet (15 mg) by mouth daily 90 tablet 1    aspirin 81 MG tablet Take 1 tablet (81 mg) by mouth daily 100 tablet 3    atorvastatin (LIPITOR) 40 MG tablet TAKE 1 TABLET BY MOUTH EVERY DAY 90 tablet 3    blood glucose (ACCU-CHEK GUIDE) test strip Use to test blood sugar 3 times daily or as directed. 100 strip 11    buPROPion (WELLBUTRIN XL) 150 MG 24 hr tablet Take 1 tablet (150 mg) by mouth every morning Take with 300 mg tablet (total daily dose 450 mg) 60 tablet 5    buPROPion (WELLBUTRIN XL) 300 MG 24 hr tablet Take 1 tablet (300 mg) by mouth every morning Take with 150 mg tablet (total daily dose 450 mg) 60 tablet 5    cephALEXin (KEFLEX) 500 MG capsule Take 1 capsule (500 mg) by mouth 4 times daily. 12 capsule 0    cetirizine (ZYRTEC) 10 MG tablet Take 10 mg by mouth      Continuous Glucose Sensor (GUARDIAN 4 GLUCOSE SENSOR) MISC 1 each See Admin Instructions. Use per 's instructions. Change every 7 days. 12 each 4    Continuous Glucose Transmitter (GUARDIAN 4 TRANSMITTER) MISC 1 each See Admin Instructions. Use per 's instructions to monitor glucose. 1 each 3    DiphenhydrAMINE HCl (BENADRYL PO)       FLUoxetine (PROZAC) 20 MG capsule TAKE 1 CAPSULE BY MOUTH EVERY DAY 90 capsule 0    Glucagon (BAQSIMI) 3 MG/DOSE nasal powder Spray 1 spray (3 mg) in nostril as needed (severe hypoglycemia) in the event of unconscious hypoglycemia or hypoglycemic seizure. May repeat dose if no response after 15 minutes. 1 each 1    glucagon 1 MG kit Inject 1 mg into the muscle once for 1 dose 1 each 0    infusion set (AGNES 32\" 9MM) misc pump supply Infusion set to be used with pump. " " Change every 2-3 days as directed. 3 Box 4    insulin glargine (LANTUS SOLOSTAR) 100 UNIT/ML pen INJECT 20 UNITS Daily  SUBCUTANEOUS ONLY IF INSULIN PUMP FAILS. 15 mL 1    Insulin Infusion Pump (INSULIN PUMP [id number]) KIT       Insulin Infusion Pump Supplies (EXTENDED INFUSION SET 23\"/9MM) MISC 1 each See Admin Instructions. Change every 2-3 days for use in insulin pump. 40 each 3    Insulin Infusion Pump Supplies (EXTENDED RESERVOIR 3ML) MISC 1 each See Admin Instructions. Change every 2-3 days for use in insulin pump. 40 each 3    Insulin Infusion Pump Supplies (MINIMED PUMP RESERVOIR 3ML) MISC 1 each See Admin Instructions. 2 each 3    insulin lispro (HUMALOG) 100 UNIT/ML vial VIA PUMP APPROX 80 UNITS DAILY. 80 mL 3    insulin pen needle (32G X 6 MM) 32G X 6 MM miscellaneous Use 4 pen needles daily or as directed as back up. 100 each 3    insulin syringe-needle U-100 (BD INSULIN SYRINGE) 29G X 1/2\" 1 ML miscellaneous Use as directed 20 each 1    ketoconazole (NIZORAL) 2 % external cream Apply topically 2 times daily as needed (redness and flaking). Apply to the face. 60 g 3    ketorolac (ACULAR) 0.5 % ophthalmic solution Place 1 drop into the right eye 4 times daily. 5 mL 2    KETOSTIX test strip Use as directed in case of high glucose, vomiting or illness. 50 strip 11    levothyroxine (SYNTHROID/LEVOTHROID) 175 MCG tablet Take 1 tablet (175 mcg) by mouth daily. Please take daily on an empty stomach and wait 30 minutes for any other medications food or drink.  Please recheck TSH level again in 12 months. 90 tablet 3    losartan-hydrochlorothiazide (HYZAAR) 50-12.5 MG tablet Take 1 tablet by mouth daily 90 tablet 3    neomycin-polymixin-dexAMETHasone (MAXITROL) 0.1 % ophthalmic suspension 1 drop TID OD for 1 week then BID OD for 1 week Follow-up in 2 weeks 5 mL 1    ondansetron (ZOFRAN) 4 MG tablet Take 1 tablet (4 mg) by mouth every 6 hours as needed for nausea. 12 tablet 0    order for DME Equipment being " ordered: CPAP machine 1 each 0    prednisoLONE acetate (PRED FORTE) 1 % ophthalmic suspension Place 1-2 drops into the right eye 4 times daily. 10 mL 2    Semaglutide, 2 MG/DOSE, (OZEMPIC, 2 MG/DOSE,) 8 MG/3ML pen Inject 2 mg subcutaneously every 7 days. 9 mL 3    topiramate (TOPAMAX) 50 MG tablet Take 1 tablet (50 mg) by mouth daily 90 tablet 3    Urea 40 % CREA Externally apply topically 2 times daily To surface of toenails 198 g 5       Allergies   Allergen Reactions    Iodinated Contrast Media Anaphylaxis    Contrast Dye Hives    Diatrizoate Hives     Iodine dye; iodine externally or topically is OK       Ruba Robin, ATC

## 2025-02-18 NOTE — LETTER
2/18/2025      Luigi Moore  [address]  [address]  [place] [address]      Dear Colleague,    Thank you for referring your patient, Luigi Moore, to the [facility] [place]. Please see a copy of my visit note below.    Ortho Hand    Doing well.  No issues.    On exam, well remodeling left hand scars with no delayed healing.  Can make a full left active composite fist tightly.  Left small and ring finger radial and ulnar tip sensation is normal.  Right ring finger Dupuytren's metacarpophalangeal joint flexion contracture of 30-35 degrees.    A/P: 64-year-old male 6 weeks status post left palm small and ring finger open Dupuytren's fasciectomy, doing well    -Can return to activities with no restrictions  -Continue scar treatment with gentle circular massage using bio oil or silicone based ointment  -As for the right hand, patient is starting his tennis season and may have time to recover in November.  Since the right hand contracture is not very problematic for him, he would prefer to wait until the fall to address this surgically.  This is very reasonable.  We will plan to have him return to clinic at the end of the summer to reevaluate and decide on surgical planning for the fall.    Miguel Marcus MD, PhD, FACS      Again, thank you for allowing me to participate in the care of your patient.        Sincerely,        Miguel Marcus MD    Electronically signed

## 2025-02-18 NOTE — PROGRESS NOTES
Ortho Hand    Doing well.  No issues.    On exam, well remodeling left hand scars with no delayed healing.  Can make a full left active composite fist tightly.  Left small and ring finger radial and ulnar tip sensation is normal.  Right ring finger Dupuytren's metacarpophalangeal joint flexion contracture of 30-35 degrees.    A/P: 64-year-old male 6 weeks status post left palm small and ring finger open Dupuytren's fasciectomy, doing well    -Can return to activities with no restrictions  -Continue scar treatment with gentle circular massage using bio oil or silicone based ointment  -As for the right hand, patient is starting his tennis season and may have time to recover in November.  Since the right hand contracture is not very problematic for him, he would prefer to wait until the fall to address this surgically.  This is very reasonable.  We will plan to have him return to clinic at the end of the summer to reevaluate and decide on surgical planning for the fall.    Miguel Marcus MD, PhD, FACS

## 2025-02-20 RX ORDER — ARIPIPRAZOLE 15 MG/1
15 TABLET ORAL DAILY
Qty: 90 TABLET | Refills: 1 | Status: SHIPPED | OUTPATIENT
Start: 2025-02-20

## 2025-02-20 NOTE — TELEPHONE ENCOUNTER
Last Written Prescription:  ARIPiprazole (ABILIFY) 15 MG tablet 90 tablet 1 8/14/2024 -- No   Sig - Route: Take 1 tablet (15 mg) by mouth daily - Oral     ----------------------  Last Visit Date: 1-2-25  Future Visit Date: 3-12-25  ----------------------      [x]  Refill decision: Medication refilled per  Medication Refill in Ambulatory Care  policy.          Request from pharmacy:  Requested Prescriptions   Pending Prescriptions Disp Refills    ARIPiprazole (ABILIFY) 15 MG tablet [Pharmacy Med Name: ARIPIPRAZOLE 15 MG TABLET] 90 tablet 1     Sig: TAKE 1 TABLET BY MOUTH EVERY DAY       Antipsychotic Medications Passed - 2/20/2025  2:20 PM        Passed - Most recent blood pressure under 140/90 in past 12 months        Passed - Patient is 12 years of age or older        Passed - Lipid panel on file within the past 12 months     Recent Labs   Lab Test 01/02/25  0904   CHOL 146   TRIG 79   HDL 62   LDL 68   NHDL 84               Passed - PHQ9 score less than 5 in past 6 monts     Please review last PHQ-9 score.           Passed - Heart Rate on file within past 12 months     Pulse Readings from Last 3 Encounters:   01/21/25 82   01/06/25 80   01/02/25 73               Passed - A1c or Glucose on file in past 12 months     Recent Labs   Lab Test 01/21/25  1102 01/06/25  0613 01/02/25  0904   *   < >  --    A1C  --   --  7.0*    < > = values in this interval not displayed.       Please review patients last 3 weights. If a weight gain of >10 lbs exists, you may refill the prescription once after instructing the patient to schedule an appointment within the next 30 days.    Wt Readings from Last 3 Encounters:   01/21/25 89.8 kg (198 lb)   01/06/25 89.8 kg (198 lb)   01/02/25 90 kg (198 lb 6.4 oz)             Passed - Medication is active on med list and the sig matches. RN to manually verify dose and sig if red X/fail.     If the protocol passes (green check), you do not need to verify med dose and sig.    A  prescription matches if they are the same clinical intention.    For Example: once daily and every morning are the same.    For all fails (red x), verify dose and sig.    If the refill does match what is on file, the RN can still proceed to approve the refill request.     If they do not match, route to the appropriate provider.             Passed - Recent (12 month) or future (90 days) visit with authorizing provider s specialty     The patient must have completed an in-person or virtual visit within the past 12 months or has a future visit scheduled within the next 90 days with the authorizing provider s specialty.  Urgent care and e-visits do not qualify as an office visit for this protocol.          Passed - Medication indicated for associated diagnosis     Medication is associated with one or more of the following diagnoses:             Agitation            Autistic disorder            Bipolar disorder            Chemotherapy-induced nausea and vomiting            Delirium            Depression            Schizophrenia             Mood disorder

## 2025-02-22 ENCOUNTER — HEALTH MAINTENANCE LETTER (OUTPATIENT)
Age: 65
End: 2025-02-22

## 2025-03-06 ENCOUNTER — TELEPHONE (OUTPATIENT)
Dept: ENDOCRINOLOGY | Facility: CLINIC | Age: 65
End: 2025-03-06
Payer: COMMERCIAL

## 2025-03-06 NOTE — TELEPHONE ENCOUNTER
Patient confirmed scheduled appointment:  Date: 6/4  Time: 7:30 am   Visit type: RETURN DIABETES  Provider: Tami Cancino PA-C  Location: [facility]  Testing/imaging:   Additional notes:  Spoke to pt and pricilla appt from 6/3 with Vincent to 6/4 due to changes in the providers schedule.  Pt has seen Tami Cancino in the past.    Rolando Almaguer on 3/6/2025 at 10:27 AM

## 2025-04-10 ENCOUNTER — TELEPHONE (OUTPATIENT)
Dept: INTERNAL MEDICINE | Facility: CLINIC | Age: 65
End: 2025-04-10
Payer: COMMERCIAL

## 2025-04-15 SDOH — HEALTH STABILITY: PHYSICAL HEALTH: ON AVERAGE, HOW MANY DAYS PER WEEK DO YOU ENGAGE IN MODERATE TO STRENUOUS EXERCISE (LIKE A BRISK WALK)?: 5 DAYS

## 2025-04-15 SDOH — HEALTH STABILITY: PHYSICAL HEALTH: ON AVERAGE, HOW MANY MINUTES DO YOU ENGAGE IN EXERCISE AT THIS LEVEL?: 120 MIN

## 2025-04-15 ASSESSMENT — SOCIAL DETERMINANTS OF HEALTH (SDOH): HOW OFTEN DO YOU GET TOGETHER WITH FRIENDS OR RELATIVES?: THREE TIMES A WEEK

## 2025-04-16 DIAGNOSIS — F33.0 MILD EPISODE OF RECURRENT MAJOR DEPRESSIVE DISORDER: ICD-10-CM

## 2025-04-18 ENCOUNTER — OFFICE VISIT (OUTPATIENT)
Dept: INTERNAL MEDICINE | Facility: CLINIC | Age: 65
End: 2025-04-18
Payer: COMMERCIAL

## 2025-04-18 VITALS
HEIGHT: 75 IN | SYSTOLIC BLOOD PRESSURE: 143 MMHG | WEIGHT: 209.4 LBS | OXYGEN SATURATION: 100 % | TEMPERATURE: 98 F | RESPIRATION RATE: 16 BRPM | HEART RATE: 89 BPM | BODY MASS INDEX: 26.04 KG/M2 | DIASTOLIC BLOOD PRESSURE: 71 MMHG

## 2025-04-18 DIAGNOSIS — F90.2 ATTENTION DEFICIT HYPERACTIVITY DISORDER (ADHD), COMBINED TYPE: ICD-10-CM

## 2025-04-18 DIAGNOSIS — F33.8 SEASONAL AFFECTIVE DISORDER: ICD-10-CM

## 2025-04-18 DIAGNOSIS — I10 ESSENTIAL HYPERTENSION: ICD-10-CM

## 2025-04-18 DIAGNOSIS — F33.0 MILD EPISODE OF RECURRENT MAJOR DEPRESSIVE DISORDER: ICD-10-CM

## 2025-04-18 DIAGNOSIS — E10.9 TYPE 1 DIABETES MELLITUS WITHOUT COMPLICATION (H): ICD-10-CM

## 2025-04-18 DIAGNOSIS — G43.111 INTRACTABLE MIGRAINE WITH AURA WITH STATUS MIGRAINOSUS: ICD-10-CM

## 2025-04-18 DIAGNOSIS — Z00.00 ROUTINE GENERAL MEDICAL EXAMINATION AT A HEALTH CARE FACILITY: Primary | ICD-10-CM

## 2025-04-18 PROCEDURE — 3077F SYST BP >= 140 MM HG: CPT | Performed by: NURSE PRACTITIONER

## 2025-04-18 PROCEDURE — 3078F DIAST BP <80 MM HG: CPT | Performed by: NURSE PRACTITIONER

## 2025-04-18 PROCEDURE — 99396 PREV VISIT EST AGE 40-64: CPT | Performed by: NURSE PRACTITIONER

## 2025-04-18 RX ORDER — DEXTROAMPHETAMINE SACCHARATE, AMPHETAMINE ASPARTATE, DEXTROAMPHETAMINE SULFATE AND AMPHETAMINE SULFATE 3.75; 3.75; 3.75; 3.75 MG/1; MG/1; MG/1; MG/1
15 TABLET ORAL 2 TIMES DAILY
Qty: 60 TABLET | Refills: 0 | Status: SHIPPED | OUTPATIENT
Start: 2025-04-18

## 2025-04-18 RX ORDER — TOPIRAMATE 50 MG/1
50 TABLET, FILM COATED ORAL DAILY
Qty: 90 TABLET | Refills: 3 | Status: SHIPPED | OUTPATIENT
Start: 2025-04-18

## 2025-04-18 RX ORDER — DEXTROAMPHETAMINE SACCHARATE, AMPHETAMINE ASPARTATE, DEXTROAMPHETAMINE SULFATE AND AMPHETAMINE SULFATE 3.75; 3.75; 3.75; 3.75 MG/1; MG/1; MG/1; MG/1
15 TABLET ORAL 2 TIMES DAILY
Qty: 60 TABLET | Refills: 0 | Status: SHIPPED | OUTPATIENT
Start: 2025-05-18

## 2025-04-18 RX ORDER — BUPROPION HYDROCHLORIDE 150 MG/1
150 TABLET ORAL EVERY MORNING
Qty: 60 TABLET | Refills: 5 | Status: SHIPPED | OUTPATIENT
Start: 2025-04-18

## 2025-04-18 RX ORDER — DEXTROAMPHETAMINE SACCHARATE, AMPHETAMINE ASPARTATE, DEXTROAMPHETAMINE SULFATE AND AMPHETAMINE SULFATE 3.75; 3.75; 3.75; 3.75 MG/1; MG/1; MG/1; MG/1
15 TABLET ORAL 2 TIMES DAILY
Qty: 60 TABLET | Refills: 0 | Status: SHIPPED | OUTPATIENT
Start: 2025-06-17

## 2025-04-18 RX ORDER — AMLODIPINE BESYLATE 10 MG/1
10 TABLET ORAL DAILY
Qty: 90 TABLET | Refills: 3 | Status: SHIPPED | OUTPATIENT
Start: 2025-04-18

## 2025-04-18 RX ORDER — BUPROPION HYDROCHLORIDE 300 MG/1
300 TABLET ORAL EVERY MORNING
Qty: 60 TABLET | Refills: 5 | Status: SHIPPED | OUTPATIENT
Start: 2025-04-18

## 2025-04-18 ASSESSMENT — ANXIETY QUESTIONNAIRES
1. FEELING NERVOUS, ANXIOUS, OR ON EDGE: SEVERAL DAYS
2. NOT BEING ABLE TO STOP OR CONTROL WORRYING: SEVERAL DAYS
6. BECOMING EASILY ANNOYED OR IRRITABLE: NOT AT ALL
GAD7 TOTAL SCORE: 4
7. FEELING AFRAID AS IF SOMETHING AWFUL MIGHT HAPPEN: NOT AT ALL
5. BEING SO RESTLESS THAT IT IS HARD TO SIT STILL: NOT AT ALL
GAD7 TOTAL SCORE: 4
3. WORRYING TOO MUCH ABOUT DIFFERENT THINGS: SEVERAL DAYS

## 2025-04-18 ASSESSMENT — PATIENT HEALTH QUESTIONNAIRE - PHQ9
5. POOR APPETITE OR OVEREATING: SEVERAL DAYS
SUM OF ALL RESPONSES TO PHQ QUESTIONS 1-9: 8

## 2025-04-18 NOTE — PROGRESS NOTES
Preventive Care Visit  [facility]  GALILEA Escalante CNP, Internal Medicine  Apr 18, 2025      Assessment & Plan     1. Routine general medical examination at a health care facility (Primary)  Colonoscopy in May 2024 without abnormalities--repeat in 10 years for routine screening. Up to date on vaccines and regular blood work.     2. Seasonal affective disorder  Notices his depressed mood gets worse in the winter time. Script sent with patient for box light.  - SAD Light, 10,000 Lux Order for DME - ONLY FOR DME    3. Mild episode of recurrent major depressive disorder  Feels his mood is stable especially because of positive changes at work and not working night shifts. He is interested in re-establishing with a counselor for depression and ADHD, so a referral was placed for this. He also notes worsening mood during winter months, will provide DME order for SAD light to have for next fall/winter, reviewed instructions for use.  - Adult Mental Health  Referral; Future  - buPROPion (WELLBUTRIN XL) 300 MG 24 hr tablet; Take 1 tablet (300 mg) by mouth every morning. Take with 150 mg tablet (total daily dose 450 mg)  Dispense: 60 tablet; Refill: 5  - buPROPion (WELLBUTRIN XL) 150 MG 24 hr tablet; Take 1 tablet (150 mg) by mouth every morning. Take with 300 mg tablet (total daily dose 450 mg)  Dispense: 60 tablet; Refill: 5  - SAD Light, 10,000 Lux Order for DME - ONLY FOR DME    4. Intractable migraine with aura with status migrainosus  Refilled medication which he reports is helpful for preventing his migraines.  - topiramate (TOPAMAX) 50 MG tablet; Take 1 tablet (50 mg) by mouth daily.  Dispense: 90 tablet; Refill: 3    5. Essential hypertension  Home measurements are reportedly well controlled. Continue with current regimen and plan for him to reach out if his blood pressure is persistently elevated.  - amLODIPine (NORVASC) 10 MG tablet; Take 1 tablet (10  "mg) by mouth daily.  Dispense: 90 tablet; Refill: 3    6. Attention deficit hyperactivity disorder (ADHD), combined type  He reports satisfaction with current Adderall dosing. No concerns for diversion, denies use of unprescribed substances.  - amphetamine-dextroamphetamine (ADDERALL) 15 MG tablet; Take 1 tablet (15 mg) by mouth 2 times daily.  Dispense: 60 tablet; Refill: 0  - amphetamine-dextroamphetamine (ADDERALL) 15 MG tablet; Take 1 tablet (15 mg) by mouth 2 times daily.  Dispense: 60 tablet; Refill: 0  - amphetamine-dextroamphetamine (ADDERALL) 15 MG tablet; Take 1 tablet (15 mg) by mouth 2 times daily.  Dispense: 60 tablet; Refill: 0    7. Type 1 diabetes mellitus without complication (H)  Most recent A1c 7.0 (1/2/25). Foot exam completed today, sensation intact. No hospitalizations for diabetes in the past year. Insulin pump settings are stable. He denies hypoglycemia, unintentional weight loss, or polyphagia/polydipsia/polyuria.  He is up to date on his eye exam.  Follow-up with Albertina in June as scheduled.     BMI  Estimated body mass index is 26.53 kg/m  as calculated from the following:    Height as of this encounter: 1.892 m (6' 2.5\").    Weight as of this encounter: 95 kg (209 lb 6.4 oz).     Counseling  Appropriate preventive services were addressed with this patient via screening, questionnaire, or discussion as appropriate for nutrition, physical activity,  social engagement. Reviewed patient's diet, addressing concerns and/or questions. The patient was instructed to see the dentist every 6 months.   He is at risk for psychosocial distress and has been provided with information to reduce risk.     Follow up in 6 months or sooner as needed.    Gaby Richards is a 64 year old, presenting for the following:  Physical (Pt here for physical)        4/18/2025     3:21 PM   Additional Questions   Roomed by Valencia Combs      HPI     Had one cataract surgery and planning for another once fluid behind his eyes " resolves. He has a consult scheduled for his left eye. He is up to date on his eye exams.    DM1 - No hypoglycemia requiring hospitalization. Following with endocrinology. He feels stable with his insulin use and diabetes overall.    Home BP - Usually really good. For example, 118/68 last night. Denies chest pain, headaches, vision changes. No side effects with regular use of statin.    Mood - He notices low motivation a couple days per week. His mood is worse in the winter. He feels his current medications for depression and anxiety are at helpful. He is interested in re-establishing with a therapist. He has had positive experiences with therapy in the past.    Social - Single and no children. Supportive friends. Recently retired from [employer] service. 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 No smoking, no drinking, no illicit drug use. Ten years of sobriety from alcohol use. Sleeping is more regular now that he doesn't work night shifts. He tries to stay active and has recently lost some weight intentionally.    Advance Care Planning  Discussed advance care planning with patient; informed AVS has link to [facility].        4/15/2025   General Health   How would you rate your overall physical health? Excellent   Feel stress (tense, anxious, or unable to sleep) To some extent   (!) STRESS CONCERN      4/15/2025   Nutrition   Three or more servings of calcium each day? Yes   Diet: Regular (no restrictions)   How many servings of fruit and vegetables per day? (!) 2-3   How many sweetened beverages each day? 0-1         4/15/2025   Exercise   Days per week of moderate/strenous exercise 5 days   Average minutes spent exercising at this level 120 min         4/15/2025   Social Factors   Frequency of gathering with friends or relatives Three times a week   Worry food won't last until get money to buy more No   Food not last or not have enough money for food? No   Do you have  "housing? (Housing is defined as stable permanent housing and does not include staying ouside in a car, in a tent, in an abandoned building, in an overnight shelter, or couch-surfing.) Yes   Are you worried about losing your housing? No   Lack of transportation? Yes   Unable to get utilities (heat,electricity)? No    (!) TRANSPORTATION CONCERN PRESENT      4/15/2025   Fall Risk   Fallen 2 or more times in the past year? No   Trouble with walking or balance? No          4/15/2025   Dental   Dentist two times every year? (!) NO - recommend every 6 months       Today's PHQ-9 Score:       4/18/2025     4:23 PM   PHQ-9 SCORE   PHQ-9 Total Score 8           4/15/2025   Substance Use   Alcohol more than 3/day or more than 7/wk Not Applicable   Do you use any other substances recreationally? No     Social History     Tobacco Use    Smoking status: Never    Smokeless tobacco: Never   Vaping Use    Vaping status: Never Used   Substance Use Topics    Alcohol use: Never     Comment: History of alcohol abuse/sober since 2015; went to treatment     Drug use: Never           4/15/2025   STI Screening   New sexual partner(s) since last STI/HIV test? No   Last PSA: No results found for: \"PSA\"  ASCVD Risk   The 10-year ASCVD risk score (Dayana STARK, et al., 2019) is: 19.4%    Values used to calculate the score:      Age: 64 years      Sex: Male      Is Non- : No      Diabetic: Yes      Tobacco smoker: No      Systolic Blood Pressure: 143 mmHg      Is BP treated: Yes      HDL Cholesterol: 62 mg/dL      Total Cholesterol: 146 mg/dL       Reviewed and updated as needed this visit by Provider                  Review of Systems  Constitutional, HEENT, cardiovascular, pulmonary, gi and gu systems are negative, except as otherwise noted.     Objective    Exam  BP (!) 143/71 (BP Location: Right arm, Patient Position: Sitting, Cuff Size: Adult Regular)   Pulse 89   Temp 98  F (36.7  C) (Oral)   Resp 16   Ht " "1.892 m (6' 2.5\")   Wt 95 kg (209 lb 6.4 oz)   SpO2 100%   BMI 26.53 kg/m     Estimated body mass index is 26.53 kg/m  as calculated from the following:    Height as of this encounter: 1.892 m (6' 2.5\").    Weight as of this encounter: 95 kg (209 lb 6.4 oz).    Physical Exam  GENERAL: alert and no distress  EYES: Eyes grossly normal to inspection, conjunctivae and sclerae normal  HENT: ear canals and TM's normal, nose and mouth without ulcers or lesions  NECK: no adenopathy, no asymmetry, masses, or scars  RESP: lungs clear to auscultation - no rales, rhonchi or wheezes  CV: regular rate and rhythm, normal S1 S2, no S3 or S4, no murmur, click or rub, no peripheral edema  ABDOMEN: soft, nontender, no hepatosplenomegaly, no masses and bowel sounds normal  MS: Dupuytren contracture of right hand; no gross musculoskeletal defects noted, no edema  SKIN: no suspicious lesions or rashes  PSYCH: mentation appears normal, affect normal/bright  Foot Exam:  normal DP and PT pulses, no trophic changes or ulcerative lesions, thickened toenails, normal sensory exam, and normal monofilament exam      Gale Whitley RN, Student FNP    I saw and examined this patient with the NP student and agree with the student's findings and plan of care as documented in the student's note with the following modifications: none    Signed Electronically by: GALILEA Escalante CNP  "

## 2025-04-21 RX ORDER — BUPROPION HYDROCHLORIDE 300 MG/1
300 TABLET ORAL EVERY MORNING
Qty: 60 TABLET | Refills: 5 | OUTPATIENT
Start: 2025-04-21

## 2025-04-23 ENCOUNTER — OFFICE VISIT (OUTPATIENT)
Dept: OPHTHALMOLOGY | Facility: CLINIC | Age: 65
End: 2025-04-23
Attending: OPHTHALMOLOGY
Payer: COMMERCIAL

## 2025-04-23 ENCOUNTER — TELEPHONE (OUTPATIENT)
Dept: OPHTHALMOLOGY | Facility: CLINIC | Age: 65
End: 2025-04-23

## 2025-04-23 DIAGNOSIS — H26.491 PCO (POSTERIOR CAPSULE OPACIFICATION), RIGHT: ICD-10-CM

## 2025-04-23 DIAGNOSIS — H35.351 CYSTOID MACULAR EDEMA OF RIGHT EYE: Primary | ICD-10-CM

## 2025-04-23 PROCEDURE — 66821 AFTER CATARACT LASER SURGERY: CPT | Mod: RT | Performed by: OPHTHALMOLOGY

## 2025-04-23 PROCEDURE — 99211 OFF/OP EST MAY X REQ PHY/QHP: CPT | Performed by: OPHTHALMOLOGY

## 2025-04-23 PROCEDURE — 92134 CPTRZ OPH DX IMG PST SGM RTA: CPT | Performed by: OPHTHALMOLOGY

## 2025-04-23 ASSESSMENT — REFRACTION_WEARINGRX
OS_AXIS: 167
OD_SPHERE: -1.50
OD_AXIS: 155
OS_CYLINDER: +1.00
OS_SPHERE: -1.00
OD_ADD: +1.75
SPECS_TYPE: PAL
OD_CYLINDER: +1.50
OS_ADD: +1.75

## 2025-04-23 ASSESSMENT — TONOMETRY
OD_IOP_MMHG: 14
OS_IOP_MMHG: 17
IOP_METHOD: ICARE

## 2025-04-23 ASSESSMENT — VISUAL ACUITY
METHOD: SNELLEN - LINEAR
OD_SC: 20/20
OS_CC: 20/20
CORRECTION_TYPE: GLASSES

## 2025-04-23 ASSESSMENT — SLIT LAMP EXAM - LIDS
COMMENTS: NORMAL
COMMENTS: NORMAL

## 2025-04-23 NOTE — PROGRESS NOTES
Chief complaint   Cataract eval left eye  S/p CEIOL OD    HPI    Luigi MEJIA Senum 64 year old male       Chief Complaint(s) and History of Present Illness(es)       Follow Up    In both eyes.  Associated symptoms include floaters.  Negative for eye pain, photophobia and flashes.  Treatments tried include eye drops.  Response to treatment was moderate improvement. Additional comments: Follow up wants cataract surgery on LE before tennis   RE doing well CE/IOL 1/21/25  Ketoralac qid RE  Prednisolone qid RE  Jeanine Candelaria COA 7:38 AM April 23, 2025                        Past ocular history   Prior eye surgery/laser/Trauma: CEIOL right eye- ? Laser near optic nerve 20 years ago right eye;  CTL wearer:No  Glasses : yes  Family Hx of eye disease: AMD in father    PMH     Past Medical History:   Diagnosis Date    Acquired hypothyroidism 12/07/2016    Cancer (H) 2015    Depressive disorder     Hidradenoma dx: Feb 2015    right hand/2 surgeries    Hyperlipidemia 12/07/2006    Hypertension     Numbness and tingling 2015    right hand, related to surgery for hidradenoma    TELLY (obstructive sleep apnea) 2007    New cpap machine 1 year ago. follows at [facility]    Type 1 diabetes (H) 1982    Uncomplicated asthma        PSH     Past Surgical History:   Procedure Laterality Date    BIOPSY  2016    COLONOSCOPY N/A 5/17/2024    Procedure: Colonoscopy;  Surgeon: Jose Luis Gonzales MD;  Location: [facility]    EXCISE LESION HAND Right 02/26/2015    Procedure: EXCISE LESION HAND;  Surgeon: Jeovany Jefferson MD;  Location: [facility]    IMPLANT PROSTHESIS PENIS INFLATABLE N/A 02/01/2022    Procedure: INSERTION of INFLATABLE PENILE PROSTHESIS;  Surgeon: Johnathan Cooper MD;  Location: [facility]    IR RENAL BIOPSY RIGHT  10/11/2024    ORTHOPEDIC SURGERY Left     achilles tendon     ORTHOPEDIC SURGERY Right     hand surgery    PHACOEMULSIFICATION CLEAR CORNEA WITH STANDARD INTRAOCULAR LENS IMPLANT Right 1/21/2025    Procedure: RIGHT EYE  PHACOEMULSIFICATION, CATARACT, WITH INTRAOCULAR LENS IMPLANT;  Surgeon: Richard Dutta MD;  Location: [facility] OR    RELEASE DUPUYTRENS CONTRACTURE Left 1/6/2025    Procedure: Left palm and small finger open Dupuytren's fasciectomy,  ring finger Dupuytren's fasciectomy;  Surgeon: Miguel Marcus MD;  Location: [facility] OR    SOFT TISSUE SURGERY      lipoma removal, below rib cage on right side       Meds     Current Outpatient Medications   Medication Sig Dispense Refill    albuterol (PROAIR HFA/PROVENTIL HFA/VENTOLIN HFA) 108 (90 Base) MCG/ACT inhaler Inhale 2 puffs into the lungs every 6 hours as needed for shortness of breath or wheezing 18 g 5    amLODIPine (NORVASC) 10 MG tablet Take 1 tablet (10 mg) by mouth daily. 90 tablet 3    amphetamine-dextroamphetamine (ADDERALL) 15 MG tablet Take 1 tablet (15 mg) by mouth 2 times daily. 60 tablet 0    [START ON 5/18/2025] amphetamine-dextroamphetamine (ADDERALL) 15 MG tablet Take 1 tablet (15 mg) by mouth 2 times daily. 60 tablet 0    [START ON 6/17/2025] amphetamine-dextroamphetamine (ADDERALL) 15 MG tablet Take 1 tablet (15 mg) by mouth 2 times daily. 60 tablet 0    ARIPiprazole (ABILIFY) 15 MG tablet TAKE 1 TABLET BY MOUTH EVERY DAY 90 tablet 1    aspirin 81 MG tablet Take 1 tablet (81 mg) by mouth daily 100 tablet 3    atorvastatin (LIPITOR) 40 MG tablet TAKE 1 TABLET BY MOUTH EVERY DAY 90 tablet 3    blood glucose (ACCU-CHEK GUIDE) test strip Use to test blood sugar 3 times daily or as directed. 100 strip 11    buPROPion (WELLBUTRIN XL) 150 MG 24 hr tablet Take 1 tablet (150 mg) by mouth every morning. Take with 300 mg tablet (total daily dose 450 mg) 60 tablet 5    buPROPion (WELLBUTRIN XL) 300 MG 24 hr tablet Take 1 tablet (300 mg) by mouth every morning. Take with 150 mg tablet (total daily dose 450 mg) 60 tablet 5    cetirizine (ZYRTEC) 10 MG tablet Take 10 mg by mouth      Continuous Glucose Sensor (GUARDIAN 4 GLUCOSE SENSOR) MISC 1 each See Admin Instructions.  "Use per 's instructions. Change every 7 days. 12 each 4    Continuous Glucose Transmitter (GUARDIAN 4 TRANSMITTER) MISC 1 each See Admin Instructions. Use per 's instructions to monitor glucose. 1 each 3    DiphenhydrAMINE HCl (BENADRYL PO)       FLUoxetine (PROZAC) 20 MG capsule TAKE 1 CAPSULE BY MOUTH EVERY DAY 90 capsule 0    Glucagon (BAQSIMI) 3 MG/DOSE nasal powder Spray 1 spray (3 mg) in nostril as needed (severe hypoglycemia) in the event of unconscious hypoglycemia or hypoglycemic seizure. May repeat dose if no response after 15 minutes. 1 each 1    glucagon 1 MG kit Inject 1 mg into the muscle once for 1 dose 1 each 0    infusion set (AGNES 32\" 9MM) misc pump supply Infusion set to be used with pump.  Change every 2-3 days as directed. 3 Box 4    insulin glargine (LANTUS SOLOSTAR) 100 UNIT/ML pen INJECT 20 UNITS Daily  SUBCUTANEOUS ONLY IF INSULIN PUMP FAILS. 15 mL 1    Insulin Infusion Pump (INSULIN PUMP UW1283) KIT       Insulin Infusion Pump Supplies (EXTENDED INFUSION SET 23\"/9MM) MISC 1 each See Admin Instructions. Change every 2-3 days for use in insulin pump. 40 each 3    Insulin Infusion Pump Supplies (EXTENDED RESERVOIR 3ML) MIS 1 each See Admin Instructions. Change every 2-3 days for use in insulin pump. 40 each 3    Insulin Infusion Pump Supplies (MINIMED PUMP RESERVOIR 3ML) MISC 1 each See Admin Instructions. 2 each 3    insulin lispro (HUMALOG) 100 UNIT/ML vial VIA PUMP APPROX 80 UNITS DAILY. 80 mL 3    insulin pen needle (32G X 6 MM) 32G X 6 MM miscellaneous Use 4 pen needles daily or as directed as back up. 100 each 3    insulin syringe-needle U-100 (BD INSULIN SYRINGE) 29G X 1/2\" 1 ML miscellaneous Use as directed 20 each 1    ketoconazole (NIZORAL) 2 % external cream Apply topically 2 times daily as needed (redness and flaking). Apply to the face. 60 g 3    ketorolac (ACULAR) 0.5 % ophthalmic solution Place 1 drop into the right eye 4 times daily. 5 mL 2    KETOSTIX " test strip Use as directed in case of high glucose, vomiting or illness. 50 strip 11    levothyroxine (SYNTHROID/LEVOTHROID) 175 MCG tablet Take 1 tablet (175 mcg) by mouth daily. Please take daily on an empty stomach and wait 30 minutes for any other medications food or drink.  Please recheck TSH level again in 12 months. 90 tablet 3    losartan-hydrochlorothiazide (HYZAAR) 50-12.5 MG tablet TAKE 1 TABLET BY MOUTH EVERY DAY 90 tablet 1    neomycin-polymixin-dexAMETHasone (MAXITROL) 0.1 % ophthalmic suspension 1 drop TID OD for 1 week then BID OD for 1 week Follow-up in 2 weeks 5 mL 1    order for DME Equipment being ordered: CPAP machine 1 each 0    prednisoLONE acetate (PRED FORTE) 1 % ophthalmic suspension Place 1-2 drops into the right eye 4 times daily. 10 mL 2    Semaglutide, 2 MG/DOSE, (OZEMPIC, 2 MG/DOSE,) 8 MG/3ML pen Inject 2 mg subcutaneously every 7 days. 9 mL 3    topiramate (TOPAMAX) 50 MG tablet Take 1 tablet (50 mg) by mouth daily. 90 tablet 3    Urea 40 % CREA Externally apply topically 2 times daily To surface of toenails 198 g 5     No current facility-administered medications for this visit.     Facility-Administered Medications Ordered in Other Visits   Medication Dose Route Frequency Provider Last Rate Last Admin    lidocaine (PF) (XYLOCAINE) 1 % injection 10 mL  10 mL Intradermal Once Kathya Lang APRN CNP           DM1 x 40+ years    Drops Currently Taking   4/23/2025   Ketorolac QID OU  Prednisolone QID OU    Assessment/Plan 04/23/2025   # Pseudophakic, right eye  - good surgical result    # Senile cataract, OS  Proceed with CEIOL left eye patient is bothered by anisometropia aim for distance    #PCO, OD  R/B/A discussed with the patient including risk of RD, Bleeding, High IOP, inflammation. Patient agrees to proceed      Plan 4/23/2025  Proceed with CEIOL OS  Macula OCT right eye today, no fluid but some dot blood  YAG cap today right eye        Nikole Vasquez MD  Cornea and  External Disease Fellow  [facility]    Attending Physician Attestation: Complete documentation of historical and exam elements from today's encounter can be found in the full encounter summary report (not reduplicated in this progress note). I personally obtained the chief complaint(s) and history of present illness. I confirmed and edited as necessary the review of systems, past medical/surgical history, family history, social history, and examination findings as documented by others; and I examined the patient myself. I personally reviewed the relevant tests, images, and reports as documented above. I formulated and edited as necessary the assessment and plan and discussed the findings and management plan with the patient and family.  I was present for the entire procedure(s). - Miranda Watson M.D

## 2025-04-23 NOTE — TELEPHONE ENCOUNTER
Called patient to schedule surgery with Dr. Watson     Spoke with: Maurice    Date(s) of Surgery: 5/6/25    Patient aware of approximate arrival time: Yes at pre op nursing will call      Tissue: Not Applicable Ordered: Not Applicable     Location of surgery: [facility]     Pre-Op H&P: PAC  at 4/29/25 6:45am     Informed patient that they need to call to schedule pre-op H&P within 30 days of surgery date: Yes    Post-Op Appt Dates:   5/7/25 8am      Discussed with patient pre-op RN will call 2-3 days prior to surgery with arrival time and instructions:  Yes       Standard Surgery Packet Sent: Yes 04/23/25  via [employer] Message      Additional Information Sent in Packet:        Informed patient that they will need an adult  to bring patient home from surgery: Yes  : friend          Additional Comments:        All patients questions were answered and was instructed to review surgical packet and call back [phone] with any questions or concerns.       Pinky Yanez on 4/23/2025 at 1:54 PM

## 2025-04-23 NOTE — NURSING NOTE
Chief Complaints and History of Present Illnesses   Patient presents with    Follow Up     Follow up wants cataract surgery on LE before tennis   RE doing well CE/IOL 1/21/25  Ketoralac qid RE  Prednisolone qid RE  Jeanine Dziuk [place] 7:38 AM April 23, 2025          Chief Complaint(s) and History of Present Illness(es)       Follow Up              Laterality: both eyes    Associated symptoms: floaters.  Negative for eye pain, photophobia and flashes    Treatments tried: eye drops    Response to treatment: moderate improvement    Comments: Follow up wants cataract surgery on LE before tennis   RE doing well CE/IOL 1/21/25  Ketoralac qid RE  Prednisolone qid RE  Jeanine Dziuk [place] 7:38 AM April 23, 2025

## 2025-04-29 ENCOUNTER — ANESTHESIA EVENT (OUTPATIENT)
Dept: SURGERY | Facility: AMBULATORY SURGERY CENTER | Age: 65
End: 2025-04-29
Payer: COMMERCIAL

## 2025-04-29 ENCOUNTER — VIRTUAL VISIT (OUTPATIENT)
Dept: SURGERY | Facility: CLINIC | Age: 65
End: 2025-04-29
Payer: COMMERCIAL

## 2025-04-29 VITALS — WEIGHT: 209 LBS | BODY MASS INDEX: 25.99 KG/M2 | HEIGHT: 75 IN

## 2025-04-29 DIAGNOSIS — H26.491 PCO (POSTERIOR CAPSULE OPACIFICATION), RIGHT: ICD-10-CM

## 2025-04-29 DIAGNOSIS — Z01.818 PREOP EXAMINATION: Primary | ICD-10-CM

## 2025-04-29 PROCEDURE — 98005 SYNCH AUDIO-VIDEO EST LOW 20: CPT | Performed by: NURSE PRACTITIONER

## 2025-04-29 ASSESSMENT — ENCOUNTER SYMPTOMS: ORTHOPNEA: 0

## 2025-04-29 NOTE — PROGRESS NOTES
Maurice is a 64 year old who is being evaluated via a billable video visit.      How would you like to obtain your AVS? MyChart          Subjective   Maurice is a 64 year old, presenting for the following health issues:  Pre-Op Exam        AIXA Kent LPN

## 2025-04-29 NOTE — H&P
Pre-Operative H & P     CC:  Preoperative exam to assess for increased cardiopulmonary risk while undergoing surgery and anesthesia.    Date of Encounter: 4/29/2025  Primary Care Physician:  Kathya Lang     Reason for visit:   Encounter Diagnoses   Name Primary?    Preop examination Yes    PCO (posterior capsule opacification), right        HPI  Luigi Moore is a 64 year old male who presents for pre-operative H & P in preparation for  Procedure Information       Case: [id number] Date/Time: 05/06/25 1225    Procedure: LEFT EYE PHACOEMULSIFICATION, CATARACT, WITH STANDARD INTRAOCULAR LENS IMPLANT INSERTION (Left: Eye)    Anesthesia type: MAC    Diagnosis: PCO (posterior capsule opacification), right [H26.491]    Pre-op diagnosis: PCO (posterior capsule opacification), right [H26.491]    Location: [place] [address] / [facility]    Providers: Miranda Watson MD            Luigi Moore is a 64 year old male with hypertension, hyperlipidemia, migraine, history of right hand hidradenacarcioma, sleep apnea, allergic rhinitis, asthma, hypothyroidism, diabetes type 1, ADHD and depression that has a right eye posterior capsule opacification.  He has been following with Dr. Watson and had surgery on the right eye on 1/21/25.  Now scheduled as above for the right eye.     History is obtained from the patient and chart review    Hx of abnormal bleeding or anti-platelet use: aspirin      Past Medical History  Past Medical History:   Diagnosis Date    Acquired hypothyroidism 12/07/2016    ADHD (attention deficit hyperactivity disorder)     Alcohol dependence in remission (H)     Depressive disorder     Hidradenocarcinoma 2015    right hand    Hyperlipidemia 12/07/2006    Hypertension     Migraine     Numbness and tingling 2015    right hand, related to surgery for hidradenoma    TELLY (obstructive sleep apnea) 2007    New cpap machine 1 year ago. follows at [facility]     Type 1 diabetes (H) 1982    Uncomplicated asthma        Past Surgical History  Past Surgical History:   Procedure Laterality Date    BIOPSY  2016    COLONOSCOPY N/A 5/17/2024    Procedure: Colonoscopy;  Surgeon: Jose Luis Gonzales MD;  Location: [facility]    EXCISE LESION HAND Right 02/26/2015    Procedure: EXCISE LESION HAND;  Surgeon: Jeovany Jefferson MD;  Location: [facility] OR    IMPLANT PROSTHESIS PENIS INFLATABLE N/A 02/01/2022    Procedure: INSERTION of INFLATABLE PENILE PROSTHESIS;  Surgeon: Johnathan Cooper MD;  Location: [facility] OR    IR RENAL BIOPSY RIGHT  10/11/2024    ORTHOPEDIC SURGERY Left     achilles tendon     ORTHOPEDIC SURGERY Right     hand surgery    PHACOEMULSIFICATION CLEAR CORNEA WITH STANDARD INTRAOCULAR LENS IMPLANT Right 1/21/2025    Procedure: RIGHT EYE PHACOEMULSIFICATION, CATARACT, WITH INTRAOCULAR LENS IMPLANT;  Surgeon: Richard Dutta MD;  Location: [facility] OR    RELEASE DUPUYTRENS CONTRACTURE Left 1/6/2025    Procedure: Left palm and small finger open Dupuytren's fasciectomy,  ring finger Dupuytren's fasciectomy;  Surgeon: Miguel Marcus MD;  Location: [facility] OR    SOFT TISSUE SURGERY      lipoma removal, below rib cage on right side       Prior to Admission Medications  Current Outpatient Medications   Medication Sig Dispense Refill    albuterol (PROAIR HFA/PROVENTIL HFA/VENTOLIN HFA) 108 (90 Base) MCG/ACT inhaler Inhale 2 puffs into the lungs every 6 hours as needed for shortness of breath or wheezing 18 g 5    amLODIPine (NORVASC) 10 MG tablet Take 1 tablet (10 mg) by mouth daily. (Patient taking differently: Take 10 mg by mouth every morning.) 90 tablet 3    amphetamine-dextroamphetamine (ADDERALL) 15 MG tablet Take 1 tablet (15 mg) by mouth 2 times daily. 60 tablet 0    ARIPiprazole (ABILIFY) 15 MG tablet TAKE 1 TABLET BY MOUTH EVERY DAY (Patient taking differently: Take 15 mg by mouth every morning.) 90 tablet 1    aspirin 81 MG tablet Take 1 tablet (81 mg) by mouth  daily (Patient taking differently: Take 81 mg by mouth every morning.) 100 tablet 3    atorvastatin (LIPITOR) 40 MG tablet TAKE 1 TABLET BY MOUTH EVERY DAY (Patient taking differently: Take 40 mg by mouth every morning.) 90 tablet 3    buPROPion (WELLBUTRIN XL) 150 MG 24 hr tablet Take 1 tablet (150 mg) by mouth every morning. Take with 300 mg tablet (total daily dose 450 mg) 60 tablet 5    buPROPion (WELLBUTRIN XL) 300 MG 24 hr tablet Take 1 tablet (300 mg) by mouth every morning. Take with 150 mg tablet (total daily dose 450 mg) 60 tablet 5    cetirizine (ZYRTEC) 10 MG tablet Take 10 mg by mouth as needed.      DiphenhydrAMINE HCl (BENADRYL PO) Take by mouth as needed.      FLUoxetine (PROZAC) 20 MG capsule TAKE 1 CAPSULE BY MOUTH EVERY DAY (Patient taking differently: Take 20 mg by mouth every morning.) 90 capsule 0    Glucagon (BAQSIMI) 3 MG/DOSE nasal powder Spray 1 spray (3 mg) in nostril as needed (severe hypoglycemia) in the event of unconscious hypoglycemia or hypoglycemic seizure. May repeat dose if no response after 15 minutes. 1 each 1    insulin glargine (LANTUS SOLOSTAR) 100 UNIT/ML pen INJECT 20 UNITS Daily  SUBCUTANEOUS ONLY IF INSULIN PUMP FAILS. (Patient taking differently: as needed. INJECT 20 UNITS Daily  SUBCUTANEOUS ONLY IF INSULIN PUMP FAILS.) 15 mL 1    Insulin Infusion Pump (INSULIN PUMP PL0744) KIT       insulin lispro (HUMALOG) 100 UNIT/ML vial VIA PUMP APPROX 80 UNITS DAILY. 80 mL 3    levothyroxine (SYNTHROID/LEVOTHROID) 175 MCG tablet Take 1 tablet (175 mcg) by mouth daily. Please take daily on an empty stomach and wait 30 minutes for any other medications food or drink.  Please recheck TSH level again in 12 months. (Patient taking differently: Take 175 mcg by mouth every morning (before breakfast). Please take daily on an empty stomach and wait 30 minutes for any other medications food or drink.  Please recheck TSH level again in 12 months.) 90 tablet 3    losartan-hydrochlorothiazide  "(HYZAAR) 50-12.5 MG tablet TAKE 1 TABLET BY MOUTH EVERY DAY (Patient taking differently: Take 1 tablet by mouth every morning.) 90 tablet 1    prednisoLONE acetate (PRED FORTE) 1 % ophthalmic suspension Place 1-2 drops into the right eye 4 times daily. 10 mL 2    Semaglutide, 2 MG/DOSE, (OZEMPIC, 2 MG/DOSE,) 8 MG/3ML pen Inject 2 mg subcutaneously every 7 days. (Patient taking differently: Inject 2 mg subcutaneously every 7 days. Last dose 4/25/25) 9 mL 3    topiramate (TOPAMAX) 50 MG tablet Take 1 tablet (50 mg) by mouth daily. (Patient taking differently: Take 50 mg by mouth every morning.) 90 tablet 3    [START ON 5/18/2025] amphetamine-dextroamphetamine (ADDERALL) 15 MG tablet Take 1 tablet (15 mg) by mouth 2 times daily. (Patient taking differently: Take 15 mg by mouth 2 times daily.) 60 tablet 0    [START ON 6/17/2025] amphetamine-dextroamphetamine (ADDERALL) 15 MG tablet Take 1 tablet (15 mg) by mouth 2 times daily. (Patient taking differently: Take 15 mg by mouth 2 times daily.) 60 tablet 0    blood glucose (ACCU-CHEK GUIDE) test strip Use to test blood sugar 3 times daily or as directed. 100 strip 11    Continuous Glucose Sensor (GUARDIAN 4 GLUCOSE SENSOR) MISC 1 each See Admin Instructions. Use per 's instructions. Change every 7 days. 12 each 4    Continuous Glucose Transmitter (GUARDIAN 4 TRANSMITTER) MISC 1 each See Admin Instructions. Use per 's instructions to monitor glucose. 1 each 3    glucagon 1 MG kit Inject 1 mg into the muscle once for 1 dose 1 each 0    infusion set (AGNES 32\" 9MM) misc pump supply Infusion set to be used with pump.  Change every 2-3 days as directed. 3 Box 4    Insulin Infusion Pump Supplies (EXTENDED INFUSION SET 23\"/9MM) MISC 1 each See Admin Instructions. Change every 2-3 days for use in insulin pump. 40 each 3    Insulin Infusion Pump Supplies (EXTENDED RESERVOIR 3ML) [facility] 1 each See Admin Instructions. Change every 2-3 days for use in insulin pump. " "40 each 3    Insulin Infusion Pump Supplies (MINIMED PUMP RESERVOIR 3ML) MISC 1 each See Admin Instructions. 2 each 3    insulin pen needle (32G X 6 MM) 32G X 6 MM miscellaneous Use 4 pen needles daily or as directed as back up. 100 each 3    insulin syringe-needle U-100 (BD INSULIN SYRINGE) 29G X 1/2\" 1 ML miscellaneous Use as directed 20 each 1    ketoconazole (NIZORAL) 2 % external cream Apply topically 2 times daily as needed (redness and flaking). Apply to the face. 60 g 3    ketorolac (ACULAR) 0.5 % ophthalmic solution Place 1 drop into the right eye 4 times daily. 5 mL 2    KETOSTIX test strip Use as directed in case of high glucose, vomiting or illness. 50 strip 11    neomycin-polymixin-dexAMETHasone (MAXITROL) 0.1 % ophthalmic suspension 1 drop TID OD for 1 week then BID OD for 1 week Follow-up in 2 weeks 5 mL 1    order for DME Equipment being ordered: CPAP machine 1 each 0    Urea 40 % CREA Externally apply topically 2 times daily To surface of toenails 198 g 5       Allergies  Allergies   Allergen Reactions    Iodinated Contrast Media Anaphylaxis    Contrast Dye Hives    Diatrizoate Hives     Iodine dye; iodine externally or topically is OK       Social History  Social History     Socioeconomic History    Marital status:      Spouse name: Not on file    Number of children: Not on file    Years of education: Not on file    Highest education level: Not on file   Occupational History    Occupation: retired from [employer] - now a full a time    Tobacco Use    Smoking status: Never     Passive exposure: Never    Smokeless tobacco: Never   Vaping Use    Vaping status: Never Used   Substance and Sexual Activity    Alcohol use: Never     Comment: History of alcohol abuse/sober since 2015; went to treatment     Drug use: Never    Sexual activity: Yes     Partners: Female     Birth control/protection: Condom, Pill   Other Topics Concern    Parent/sibling w/ CABG, MI or angioplasty before 65F " 55M? 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    No unusual exposures    Has 1 dog 3 cats    Lives in [facility], in a house    Hardwood floors    One monogamous female partner     Social Drivers of Health     Financial Resource Strain: Low Risk  (4/15/2025)    Financial Resource Strain     Within the past 12 months, have you or your family members you live with been unable to get utilities (heat, electricity) when it was really needed?: No   Food Insecurity: Low Risk  (4/15/2025)    Food Insecurity     Within the past 12 months, did you worry that your food would run out before you got money to buy more?: No     Within the past 12 months, did the food you bought just not last and you didn t have money to get more?: No   Transportation Needs: High Risk (4/15/2025)    Transportation Needs     Within the past 12 months, has lack of transportation kept you from medical appointments, getting your medicines, non-medical meetings or appointments, work, or from getting things that you need?: Yes   Physical Activity: Sufficiently Active (4/15/2025)    Exercise Vital Sign     Days of Exercise per Week: 5 days     Minutes of Exercise per Session: 120 min   Stress: Stress Concern Present (4/15/2025)    [employer] - Occupational Stress Questionnaire     Feeling of Stress : To some extent   Social Connections: Unknown (4/15/2025)    Social Connection and Isolation Panel [NHANES]     Frequency of Communication with Friends and Family: Not on file     Frequency of Social Gatherings with Friends and Family: Three times a week     Attends Hinduism Services: Not on file     Active Member of Clubs or Organizations: Not on file     Attends Club or Organization Meetings: Not on file     Marital Status: Not on file   Interpersonal Safety: Low Risk  (1/21/2025)    Interpersonal Safety     Do you feel physically and emotionally safe where you currently  live?: Yes     Within the past 12 months, have you been hit, slapped, kicked or otherwise physically hurt by someone?: No     Within the past 12 months, have you been humiliated or emotionally abused in other ways by your partner or ex-partner?: No   Housing Stability: Low Risk  (4/15/2025)    Housing Stability     Do you have housing? : Yes     Are you worried about losing your housing?: No       Family History  Family History   Problem Relation Age of Onset    Other - See Comments Mother         pancreatitis    Hypertension Mother     Heart Failure Mother     Obesity Mother     Macular Degeneration Father     Asthma Father     Cancer Father          of leukemia, also had a h/o asthma    Diabetes Father     Other Cancer Father     Melanoma Father     Liver Cancer Brother 53    Cancer Brother         Intestinal cancer, spread to liver    Cancer Maternal Grandmother     Cancer Maternal Grandfather     Bone Cancer Paternal Grandmother     Other Cancer Paternal Grandmother     Bone Cancer Paternal Grandfather     Other Cancer Paternal Grandfather     Heart Failure Maternal Uncle     Heart Failure Maternal Uncle     Glaucoma No family hx of     Depression No family hx of     Mental Illness No family hx of     Substance Abuse No family hx of     Anesthesia Reaction No family hx of     Thrombosis No family hx of        Review of Systems  The complete review of systems is negative other than noted in the HPI or here.   Anesthesia Evaluation   Pt has had prior anesthetic.     No history of anesthetic complications       ROS/MED HX  ENT/Pulmonary:     (+) sleep apnea, uses CPAP,         allergic rhinitis,          Intermittent, asthma  Treatment: Inhaler prn,              (-) recent URI   Neurologic:     (+)      migraines,                          Cardiovascular:     (+) Dyslipidemia hypertension- -   -  - -                                 Previous cardiac testing   Echo: Date: Results:    Stress Test:  Date:  Results:    ECG Reviewed:  Date: 10/2024 Results:  Sinus rhythm with Premature supraventricular complexes   Otherwise normal ECG   Cath:  Date: Results:   (-) orthopnea/PND   METS/Exercise Tolerance: >4 METS Comment: Very active working as a  and doing bike racing   Hematologic:  - neg hematologic  ROS     Musculoskeletal:  - neg musculoskeletal ROS     GI/Hepatic:  - neg GI/hepatic ROS     Renal/Genitourinary:  - neg Renal ROS     Endo:     (+) type I DM,  Last HgA1c: 1/2/25, date: 7.0, Using insulin, - using insulin pump.   Diabetic complications: retinopathy. thyroid problem, hypothyroidism,           Psychiatric/Substance Use:     (+) psychiatric history depression (ADHD) alcohol abuse      Infectious Disease:  - neg infectious disease ROS     Malignancy:   (+) Malignancy, History of Other.Other CA hidradenacarcinoma right hand Remission status post Surgery.    Other:  - neg other ROS          Virtual visit -  No vitals were obtained    Physical Exam  Constitutional: Awake, alert, cooperative, no apparent distress, and appears stated age.  Eyes: Pupils equal  HENT: Normocephalic  Respiratory: non labored breathing   Neurologic: Awake, alert, oriented to name, place and time.   Neuropsychiatric: Calm, cooperative. Normal affect.      Prior Labs/Diagnostic Studies   All labs and imaging personally reviewed     EKG/ stress test - if available please see in ROS above         Latest Ref Rng & Units 3/25/2015    12:53 PM   PFT   FVC L 5.01    FEV1 L 3.84    FVC% % 84    FEV1% % 83          The patient's records and results personally reviewed by this provider.     Outside records reviewed from: Care Everywhere      Assessment    Luigi Moore is a 64 year old male seen as a PAC referral for risk assessment and optimization for anesthesia.    Plan/Recommendations  Pt will be optimized for the proposed procedure.  See below for details on the assessment, risk, and preoperative  "recommendations    NEUROLOGY  - No history of TIA, CVA or seizure  - migraines are managed with topamax    -Post Op delirium risk factors:  No risk identified    ENT  - No current airway concerns.  Will need to be reassessed day of surgery.  Mallampati: Unable to assess  TM: Unable to assess    - hold zyrtec DOS.    CARDIAC  - No history of CAD and Afib  - continue amlodipine without interruption.    - METS (Metabolic Equivalents)  Patient performs 4 or more METS exercise without symptoms             Total Score: 0      RCRI-Low risk: Class 2 0.9% complication rate             Total Score: 1    RCRI: Diabetes        PULMONARY  - Obstructive Sleep Apnea  TELLY without home CPAP use.  TELLY Low Risk             Total Score: -    Criteria with incomplete data:    TELLY: Snores loudly    TELLY: Often tired    TELLY: Observed stopped breathing    TELLY: Hypertension    TELLY: BMI over 35 kg/m2    TELLY: Over 50 ys old    TELLY: Neck Circum >16 in    TELLY: Male      - This score is not calculated because of inadequate data     - Asthma  Well controlled  - Tobacco History    History   Smoking Status    Never   Smokeless Tobacco    Never       GI  - denies GERD  PONV Low Risk  Total Score: 1           1 AN PONV: Patient is not a current smoker            ENDOCRINE    - BMI: Estimated body mass index is 26.48 kg/m  as calculated from the following:    Height as of this encounter: 1.892 m (6' 2.5\").    Weight as of this encounter: 94.8 kg (209 lb).  Overweight (BMI 25.0-29.9)  - Diabetes  Diabetes Type 1, insulin dependent. Continue insulin pump basal rate without change or interruption.  Hold morning short acting insulin bolus DOS. Take 80% of last scheduled dose of long-acting insulin prior to procedure ( only takes lantus prn for pump failure).  Recommend close monitoring of the patient's blood glucose levels throughout the perioperative period.    - hold Ozempic 1 week prior to surgery.     HEME  VTE Low Risk 0.5%             Total Score: 3 "    VTE: Greater than 59 yrs old    VTE: Male      - may continue aspirin uninterrupted          PSYCH  - hold adderall DOS  - continue all other mental health meds without interruption.  - alcohol dependence in remission since 2015        Different anesthesia methods/types have been discussed with the patient, but they are aware that the final plan will be decided by the assigned anesthesia provider on the date of service.      The patient is optimized for their procedure. AVS with information on surgery time/arrival time, meds and NPO status given by nursing staff. No further diagnostic testing indicated.    Please refer to the physical examination documented by the anesthesiologist in the anesthesia record on the day of surgery.    Video-Visit Details    Type of service:  Video Visit    Provider received verbal consent for a Video Visit from the patient? Yes     Originating Location (pt. Location): Home    Distant Location (provider location):  Off-site  Mode of Communication:  Video Conference via [employer]    On the day of service:     Prep time: 10 minutes  Visit time: 12 minutes  Documentation time: 5 minutes  ------------------------------------------  Total time: 27 minutes      GALILEA Duncan CNP  Preoperative Assessment Center  [facility]  Clinic and Surgery Center  Phone: [phone]  Fax: [phone]

## 2025-04-29 NOTE — PATIENT INSTRUCTIONS
"Preparing for Your Surgery      Name:  Luigi Moore \"Maurice\"  MRN:  [id number]   :  1960   Today's Date:  2025       [employer] [place] [id number] Project                                Timeline 2025 -2025    This project will affect travel to the [facility], as well as the [facility].      Please check the [facility] [employer] project website for the most up to date information and give yourself additional time to reach your destination.              Arriving for surgery:  Surgery date:  25  Arrival time:  10:40 am  Surgery time: 12:20 pm    Restrictions due to COVID 19:    Please maintain social distance.  Masking is optional        parking is available for anyone with mobility limitations or disabilities. (Monday- Friday 7 am- 5 pm)    Please come to:    [facility]  [address] [place] [id number]    Check in on the 5th floor, Ambulatory Surgery Center.    What can I eat or drink?    -  You may eat and drink normally until 8 hours before arrival time  (Until 2:40 am on 25)  -  You may have clear liquids until 2 hours before arrival time  (Until 8:40 am on 24)    Examples of clear liquids:  Water  Clear broth  Juices (apple, white grape, white cranberry  and cider) without pulp  Noncarbonated, powder based beverages  (lemonade and Bonifacio-Aid)  Sodas (Sprite, 7-Up, ginger ale and seltzer)  Coffee or tea (without milk or cream)  Gatorade    --No alcohol or cannabis products for at least 24 hours before surgery    Which medicines can I take?    Hold Ibuprofen (Advil, Motrin) for 1 day before surgery--unless otherwise directed by surgeon.  Hold Naproxen (Aleve) for 4 days before surgery.     Insulin pump- basal rate-do what you normally would do     Hold Ozempic dose 25      -  DO NOT take the following medications the day of surgery:   Adderall   Cetirizine " (Zyrtec)   Benadryl       -  PLEASE TAKE the following medications the day of surgery    Albuterol inhaler as needed   Amlodipine (Norvasc)   Aripiprazole (Abilify)   Aspirin - ok to stay on   Atorvastatin (Lipitor)   Bupropion (Wellbutrin)   Fluoxetine (Prozac)   Levothyroxine (Synthroid)   Losartan-hydrochlorothiazide (Hyzaar)   Eye drops as directed   Topiramate           How do I prepare myself?  - Please take 2 showers (one the night prior to surgery and one the morning of surgery) using Scrubcare or Hibiclens soap.    Use this soap only from the neck to your toes. Avoid genital area      Leave the soap on your skin for one minute--then rinse thoroughly.      You may use your own shampoo and conditioner; no other hair products.   - Please remove all jewelry and body piercings.  - No lotions, deodorants or fragrance.  - Bring your ID and insurance card.    -If you have a Deep Brain Stimulator, a Spinal Cord Stimulator or any implanted Neuro device you must bring the remote to the Surgery Center          ALL PATIENTS ARE REQUIRED TO HAVE A RESPONSIBLE ADULT TO DRIVE AND BE IN ATTENDANCE WITH THEM FOR 24 HOURS FOLLOWING SURGERY       Covid testing policy as of 12/06/2022  Your surgeon will notify and schedule you for a COVID test if one is needed before surgery--please direct any questions or COVID symptoms to your surgeon      Questions or Concerns:    -For questions regarding the day of surgery please contact the Ambulatory Surgery Center at [phone].    -If you have health changes between today and your surgery please contact your surgeon.     For questions after surgery please call your surgeons office.

## 2025-04-30 ENCOUNTER — TELEPHONE (OUTPATIENT)
Dept: ENDOCRINOLOGY | Facility: CLINIC | Age: 65
End: 2025-04-30
Payer: COMMERCIAL

## 2025-04-30 NOTE — TELEPHONE ENCOUNTER
Patient confirmed scheduled appointment:  Date: 06/03/25  Time: 8:00  Visit type: RETURN DIABETES  Provider: Vincent  Location: [facility] ENDO DIABETES  Testing/imaging: N/A   Additional notes: scheduled per pt. Pt was originally hesitant to r/s as he has a tennis camp the week of the 3rd, and didn't want to have any eye surgery that week. CC clarified that she was rescheduling the diabetes visit, not the cataract surgery. Pt able to r/s the endo visit more easily. Moved to day that [facility] re-opened to accommodate [facility]ak pt's.    Chrissy Foster on 4/30/2025 at 1:46 PM

## 2025-05-04 DIAGNOSIS — H25.813 COMBINED FORM OF AGE-RELATED CATARACT, BOTH EYES: Primary | ICD-10-CM

## 2025-05-04 RX ORDER — PREDNISOLONE ACETATE 10 MG/ML
1 SUSPENSION/ DROPS OPHTHALMIC 4 TIMES DAILY
Qty: 5 ML | Refills: 0 | Status: SHIPPED | OUTPATIENT
Start: 2025-05-06

## 2025-05-04 RX ORDER — KETOROLAC TROMETHAMINE 5 MG/ML
1 SOLUTION OPHTHALMIC 4 TIMES DAILY
Qty: 5 ML | Refills: 0 | Status: SHIPPED | OUTPATIENT
Start: 2025-05-06

## 2025-05-04 RX ORDER — MOXIFLOXACIN 5 MG/ML
1 SOLUTION/ DROPS OPHTHALMIC 4 TIMES DAILY
Qty: 3 ML | Refills: 0 | Status: SHIPPED | OUTPATIENT
Start: 2025-05-06

## 2025-05-06 ENCOUNTER — HOSPITAL ENCOUNTER (OUTPATIENT)
Facility: AMBULATORY SURGERY CENTER | Age: 65
Discharge: HOME | End: 2025-05-06
Attending: OPHTHALMOLOGY
Payer: COMMERCIAL

## 2025-05-06 ENCOUNTER — ANESTHESIA (OUTPATIENT)
Dept: SURGERY | Facility: AMBULATORY SURGERY CENTER | Age: 65
End: 2025-05-06
Payer: COMMERCIAL

## 2025-05-06 VITALS
BODY MASS INDEX: 24.87 KG/M2 | HEIGHT: 75 IN | SYSTOLIC BLOOD PRESSURE: 132 MMHG | RESPIRATION RATE: 20 BRPM | OXYGEN SATURATION: 100 % | TEMPERATURE: 97.2 F | WEIGHT: 200 LBS | DIASTOLIC BLOOD PRESSURE: 80 MMHG | HEART RATE: 76 BPM

## 2025-05-06 LAB
ANION GAP SERPL CALCULATED.3IONS-SCNC: 7 MMOL/L (ref 7–15)
BUN SERPL-MCNC: 8.4 MG/DL (ref 8–23)
CALCIUM SERPL-MCNC: 8.6 MG/DL (ref 8.8–10.4)
CHLORIDE SERPL-SCNC: 105 MMOL/L (ref 98–107)
CREAT SERPL-MCNC: 0.88 MG/DL (ref 0.67–1.17)
EGFRCR SERPLBLD CKD-EPI 2021: >90 ML/MIN/1.73M2
GLUCOSE BLDC GLUCOMTR-MCNC: 153 MG/DL (ref 70–99)
GLUCOSE SERPL-MCNC: 132 MG/DL (ref 70–99)
HCO3 SERPL-SCNC: 30 MMOL/L (ref 22–29)
POTASSIUM SERPL-SCNC: 3 MMOL/L (ref 3.4–5.3)
SODIUM SERPL-SCNC: 142 MMOL/L (ref 135–145)

## 2025-05-06 PROCEDURE — 80048 BASIC METABOLIC PNL TOTAL CA: CPT | Performed by: PATHOLOGY

## 2025-05-06 PROCEDURE — 82962 GLUCOSE BLOOD TEST: CPT | Performed by: PATHOLOGY

## 2025-05-06 DEVICE — LENS CC60WF 20.0 CLAREON UV ASPHERIC BICONVEX IOL: Type: IMPLANTABLE DEVICE | Site: EYE | Status: FUNCTIONAL

## 2025-05-06 RX ORDER — HYDROMORPHONE HYDROCHLORIDE 1 MG/ML
0.4 INJECTION, SOLUTION INTRAMUSCULAR; INTRAVENOUS; SUBCUTANEOUS EVERY 5 MIN PRN
Status: DISCONTINUED | OUTPATIENT
Start: 2025-05-06 | End: 2025-05-07 | Stop reason: HOSPADM

## 2025-05-06 RX ORDER — PROPARACAINE HYDROCHLORIDE 5 MG/ML
1 SOLUTION/ DROPS OPHTHALMIC ONCE
Status: COMPLETED | OUTPATIENT
Start: 2025-05-06 | End: 2025-05-06

## 2025-05-06 RX ORDER — CYCLOPENTOLAT/TROPIC/PHENYLEPH 1%-1%-2.5%
1 DROPS (EA) OPHTHALMIC (EYE)
Status: COMPLETED | OUTPATIENT
Start: 2025-05-06 | End: 2025-05-06

## 2025-05-06 RX ORDER — NALOXONE HYDROCHLORIDE 0.4 MG/ML
0.1 INJECTION, SOLUTION INTRAMUSCULAR; INTRAVENOUS; SUBCUTANEOUS
Status: DISCONTINUED | OUTPATIENT
Start: 2025-05-06 | End: 2025-05-07 | Stop reason: HOSPADM

## 2025-05-06 RX ORDER — TRIAMCINOLONE ACETONIDE 40 MG/ML
INJECTION, SUSPENSION INTRA-ARTICULAR; INTRAMUSCULAR PRN
Status: DISCONTINUED | OUTPATIENT
Start: 2025-05-06 | End: 2025-05-06 | Stop reason: HOSPADM

## 2025-05-06 RX ORDER — LIDOCAINE HYDROCHLORIDE 10 MG/ML
INJECTION, SOLUTION EPIDURAL; INFILTRATION; INTRACAUDAL; PERINEURAL PRN
Status: DISCONTINUED | OUTPATIENT
Start: 2025-05-06 | End: 2025-05-06 | Stop reason: HOSPADM

## 2025-05-06 RX ORDER — FENTANYL CITRATE 50 UG/ML
25 INJECTION, SOLUTION INTRAMUSCULAR; INTRAVENOUS EVERY 5 MIN PRN
Status: DISCONTINUED | OUTPATIENT
Start: 2025-05-06 | End: 2025-05-07 | Stop reason: HOSPADM

## 2025-05-06 RX ORDER — MOXIFLOXACIN IN NACL,ISO-OS/PF 0.3MG/0.3
SYRINGE (ML) INTRAOCULAR PRN
Status: DISCONTINUED | OUTPATIENT
Start: 2025-05-06 | End: 2025-05-06 | Stop reason: HOSPADM

## 2025-05-06 RX ORDER — SODIUM CHLORIDE, SODIUM LACTATE, POTASSIUM CHLORIDE, CALCIUM CHLORIDE 600; 310; 30; 20 MG/100ML; MG/100ML; MG/100ML; MG/100ML
INJECTION, SOLUTION INTRAVENOUS CONTINUOUS
Status: DISCONTINUED | OUTPATIENT
Start: 2025-05-06 | End: 2025-05-07 | Stop reason: HOSPADM

## 2025-05-06 RX ORDER — BALANCED SALT SOLUTION 6.4; .75; .48; .3; 3.9; 1.7 MG/ML; MG/ML; MG/ML; MG/ML; MG/ML; MG/ML
SOLUTION OPHTHALMIC PRN
Status: DISCONTINUED | OUTPATIENT
Start: 2025-05-06 | End: 2025-05-06 | Stop reason: HOSPADM

## 2025-05-06 RX ORDER — HYDROMORPHONE HYDROCHLORIDE 1 MG/ML
0.2 INJECTION, SOLUTION INTRAMUSCULAR; INTRAVENOUS; SUBCUTANEOUS EVERY 5 MIN PRN
Status: DISCONTINUED | OUTPATIENT
Start: 2025-05-06 | End: 2025-05-07 | Stop reason: HOSPADM

## 2025-05-06 RX ORDER — DEXAMETHASONE SODIUM PHOSPHATE 10 MG/ML
4 INJECTION, SOLUTION INTRAMUSCULAR; INTRAVENOUS
Status: DISCONTINUED | OUTPATIENT
Start: 2025-05-06 | End: 2025-05-07 | Stop reason: HOSPADM

## 2025-05-06 RX ORDER — TETRACAINE HYDROCHLORIDE 5 MG/ML
SOLUTION OPHTHALMIC PRN
Status: DISCONTINUED | OUTPATIENT
Start: 2025-05-06 | End: 2025-05-06 | Stop reason: HOSPADM

## 2025-05-06 RX ORDER — ONDANSETRON 4 MG/1
4 TABLET, ORALLY DISINTEGRATING ORAL EVERY 30 MIN PRN
Status: DISCONTINUED | OUTPATIENT
Start: 2025-05-06 | End: 2025-05-07 | Stop reason: HOSPADM

## 2025-05-06 RX ORDER — ONDANSETRON 2 MG/ML
4 INJECTION INTRAMUSCULAR; INTRAVENOUS EVERY 30 MIN PRN
Status: DISCONTINUED | OUTPATIENT
Start: 2025-05-06 | End: 2025-05-07 | Stop reason: HOSPADM

## 2025-05-06 RX ORDER — LIDOCAINE 40 MG/G
CREAM TOPICAL
Status: DISCONTINUED | OUTPATIENT
Start: 2025-05-06 | End: 2025-05-07 | Stop reason: HOSPADM

## 2025-05-06 RX ORDER — FENTANYL CITRATE 50 UG/ML
50 INJECTION, SOLUTION INTRAMUSCULAR; INTRAVENOUS EVERY 5 MIN PRN
Status: DISCONTINUED | OUTPATIENT
Start: 2025-05-06 | End: 2025-05-07 | Stop reason: HOSPADM

## 2025-05-06 RX ORDER — OXYCODONE HYDROCHLORIDE 5 MG/1
10 TABLET ORAL
Status: DISCONTINUED | OUTPATIENT
Start: 2025-05-06 | End: 2025-05-07 | Stop reason: HOSPADM

## 2025-05-06 RX ORDER — ACETAMINOPHEN 325 MG/1
975 TABLET ORAL ONCE
Status: COMPLETED | OUTPATIENT
Start: 2025-05-06 | End: 2025-05-06

## 2025-05-06 RX ORDER — OXYCODONE HYDROCHLORIDE 5 MG/1
5 TABLET ORAL
Status: DISCONTINUED | OUTPATIENT
Start: 2025-05-06 | End: 2025-05-07 | Stop reason: HOSPADM

## 2025-05-06 RX ORDER — LABETALOL HYDROCHLORIDE 5 MG/ML
10 INJECTION, SOLUTION INTRAVENOUS
Status: DISCONTINUED | OUTPATIENT
Start: 2025-05-06 | End: 2025-05-07 | Stop reason: HOSPADM

## 2025-05-06 RX ORDER — FENTANYL CITRATE 50 UG/ML
INJECTION, SOLUTION INTRAMUSCULAR; INTRAVENOUS PRN
Status: DISCONTINUED | OUTPATIENT
Start: 2025-05-06 | End: 2025-05-06

## 2025-05-06 RX ADMIN — PROPARACAINE HYDROCHLORIDE 1 DROP: 5 SOLUTION/ DROPS OPHTHALMIC at 11:40

## 2025-05-06 RX ADMIN — SODIUM CHLORIDE, SODIUM LACTATE, POTASSIUM CHLORIDE, CALCIUM CHLORIDE: 600; 310; 30; 20 INJECTION, SOLUTION INTRAVENOUS at 11:54

## 2025-05-06 RX ADMIN — Medication 1 DROP: at 11:51

## 2025-05-06 RX ADMIN — Medication 1 DROP: at 11:40

## 2025-05-06 RX ADMIN — Medication 1 DROP: at 11:56

## 2025-05-06 RX ADMIN — ACETAMINOPHEN 975 MG: 325 TABLET ORAL at 11:51

## 2025-05-06 RX ADMIN — FENTANYL CITRATE 50 MCG: 50 INJECTION, SOLUTION INTRAMUSCULAR; INTRAVENOUS at 13:05

## 2025-05-06 ASSESSMENT — ENCOUNTER SYMPTOMS: ORTHOPNEA: 0

## 2025-05-06 NOTE — ANESTHESIA POSTPROCEDURE EVALUATION
Patient: Luigi MEJIA Senum    Procedure: Procedure(s):  LEFT EYE PHACOEMULSIFICATION, CATARACT, WITH STANDARD INTRAOCULAR LENS IMPLANT INSERTION       Anesthesia Type:  MAC    Note:  Disposition: Outpatient   Postop Pain Control: Uneventful            Sign Out: Well controlled pain   PONV: No   Neuro/Psych: Uneventful            Sign Out: Acceptable/Baseline neuro status   Airway/Respiratory: Uneventful            Sign Out: Acceptable/Baseline resp. status   CV/Hemodynamics: Uneventful            Sign Out: Acceptable CV status; No obvious hypovolemia; No obvious fluid overload   Other NRE: NONE   DID A NON-ROUTINE EVENT OCCUR? No           Last vitals:  Vitals Value Taken Time   /81 05/06/25 1345   Temp 36.2  C (97.2  F) 05/06/25 1333   Pulse 75 05/06/25 1345   Resp 20 05/06/25 1333   SpO2 100 % 05/06/25 1349   Vitals shown include unfiled device data.    Electronically Signed By: Emilia Jesus MD  May 6, 2025  1:50 PM

## 2025-05-06 NOTE — DISCHARGE INSTRUCTIONS
Post-Operative Instructions     FIRST 24 HOURS AFTER SURGERY:  You are allowed to Tylenol (acetaminophen) 650mg every four hours as needed for pain unless you have liver disease or are allergic to Tylenol..  Continue taking your regular medications and eye drops in the non-operative eye.  Do not remove the metal or plastic shield unless otherwise instructed by your surgeon.  Do not operate a car, motorcycle, or machinery for 24 hours after surgery.  Call ED immediately if you have SEVERE PAIN unrelieved with Tylenol. And  ask for the resident on call.     MEDICATION INSTRUCTIONS:  -Use these eyedrops in your operative eye. Allow 5 minutes in between eyedrops.    - Prednisolone 4 times per day   - Ketorolac 4 times per day    - Moxifloxacin 4 times per day   -If you feel your eyes are dry and itchy, you can use regular lubricating drops for one month after the surgery. These eye drops can be found over the counter Brand names example: Systane, Refresh. Please choose preservative free drops. To be used four times a day and as needed for 1 month  -Instilling the eye drops directly into the eye.    -If you had previously any glaucoma eye drops, You can remove the cover and instill the drops as prescribed before and after the procedure      GENERAL INSTRUCTIONS:  Hygiene of the operated eye  Wash your hands thoroughly before caring for the eye.  If the lids are sticky or itchy in the morning, debris, or matter can be gently wiped away with a cotton ball moistened with tap water. DO NOT press on the lids or eyeball.     FIVE MINUTES APART. If ointment is prescribed, it should be applied last.  If you have been taking medications in the non-operated eye, continue as they were prescribed.  If you take medications by mouth for a medical problem, continue as they were prescribed (unless otherwise instructed by physician)    EYE PROTECTION  From the time of surgery until the time of your post-operative day 1 appointment, wear  the eye shield at all times. Then, wear it whenever sleeping, for 1 week.  Protective sunglasses may be worn as needed for your comfort, and are suggested for outdoor activities.    ACTIVITIES  Avoid vigorous exertion and heavy lifting for the first week after surgery  You may take a bath or shower, but avoid getting water directly into your eye for one week after surgery, usually by keeping your eyes closed during the bath.  You should discuss driving and traveling with your surgeon. You may ride in a car and fly in an airplane unless otherwise instructed.  Avoid swimming or using a hot tube/sauna/pool/lake for 2 weeks after the surgery.      WHAT TO EXPECT:  Mild irritation and discomfort are normal.    Call the doctor if you experience any of the following:  Severe eye pain  Nausea  Vomiting  Severe headache         [facility]  Home Care Following Anesthesia  For 24 hours after surgery:  Get plenty of rest.  A responsible adult must stay with you for at least 24 hours after you leave the surgery center.  Do not drive or use heavy equipment.  If you have weakness or tingling, don't drive or use heavy equipment until this feeling goes away.   Do not drink alcohol.   Avoid strenuous or risky activities.  Ask for help when climbing stairs.  You may feel lightheaded.  IF so, sit for a few minutes before standing.  Have someone help you get up.   If you have nausea (feel sick to your stomach): Drink only clear liquids such as apple juice, ginger ale, broth or 7-Up.  Rest may also help.  Be sure to drink enough fluids.  Move to a regular diet as you feel able.   You may have a slight fever.  Call the doctor if your fever is over 100 F (37.7 C) (taken under the tongue) or lasts longer than 24 hours.  You may have a dry mouth, a sore throat, muscle aches or trouble sleeping. These should go away after 24 hours.  Do not make important or legal decisions.   It is recommended to avoid  smoking.               Tips for taking pain medications  To get the best pain relief possible, remember these points:  Take pain medications as directed, before pain becomes severe.  Pain medication can upset your stomach: taking it with food may help.  Constipation is a common side effect of pain medication. Drink plenty of  fluids.  Eat foods high in fiber. Take a stool softener if recommended by your doctor or pharmacist.  Do not drink alcohol, drive or operate machinery while taking pain medications.  Ask about other ways to control pain, such as with heat, ice or relaxation.    Tylenol/Acetaminophen Consumption    If you feel your pain relief is insufficient, you may take Tylenol/Acetaminophen in addition to your narcotic pain medication.   Be careful not to exceed 4,000 mg of Tylenol/Acetaminophen in a 24 hour period from all sources.  If you are taking extra strength Tylenol/acetaminophen (500 mg), the maximum dose is 8 tablets in 24 hours.  If you are taking regular strength acetaminophen (325 mg), the maximum dose is 12 tablets in 24 hours.    Call a doctor for any of the following:  Signs of infection (fever, growing tenderness at the surgery site, a large amount of drainage or bleeding, severe pain, foul-smelling drainage, redness, swelling).  It has been over 8 to 10 hours since surgery and you are still not able to urinate (pass water).  Headache for over 24 hours.  Numbness, tingling or weakness the day after surgery (if you had spinal anesthesia).  Signs of Covid-19 infection (temperature over 100 degrees, shortness of breath, cough, loss of taste/smell, generalized body aches, persistent headache, chills, sore throat, nausea/vomiting/diarrhea)    Your doctor is:  Dr. Miranda Watson, Ophthalmology: [phone]  After hours and weekends call the hospital @ [phone] and ask for the resident on call for:  Ophthalmology  For emergency care, call the:  [facility]:  [phone]  (TTY for hearing impaired: [phone])    Tylenol 975 mg given at 12 pm.   Ok to take more after 6 pm.

## 2025-05-06 NOTE — OP NOTE
Operative  Report    Patient Name: Luigi Moore    MRN: [id number]    Date of Surgery: 5/6/2025    Surgeon: Miranda Watson M.D    Assistant surgeon: Nikole Vasquez M.D.    Preoperative Diagnosis: Visually significant cataract, left eye    Postoperative Diagnosis: Same    Procedure: Phacoemulsification with intraocular lens implant, left eye    Implant: AIM Distance  Implant Name Type Inv. Item Serial No.  Lot No. LRB No. Used Action   LENS CC60WF 20.0 [employer] UV ASPHERIC BICONVEX IOL - [id number] Lens/Eye Implant LENS CC60WF 20.0 CL[employer] UV ASPHERIC BICONVEX IOL [id number] [facility] [employer]  Left 1 Implanted       Anesthesia Type: MAC    Estimated Blood Loss: Trace    Complications: None    INDICATIONS FOR PROCEDURE: Patient has a history of visually significant cataract affecting the patient's activity of daily living. After a discussion with the patient, the patient has elected to have the listed procedure(s) to maximize visual potential. All of the appropriate consent forms have been signed and all of the patient's questions have been answered.    DETAILS OF THE PROCEDURE: Patient was identified in the pre operative area and given pre operative eye drops. The patient was then brought into the operating room and intravenous sedation was begun after a time out was performed. The patient was prepped and draped in the usual sterile ophthalmic fashion.     A lid speculum was placed and the operating microscope was brought into position. A paracentesis was made and lidocain and viscoelastic was injected into the anterior chamber. A clear corneal incision was made using a keratome blade. A cystotome needle and Utrata forceps were used to create an anterior continuous curvilinear capsulorhexis. Then BSS on a blunt tipped cannula was used for hydrodissection and hydrodelineation. Using the phacoemulsification unit, the nucleus was then removed from the eye. Using  irrigation and aspiration, the remaining cortical material was removed from the eye. The capsular bag was infused with viscoelastic material. The intraocular lens was then placed into the capsular bag and secured into position. The remaining viscoelastic material was then removed from the eye via irrigation and aspiration. BSS on a blunt tipped cannula was used to hydrate all of the wounds. At the end of the case, the wounds were noted to be watertight, the pupil was noted to be round, the lens appeared to be in good position, and the pressure was palpated to be physiologic. Intracameral moxifloxacin and a subconjunctival injection of Kenalog were administered.     Post operative ointment was applied and the eye was patched and shielded. Patient tolerated procedure and was brought to the recovery area in good condition. Patient will follow in the eye clinic in one day.

## 2025-05-06 NOTE — ANESTHESIA CARE TRANSFER NOTE
Patient: Luigi MEJIA Senum    Procedure: Procedure(s):  LEFT EYE PHACOEMULSIFICATION, CATARACT, WITH STANDARD INTRAOCULAR LENS IMPLANT INSERTION       Diagnosis: PCO (posterior capsule opacification), right [H26.491]  Diagnosis Additional Information: No value filed.    Anesthesia Type:   MAC     Note:    Oropharynx: oropharynx clear of all foreign objects and spontaneously breathing  Level of Consciousness: awake  Oxygen Supplementation: room air    Independent Airway: airway patency satisfactory and stable  Dentition: dentition unchanged  Vital Signs Stable: post-procedure vital signs reviewed and stable  Report to RN Given: handoff report given  Patient transferred to: Phase II  Comments: Report to Phase II RN. Resps easy and regular.   Handoff Report: Identifed the Patient, Identified the Reponsible Provider, Reviewed the pertinent medical history, Discussed the surgical course, Reviewed Intra-OP anesthesia mangement and issues during anesthesia, Set expectations for post-procedure period and Allowed opportunity for questions and acknowledgement of understanding      Vitals:  Vitals Value Taken Time   /75 05/06/25 1333   Temp 36.2  C (97.2  F) 05/06/25 1333   Pulse 78 05/06/25 1333   Resp 20 05/06/25 1333   SpO2 98 % 05/06/25 1333   Vitals shown include unfiled device data.    Electronically Signed By: GALILEA Up CRNA  May 6, 2025  1:35 PM

## 2025-05-06 NOTE — ANESTHESIA PREPROCEDURE EVALUATION
Anesthesia Pre-Procedure Evaluation    Patient: Luigi Moore   MRN: [id number] : 1960        Procedure : Procedure(s):  LEFT EYE PHACOEMULSIFICATION, CATARACT, WITH STANDARD INTRAOCULAR LENS IMPLANT INSERTION          Past Medical History:   Diagnosis Date    Acquired hypothyroidism 2016    ADHD (attention deficit hyperactivity disorder)     Alcohol dependence in remission (H)     Depressive disorder     Hidradenocarcinoma 2015    right hand    Hyperlipidemia 2006    Hypertension     Migraine     Numbness and tingling 2015    right hand, related to surgery for hidradenoma    TELLY (obstructive sleep apnea) 2007    New cpap machine 1 year ago. follows at [facility]    Type 1 diabetes (H) 1982    Uncomplicated asthma       Past Surgical History:   Procedure Laterality Date    BIOPSY      COLONOSCOPY N/A 2024    Procedure: Colonoscopy;  Surgeon: Jose Luis Gonzales MD;  Location: [facility]    EXCISE LESION HAND Right 2015    Procedure: EXCISE LESION HAND;  Surgeon: Jeovany Jefferson MD;  Location: [facility] OR    IMPLANT PROSTHESIS PENIS INFLATABLE N/A 2022    Procedure: INSERTION of INFLATABLE PENILE PROSTHESIS;  Surgeon: Johnathan Cooper MD;  Location: [facility] OR    IR RENAL BIOPSY RIGHT  10/11/2024    ORTHOPEDIC SURGERY Left     achilles tendon     ORTHOPEDIC SURGERY Right     hand surgery    PHACOEMULSIFICATION CLEAR CORNEA WITH STANDARD INTRAOCULAR LENS IMPLANT Right 2025    Procedure: RIGHT EYE PHACOEMULSIFICATION, CATARACT, WITH INTRAOCULAR LENS IMPLANT;  Surgeon: Richard Dutta MD;  Location: [facility] OR    RELEASE DUPUYTRENS CONTRACTURE Left 2025    Procedure: Left palm and small finger open Dupuytren's fasciectomy,  ring finger Dupuytren's fasciectomy;  Surgeon: Miguel Marcus MD;  Location: [facility] OR    SOFT TISSUE SURGERY      lipoma removal, below rib cage on right side      Allergies   Allergen Reactions    Iodinated Contrast Media Anaphylaxis    Contrast  Dye Hives    Diatrizoate Hives     Iodine dye; iodine externally or topically is OK      Social History     Tobacco Use    Smoking status: Never     Passive exposure: Never    Smokeless tobacco: Never   Substance Use Topics    Alcohol use: Never     Comment: History of alcohol abuse/sober since 2015; went to treatment       Wt Readings from Last 1 Encounters:   05/06/25 90.7 kg (200 lb)        Anesthesia Evaluation   Pt has had prior anesthetic. Type: General and MAC.    No history of anesthetic complications       ROS/MED HX  ENT/Pulmonary:     (+) sleep apnea, uses CPAP,         allergic rhinitis,          Intermittent, asthma  Treatment: Inhaler prn,              (-) recent URI   Neurologic:     (+)      migraines,                          Cardiovascular:     (+) Dyslipidemia hypertension-range: 120-140/60-70/ -   -  - -                                 Previous cardiac testing   Echo: Date: Results:    Stress Test:  Date: Results:    ECG Reviewed:  Date: 10/2024 Results:  Sinus rhythm with Premature supraventricular complexes   Otherwise normal ECG   Cath:  Date: Results:   (-) VAIL, orthopnea/PND and syncope   METS/Exercise Tolerance: >4 METS Comment: Very active working as a  and doing bike racing   Hematologic:  - neg hematologic  ROS     Musculoskeletal:  - neg musculoskeletal ROS     GI/Hepatic: Comment: Last dose of Ozempic was 4/22/25 - denies any GI symptoms   (-) GERD   Renal/Genitourinary:  - neg Renal ROS     Endo:     (+) type I DM,  Last HgA1c: 1/2/25, date: 7.0, Using insulin, - using insulin pump. Normal glucose range: avg 130s,  Diabetic complications: retinopathy. thyroid problem, hypothyroidism,           Psychiatric/Substance Use:     (+) psychiatric history depression (ADHD) alcohol abuse      Infectious Disease:  - neg infectious disease ROS     Malignancy:   (+) Malignancy, History of Other.Other CA hidradenacarcinoma right hand Remission status post Surgery.    Other:  -  neg other ROS          Physical Exam    Airway        Mallampati: II   TM distance: > 3 FB   Neck ROM: full   Mouth opening: > 3 cm    Respiratory Devices and Support         Dental     Comment: No apparent abnormalities        Cardiovascular          Rhythm and rate: regular and normal     Pulmonary           breath sounds clear to auscultation           OUTSIDE LABS:  CBC:   Lab Results   Component Value Date    WBC 6.3 12/17/2024    WBC 6.1 10/11/2024    HGB 14.2 12/17/2024    HGB 13.0 (L) 10/11/2024    HCT 41.0 12/17/2024    HCT 37.9 (L) 10/11/2024     12/17/2024     10/11/2024     BMP:   Lab Results   Component Value Date     (L) 08/14/2024     01/05/2024    POTASSIUM 4.1 08/14/2024    POTASSIUM 3.9 01/05/2024    CHLORIDE 99 08/14/2024    CHLORIDE 102 01/05/2024    CO2 26 08/14/2024    CO2 24 01/05/2024    BUN 14.9 08/14/2024    BUN 7.4 (L) 01/05/2024    CR 0.88 08/14/2024    CR 1.01 01/05/2024     (H) 05/06/2025     (H) 01/21/2025     COAGS:   Lab Results   Component Value Date    INR 1.03 10/11/2024     POC:   Lab Results   Component Value Date     (H) 05/08/2017     HEPATIC:   Lab Results   Component Value Date    ALBUMIN 4.4 01/05/2024    PROTTOTAL 8.0 01/05/2024    ALT 15 01/05/2024    AST 24 01/05/2024    ALKPHOS 101 01/05/2024    BILITOTAL 0.7 01/05/2024     OTHER:   Lab Results   Component Value Date    A1C 7.0 (H) 01/02/2025    NEVA 9.5 08/14/2024    TSH 4.19 01/02/2025    T4 1.46 12/17/2024       Anesthesia Plan    ASA Status:  3    NPO Status:  NPO Appropriate    Anesthesia Type: MAC.     - Reason for MAC: straight local not clinically adequate              Consents    Anesthesia Plan(s) and associated risks, benefits, and realistic alternatives discussed. Questions answered and patient/representative(s) expressed understanding.     - Discussed:     - Discussed with:  Patient            Postoperative Care            Comments:    Other Comments: Discussed  "light sedation, likelihood of awareness/recall, for cataract surgery.    Discussed plan for MAC, including possibility of awareness, risk of aspiration pneumonia, risk of hypoxia/low oxygen/airway obstruction requiring additional airway support/devices. Discussed alternatives. Discussed backup plan of general anesthesia and intubation. Ensured understanding, invited questions and all questions were answered.               Catherine Borja MD    Clinically Significant Risk Factors Present on Admission                 # Drug Induced Platelet Defect: home medication list includes an antiplatelet medication   # Hypertension: Noted on problem list          # DMII: A1C = N/A within past 6 months    # Overweight: Estimated body mass index is 25 kg/m  as calculated from the following:    Height as of this encounter: 1.905 m (6' 3\").    Weight as of this encounter: 90.7 kg (200 lb).                "

## 2025-05-07 ENCOUNTER — OFFICE VISIT (OUTPATIENT)
Dept: OPHTHALMOLOGY | Facility: CLINIC | Age: 65
End: 2025-05-07
Attending: OPHTHALMOLOGY
Payer: COMMERCIAL

## 2025-05-07 DIAGNOSIS — Z98.890 POSTOPERATIVE EYE STATE: Primary | ICD-10-CM

## 2025-05-07 PROCEDURE — 99212 OFFICE O/P EST SF 10 MIN: CPT | Performed by: OPHTHALMOLOGY

## 2025-05-07 ASSESSMENT — VISUAL ACUITY
OS_SC: 20/25
OD_SC+: +3
OD_SC: 20/25
OS_SC+: +2
METHOD: SNELLEN - LINEAR

## 2025-05-07 ASSESSMENT — SLIT LAMP EXAM - LIDS
COMMENTS: NORMAL
COMMENTS: NORMAL

## 2025-05-07 ASSESSMENT — TONOMETRY
OD_IOP_MMHG: 15
IOP_METHOD: ICARE
OS_IOP_MMHG: 30

## 2025-05-07 NOTE — PROGRESS NOTES
Chief complaint   POD1 s/p CEIOL left4 eye on 5/6/2025  S/p CEIOL OD    HPI    Luigi MEJIA Senum 64 year old male     Interval hx 05/07/2025  Chief Complaint(s) and History of Present Illness(es)       Post Op (Ophthalmology) Left Eye    In left eye. Additional comments: Post op day 1 CE/IOL LE  Had good night, no pain\  Prednisolone qid LE  Ketorolac qid LE  Vigamox qid LE  Jeanine Teagan COA 7:53 AM May 7, 2025                              Past ocular history   Prior eye surgery/laser/Trauma: CEIOL right eye- ? Laser near optic nerve 20 years ago right eye;  CTL wearer:No  Glasses : yes  Family Hx of eye disease: AMD in father    PMH     Past Medical History:   Diagnosis Date    Acquired hypothyroidism 12/07/2016    ADHD (attention deficit hyperactivity disorder)     Alcohol dependence in remission (H)     Depressive disorder     Hidradenocarcinoma 2015    right hand    Hyperlipidemia 12/07/2006    Hypertension     Migraine     Numbness and tingling 2015    right hand, related to surgery for hidradenoma    TELLY (obstructive sleep apnea) 2007    New cpap machine 1 year ago. follows at [facility]    Type 1 diabetes (H) 1982    Uncomplicated asthma        PSH     Past Surgical History:   Procedure Laterality Date    BIOPSY  2016    COLONOSCOPY N/A 5/17/2024    Procedure: Colonoscopy;  Surgeon: Jose Luis Gonzales MD;  Location: [facility]    EXCISE LESION HAND Right 02/26/2015    Procedure: EXCISE LESION HAND;  Surgeon: Jeovany Jefferson MD;  Location: [facility]    IMPLANT PROSTHESIS PENIS INFLATABLE N/A 02/01/2022    Procedure: INSERTION of INFLATABLE PENILE PROSTHESIS;  Surgeon: Johnathan Cooper MD;  Location: [facility]    IR RENAL BIOPSY RIGHT  10/11/2024    ORTHOPEDIC SURGERY Left     achilles tendon     ORTHOPEDIC SURGERY Right     hand surgery    PHACOEMULSIFICATION CLEAR CORNEA WITH STANDARD INTRAOCULAR LENS IMPLANT Right 1/21/2025    Procedure: RIGHT EYE PHACOEMULSIFICATION, CATARACT, WITH INTRAOCULAR LENS  IMPLANT;  Surgeon: Richard Dutta MD;  Location: [facility] OR    PHACOEMULSIFICATION WITH STANDARD INTRAOCULAR LENS IMPLANT Left 5/6/2025    Procedure: LEFT EYE PHACOEMULSIFICATION, CATARACT, WITH STANDARD INTRAOCULAR LENS IMPLANT INSERTION;  Surgeon: Miranda Watson MD;  Location: [facility] OR    RELEASE DUPUYTRENS CONTRACTURE Left 1/6/2025    Procedure: Left palm and small finger open Dupuytren's fasciectomy,  ring finger Dupuytren's fasciectomy;  Surgeon: Miguel Marcus MD;  Location: [facility] OR    SOFT TISSUE SURGERY      lipoma removal, below rib cage on right side       Meds     Current Outpatient Medications   Medication Sig Dispense Refill    albuterol (PROAIR HFA/PROVENTIL HFA/VENTOLIN HFA) 108 (90 Base) MCG/ACT inhaler Inhale 2 puffs into the lungs every 6 hours as needed for shortness of breath or wheezing 18 g 5    amLODIPine (NORVASC) 10 MG tablet Take 1 tablet (10 mg) by mouth daily. (Patient taking differently: Take 10 mg by mouth every morning.) 90 tablet 3    amphetamine-dextroamphetamine (ADDERALL) 15 MG tablet Take 1 tablet (15 mg) by mouth 2 times daily. 60 tablet 0    [START ON 5/18/2025] amphetamine-dextroamphetamine (ADDERALL) 15 MG tablet Take 1 tablet (15 mg) by mouth 2 times daily. (Patient taking differently: Take 15 mg by mouth 2 times daily.) 60 tablet 0    [START ON 6/17/2025] amphetamine-dextroamphetamine (ADDERALL) 15 MG tablet Take 1 tablet (15 mg) by mouth 2 times daily. (Patient taking differently: Take 15 mg by mouth 2 times daily.) 60 tablet 0    ARIPiprazole (ABILIFY) 15 MG tablet TAKE 1 TABLET BY MOUTH EVERY DAY (Patient taking differently: Take 15 mg by mouth every morning.) 90 tablet 1    aspirin 81 MG tablet Take 1 tablet (81 mg) by mouth daily (Patient taking differently: Take 81 mg by mouth every morning.) 100 tablet 3    atorvastatin (LIPITOR) 40 MG tablet TAKE 1 TABLET BY MOUTH EVERY DAY (Patient taking differently: Take 40 mg by mouth every morning.) 90 tablet 3    blood  "glucose (ACCU-CHEK GUIDE) test strip Use to test blood sugar 3 times daily or as directed. 100 strip 11    buPROPion (WELLBUTRIN XL) 150 MG 24 hr tablet Take 1 tablet (150 mg) by mouth every morning. Take with 300 mg tablet (total daily dose 450 mg) 60 tablet 5    buPROPion (WELLBUTRIN XL) 300 MG 24 hr tablet Take 1 tablet (300 mg) by mouth every morning. Take with 150 mg tablet (total daily dose 450 mg) 60 tablet 5    cetirizine (ZYRTEC) 10 MG tablet Take 10 mg by mouth as needed.      Continuous Glucose Sensor (GUARDIAN 4 GLUCOSE SENSOR) MISC 1 each See Admin Instructions. Use per 's instructions. Change every 7 days. 12 each 4    Continuous Glucose Transmitter (GUARDIAN 4 TRANSMITTER) MISC 1 each See Admin Instructions. Use per 's instructions to monitor glucose. 1 each 3    DiphenhydrAMINE HCl (BENADRYL PO) Take by mouth as needed.      FLUoxetine (PROZAC) 20 MG capsule TAKE 1 CAPSULE BY MOUTH EVERY DAY (Patient taking differently: Take 20 mg by mouth every morning.) 90 capsule 0    Glucagon (BAQSIMI) 3 MG/DOSE nasal powder Spray 1 spray (3 mg) in nostril as needed (severe hypoglycemia) in the event of unconscious hypoglycemia or hypoglycemic seizure. May repeat dose if no response after 15 minutes. 1 each 1    glucagon 1 MG kit Inject 1 mg into the muscle once for 1 dose 1 each 0    infusion set (AGNES 32\" 9MM) misc pump supply Infusion set to be used with pump.  Change every 2-3 days as directed. 3 Box 4    insulin glargine (LANTUS SOLOSTAR) 100 UNIT/ML pen INJECT 20 UNITS Daily  SUBCUTANEOUS ONLY IF INSULIN PUMP FAILS. (Patient taking differently: as needed. INJECT 20 UNITS Daily  SUBCUTANEOUS ONLY IF INSULIN PUMP FAILS.) 15 mL 1    Insulin Infusion Pump (INSULIN PUMP AD8254) KIT       Insulin Infusion Pump Supplies (EXTENDED INFUSION SET 23\"/9MM) MISC 1 each See Admin Instructions. Change every 2-3 days for use in insulin pump. 40 each 3    Insulin Infusion Pump Supplies (EXTENDED " "RESERVOIR 3ML) MISC 1 each See Admin Instructions. Change every 2-3 days for use in insulin pump. 40 each 3    Insulin Infusion Pump Supplies (MINIMED PUMP RESERVOIR 3ML) MISC 1 each See Admin Instructions. 2 each 3    insulin lispro (HUMALOG) 100 UNIT/ML vial VIA PUMP APPROX 80 UNITS DAILY. 80 mL 3    insulin pen needle (32G X 6 MM) 32G X 6 MM miscellaneous Use 4 pen needles daily or as directed as back up. 100 each 3    insulin syringe-needle U-100 (BD INSULIN SYRINGE) 29G X 1/2\" 1 ML miscellaneous Use as directed 20 each 1    ketoconazole (NIZORAL) 2 % external cream Apply topically 2 times daily as needed (redness and flaking). Apply to the face. 60 g 3    ketorolac (ACULAR) 0.5 % ophthalmic solution Place 1 drop Into the left eye 4 times daily. 5 mL 0    ketorolac (ACULAR) 0.5 % ophthalmic solution Place 1 drop into the right eye 4 times daily. 5 mL 2    KETOSTIX test strip Use as directed in case of high glucose, vomiting or illness. 50 strip 11    levothyroxine (SYNTHROID/LEVOTHROID) 175 MCG tablet Take 1 tablet (175 mcg) by mouth daily. Please take daily on an empty stomach and wait 30 minutes for any other medications food or drink.  Please recheck TSH level again in 12 months. (Patient taking differently: Take 175 mcg by mouth every morning (before breakfast). Please take daily on an empty stomach and wait 30 minutes for any other medications food or drink.  Please recheck TSH level again in 12 months.) 90 tablet 3    losartan-hydrochlorothiazide (HYZAAR) 50-12.5 MG tablet TAKE 1 TABLET BY MOUTH EVERY DAY (Patient taking differently: Take 1 tablet by mouth every morning.) 90 tablet 1    moxifloxacin (VIGAMOX) 0.5 % ophthalmic solution Place 1 drop Into the left eye 4 times daily. 3 mL 0    neomycin-polymixin-dexAMETHasone (MAXITROL) 0.1 % ophthalmic suspension 1 drop TID OD for 1 week then BID OD for 1 week Follow-up in 2 weeks 5 mL 1    order for DME Equipment being ordered: CPAP machine 1 each 0    " prednisoLONE acetate (PRED FORTE) 1 % ophthalmic suspension Place 1 drop Into the left eye 4 times daily. 5 mL 0    prednisoLONE acetate (PRED FORTE) 1 % ophthalmic suspension Place 1-2 drops into the right eye 4 times daily. 10 mL 2    Semaglutide, 2 MG/DOSE, (OZEMPIC, 2 MG/DOSE,) 8 MG/3ML pen Inject 2 mg subcutaneously every 7 days. (Patient taking differently: Inject 2 mg subcutaneously every 7 days. Last dose 4/25/25) 9 mL 3    topiramate (TOPAMAX) 50 MG tablet Take 1 tablet (50 mg) by mouth daily. (Patient taking differently: Take 50 mg by mouth every morning.) 90 tablet 3    Urea 40 % CREA Externally apply topically 2 times daily To surface of toenails 198 g 5     No current facility-administered medications for this visit.     Facility-Administered Medications Ordered in Other Visits   Medication Dose Route Frequency Provider Last Rate Last Admin    lidocaine (PF) (XYLOCAINE) 1 % injection 10 mL  10 mL Intradermal Once Kathya Lang APRN CNP           DM1 x 40+ years    Drops Currently Taking   5/7//2025   Prednisolone QID and Moxi QID OS    Assessment/Plan 05/07/2025   # Pseudophakic, right eye  - good surgical result    # Pseudophakic, left eye  POD1 s/p CEIOL leftt eye on 5/6/2025  Good surgical result, Sheree -ve,; IOP left eye after main wound burp: 15mmHg  No water in the eye x 1 week  Sleep with eye shield x 1 week      #PCO, OD  S/p YAG cap      Plan 5/7/2025  F/up 3 weeks for POW3 f/up  Prednisolone QID left eye x 1 week then TID x 1 week then BID x 1 week  Moxi QID left eye x 1 week then stop    Nikole Vasquez MD  Cornea and External Disease Fellow  [facility]      I agree with the plan. I did not examine the patient today  Miranda Watson M.D

## 2025-05-07 NOTE — NURSING NOTE
Chief Complaints and History of Present Illnesses   Patient presents with    Post Op (Ophthalmology) Left Eye     Post op day 1 CE/IOL LE  Had good night, no pain\  Prednisolone qid LE  Ketorolac qid LE  Vigamox qid LE  Jeanine Dziuk COA 7:53 AM May 7, 2025          Chief Complaint(s) and History of Present Illness(es)       Post Op (Ophthalmology) Left Eye              Laterality: left eye    Comments: Post op day 1 CE/IOL LE  Had good night, no pain\  Prednisolone qid LE  Ketorolac qid LE  Vigamox qid LE  Jeanine Dziuk KIARRA 7:53 AM May 7, 2025

## 2025-05-13 ENCOUNTER — RESULTS FOLLOW-UP (OUTPATIENT)
Dept: SURGERY | Facility: CLINIC | Age: 65
End: 2025-05-13

## 2025-05-13 NOTE — RESULT ENCOUNTER NOTE
Ari Meyers,    I hope your surgery went well.  Your test results from the day of surgery are attached.  Please note that your potassium level and calcium were a little low.  Additionally, your glucose was a bit high for being in a fasting state.  I would recommend that you follow up with your primary care provider in the next month or so for a recheck.  Also consider eating fruits and vegetables that are high in potassium to keep that level up to normal.         Sravani Mercer DNP, RN, ANP-C

## 2025-05-20 NOTE — PROGRESS NOTES
JENISE Moore is a 63 year old male with type 1 diabetes mellitus.  Clinic visit with me today.    Pt dx with type 1 DM at age 22.  He has been using an insulin pump since around 1987.  Pt's diabetes is complicated by PDR.  No known diabetic nephropathy.  He is participating in the kidney precision medicine project and did have a renal biopsy with this with no concerning findings.    Pt has no hx of diabetic peripheral neuropathy.  Hx of ED s/p surgery.  Pt also has hx of hypothyroidism, hyperlipidemia, HTN, TELLY, insomnia, exploding head syndrome, hidradenoma and Dupuytren contractures.    Bp at home 120's or 110's/70.    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  He is going to a tennis camp himself next week but has not had any time for tournaments himself as he has been coaching and teaching a lot.  The highly ranked female player that he was coaching from [facility] was injured but he is now coaching highly ranked male player and his boss is looking at opening at tennis training facility in [facility] that he would likely work at in the winter.    A1C is 7.2% today.        For his diabetes, using Medtronic 780G insulin pump.  His data is included below and we reviewed this together.  Of note he is now eating a much lower carb diet.  This was not really at goal of his it just has happened as he is snacking a lot less.  If you are going to change anything about his diet he thinks he would like to eat more steak.  He denies eating large amounts of cheese sausage or fried foods.  He is meeting activity recommendations.    No peripheral neuropathy sx. No active foot ulcers.  He does have hypertrophic nails due to trauma.  He did have this tested for fungus and indeed there is no fungus and he has thickened toenails which he cares for himself.      He feels he is doing a good job of taking his thyroid medication on empty stomach each morning.    He is able to feel his low blood  sugars.  He has not needed assistance treating low since about 2002.      He feels he is doing well with the Ozempic.  He is comfortable with his current weight which is now stable.  It had been as low as 185 pounds at 1 point and now is at 195.  He feels he does have a decent appetite and enjoys his evening meal, but always has been mostly a once a day meal person.  He previously consumed more carbs throughout the day when he was working, but now he has his phone present when working out and coaching and watches the trends and is better able to limit lows without taking in as much carb.  Having his ADHD and depression treated has helped a lot.      Diabetes Care  Retinopathy: yes; hx of PDR.  History of laser treatment right eye x1. Pt seen by ophthalmology annually.  Eye exam was updated in November 2025 and everything was stable recommended annual follow-up.    Nephropathy:urine microalbuminuria negative in December 2023.  Patient taking Hyzaar.  Neuropathy: None.  Foot Exam: No foot ulcers.  Denies any numbness paresthesias.   Taking aspirin: Yes.  Lipids: LDL 76 in January 2024.  Patient taking Lipitor.  Insulin: Medtronic 7 80G insulin pump.  DM meds: Insulin and Ozempic.   Testing: Currently using glucose meter.  Will order Guardian4 sensors for his Medtronic 7 80G insulin pump.  Hypoglycemia tx: has Baqsimi.  Backup insulin: Patient has basal insulin for backup in case insulin pump fails.    CGMS and pump data:  His average blood sugar during the limited amount of CGM data time that we have he has 179.  He has a high coefficient of variance at 34.4%  He is using an average daily dosage of 55 units of insulin 70% of which is bolus insulin.  He has been in smartcard just 5% of the time.      ROS  Please see under HPI.      Allergies  Allergies   Allergen Reactions    Iodinated Contrast Media Anaphylaxis    Contrast Dye Hives    Diatrizoate Hives     Iodine dye; iodine externally or topically is OK        Medications  Current Outpatient Medications   Medication Sig Dispense Refill    albuterol (PROAIR HFA/PROVENTIL HFA/VENTOLIN HFA) 108 (90 Base) MCG/ACT inhaler Inhale 2 puffs into the lungs every 6 hours as needed for shortness of breath or wheezing 18 g 5    amLODIPine (NORVASC) 10 MG tablet Take 1 tablet (10 mg) by mouth daily. 90 tablet 3    amphetamine-dextroamphetamine (ADDERALL) 15 MG tablet Take 1 tablet (15 mg) by mouth 2 times daily. 60 tablet 0    amphetamine-dextroamphetamine (ADDERALL) 15 MG tablet Take 1 tablet (15 mg) by mouth 2 times daily. 60 tablet 0    [START ON 6/17/2025] amphetamine-dextroamphetamine (ADDERALL) 15 MG tablet Take 1 tablet (15 mg) by mouth 2 times daily. 60 tablet 0    ARIPiprazole (ABILIFY) 15 MG tablet TAKE 1 TABLET BY MOUTH EVERY DAY 90 tablet 1    aspirin 81 MG tablet Take 1 tablet (81 mg) by mouth daily 100 tablet 3    atorvastatin (LIPITOR) 40 MG tablet TAKE 1 TABLET BY MOUTH EVERY DAY 90 tablet 3    blood glucose (ACCU-CHEK GUIDE) test strip Use to test blood sugar 3 times daily or as directed. 100 strip 11    buPROPion (WELLBUTRIN XL) 150 MG 24 hr tablet Take 1 tablet (150 mg) by mouth every morning. Take with 300 mg tablet (total daily dose 450 mg) 60 tablet 5    buPROPion (WELLBUTRIN XL) 300 MG 24 hr tablet Take 1 tablet (300 mg) by mouth every morning. Take with 150 mg tablet (total daily dose 450 mg) 60 tablet 5    cetirizine (ZYRTEC) 10 MG tablet Take 10 mg by mouth as needed.      Continuous Glucose Sensor (GUARDIAN 4 GLUCOSE SENSOR) MISC 1 each See Admin Instructions. Use per 's instructions. Change every 7 days. 12 each 4    Continuous Glucose Transmitter (GUARDIAN 4 TRANSMITTER) MISC 1 each See Admin Instructions. Use per 's instructions to monitor glucose. 1 each 3    DiphenhydrAMINE HCl (BENADRYL PO) Take by mouth as needed.      FLUoxetine (PROZAC) 20 MG capsule TAKE 1 CAPSULE BY MOUTH EVERY DAY 90 capsule 0    Glucagon (BAQSIMI) 3  "MG/DOSE nasal powder Spray 1 spray (3 mg) in nostril as needed (severe hypoglycemia) in the event of unconscious hypoglycemia or hypoglycemic seizure. May repeat dose if no response after 15 minutes. 1 each 1    glucagon 1 MG kit Inject 1 mg into the muscle once for 1 dose 1 each 0    infusion set (AGNES 32\" 9MM) misc pump supply Infusion set to be used with pump.  Change every 2-3 days as directed. 3 Box 4    insulin glargine (LANTUS SOLOSTAR) 100 UNIT/ML pen INJECT 20 UNITS Daily  SUBCUTANEOUS ONLY IF INSULIN PUMP FAILS. (Patient taking differently: as needed. INJECT 20 UNITS Daily  SUBCUTANEOUS ONLY IF INSULIN PUMP FAILS.) 15 mL 1    Insulin Infusion Pump (INSULIN PUMP FJ0040) KIT       Insulin Infusion Pump Supplies (EXTENDED INFUSION SET 23\"/9MM) MISC 1 each See Admin Instructions. Change every 2-3 days for use in insulin pump. 40 each 3    Insulin Infusion Pump Supplies (EXTENDED RESERVOIR 3ML) MISC 1 each See Admin Instructions. Change every 2-3 days for use in insulin pump. 40 each 3    Insulin Infusion Pump Supplies (MINIMED PUMP RESERVOIR 3ML) MISC 1 each See Admin Instructions. 2 each 3    insulin lispro (HUMALOG) 100 UNIT/ML vial VIA PUMP APPROX 80 UNITS DAILY. 80 mL 3    insulin pen needle (32G X 6 MM) 32G X 6 MM miscellaneous Use 4 pen needles daily or as directed as back up. 100 each 3    insulin syringe-needle U-100 (BD INSULIN SYRINGE) 29G X 1/2\" 1 ML miscellaneous Use as directed 20 each 1    ketoconazole (NIZORAL) 2 % external cream Apply topically 2 times daily as needed (redness and flaking). Apply to the face. 60 g 3    ketorolac (ACULAR) 0.5 % ophthalmic solution Place 1 drop Into the left eye 4 times daily. 5 mL 0    ketorolac (ACULAR) 0.5 % ophthalmic solution Place 1 drop into the right eye 4 times daily. 5 mL 2    KETOSTIX test strip Use as directed in case of high glucose, vomiting or illness. 50 strip 11    levothyroxine (SYNTHROID/LEVOTHROID) 175 MCG tablet Take 1 tablet (175 mcg) by " mouth daily. Please take daily on an empty stomach and wait 30 minutes for any other medications food or drink.  Please recheck TSH level again in 12 months. 90 tablet 3    losartan-hydrochlorothiazide (HYZAAR) 50-12.5 MG tablet TAKE 1 TABLET BY MOUTH EVERY DAY 90 tablet 1    moxifloxacin (VIGAMOX) 0.5 % ophthalmic solution Place 1 drop Into the left eye 4 times daily. 3 mL 0    neomycin-polymixin-dexAMETHasone (MAXITROL) 0.1 % ophthalmic suspension 1 drop TID OD for 1 week then BID OD for 1 week Follow-up in 2 weeks 5 mL 1    order for DME Equipment being ordered: CPAP machine 1 each 0    prednisoLONE acetate (PRED FORTE) 1 % ophthalmic suspension Place 1 drop Into the left eye 4 times daily. 5 mL 0    prednisoLONE acetate (PRED FORTE) 1 % ophthalmic suspension Place 1-2 drops into the right eye 4 times daily. 10 mL 2    Semaglutide, 2 MG/DOSE, (OZEMPIC, 2 MG/DOSE,) 8 MG/3ML pen Inject 2 mg subcutaneously every 7 days. 9 mL 3    topiramate (TOPAMAX) 50 MG tablet Take 1 tablet (50 mg) by mouth daily. 90 tablet 3    Urea 40 % CREA Externally apply topically 2 times daily To surface of toenails 198 g 5       Family History  family history includes Asthma in his father; Bone Cancer in his paternal grandfather and paternal grandmother; Cancer in his brother, father, maternal grandfather, and maternal grandmother; Diabetes in his father; Heart Failure in his maternal uncle, maternal uncle, and mother; Hypertension in his mother; Liver Cancer (age of onset: 53) in his brother; Macular Degeneration in his father; Melanoma in his father; Obesity in his mother; Other - See Comments in his mother; Other Cancer in his father, paternal grandfather, and paternal grandmother.    Father dx type 1 DM at age 78.    Social History   reports that he has never smoked. He has never been exposed to tobacco smoke. He has never used smokeless tobacco. He reports that he does not drink alcohol and does not use drugs.     Smoke: none  ETOH:  none   Martial status: . No children.  Occupation: working at [employer] and .  Also bike races.      Past Medical History  Past Medical History:   Diagnosis Date    Acquired hypothyroidism 12/07/2016    ADHD (attention deficit hyperactivity disorder)     Alcohol dependence in remission (H)     Depressive disorder     Hidradenocarcinoma 2015    right hand    Hyperlipidemia 12/07/2006    Hypertension     Migraine     Numbness and tingling 2015    right hand, related to surgery for hidradenoma    TELLY (obstructive sleep apnea) 2007    New cpap machine 1 year ago. [place] at [facility] [place]    Type 1 diabetes (H) 1982    Uncomplicated asthma        Past Surgical History:   Procedure Laterality Date    BIOPSY  2016    COLONOSCOPY N/A 5/17/2024    Procedure: Colonoscopy;  Surgeon: Jose Luis Gonzales MD;  Location: [facility]    EXCISE LESION HAND Right 02/26/2015    Procedure: EXCISE LESION HAND;  Surgeon: Jeovany Jefferson MD;  Location: [facility]    IMPLANT PROSTHESIS PENIS INFLATABLE N/A 02/01/2022    Procedure: INSERTION of INFLATABLE PENILE PROSTHESIS;  Surgeon: Johnathan Cooper MD;  Location: [facility] OR    IR RENAL BIOPSY RIGHT  10/11/2024    ORTHOPEDIC SURGERY Left     achilles tendon     ORTHOPEDIC SURGERY Right     hand surgery    PHACOEMULSIFICATION CLEAR CORNEA WITH STANDARD INTRAOCULAR LENS IMPLANT Right 1/21/2025    Procedure: RIGHT EYE PHACOEMULSIFICATION, CATARACT, WITH INTRAOCULAR LENS IMPLANT;  Surgeon: Richard Dutta MD;  Location: [facility] OR    PHACOEMULSIFICATION WITH STANDARD INTRAOCULAR LENS IMPLANT Left 5/6/2025    Procedure: LEFT EYE PHACOEMULSIFICATION, CATARACT, WITH STANDARD INTRAOCULAR LENS IMPLANT INSERTION;  Surgeon: Miranda Watson MD;  Location: [facility] OR    RELEASE DUPUYTRENS CONTRACTURE Left 1/6/2025    Procedure: Left palm and small finger open Dupuytren's fasciectomy,  ring finger Dupuytren's fasciectomy;  Surgeon: Miguel Marcus MD;  Location: [facility] OR    SOFT TISSUE  SURGERY      lipoma removal, below rib cage on right side       Physical Exam  /71   Pulse 91   Wt 88.5 kg (195 lb)   SpO2 94%   BMI 24.37 kg/m    Body mass index is 24.37 kg/m .  Wt Readings from Last 10 Encounters:   06/03/25 88.5 kg (195 lb)   05/06/25 90.7 kg (200 lb)   04/29/25 94.8 kg (209 lb)   04/18/25 95 kg (209 lb 6.4 oz)   01/21/25 89.8 kg (198 lb)   01/06/25 89.8 kg (198 lb)   01/02/25 90 kg (198 lb 6.4 oz)   12/17/24 91.2 kg (201 lb)   09/10/24 93 kg (205 lb)   08/14/24 91.3 kg (201 lb 3.2 oz)       This is a pleasant male with bright affect who is engaged.  Thought form and content are fluid and coherent.  Mood is great affect is upbeat.  Skin has good color.  No swelling in the lower extremities.  He does have some callus his great toe.  Feet are warm with good cap refill sensation intact to monofilament throughout.    RESULTS  Recent Labs   Lab Test 06/03/25  0757 05/06/25  1129 01/02/25  0904 12/17/24  1458 12/17/24  1456 12/17/24  1405 10/14/24  0708 08/14/24  1726 08/14/24  1724 01/05/24  1334 12/19/23  1614 12/28/22  1450 11/15/22  1416 12/21/21  1543 12/21/21  1426   A1C 7.2*  --  7.0*  --   --    < >  --   --    < >  --   --    < >  --    < >  --    HEMOGLOBINA1  --   --   --   --   --   --   --   --   --   --   --   --  7.7*  --  7.9   TSH  --   --  4.19  --  6.18*  --  7.85*  --   --   --   --    < >  --   --   --    T4  --   --   --   --  1.46  --  1.48  --   --   --   --    < >  --   --   --    LDL  --   --  68  --   --   --  97  --   --  76  --    < >  --   --   --    HDL  --   --  62  --   --   --   --   --   --  53  --    < >  --   --   --    TRIG  --   --  79  --   --   --   --   --   --  75  --    < >  --   --   --    CR  --  0.88  --   --   --   --   --  0.88  --  1.01  --    < >  --    < >  --    MICROL  --   --   --  <12.0  --   --   --   --   --   --  <12.0   < >  --    < >  --     < > = values in this interval not displayed.     Cholesterol   Date Value Ref Range Status    01/02/2025 146 <200 mg/dL Final   04/10/2018 173 <200 mg/dL Final       Lab Results   Component Value Date    A1C 9.2 08/14/2024    A1C 6.9 12/19/2023    A1C 7.5 12/21/2021    A1C 7.2 07/23/2021    A1C 7.6 04/10/2018    A1C 8.7 12/07/2016    A1C 8.1 02/17/2015    A1C 10.0 01/17/2013     Hemoglobin   Date Value Ref Range Status   12/17/2024 14.2 13.3 - 17.7 g/dL Final   06/21/2018 15.8 13.3 - 17.7 g/dL Final   ]      ASSESSMENT/PLAN:      TYPE 1 DIABETES MELLITUS: 63-year-old male with history of type 1 diabetes mellitus diagnosed at age 22 complicated by peripheral proliferative diabetic retinopathy which has been stable in recent years and ED. his A1c is at goal .  He is meeting diet and activity goals and feels good about his management particularly now that his depression and ADHD are well-managed.  He has not had a pump failure in a long time and generally gets it up and running again when he does, however discussed that due to his decreased use of insulin recently he should use no more than 14 units of Lantus daily in case of pump failure.      RETINOPATHY: History of proliferative diabetic retinopathy.  No recent visual changes.  Patient seen by ophthalmology on an annual basis.  HTN: Continue current meds.  Blood pressure is at goal.  LIPIDS: LDL 76 in January 2024.  Patient taking Lipitor.  He has changed his diet and is eating far less carbs we will check his lipid panel as he is not eating more meat.    HYPOTHYROIDISM: Levothyroxine was increased to 175 mcg nearly a year ago.  TSH was in range in December and we will recheck again this December.    MENTAL HEALTH: Improved per patient.  He will continue to follow with Kaylynn who can his primary care provider.    TELLY: Using CPAP.  HEALTH MAINTENANCE: Reminded patient to see his primary care provider for health maintenance.  He does have callus on his great toe and I do think that shoes are too small and we discussed this.  Foot exam today revealed good  vibratory and monofilament sensation.    FOLLOW UP: In 6 months.  He is advised to see the diabetes educator if his blood sugar average does not at or below 175 or if he is having frequent blood sugars less than 70.  Potential travel to [place]: Discussed the importance of having a travel consult prior to travel.  I also recommend that he speak with [employer] and assure he has the contact of customer support in [place] and works out with them and his insurance to ensure they have supplies for his pump throughout his travel.  We discussed that he may need to have friend or relative male his supplies to him in [place] .  we will of course be happy to provide any prescriptions needed.      It is my privilege to be involved in the care of the above patient.     Arti Kaur PA-C, MPAS  [facility]  Diabetes, Endocrinology, and Metabolism  [phone] Appointments/Nurse Line  [phone] Fax  On [facility] (inpatient)    To Page:  Dial [phone]  Enter the messaging ID when prompted ([phone])  That will give the option to leave a callback number.        25 minutes spent on the date of the encounter doing chart review, history and exam, documentation, education and counseling, as well as communication and coordination of care, and further activities as noted above.   This time includes time spent reviewing CGM, reviewing and ordering lab tests, medications and supplies.            05/20/25 12:03 PM  PATIENT LAB/IMAGING STATUS : MyChart sent to patient to complete standing/future orders

## 2025-06-03 ENCOUNTER — OFFICE VISIT (OUTPATIENT)
Dept: ENDOCRINOLOGY | Facility: CLINIC | Age: 65
End: 2025-06-03
Payer: COMMERCIAL

## 2025-06-03 ENCOUNTER — RESULTS FOLLOW-UP (OUTPATIENT)
Dept: ENDOCRINOLOGY | Facility: CLINIC | Age: 65
End: 2025-06-03

## 2025-06-03 VITALS
WEIGHT: 195 LBS | SYSTOLIC BLOOD PRESSURE: 120 MMHG | DIASTOLIC BLOOD PRESSURE: 71 MMHG | HEART RATE: 91 BPM | BODY MASS INDEX: 24.37 KG/M2 | OXYGEN SATURATION: 94 %

## 2025-06-03 DIAGNOSIS — E10.8 TYPE 1 DIABETES MELLITUS WITH COMPLICATIONS (H): Primary | ICD-10-CM

## 2025-06-03 LAB
EST. AVERAGE GLUCOSE BLD GHB EST-MCNC: 160 MG/DL
HBA1C MFR BLD: 7.2 %

## 2025-06-03 PROCEDURE — 83036 HEMOGLOBIN GLYCOSYLATED A1C: CPT | Performed by: PATHOLOGY

## 2025-06-03 ASSESSMENT — PAIN SCALES - GENERAL: PAINLEVEL_OUTOF10: NO PAIN (0)

## 2025-06-03 NOTE — PATIENT INSTRUCTIONS
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  If you were to have a pump failure and need to use Lantus. Please use just 14 units of long acting if you were to do so in case of pump failure.    It is good to speak with you today!  I am here to support your health care and life goals by helping you to manage your diabetes and prevent complications.  Please let me know of anything more I might do to support this and review below to be aware of recommendations and resources that might apply to you and your situation.  With deep appreciation and my best wishes for great wilman,  Arti      Please contact us to schedule at any of our [place] lab locations  Call [phone] ([phone]), select option [phone].    For [facility] lab at [address] [place] [address], call : [phone].    Please be sure to get your eye exam done annually as well as microalbuminuria test at lab if you do not see a nephrologist.      Our diabetic foot providers, Dr. Campos and Corine are available to see patients with pedal ulcers or other concerns.   Please be sure to let us know about any lesions that are not healing readily or other concerns with your feet!    If you are smoking, I do recommend that you quit or, if unable, reduce you use and help is available!  Our clinical pharmacists can work with you to develop a quit or reduction plan that may include nicotine replacement if desired and offer the support and knowledge to help you quit smoking.  Call to schedule or let me know if you would like a referral!    Please work to meet recommendations for physical activity which is 30 minutes aerobic activity at 6 days each week and resistance or weight training 2-3 times /week.  If you are >60 years old, please be sure you are including exercises to help you maintain good balance and prevent falls as you continue to age.  Please advise if you you would like a referral to physical therapy to assist you in  developing a personalized and appropriate program for yourself.     If you are on insulin and have ever had any low blood sugars that you are unable to treat your self we need a plan to prevent this, but you also MUST ALSO HAVE a prescription for GLUCAGON, nasal inhaled or injectable.  Please let me know right away, if you need this refilled.      If you are on a pump, please let me know if you do not have a backup insulin plan in case of pump failure, including a prescription for long-acting insulin, and knowledge of what dose you should use in case of pump failure.    Also many heart healthy food ideas are available at:  https://www.diabetesfoodhub.org/    If you do not already have a primary care provider it is very important that you do make an appointment to establish care with a family medicine, internal medicine, adult medicine, or med/peds primary care provider.  To establish primary care at [facility] with an Internal Medicine Physician, please call [phone] and ask for Internal Medicine/Primary Care clinic appointment.    Diabetes Education and Nutrition providers are also available to support you in matching your lifestyle and health goals as well as keep up with technology that can help your blood sugar management less burdensome and time consuming while getting better results!  Please let us know any time you would like to schedule with nutrition or diabetes educator; most insurers cover 2 - 6 hours of education annually and I see better outcomes in patients who take advantage of this.  Knowledge is power of course!    Blood pressure management is a VERY IMPORTANT part of diabetes management.  If you are having higher blood pressures in clinic >140/90, PLEASE GET A HOME BLOOD PRESSURE cuff and check your blood sugar after sitting for 5 minutes.  If it is >140/90 seated resting at home it is imperative that you let me, your general practitioner or nephrologist know so that we can make a plan  to address this.    Did you know that persons with diabetes have significantly increased rates of both depression and anxiety?  This can also increase your risk for cardiovascular disease and is treatable and manageable!  Health Psychology is a specialty that helps people cope with the stress and anxiety that often occurs with illness, emotional and psychological issues, and preparation for medical treatment including surgical procedures.  We have a number of great providers [place] [facility] including [place]. Suzanne Gramajo who specializes in health psychology and behavioral medicine with clinical expertise in stress-related and inflammatory diseases, diabetes, obesity, and addressing health inequities. Information to schedule with her is below or please feel free to discuss your concerns with me at your next appointment.    If you are having difficulty affording enough food, our SW Freda Enrique can help you access [employer] which gives you $80 /month  food credit at certain locations including [employer], which everyone can access.  Groceries at 40% less than in stores.  https://[email or web address]-[email or web address]      HEALTH PSYCHOLOGY    What is Health Psychology?  Health Psychology is a specialty that helps people cope with the stress and anxiety that often occurs with illness, emotional and psychological issues, and preparation for medical treatment including surgical procedures.    Telehealth services are now being provided to patients.    Location:    [facility]                       [address] [place] [id number]                     Please arrive 15 minutes early to check in for in-person appointments.       We can help patients affected by  Adjustment problems  Anxiety  Cancer  Cardiovascular disorders  Chronic digestive disorders  Depression  Diabetes  Disability  Health-related behavior change  Insomnia  Other medical and nervous system conditions  People experiencing or  wishing to prevent health problems  Pulmonary/lung conditions  Smoking cessation  Transplants    Treatments we offer include  Psychological assessment  Psychotherapy/counseling  Cognitive behavioral therapy  Relaxation training  Behavior therapy  Motivational interviewing  Stress management    How We Help  Coping: We help people with the emotional issues related to their illness.  Challenges: Difficult challenges seem to increase during illness. We teach steps and strategies for managing stressful situations.  Feelings: We help people deal with anger, anxiety, confusion, depression, fear, frustration, grief, loss of control, sadness, uncertainty, and motivational issues.  Behaviors: When people are ill, it can be harder to take care of themselves. We help people manage weight, follow health regimens, relax, and quit smoking.  Counseling: We offer several types of counseling and can create a plan that meets needs of a patient. Our practice works with individuals, couples, and families.    Our Care Team  Working with primary and specialty physicians at [facility], we see patients in the hospital and clinic, allowing us to coordinate our services with the rest of people's medical needs.       To Schedule an Appointment  New patient appointments are generally scheduled through the Central Scheduling number: [phone].        NOTE: Services are confidential. Insurance coverage varies. Mental health benefits of health plans may have different levels of coverage than medical benefits. Please confirm the coverage details with the insurance plan or our business office.      My best wishes,    Arti Kaur PA-C, MPAS  [facility]  Diabetes, Endocrinology, and Metabolism  [phone] Appointments/Nurse  [phone] Fax

## 2025-06-03 NOTE — NURSING NOTE
"Chief Complaint   Patient presents with    Diabetes     Vital signs:      BP: 120/71 Pulse: 91     SpO2: 94 %       Weight: 88.5 kg (195 lb)  Estimated body mass index is 24.37 kg/m  as calculated from the following:    Height as of 5/6/25: 1.905 m (6' 3\").    Weight as of this encounter: 88.5 kg (195 lb).        "

## 2025-06-03 NOTE — LETTER
6/3/2025       RE: Luigi Moore  [address]  [address]  [facility] [address]     Dear Colleague,    Thank you for referring your patient, Luigi Moore, to the [facility] ENDOCRINOLOGY [facility]INIC [place] at [facility]. Please see a copy of my visit note below.      HPI  Luigi Moore is a 63 year old male with type 1 diabetes mellitus.  Clinic visit with me today.    Pt dx with type 1 DM at age 22.  He has been using an insulin pump since around 1987.  Pt's diabetes is complicated by PDR.  No known diabetic nephropathy.  He is participating in the kidney precision medicine project and did have a renal biopsy with this with no concerning findings.    Pt has no hx of diabetic peripheral neuropathy.  Hx of ED s/p surgery.  Pt also has hx of hypothyroidism, hyperlipidemia, HTN, TELLY, insomnia, exploding head syndrome, hidradenoma and Dupuytren contractures.    Bp at home 120's or 110's/70.    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  He is going to a tennis camp himself next week but has not had any time for tournaments himself as he has been coaching and teaching a lot.  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    A1C is 7.2% today.        For his diabetes, using Medtronic 780G insulin pump.  His data is included below and we reviewed this together.  Of note he is now eating a much lower carb diet.  This was not really at goal of his it just has happened as he is snacking a lot less.  If you are going to change anything about his diet he thinks he would like to eat more steak.  He denies eating large amounts of cheese sausage or fried foods.  He is meeting activity recommendations.    No peripheral neuropathy sx. No  active foot ulcers.  He does have hypertrophic nails due to trauma.  He did have this tested for fungus and indeed there is no fungus and he has thickened toenails which he cares for himself.      He feels he is doing a good job of taking his thyroid medication on empty stomach each morning.    He is able to feel his low blood sugars.  He has not needed assistance treating low since about 2002.      He feels he is doing well with the Ozempic.  He is comfortable with his current weight which is now stable.  It had been as low as 185 pounds at 1 point and now is at 195.  He feels he does have a decent appetite and enjoys his evening meal, but always has been mostly a once a day meal person.  He previously consumed more carbs throughout the day when he was working, but now he has his phone present when working out and coaching and watches the trends and is better able to limit lows without taking in as much carb.  Having his ADHD and depression treated has helped a lot.      Diabetes Care  Retinopathy: yes; hx of PDR.  History of laser treatment right eye x1. Pt seen by ophthalmology annually.  Eye exam was updated in November 2025 and everything was stable recommended annual follow-up.    Nephropathy:urine microalbuminuria negative in December 2023.  Patient taking Hyzaar.  Neuropathy: None.  Foot Exam: No foot ulcers.  Denies any numbness paresthesias.   Taking aspirin: Yes.  Lipids: LDL 76 in January 2024.  Patient taking Lipitor.  Insulin: Medtronic 7 80G insulin pump.  DM meds: Insulin and Ozempic.   Testing: Currently using glucose meter.  Will order Guardian4 sensors for his Medtronic 7 80G insulin pump.  Hypoglycemia tx: has Baqsimi.  Backup insulin: Patient has basal insulin for backup in case insulin pump fails.    CGMS and pump data:  His average blood sugar during the limited amount of CGM data time that we have he has 179.  He has a high coefficient of variance at 34.4%  He is using an average daily dosage  of 55 units of insulin 70% of which is bolus insulin.  He has been in smartcard just 5% of the time.      ROS  Please see under HPI.      Allergies  Allergies   Allergen Reactions     Iodinated Contrast Media Anaphylaxis     Contrast Dye Hives     Diatrizoate Hives     Iodine dye; iodine externally or topically is OK       Medications  Current Outpatient Medications   Medication Sig Dispense Refill     albuterol (PROAIR HFA/PROVENTIL HFA/VENTOLIN HFA) 108 (90 Base) MCG/ACT inhaler Inhale 2 puffs into the lungs every 6 hours as needed for shortness of breath or wheezing 18 g 5     amLODIPine (NORVASC) 10 MG tablet Take 1 tablet (10 mg) by mouth daily. 90 tablet 3     amphetamine-dextroamphetamine (ADDERALL) 15 MG tablet Take 1 tablet (15 mg) by mouth 2 times daily. 60 tablet 0     amphetamine-dextroamphetamine (ADDERALL) 15 MG tablet Take 1 tablet (15 mg) by mouth 2 times daily. 60 tablet 0     [START ON 6/17/2025] amphetamine-dextroamphetamine (ADDERALL) 15 MG tablet Take 1 tablet (15 mg) by mouth 2 times daily. 60 tablet 0     ARIPiprazole (ABILIFY) 15 MG tablet TAKE 1 TABLET BY MOUTH EVERY DAY 90 tablet 1     aspirin 81 MG tablet Take 1 tablet (81 mg) by mouth daily 100 tablet 3     atorvastatin (LIPITOR) 40 MG tablet TAKE 1 TABLET BY MOUTH EVERY DAY 90 tablet 3     blood glucose (ACCU-CHEK GUIDE) test strip Use to test blood sugar 3 times daily or as directed. 100 strip 11     buPROPion (WELLBUTRIN XL) 150 MG 24 hr tablet Take 1 tablet (150 mg) by mouth every morning. Take with 300 mg tablet (total daily dose 450 mg) 60 tablet 5     buPROPion (WELLBUTRIN XL) 300 MG 24 hr tablet Take 1 tablet (300 mg) by mouth every morning. Take with 150 mg tablet (total daily dose 450 mg) 60 tablet 5     cetirizine (ZYRTEC) 10 MG tablet Take 10 mg by mouth as needed.       Continuous Glucose Sensor (GUARDIAN 4 GLUCOSE SENSOR) MISC 1 each See Admin Instructions. Use per 's instructions. Change every 7 days. 12 each 4  "    Continuous Glucose Transmitter (GUARDIAN 4 TRANSMITTER) MISC 1 each See Admin Instructions. Use per 's instructions to monitor glucose. 1 each 3     DiphenhydrAMINE HCl (BENADRYL PO) Take by mouth as needed.       FLUoxetine (PROZAC) 20 MG capsule TAKE 1 CAPSULE BY MOUTH EVERY DAY 90 capsule 0     Glucagon (BAQSIMI) 3 MG/DOSE nasal powder Spray 1 spray (3 mg) in nostril as needed (severe hypoglycemia) in the event of unconscious hypoglycemia or hypoglycemic seizure. May repeat dose if no response after 15 minutes. 1 each 1     glucagon 1 MG kit Inject 1 mg into the muscle once for 1 dose 1 each 0     infusion set (AGNES 32\" 9MM) misc pump supply Infusion set to be used with pump.  Change every 2-3 days as directed. 3 Box 4     insulin glargine (LANTUS SOLOSTAR) 100 UNIT/ML pen INJECT 20 UNITS Daily  SUBCUTANEOUS ONLY IF INSULIN PUMP FAILS. (Patient taking differently: as needed. INJECT 20 UNITS Daily  SUBCUTANEOUS ONLY IF INSULIN PUMP FAILS.) 15 mL 1     Insulin Infusion Pump (INSULIN PUMP LS0776) KIT        Insulin Infusion Pump Supplies (EXTENDED INFUSION SET 23\"/9MM) MISC 1 each See Admin Instructions. Change every 2-3 days for use in insulin pump. 40 each 3     Insulin Infusion Pump Supplies (EXTENDED RESERVOIR 3ML) [facility] 1 each See Admin Instructions. Change every 2-3 days for use in insulin pump. 40 each 3     Insulin Infusion Pump Supplies (MINIMED PUMP RESERVOIR 3ML) MISC 1 each See Admin Instructions. 2 each 3     insulin lispro (HUMALOG) 100 UNIT/ML vial VIA PUMP APPROX 80 UNITS DAILY. 80 mL 3     insulin pen needle (32G X 6 MM) 32G X 6 MM miscellaneous Use 4 pen needles daily or as directed as back up. 100 each 3     insulin syringe-needle U-100 (BD INSULIN SYRINGE) 29G X 1/2\" 1 ML miscellaneous Use as directed 20 each 1     ketoconazole (NIZORAL) 2 % external cream Apply topically 2 times daily as needed (redness and flaking). Apply to the face. 60 g 3     ketorolac (ACULAR) 0.5 % ophthalmic " solution Place 1 drop Into the left eye 4 times daily. 5 mL 0     ketorolac (ACULAR) 0.5 % ophthalmic solution Place 1 drop into the right eye 4 times daily. 5 mL 2     KETOSTIX test strip Use as directed in case of high glucose, vomiting or illness. 50 strip 11     levothyroxine (SYNTHROID/LEVOTHROID) 175 MCG tablet Take 1 tablet (175 mcg) by mouth daily. Please take daily on an empty stomach and wait 30 minutes for any other medications food or drink.  Please recheck TSH level again in 12 months. 90 tablet 3     losartan-hydrochlorothiazide (HYZAAR) 50-12.5 MG tablet TAKE 1 TABLET BY MOUTH EVERY DAY 90 tablet 1     moxifloxacin (VIGAMOX) 0.5 % ophthalmic solution Place 1 drop Into the left eye 4 times daily. 3 mL 0     neomycin-polymixin-dexAMETHasone (MAXITROL) 0.1 % ophthalmic suspension 1 drop TID OD for 1 week then BID OD for 1 week Follow-up in 2 weeks 5 mL 1     order for DME Equipment being ordered: CPAP machine 1 each 0     prednisoLONE acetate (PRED FORTE) 1 % ophthalmic suspension Place 1 drop Into the left eye 4 times daily. 5 mL 0     prednisoLONE acetate (PRED FORTE) 1 % ophthalmic suspension Place 1-2 drops into the right eye 4 times daily. 10 mL 2     Semaglutide, 2 MG/DOSE, (OZEMPIC, 2 MG/DOSE,) 8 MG/3ML pen Inject 2 mg subcutaneously every 7 days. 9 mL 3     topiramate (TOPAMAX) 50 MG tablet Take 1 tablet (50 mg) by mouth daily. 90 tablet 3     Urea 40 % CREA Externally apply topically 2 times daily To surface of toenails 198 g 5       Family History  family history includes Asthma in his father; Bone Cancer in his paternal grandfather and paternal grandmother; Cancer in his brother, father, maternal grandfather, and maternal grandmother; Diabetes in his father; Heart Failure in his maternal uncle, maternal uncle, and mother; Hypertension in his mother; Liver Cancer (age of onset: 53) in his brother; Macular Degeneration in his father; Melanoma in his father; Obesity in his mother; Other - See  Comments in his mother; Other Cancer in his father, paternal grandfather, and paternal grandmother.    Father dx type 1 DM at age 78.    Social History   reports that he has never smoked. He has never been exposed to tobacco smoke. He has never used smokeless tobacco. He reports that he does not drink alcohol and does not use drugs.     Smoke: none  ETOH: none   Martial status: . No children.  Occupation: working at [employer] and .  Also bike races.      Past Medical History  Past Medical History:   Diagnosis Date     Acquired hypothyroidism 12/07/2016     ADHD (attention deficit hyperactivity disorder)      Alcohol dependence in remission (H)      Depressive disorder      Hidradenocarcinoma 2015    right hand     Hyperlipidemia 12/07/2006     Hypertension      Migraine      Numbness and tingling 2015    right hand, related to surgery for hidradenoma     TELLY (obstructive sleep apnea) 2007    New cp[place] 1 year ago[place] [facility] [place]     Type 1 diabetes (H) 1982     Uncomplicated asthma        Past Surgical History:   Procedure Laterality Date     BIOPSY  2016     COLONOSCOPY N/A 5/17/2024    Procedure: Colonoscopy;  Surgeon: Jose Luis Gonzales MD;  Location: [facility]     EXCISE LESION HAND Right 02/26/2015    Procedure: EXCISE LESION HAND;  Surgeon: Jeovany Jefferson MD;  Location: [facility] OR     IMPLANT PROSTHESIS PENIS INFLATABLE N/A 02/01/2022    Procedure: INSERTION of INFLATABLE PENILE PROSTHESIS;  Surgeon: Johnathan Cooper MD;  Location: [facility] OR     IR RENAL BIOPSY RIGHT  10/11/2024     ORTHOPEDIC SURGERY Left     achilles tendon      ORTHOPEDIC SURGERY Right     hand surgery     PHACOEMULSIFICATION CLEAR CORNEA WITH STANDARD INTRAOCULAR LENS IMPLANT Right 1/21/2025    Procedure: RIGHT EYE PHACOEMULSIFICATION, CATARACT, WITH INTRAOCULAR LENS IMPLANT;  Surgeon: Richard Dutta MD;  Location: [facility] OR     PHACOEMULSIFICATION WITH STANDARD INTRAOCULAR LENS IMPLANT Left 5/6/2025     Procedure: LEFT EYE PHACOEMULSIFICATION, CATARACT, WITH STANDARD INTRAOCULAR LENS IMPLANT INSERTION;  Surgeon: Miranda Watson MD;  Location: [facility] OR     RELEASE DUPUYTRENS CONTRACTURE Left 1/6/2025    Procedure: Left palm and small finger open Dupuytren's fasciectomy,  ring finger Dupuytren's fasciectomy;  Surgeon: Miguel Marcus MD;  [place] [facility] [place]     SOFT TISSUE SURGERY      lipoma removal, below rib cage on right side       Physical Exam  /71   Pulse 91   Wt 88.5 kg (195 lb)   SpO2 94%   BMI 24.37 kg/m    Body mass index is 24.37 kg/m .  Wt Readings from Last 10 Encounters:   06/03/25 88.5 kg (195 lb)   05/06/25 90.7 kg (200 lb)   04/29/25 94.8 kg (209 lb)   04/18/25 95 kg (209 lb 6.4 oz)   01/21/25 89.8 kg (198 lb)   01/06/25 89.8 kg (198 lb)   01/02/25 90 kg (198 lb 6.4 oz)   12/17/24 91.2 kg (201 lb)   09/10/24 93 kg (205 lb)   08/14/24 91.3 kg (201 lb 3.2 oz)       This is a pleasant male with bright affect who is engaged.  Thought form and content are fluid and coherent.  Mood is great affect is upbeat.  Skin has good color.  No swelling in the lower extremities.  He does have some callus his great toe.  Feet are warm with good cap refill sensation intact to monofilament throughout.    RESULTS  Recent Labs   Lab Test 06/03/25  0757 05/06/25  1129 01/02/25  0904 12/17/24  1458 12/17/24  1456 12/17/24  1405 10/14/24  0708 08/14/24  1726 08/14/24  1724 01/05/24  1334 12/19/23  1614 12/28/22  1450 11/15/22  1416 12/21/21  1543 12/21/21  1426   A1C 7.2*  --  7.0*  --   --    < >  --   --    < >  --   --    < >  --    < >  --    HEMOGLOBINA1  --   --   --   --   --   --   --   --   --   --   --   --  7.7*  --  7.9   TSH  --   --  4.19  --  6.18*  --  7.85*  --   --   --   --    < >  --   --   --    T4  --   --   --   --  1.46  --  1.48  --   --   --   --    < >  --   --   --    LDL  --   --  68  --   --   --  97  --   --  76  --    < >  --   --   --    HDL  --   --  62  --   --   --   --    --   --  53  --    < >  --   --   --    TRIG  --   --  79  --   --   --   --   --   --  75  --    < >  --   --   --    CR  --  0.88  --   --   --   --   --  0.88  --  1.01  --    < >  --    < >  --    MICROL  --   --   --  <12.0  --   --   --   --   --   --  <12.0   < >  --    < >  --     < > = values in this interval not displayed.     Cholesterol   Date Value Ref Range Status   01/02/2025 146 <200 mg/dL Final   04/10/2018 173 <200 mg/dL Final       Lab Results   Component Value Date    A1C 9.2 08/14/2024    A1C 6.9 12/19/2023    A1C 7.5 12/21/2021    A1C 7.2 07/23/2021    A1C 7.6 04/10/2018    A1C 8.7 12/07/2016    A1C 8.1 02/17/2015    A1C 10.0 01/17/2013     Hemoglobin   Date Value Ref Range Status   12/17/2024 14.2 13.3 - 17.7 g/dL Final   06/21/2018 15.8 13.3 - 17.7 g/dL Final   ]      ASSESSMENT/PLAN:      TYPE 1 DIABETES MELLITUS: 63-year-old male with history of type 1 diabetes mellitus diagnosed at age 22 complicated by peripheral proliferative diabetic retinopathy which has been stable in recent years and ED. his A1c is at goal .  He is meeting diet and activity goals and feels good about his management particularly now that his depression and ADHD are well-managed.  He has not had a pump failure in a long time and generally gets it up and running again when he does, however discussed that due to his decreased use of insulin recently he should use no more than 14 units of Lantus daily in case of pump failure.      RETINOPATHY: History of proliferative diabetic retinopathy.  No recent visual changes.  Patient seen by ophthalmology on an annual basis.  HTN: Continue current meds.  Blood pressure is at goal.  LIPIDS: LDL 76 in January 2024.  Patient taking Lipitor.  He has changed his diet and is eating far less carbs we will check his lipid panel as he is not eating more meat.    HYPOTHYROIDISM: Levothyroxine was increased to 175 mcg nearly a year ago.  TSH was in range in December and we will recheck again  this December.    MENTAL HEALTH: Improved per patient.  He will continue to follow with Kaylynn who can his primary care provider.    TELLY: Using CPAP.  HEALTH MAINTENANCE: Reminded patient to see his primary care provider for health maintenance.  He does have callus on his great toe and I do think that shoes are too small and we discussed this.  Foot exam today revealed good vibratory and monofilament sensation.    FOLLOW UP: In 6 months.  He is advised to see the diabetes educator if his blood sugar average does not at or below 175 or if he is having frequent blood sugars less than 70.  Potential travel to [place]: Discussed the importance of having a travel consult prior to travel.  I also recommend that he speak with [employer] and assure he has the contact of customer support in [place] and works out with them and his insurance to ensure they have supplies for his pump throughout his travel.  We discussed that he may need to have friend or relative male his supplies to him in [place] .  we will of course be happy to provide any prescriptions needed.      It is my privilege to be involved in the care of the above patient.     Arti Kaur PA-C, [facility]  [facility]  Diabetes, Endocrinology, and Metabolism  [phone] Appointments/Nurse Line  [phone] Fax  On [facility] (inpatient)    To Page:  Dial [phone]  Enter the messaging ID when prompted ([id number])  That will give the option to leave a callback number.        25 minutes spent on the date of the encounter doing chart review, history and exam, documentation, education and counseling, as well as communication and coordination of care, and further activities as noted above.   This time includes time spent reviewing CGM, reviewing and ordering lab tests, medications and supplies.            05/20/25 12:03 PM  PATIENT LAB/IMAGING STATUS : MyChart sent to patient to complete standing/future orders                                                                                Again, thank you for allowing me to participate in the care of your patient.      Sincerely,    Arti Kaur PA-C

## 2025-07-16 ENCOUNTER — MYC REFILL (OUTPATIENT)
Dept: ENDOCRINOLOGY | Facility: CLINIC | Age: 65
End: 2025-07-16
Payer: COMMERCIAL

## 2025-07-16 DIAGNOSIS — E10.8 TYPE 1 DIABETES MELLITUS WITH COMPLICATIONS (H): ICD-10-CM

## 2025-07-17 ENCOUNTER — TELEPHONE (OUTPATIENT)
Dept: DERMATOLOGY | Facility: CLINIC | Age: 65
End: 2025-07-17
Payer: COMMERCIAL

## 2025-07-17 RX ORDER — BLOOD-GLUCOSE SENSOR
1 EACH MISCELLANEOUS SEE ADMIN INSTRUCTIONS
Qty: 12 EACH | Refills: 4 | Status: SHIPPED | OUTPATIENT
Start: 2025-07-17

## 2025-07-17 NOTE — TELEPHONE ENCOUNTER
7/17 Patient confirmed scheduled appointment:  Date: 4/28/2026  Time: 7:00 AM  Visit type: Return Dermatology   Provider: Savi   Location: [facility]  Testing/imaging: na  Additional notes: Reschedule of 10/27/2025

## 2025-08-04 NOTE — TELEPHONE ENCOUNTER
----- Message from Saundra Ordoñez RN sent at 2/12/2018 10:06 AM CST -----  Regarding: appt  Please call to schedule.    Thanks    Due for RTC     I do not need any follow up on the scheduling of this appointment.       Low salt low cholesterol, low fat diet kishore fiber

## 2025-08-06 ENCOUNTER — MYC REFILL (OUTPATIENT)
Dept: ENDOCRINOLOGY | Facility: CLINIC | Age: 65
End: 2025-08-06
Payer: COMMERCIAL

## 2025-08-06 DIAGNOSIS — E10.8 TYPE 1 DIABETES MELLITUS WITH COMPLICATIONS (H): ICD-10-CM

## 2025-08-06 RX ORDER — INSULIN LISPRO 100 [IU]/ML
INJECTION, SOLUTION INTRAVENOUS; SUBCUTANEOUS
Qty: 80 ML | Refills: 3 | Status: SHIPPED | OUTPATIENT
Start: 2025-08-06

## (undated) DEVICE — LINEN TOWEL PACK X5 5464

## (undated) DEVICE — SU ETHILON 4-0 PS-2 18" BLACK 1667H

## (undated) DEVICE — WIPES FOLEY CARE SURESTEP PROVON DFC100

## (undated) DEVICE — DRSG KERLIX FLUFFS X5

## (undated) DEVICE — SU MONOCRYL 4-0 PS-2 18" UND Y496G

## (undated) DEVICE — EYE SHIELD PLASTIC

## (undated) DEVICE — PREP CHLORAPREP 26ML TINTED HI-LITE ORANGE 930815

## (undated) DEVICE — SYR 50ML LL W/O NDL 309653

## (undated) DEVICE — PAD CHUX UNDERPAD 30X36" P3036C

## (undated) DEVICE — PACK CATARACT CUSTOM ASC SEY15CPUMC

## (undated) DEVICE — CATH PLUG W/CAP 000076

## (undated) DEVICE — GLOVE PROTEXIS W/NEU-THERA 7.5  2D73TE75

## (undated) DEVICE — Device

## (undated) DEVICE — EYE CANN IRR 25GA HYDRODISSECTING 585037

## (undated) DEVICE — SOL WATER IRRIG 1000ML BOTTLE 2F7114

## (undated) DEVICE — LINEN ORTHO PACK 5446

## (undated) DEVICE — DRAPE IOBAN INCISE 13X13" 6640EZ

## (undated) DEVICE — BLADE KNIFE SURG 15 371115

## (undated) DEVICE — TOURNIQUET SGL BLADDER 18"X4" RED 5921-218-135

## (undated) DEVICE — SYR BULB IRRIG DOVER 60 ML LATEX FREE 67000

## (undated) DEVICE — GLOVE BIOGEL PI MICRO SZ 6.5 48565

## (undated) DEVICE — SUCTION MANIFOLD NEPTUNE 2 SYS 1 PORT 702-025-000

## (undated) DEVICE — DRAPE IOBAN C-SECTION W/POUCH 30X35" 6657

## (undated) DEVICE — TUBING SUCTION MEDI-VAC 1/4"X20' N620A

## (undated) DEVICE — DRSG STERI STRIP 1/2X4" R1547

## (undated) DEVICE — SYR 20ML LL W/O NDL 302830

## (undated) DEVICE — TAPE MICROPORE 2"X1.5YD 1530S-2

## (undated) DEVICE — SOL NACL 0.9% IRRIG 500ML BOTTLE 2F7123

## (undated) DEVICE — DRAPE MAYO STAND 23X54 8337

## (undated) DEVICE — DRAIN JACKSON PRATT RESERVOIR 100ML SU130-1305

## (undated) DEVICE — POSITIONER ARMBOARD FOAM 1PAIR LF FP-ARMB1

## (undated) DEVICE — NDL BLUNT 18GA 1.5" FILTER 305211

## (undated) DEVICE — SOL NACL 0.9% IRRIG 1000ML BOTTLE 2F7124

## (undated) DEVICE — DRSG KERLIX 4 1/2"X4YDS ROLL 6730

## (undated) DEVICE — SU ETHILON 2-0 PS 18" 585H

## (undated) DEVICE — SOL WATER IRRIG 500ML BOTTLE 2F7113

## (undated) DEVICE — SUCTION TIP YANKAUER W/O VENT K86

## (undated) DEVICE — EYE TIP IRRIGATION & ASPIRATION POLYMER 35D BENT 8065751511

## (undated) DEVICE — SUCTION MANIFOLD NEPTUNE 2 SYS 4 PORT 0702-020-000

## (undated) DEVICE — CATARACT BLADE PACK 2.4MM 58001897

## (undated) DEVICE — GLOVE BIOGEL PI MICRO SZ 7.5 48575

## (undated) DEVICE — ESU GROUND PAD UNIVERSAL W/O CORD

## (undated) DEVICE — GLOVE BIOGEL PI ULTRATOUCH G SZ 7.5 42175

## (undated) DEVICE — SU PDS II 2-0 CT-2 27"  Z333H

## (undated) DEVICE — DRSG XEROFORM 5X9" 8884431605

## (undated) DEVICE — CAST PLASTER SPLINT 4X15" 7394

## (undated) DEVICE — DRAIN JACKSON PRATT 10FR ROUND SU130-1321

## (undated) DEVICE — SU VICRYL 2-0 SH 27" UND J417H

## (undated) DEVICE — DRAPE LAP TRANSVERSE 29421

## (undated) DEVICE — EYE PACK CUSTOM ANTERIOR 30DEG TIP CENTURION PPK6682-04

## (undated) DEVICE — LABEL MEDICATION SYSTEM 3303-P

## (undated) DEVICE — CATH TRAY FOLEY SURESTEP 16FR WDRAIN BAG STLK LATEX A300316A

## (undated) DEVICE — LINEN GOWN X4 5410

## (undated) DEVICE — RETR PENILE WILSON XL 12"  TLC-5042-M

## (undated) DEVICE — GLOVE PROTEXIS BLUE W/NEU-THERA 8.0  2D73EB80

## (undated) DEVICE — PACK HAND CUSTOM ASC

## (undated) RX ORDER — LIDOCAINE HYDROCHLORIDE 10 MG/ML
INJECTION, SOLUTION EPIDURAL; INFILTRATION; INTRACAUDAL; PERINEURAL
Status: DISPENSED
Start: 2025-01-06

## (undated) RX ORDER — HYDROMORPHONE HYDROCHLORIDE 1 MG/ML
INJECTION, SOLUTION INTRAMUSCULAR; INTRAVENOUS; SUBCUTANEOUS
Status: DISPENSED
Start: 2022-02-01

## (undated) RX ORDER — FENTANYL CITRATE 50 UG/ML
INJECTION, SOLUTION INTRAMUSCULAR; INTRAVENOUS
Status: DISPENSED
Start: 2024-10-11

## (undated) RX ORDER — BUPIVACAINE HYDROCHLORIDE 2.5 MG/ML
INJECTION, SOLUTION EPIDURAL; INFILTRATION; INTRACAUDAL
Status: DISPENSED
Start: 2022-02-01

## (undated) RX ORDER — PROPOFOL 10 MG/ML
INJECTION, EMULSION INTRAVENOUS
Status: DISPENSED
Start: 2022-02-01

## (undated) RX ORDER — FENTANYL CITRATE 50 UG/ML
INJECTION, SOLUTION INTRAMUSCULAR; INTRAVENOUS
Status: DISPENSED
Start: 2022-02-01

## (undated) RX ORDER — GABAPENTIN 300 MG/1
CAPSULE ORAL
Status: DISPENSED
Start: 2022-02-01

## (undated) RX ORDER — SODIUM CHLORIDE, SODIUM LACTATE, POTASSIUM CHLORIDE, CALCIUM CHLORIDE 600; 310; 30; 20 MG/100ML; MG/100ML; MG/100ML; MG/100ML
INJECTION, SOLUTION INTRAVENOUS
Status: DISPENSED
Start: 2022-02-01

## (undated) RX ORDER — FENTANYL CITRATE 50 UG/ML
INJECTION, SOLUTION INTRAMUSCULAR; INTRAVENOUS
Status: DISPENSED
Start: 2024-05-17

## (undated) RX ORDER — ACETAMINOPHEN 325 MG/1
TABLET ORAL
Status: DISPENSED
Start: 2025-01-21

## (undated) RX ORDER — LIDOCAINE HYDROCHLORIDE 20 MG/ML
INJECTION, SOLUTION EPIDURAL; INFILTRATION; INTRACAUDAL; PERINEURAL
Status: DISPENSED
Start: 2022-02-01

## (undated) RX ORDER — SIMETHICONE 40MG/0.6ML
SUSPENSION, DROPS(FINAL DOSAGE FORM)(ML) ORAL
Status: DISPENSED
Start: 2017-05-08

## (undated) RX ORDER — FENTANYL CITRATE 50 UG/ML
INJECTION, SOLUTION INTRAMUSCULAR; INTRAVENOUS
Status: DISPENSED
Start: 2025-01-06

## (undated) RX ORDER — OXYCODONE HYDROCHLORIDE 5 MG/1
TABLET ORAL
Status: DISPENSED
Start: 2022-02-01

## (undated) RX ORDER — FENTANYL CITRATE 50 UG/ML
INJECTION, SOLUTION INTRAMUSCULAR; INTRAVENOUS
Status: DISPENSED
Start: 2025-01-21

## (undated) RX ORDER — ACETAMINOPHEN 325 MG/1
TABLET ORAL
Status: DISPENSED
Start: 2025-05-06

## (undated) RX ORDER — FENTANYL CITRATE 50 UG/ML
INJECTION, SOLUTION INTRAMUSCULAR; INTRAVENOUS
Status: DISPENSED
Start: 2017-05-08

## (undated) RX ORDER — ONDANSETRON 2 MG/ML
INJECTION INTRAMUSCULAR; INTRAVENOUS
Status: DISPENSED
Start: 2025-01-06

## (undated) RX ORDER — EPHEDRINE SULFATE 50 MG/ML
INJECTION, SOLUTION INTRAMUSCULAR; INTRAVENOUS; SUBCUTANEOUS
Status: DISPENSED
Start: 2022-02-01

## (undated) RX ORDER — ACETAMINOPHEN 325 MG/1
TABLET ORAL
Status: DISPENSED
Start: 2022-02-01

## (undated) RX ORDER — FENTANYL CITRATE-0.9 % NACL/PF 10 MCG/ML
PLASTIC BAG, INJECTION (ML) INTRAVENOUS
Status: DISPENSED
Start: 2025-01-06

## (undated) RX ORDER — CEFAZOLIN SODIUM 2 G/50ML
SOLUTION INTRAVENOUS
Status: DISPENSED
Start: 2025-01-06

## (undated) RX ORDER — CEFAZOLIN SODIUM 2 G/100ML
INJECTION, SOLUTION INTRAVENOUS
Status: DISPENSED
Start: 2022-02-01

## (undated) RX ORDER — PROPOFOL 10 MG/ML
INJECTION, EMULSION INTRAVENOUS
Status: DISPENSED
Start: 2025-01-06

## (undated) RX ORDER — LIDOCAINE HYDROCHLORIDE 10 MG/ML
INJECTION, SOLUTION EPIDURAL; INFILTRATION; INTRACAUDAL; PERINEURAL
Status: DISPENSED
Start: 2024-10-11

## (undated) RX ORDER — ONDANSETRON 2 MG/ML
INJECTION INTRAMUSCULAR; INTRAVENOUS
Status: DISPENSED
Start: 2022-02-01

## (undated) RX ORDER — ACETAMINOPHEN 325 MG/1
TABLET ORAL
Status: DISPENSED
Start: 2025-01-06

## (undated) RX ORDER — FENTANYL CITRATE-0.9 % NACL/PF 10 MCG/ML
PLASTIC BAG, INJECTION (ML) INTRAVENOUS
Status: DISPENSED
Start: 2022-02-01

## (undated) RX ORDER — FENTANYL CITRATE 50 UG/ML
INJECTION, SOLUTION INTRAMUSCULAR; INTRAVENOUS
Status: DISPENSED
Start: 2025-05-06

## (undated) RX ORDER — BUPIVACAINE HYDROCHLORIDE 2.5 MG/ML
INJECTION, SOLUTION EPIDURAL; INFILTRATION; INTRACAUDAL
Status: DISPENSED
Start: 2025-01-06

## (undated) RX ORDER — OXYCODONE HYDROCHLORIDE 5 MG/1
TABLET ORAL
Status: DISPENSED
Start: 2025-01-06